# Patient Record
Sex: FEMALE | Race: WHITE | URBAN - METROPOLITAN AREA
[De-identification: names, ages, dates, MRNs, and addresses within clinical notes are randomized per-mention and may not be internally consistent; named-entity substitution may affect disease eponyms.]

---

## 2021-12-30 ENCOUNTER — TELEPHONE (OUTPATIENT)
Dept: FAMILY MEDICINE CLINIC | Age: 61
End: 2021-12-30

## 2022-02-08 ENCOUNTER — VIRTUAL VISIT (OUTPATIENT)
Dept: FAMILY MEDICINE CLINIC | Age: 62
End: 2022-02-08
Payer: COMMERCIAL

## 2022-02-08 DIAGNOSIS — M25.552 HIP PAIN, CHRONIC, LEFT: ICD-10-CM

## 2022-02-08 DIAGNOSIS — G89.29 CHRONIC PAIN OF LEFT KNEE: ICD-10-CM

## 2022-02-08 DIAGNOSIS — G89.29 CHRONIC PAIN OF RIGHT KNEE: ICD-10-CM

## 2022-02-08 DIAGNOSIS — M25.562 CHRONIC PAIN OF LEFT KNEE: ICD-10-CM

## 2022-02-08 DIAGNOSIS — G89.29 HIP PAIN, CHRONIC, LEFT: ICD-10-CM

## 2022-02-08 DIAGNOSIS — M25.561 CHRONIC PAIN OF RIGHT KNEE: ICD-10-CM

## 2022-02-08 DIAGNOSIS — Z76.89 ENCOUNTER TO ESTABLISH CARE: Primary | ICD-10-CM

## 2022-02-08 PROCEDURE — 99204 OFFICE O/P NEW MOD 45 MIN: CPT | Performed by: PHYSICIAN ASSISTANT

## 2022-02-08 RX ORDER — MELOXICAM 15 MG/1
15 TABLET ORAL DAILY
Qty: 90 TABLET | Refills: 1 | Status: SHIPPED | OUTPATIENT
Start: 2022-02-08 | End: 2022-05-06

## 2022-02-08 SDOH — ECONOMIC STABILITY: HOUSING INSECURITY
IN THE LAST 12 MONTHS, WAS THERE A TIME WHEN YOU DID NOT HAVE A STEADY PLACE TO SLEEP OR SLEPT IN A SHELTER (INCLUDING NOW)?: NO

## 2022-02-08 SDOH — ECONOMIC STABILITY: TRANSPORTATION INSECURITY
IN THE PAST 12 MONTHS, HAS THE LACK OF TRANSPORTATION KEPT YOU FROM MEDICAL APPOINTMENTS OR FROM GETTING MEDICATIONS?: NO

## 2022-02-08 SDOH — ECONOMIC STABILITY: FOOD INSECURITY: WITHIN THE PAST 12 MONTHS, THE FOOD YOU BOUGHT JUST DIDN'T LAST AND YOU DIDN'T HAVE MONEY TO GET MORE.: NEVER TRUE

## 2022-02-08 SDOH — ECONOMIC STABILITY: FOOD INSECURITY: WITHIN THE PAST 12 MONTHS, YOU WORRIED THAT YOUR FOOD WOULD RUN OUT BEFORE YOU GOT MONEY TO BUY MORE.: NEVER TRUE

## 2022-02-08 SDOH — ECONOMIC STABILITY: TRANSPORTATION INSECURITY
IN THE PAST 12 MONTHS, HAS LACK OF TRANSPORTATION KEPT YOU FROM MEETINGS, WORK, OR FROM GETTING THINGS NEEDED FOR DAILY LIVING?: NO

## 2022-02-08 SDOH — ECONOMIC STABILITY: HOUSING INSECURITY: IN THE LAST 12 MONTHS, HOW MANY PLACES HAVE YOU LIVED?: 1

## 2022-02-08 SDOH — ECONOMIC STABILITY: INCOME INSECURITY: IN THE LAST 12 MONTHS, WAS THERE A TIME WHEN YOU WERE NOT ABLE TO PAY THE MORTGAGE OR RENT ON TIME?: NO

## 2022-02-08 ASSESSMENT — ENCOUNTER SYMPTOMS
COLOR CHANGE: 0
BACK PAIN: 0
SHORTNESS OF BREATH: 0

## 2022-02-08 ASSESSMENT — PATIENT HEALTH QUESTIONNAIRE - PHQ9
SUM OF ALL RESPONSES TO PHQ QUESTIONS 1-9: 0
SUM OF ALL RESPONSES TO PHQ9 QUESTIONS 1 & 2: 0
SUM OF ALL RESPONSES TO PHQ QUESTIONS 1-9: 0
2. FEELING DOWN, DEPRESSED OR HOPELESS: 0
1. LITTLE INTEREST OR PLEASURE IN DOING THINGS: 0

## 2022-02-08 ASSESSMENT — SOCIAL DETERMINANTS OF HEALTH (SDOH): HOW HARD IS IT FOR YOU TO PAY FOR THE VERY BASICS LIKE FOOD, HOUSING, MEDICAL CARE, AND HEATING?: NOT HARD AT ALL

## 2022-02-08 NOTE — PROGRESS NOTES
2022    TELEHEALTH EVALUATION -- Audio/Visual (During FZFHV-00 public health emergency)    HPI:    Lauren Bender (:  1960) has requested an audio/video evaluation for the following concern(s):    The pt is here to establish care    Arthritis:  Symptoms started one year ago  Location: left hip, radiates down leg, right knee and left knee  Characteristics: sharp pain  Associated symptoms: bone on bone sensation, grinding sensation, left knee does not bend, right knee swells  Denies: heat and erythema  Treatments tried: aleve muscle and joint, advil, icy hot  Aggravated by going up and down stairs, has to   + family hx of rheumatoid arthritis in m. Grandmother        Review of Systems   Constitutional: Positive for activity change. Respiratory: Negative for shortness of breath. Cardiovascular: Negative for chest pain. Musculoskeletal: Positive for arthralgias, gait problem and joint swelling. Negative for back pain. Skin: Negative for color change. Neurological: Positive for weakness. Negative for numbness. Prior to Visit Medications    Medication Sig Taking? Authorizing Provider   Naproxen Sodium (ALEVE PO) Take by mouth Yes Historical Provider, MD   meloxicam (MOBIC) 15 MG tablet Take 1 tablet by mouth daily Yes SONAM Bailey   naproxen (NAPROSYN) 500 MG tablet Take 1 tablet by mouth 2 times daily (with meals)  Eron Umanzor MD   atorvastatin (LIPITOR) 10 MG tablet Take 1 tablet by mouth nightly. Eorn Umanzor MD   vitamin D (ERGOCALCIFEROL) 94225 UNITS CAPS capsule Take 1 capsule by mouth once a week. Imelda White MD   pravastatin (PRAVACHOL) 20 MG tablet Take 1 tablet by mouth every evening.   Imelda White MD       Social History     Tobacco Use    Smoking status: Former Smoker     Packs/day: 0.50     Years: 43.00     Pack years: 21.50     Types: Cigarettes     Quit date: 3/1/2018     Years since quitting: 3.9    Smokeless tobacco: Never Used   Vaping Use    Vaping Use: Some days    Devices: Refillable tank   Substance Use Topics    Alcohol use: No    Drug use: No        Allergies   Allergen Reactions    Asa [Aspirin] Rash    Pcn [Penicillins] Hives and Swelling       PHYSICAL EXAMINATION:  [ INSTRUCTIONS:  \"[x]\" Indicates a positive item  \"[]\" Indicates a negative item  -- DELETE ALL ITEMS NOT EXAMINED]  Vital Signs: (As obtained by patient/caregiver or practitioner observation)    Blood pressure-  Heart rate-    Respiratory rate- 14   Temperature- 98.6 Pulse oximetry-     Constitutional: [x] Appears well-developed and well-nourished [x] No apparent distress      [] Abnormal-   Mental status  [x] Alert and awake  [x] Oriented to person/place/time []Able to follow commands      Eyes:  EOM    [x]  Normal  [] Abnormal-  Sclera  [x]  Normal  [] Abnormal -         Discharge []  None visible  [] Abnormal -    HENT:   [x] Normocephalic, atraumatic. [] Abnormal   [x] Mouth/Throat: Mucous membranes are moist.     External Ears [x] Normal  [] Abnormal-     Neck: [x] No visualized mass     Pulmonary/Chest: [x] Respiratory effort normal.  [x] No visualized signs of difficulty breathing or respiratory distress        [] Abnormal-      Musculoskeletal:   [] Normal gait with no signs of ataxia         [x] Normal range of motion of neck        [x] Abnormal- using cane to ambulate    Neurological:        [] No Facial Asymmetry (Cranial nerve 7 motor function) (limited exam to video visit)          [] No gaze palsy        [] Abnormal-         Skin:        [] No significant exanthematous lesions or discoloration noted on facial skin         [] Abnormal-            Psychiatric:       [] Normal Affect [] No Hallucinations        [] Abnormal-     Other pertinent observable physical exam findings-     ASSESSMENT/PLAN:  1. Encounter to establish care  -  Pt in need of lab work, will have her return in office for a physical    2.  Chronic pain of right knee  - XR KNEE RIGHT (3 VIEWS); Future  - Amilcar Toledo MD, Orthopedic Surgery, [facility]  - meloxicam (MOBIC) 15 MG tablet; Take 1 tablet by mouth daily  Dispense: 90 tablet; Refill: 1    3. Hip pain, chronic, left  - [facility] Jocelynn Vega MD, Orthopedic Surgery, [facility]  - meloxicam (MOBIC) 15 MG tablet; Take 1 tablet by mouth daily  Dispense: 90 tablet; Refill: 1  - XR HIP LEFT (2-3 VIEWS); Future    4. Chronic pain of left knee  - XR KNEE LEFT (3 VIEWS); Future  - Amilcar Toledo MD, Orthopedic Surgery, [facility]  - meloxicam (MOBIC) 15 MG tablet; Take 1 tablet by mouth daily  Dispense: 90 tablet; Refill: 1      Return for Physical exam.    Emilie Sevilla, was evaluated through a synchronous (real-time) audio-video encounter. The patient (or guardian if applicable) is aware that this is a billable service, which includes applicable co-pays. This Virtual Visit was conducted with patient's (and/or legal guardian's) consent. The visit was conducted pursuant to the emergency declaration under the [facility], [address] [place] [address] authority and the [employer] and [employer] General Act. Patient identification was verified, and a caregiver was present when appropriate. The patient was located at home in a state where the provider was licensed to provide care. Total time spent on this encounter: Not billed by time    --SONAM Ellison on 2/8/2022 at 10:17 AM    An electronic signature was used to authenticate this note.

## 2022-02-21 ENCOUNTER — OFFICE VISIT (OUTPATIENT)
Dept: ORTHOPEDIC SURGERY | Age: 62
End: 2022-02-21
Payer: COMMERCIAL

## 2022-02-21 VITALS — BODY MASS INDEX: 32.96 KG/M2 | WEIGHT: 210 LBS | HEIGHT: 67 IN

## 2022-02-21 DIAGNOSIS — M25.561 PAIN IN BOTH KNEES, UNSPECIFIED CHRONICITY: Primary | ICD-10-CM

## 2022-02-21 DIAGNOSIS — M17.11 PRIMARY OSTEOARTHRITIS OF RIGHT KNEE: ICD-10-CM

## 2022-02-21 DIAGNOSIS — M17.12 PRIMARY OSTEOARTHRITIS OF LEFT KNEE: ICD-10-CM

## 2022-02-21 DIAGNOSIS — M25.562 PAIN IN BOTH KNEES, UNSPECIFIED CHRONICITY: Primary | ICD-10-CM

## 2022-02-21 PROCEDURE — 99204 OFFICE O/P NEW MOD 45 MIN: CPT | Performed by: ORTHOPAEDIC SURGERY

## 2022-02-21 PROCEDURE — 20610 DRAIN/INJ JOINT/BURSA W/O US: CPT | Performed by: ORTHOPAEDIC SURGERY

## 2022-02-21 RX ORDER — TRIAMCINOLONE ACETONIDE 40 MG/ML
40 INJECTION, SUSPENSION INTRA-ARTICULAR; INTRAMUSCULAR ONCE
Status: COMPLETED | OUTPATIENT
Start: 2022-02-21 | End: 2022-02-21

## 2022-02-21 RX ORDER — ROPIVACAINE HYDROCHLORIDE 5 MG/ML
20 INJECTION, SOLUTION EPIDURAL; INFILTRATION; PERINEURAL ONCE
Status: COMPLETED | OUTPATIENT
Start: 2022-02-21 | End: 2022-02-21

## 2022-02-21 RX ADMIN — ROPIVACAINE HYDROCHLORIDE 20 MG: 5 INJECTION, SOLUTION EPIDURAL; INFILTRATION; PERINEURAL at 11:47

## 2022-02-21 RX ADMIN — TRIAMCINOLONE ACETONIDE 40 MG: 40 INJECTION, SUSPENSION INTRA-ARTICULAR; INTRAMUSCULAR at 11:48

## 2022-02-21 NOTE — PROGRESS NOTES
ORTHOPAEDIC SURGERY H&P / CONSULTATION NOTE    Chief complaint:   Chief Complaint   Patient presents with    Knee Pain     BILATERL KNEE PAIN        History of present illness: The patient is a 59 y.o. female with subjective symptoms of bilateral knee pain left greater than right. The chief complaint is located at left knee greater than right knee. Duration of symptoms has been for over 1 year. The severity of symptoms is rated at 4/10 pain but can be as high as 10/10 pain on intake form. Patient states chronic pain. She has previous diagnoses of bilateral knee osteoarthritis with the right knee having more of a cosmetic deformity as not needed than the left knee. She has been taking meloxicam prescribed by her primary care doctor for the last week and a half and she does feel that there is been some improvement in her baseline symptoms. She is not done any physical therapy. She denies injections. She presents today for initial consultation. She denies gross instability of the knee. She does state occasional swelling. The patient has tried the below listed items prior to today's consultation for above listed chief complaint.     +   Over-the-counter anti-inflammatories/prescription medication anti-inflammatory. -   Physical therapy / guided home exercise program -     -   Previous corticosteroid injections    Past medical history:    Past Medical History:   Diagnosis Date    Foot pain, bilateral     Hypercholesterolemia 2014        Past surgical history:    Past Surgical History:   Procedure Laterality Date     SECTION          Allergies:     Allergies   Allergen Reactions    Asa [Aspirin] Rash    Pcn [Penicillins] Hives and Swelling         Medications:   Current Outpatient Medications:     Naproxen Sodium (ALEVE PO), Take by mouth, Disp: , Rfl:     meloxicam (MOBIC) 15 MG tablet, Take 1 tablet by mouth daily, Disp: 90 tablet, Rfl: 1    naproxen (NAPROSYN) 500 MG tablet, Take 1 tablet by mouth 2 times daily (with meals), Disp: 60 tablet, Rfl: 3    atorvastatin (LIPITOR) 10 MG tablet, Take 1 tablet by mouth nightly., Disp: 30 tablet, Rfl: 3    vitamin D (ERGOCALCIFEROL) 13472 UNITS CAPS capsule, Take 1 capsule by mouth once a week., Disp: 5 capsule, Rfl: 6    pravastatin (PRAVACHOL) 20 MG tablet, Take 1 tablet by mouth every evening., Disp: 30 tablet, Rfl: 3     Social history: Denies IV drug use. Social History     Socioeconomic History    Marital status:      Spouse name: Not on file    Number of children: Not on file    Years of education: Not on file    Highest education level: Not on file   Occupational History    Not on file   Tobacco Use    Smoking status: Former Smoker     Packs/day: 0.50     Years: 43.00     Pack years: 21.50     Types: Cigarettes     Quit date: 3/1/2018     Years since quitting: 3.9    Smokeless tobacco: Never Used   Vaping Use    Vaping Use: Some days    Devices: Refillable tank   Substance and Sexual Activity    Alcohol use: No    Drug use: No    Sexual activity: Not on file   Other Topics Concern    Not on file   Social History Narrative    Not on file     Social Determinants of Health     Financial Resource Strain: Low Risk     Difficulty of Paying Living Expenses: Not hard at all   Food Insecurity: No Food Insecurity    Worried About Running Out of Food in the Last Year: Never true    Dajuan of Food in the Last Year: Never true   Transportation Needs: No Transportation Needs    Lack of Transportation (Medical): No    Lack of Transportation (Non-Medical):  No   Physical Activity:     Days of Exercise per Week: Not on file    Minutes of Exercise per Session: Not on file   Stress:     Feeling of Stress : Not on file   Social Connections:     Frequency of Communication with Friends and Family: Not on file    Frequency of Social Gatherings with Friends and Family: Not on file    Attends Rastafarian Services: Not on file   [facility] Active Member of Clubs or Organizations: Not on file    Attends Club or Organization Meetings: Not on file    Marital Status: Not on file   Intimate Partner Violence:     Fear of Current or Ex-Partner: Not on file    Emotionally Abused: Not on file    Physically Abused: Not on file    Sexually Abused: Not on file   Housing Stability: [address] Unable to Pay for Housing in the Last Year: No    Number of [place] in the Last Year: 1    Unstable Housing in the Last Year: No     Tobacco use. 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 Patient denies any significant numbness or tingling at baseline as it relates to this presenting symptom/chief complaint. The patient denies any significant problems with skin or any significant allergies. Physical examination:  Body mass index is 32.89 kg/m².   AAOx3, NCAT  EOMI  MMM  RR  Unlabored breathing, no wheezing  Skin intact BUE and BLE, warm and moist  Bilateral lower extremity examination specific to subjective symptoms  Exam Right Lower Extremity  Trace effusion, 5/125/0 active ROM (E/F/Lag), same P assive ROM (E/F/Lag), negative anterior Drawer, negative posterior Drawer,   Stable varus/valgus at 0 and 30? with a valgus knee positioning of the right knee and unable to correct to neutral alignment, none TTP Joint Line, negative Shadi, positive Tripp's    Exam Left Lower Extremity  Trace effusion,      5/110/0 limited secondary to pain active ROM (E/F/Lag), same P assive ROM (E/F/Lag), negative anterior Drawer, negative posterior Drawer,  Stable varus/valgus at 0 and 30? relatively neutral alignment,    none TTP Joint Line, trace medial Shadi,   positive Tripp's     Bilateral lower extremity  Skin intact throughout  5/5 IP Q H TA G EHL  SILT DP SP LP MP S S  +2 DP pulse    Diagnostic imaging:  MY READ:  Four view bilateral knees 2/21/2022: Severe tricompartmental arthrosis right greater than left with a valgus knee positioning right knee. Relatively neutral alignment left    Pertinent lab work: None     Diagnosis Orders   1. Pain in both knees, unspecified chronicity  XR KNEE LEFT (MIN 4 VIEWS)    XR KNEE RIGHT (MIN 4 VIEWS)   2. Primary osteoarthritis of left knee  [facility] [place] (Ortho & Sports)-OSR    triamcinolone acetonide (KENALOG-40) injection 40 mg    KS ARTHROCENTESIS ASPIR&/INJ MAJOR JT/BURSA W/O US    ropivacaine (NAROPIN) 0.5% injection 20 mg   3. Primary osteoarthritis of right knee  [facility] (Ortho & [facility])-OSR       Assessment and plan: 59 y.o. female with current subjective symptoms and physical exam findings with diagnostic imaging correlating to bilateral knee osteoarthritis left greater than right.  -Time of 23 minutes was spent coordinating and discussing the clinical findings, reviewing diagnostic imaging as indicated, coordinating care with prior notes review and current clinical encounter documentation as it pertains to the patient's presenting subjective symptoms and diagnoses. -I reviewed with the patient the imaging findings as well as clinical exam and  how it correlates to subjective symptoms.  -I had a pleasant discussion with the patient. Additional time was taken given bilateral knee involvement and review with the patient somewhat windswept knee pattern. I reviewed with her that currently her clinical examination correlates to her radiographic findings by way of knee osteoarthritis.   Most pronounced radiographically as the right knee as well as cosmetically however she is doing quite well with this and is without significant pain. She states majority of her pain is primarily in the left knee. This is primarily anterior medial with occasional lateral base pain. I reviewed with her conservative treatment options which she is already started Mobic a week and a half ago and so I think this is reasonable for her to utilize for the next 4 to 6 weeks as needed as prescribed by her primary care doctor.  -I did offer formal physical therapy to include aqua therapy as she is not interested in trying to lose some weight as well as recondition her knees in the setting of [facility] for knee osteoarthritis treatment  -As her pain has been ongoing for over a year and truly is her left knee that is bothering her with range of motion, I did offer corticosteroid injection in the left knee intra-articular space. Understand the risks and benefits of this she wishes to proceed. --The patient was educated to the risks (infection and skin blanching to name a few) and benefits of the injection and understanding these risks and benefits, the patient wished to proceed and a verbal consent was obtained. If the patient verbalized the presence of diabetes or rheumatologic condition on chronic immunosuppresseants as a comorbidity upon direct questioning, an additional discussion was had detailing the potential increased risk of infection and potential increase in FSBG and to monitor FSBG and adjust medications as needed.  -After sterile prep of the left knee, a five cc corticosteroid injection (4cc ropivicaine and 1cc kenolog 40) was administered into the intra-articular space left knee. The patient tolerated the procedure well and started to have immediate improvement in pain/symptoms.   She was able to flex the knee to 120 degrees thereafter and better able to don doff clothing  -All questions answered to the patient's satisfaction and the patient expressed understanding and agreement with the above listed treatment plan  -Follow up in 12 weeks as needed  -Thank you for the clinical consultation and allowing me to participate in the patient's care. Electronically signed by Renee Sow MD on 2/21/22 at 11:33 AM JOHN Duran Res, MD       Orthopaedic Surgery-Sports Medicine        Disclaimer: This note was dictated with voice recognition software. Though review and correction are routinely performed, please contact the office/medical records for any errors requiring correction.

## 2022-03-08 ENCOUNTER — OFFICE VISIT (OUTPATIENT)
Dept: FAMILY MEDICINE CLINIC | Age: 62
End: 2022-03-08
Payer: COMMERCIAL

## 2022-03-08 VITALS
WEIGHT: 225 LBS | DIASTOLIC BLOOD PRESSURE: 92 MMHG | HEART RATE: 71 BPM | OXYGEN SATURATION: 97 % | TEMPERATURE: 98.4 F | BODY MASS INDEX: 36.16 KG/M2 | HEIGHT: 66 IN | SYSTOLIC BLOOD PRESSURE: 172 MMHG

## 2022-03-08 DIAGNOSIS — R60.0 BILATERAL LOWER EXTREMITY EDEMA: ICD-10-CM

## 2022-03-08 DIAGNOSIS — Z00.00 PHYSICAL EXAM, ANNUAL: Primary | ICD-10-CM

## 2022-03-08 DIAGNOSIS — Z12.11 COLON CANCER SCREENING: ICD-10-CM

## 2022-03-08 DIAGNOSIS — Z12.4 CERVICAL CANCER SCREENING: ICD-10-CM

## 2022-03-08 DIAGNOSIS — Z12.31 ENCOUNTER FOR SCREENING MAMMOGRAM FOR MALIGNANT NEOPLASM OF BREAST: ICD-10-CM

## 2022-03-08 DIAGNOSIS — Z00.00 PHYSICAL EXAM, ANNUAL: ICD-10-CM

## 2022-03-08 LAB
A/G RATIO: 1.7 (ref 1.1–2.2)
ALBUMIN SERPL-MCNC: 4.3 G/DL (ref 3.4–5)
ALP BLD-CCNC: 72 U/L (ref 40–129)
ALT SERPL-CCNC: 16 U/L (ref 10–40)
ANION GAP SERPL CALCULATED.3IONS-SCNC: 15 MMOL/L (ref 3–16)
AST SERPL-CCNC: 13 U/L (ref 15–37)
BILIRUB SERPL-MCNC: 0.4 MG/DL (ref 0–1)
BUN BLDV-MCNC: 13 MG/DL (ref 7–20)
CALCIUM SERPL-MCNC: 9.4 MG/DL (ref 8.3–10.6)
CHLORIDE BLD-SCNC: 103 MMOL/L (ref 99–110)
CHOLESTEROL, TOTAL: 249 MG/DL (ref 0–199)
CO2: 22 MMOL/L (ref 21–32)
CREAT SERPL-MCNC: 0.7 MG/DL (ref 0.6–1.2)
GFR AFRICAN AMERICAN: >60
GFR NON-AFRICAN AMERICAN: >60
GLUCOSE BLD-MCNC: 101 MG/DL (ref 70–99)
HCT VFR BLD CALC: 40.4 % (ref 36–48)
HDLC SERPL-MCNC: 51 MG/DL (ref 40–60)
HEMOGLOBIN: 13.2 G/DL (ref 12–16)
LDL CHOLESTEROL CALCULATED: 167 MG/DL
MCH RBC QN AUTO: 29 PG (ref 26–34)
MCHC RBC AUTO-ENTMCNC: 32.7 G/DL (ref 31–36)
MCV RBC AUTO: 88.5 FL (ref 80–100)
PDW BLD-RTO: 15.2 % (ref 12.4–15.4)
PLATELET # BLD: 277 K/UL (ref 135–450)
PMV BLD AUTO: 7.9 FL (ref 5–10.5)
POTASSIUM SERPL-SCNC: 4.6 MMOL/L (ref 3.5–5.1)
PRO-BNP: 195 PG/ML (ref 0–124)
RBC # BLD: 4.56 M/UL (ref 4–5.2)
SODIUM BLD-SCNC: 140 MMOL/L (ref 136–145)
TOTAL PROTEIN: 6.9 G/DL (ref 6.4–8.2)
TRIGL SERPL-MCNC: 153 MG/DL (ref 0–150)
TSH REFLEX: 0.93 UIU/ML (ref 0.27–4.2)
VLDLC SERPL CALC-MCNC: 31 MG/DL
WBC # BLD: 7.3 K/UL (ref 4–11)

## 2022-03-08 PROCEDURE — 99396 PREV VISIT EST AGE 40-64: CPT | Performed by: PHYSICIAN ASSISTANT

## 2022-03-08 ASSESSMENT — ENCOUNTER SYMPTOMS
BLOOD IN STOOL: 0
SHORTNESS OF BREATH: 0
WHEEZING: 0

## 2022-03-09 RX ORDER — ATORVASTATIN CALCIUM 20 MG/1
20 TABLET, FILM COATED ORAL DAILY
Qty: 90 TABLET | Refills: 1 | Status: SHIPPED | OUTPATIENT
Start: 2022-03-09

## 2022-03-09 RX ORDER — POTASSIUM CHLORIDE 750 MG/1
10 TABLET, EXTENDED RELEASE ORAL DAILY
Qty: 90 TABLET | Refills: 1 | Status: SHIPPED | OUTPATIENT
Start: 2022-03-09

## 2022-03-09 RX ORDER — FUROSEMIDE 20 MG/1
20 TABLET ORAL DAILY
Qty: 90 TABLET | Refills: 1 | Status: SHIPPED | OUTPATIENT
Start: 2022-03-09

## 2022-05-06 ENCOUNTER — PATIENT MESSAGE (OUTPATIENT)
Dept: FAMILY MEDICINE CLINIC | Age: 62
End: 2022-05-06

## 2022-05-06 DIAGNOSIS — M25.561 CHRONIC PAIN OF RIGHT KNEE: Primary | ICD-10-CM

## 2022-05-06 DIAGNOSIS — G89.29 HIP PAIN, CHRONIC, LEFT: ICD-10-CM

## 2022-05-06 DIAGNOSIS — M25.562 CHRONIC PAIN OF LEFT KNEE: ICD-10-CM

## 2022-05-06 DIAGNOSIS — M25.552 HIP PAIN, CHRONIC, LEFT: ICD-10-CM

## 2022-05-06 DIAGNOSIS — G89.29 CHRONIC PAIN OF LEFT KNEE: ICD-10-CM

## 2022-05-06 DIAGNOSIS — G89.29 CHRONIC PAIN OF RIGHT KNEE: Primary | ICD-10-CM

## 2022-05-06 RX ORDER — CELECOXIB 200 MG/1
200 CAPSULE ORAL DAILY
Qty: 90 CAPSULE | Refills: 1 | Status: SHIPPED | OUTPATIENT
Start: 2022-05-06

## 2022-05-06 NOTE — TELEPHONE ENCOUNTER
From: Jam Rodriguez  To: Ainsley Flores  Sent: 5/6/2022 8:29 AM EDT  Subject: Anti inflamatory    Good Morning Promise  I wanted to check in with you it has been almost 90 days on the meds and i am still swollen and in pain actually it is worse now than ehat it was. Dr. Ky De Los Santos gave me a shot said it would ladt about 2 or 3 months actually it lasted about 2 weeks i cant afford physical therapy i have to pay it out of pocket I am just asking is there another medication that i can take or maybe a second opion i can get I am tolerating the pain but it is getting so unbearable.  Thank you WERO

## 2022-06-01 ENCOUNTER — TELEPHONE (OUTPATIENT)
Dept: FAMILY MEDICINE CLINIC | Age: 62
End: 2022-06-01

## 2022-06-01 NOTE — TELEPHONE ENCOUNTER
Pt given an order for mammogram and cologuard at her appointment three months ago.  Can we check that she received her cologuard box and remind her to schedule her mammogram? Thank you

## 2023-01-27 ENCOUNTER — TELEPHONE (OUTPATIENT)
Dept: FAMILY MEDICINE CLINIC | Age: 63
End: 2023-01-27

## 2023-01-27 NOTE — TELEPHONE ENCOUNTER
Contacted the Pt to confirm she received her Cologuard Kit. The PT stated yes and she will process it this week.

## 2023-08-15 ENCOUNTER — TELEPHONE (OUTPATIENT)
Dept: FAMILY MEDICINE CLINIC | Age: 63
End: 2023-08-15

## 2024-02-20 ENCOUNTER — TELEPHONE (OUTPATIENT)
Dept: FAMILY MEDICINE CLINIC | Age: 64
End: 2024-02-20

## 2024-03-18 ASSESSMENT — PATIENT HEALTH QUESTIONNAIRE - PHQ9
4. FEELING TIRED OR HAVING LITTLE ENERGY: MORE THAN HALF THE DAYS
SUM OF ALL RESPONSES TO PHQ QUESTIONS 1-9: 8
SUM OF ALL RESPONSES TO PHQ QUESTIONS 1-9: 8
5. POOR APPETITE OR OVEREATING: NEARLY EVERY DAY
1. LITTLE INTEREST OR PLEASURE IN DOING THINGS: NOT AT ALL
2. FEELING DOWN, DEPRESSED OR HOPELESS: NOT AT ALL
3. TROUBLE FALLING OR STAYING ASLEEP: NOT AT ALL
SUM OF ALL RESPONSES TO PHQ9 QUESTIONS 1 & 2: 0
2. FEELING DOWN, DEPRESSED OR HOPELESS: NOT AT ALL
6. FEELING BAD ABOUT YOURSELF - OR THAT YOU ARE A FAILURE OR HAVE LET YOURSELF OR YOUR FAMILY DOWN: SEVERAL DAYS
7. TROUBLE CONCENTRATING ON THINGS, SUCH AS READING THE NEWSPAPER OR WATCHING TELEVISION: NOT AT ALL
10. IF YOU CHECKED OFF ANY PROBLEMS, HOW DIFFICULT HAVE THESE PROBLEMS MADE IT FOR YOU TO DO YOUR WORK, TAKE CARE OF THINGS AT HOME, OR GET ALONG WITH OTHER PEOPLE: NOT DIFFICULT AT ALL
SUM OF ALL RESPONSES TO PHQ9 QUESTIONS 1 & 2: 0
SUM OF ALL RESPONSES TO PHQ QUESTIONS 1-9: 8
9. THOUGHTS THAT YOU WOULD BE BETTER OFF DEAD, OR OF HURTING YOURSELF: MORE THAN HALF THE DAYS
8. MOVING OR SPEAKING SO SLOWLY THAT OTHER PEOPLE COULD HAVE NOTICED. OR THE OPPOSITE, BEING SO FIGETY OR RESTLESS THAT YOU HAVE BEEN MOVING AROUND A LOT MORE THAN USUAL: NOT AT ALL
SUM OF ALL RESPONSES TO PHQ QUESTIONS 1-9: 6
1. LITTLE INTEREST OR PLEASURE IN DOING THINGS: NOT AT ALL

## 2024-03-19 ENCOUNTER — OFFICE VISIT (OUTPATIENT)
Dept: FAMILY MEDICINE CLINIC | Age: 64
End: 2024-03-19
Payer: COMMERCIAL

## 2024-03-19 VITALS
TEMPERATURE: 97.3 F | HEART RATE: 73 BPM | BODY MASS INDEX: 35.48 KG/M2 | SYSTOLIC BLOOD PRESSURE: 180 MMHG | WEIGHT: 220.8 LBS | DIASTOLIC BLOOD PRESSURE: 90 MMHG | HEIGHT: 66 IN | OXYGEN SATURATION: 99 %

## 2024-03-19 DIAGNOSIS — E78.00 HYPERCHOLESTEROLEMIA: ICD-10-CM

## 2024-03-19 DIAGNOSIS — Z23 NEED FOR PNEUMOCOCCAL 20-VALENT CONJUGATE VACCINATION: ICD-10-CM

## 2024-03-19 DIAGNOSIS — Z12.11 COLON CANCER SCREENING: ICD-10-CM

## 2024-03-19 DIAGNOSIS — R06.00 DYSPNEA, UNSPECIFIED TYPE: ICD-10-CM

## 2024-03-19 DIAGNOSIS — E66.01 SEVERE OBESITY (BMI 35.0-39.9) WITH COMORBIDITY (HCC): ICD-10-CM

## 2024-03-19 DIAGNOSIS — Z12.31 ENCOUNTER FOR SCREENING MAMMOGRAM FOR MALIGNANT NEOPLASM OF BREAST: ICD-10-CM

## 2024-03-19 DIAGNOSIS — I10 ESSENTIAL HYPERTENSION: Primary | ICD-10-CM

## 2024-03-19 DIAGNOSIS — Z78.0 POST-MENOPAUSAL: ICD-10-CM

## 2024-03-19 DIAGNOSIS — R03.0 ELEVATED BLOOD PRESSURE READING: ICD-10-CM

## 2024-03-19 LAB
ALBUMIN SERPL-MCNC: 4.3 G/DL (ref 3.4–5)
ALBUMIN/GLOB SERPL: 1.6 {RATIO} (ref 1.1–2.2)
ALP SERPL-CCNC: 73 U/L (ref 40–129)
ALT SERPL-CCNC: 9 U/L (ref 10–40)
ANION GAP SERPL CALCULATED.3IONS-SCNC: 13 MMOL/L (ref 3–16)
AST SERPL-CCNC: 13 U/L (ref 15–37)
BILIRUB SERPL-MCNC: <0.2 MG/DL (ref 0–1)
BUN SERPL-MCNC: 17 MG/DL (ref 7–20)
CALCIUM SERPL-MCNC: 9.4 MG/DL (ref 8.3–10.6)
CHLORIDE SERPL-SCNC: 103 MMOL/L (ref 99–110)
CHOLEST SERPL-MCNC: 216 MG/DL (ref 0–199)
CO2 SERPL-SCNC: 23 MMOL/L (ref 21–32)
CREAT SERPL-MCNC: 0.7 MG/DL (ref 0.6–1.2)
DEPRECATED RDW RBC AUTO: 14.3 % (ref 12.4–15.4)
GFR SERPLBLD CREATININE-BSD FMLA CKD-EPI: >60 ML/MIN/{1.73_M2}
GLUCOSE SERPL-MCNC: 104 MG/DL (ref 70–99)
HCT VFR BLD AUTO: 39.6 % (ref 36–48)
HDLC SERPL-MCNC: 49 MG/DL (ref 40–60)
HGB BLD-MCNC: 13.1 G/DL (ref 12–16)
LDLC SERPL CALC-MCNC: 148 MG/DL
MCH RBC QN AUTO: 30 PG (ref 26–34)
MCHC RBC AUTO-ENTMCNC: 33.2 G/DL (ref 31–36)
MCV RBC AUTO: 90.4 FL (ref 80–100)
NT-PROBNP SERPL-MCNC: 171 PG/ML (ref 0–124)
PLATELET # BLD AUTO: 330 K/UL (ref 135–450)
PMV BLD AUTO: 8.8 FL (ref 5–10.5)
POTASSIUM SERPL-SCNC: 4.9 MMOL/L (ref 3.5–5.1)
PROT SERPL-MCNC: 7 G/DL (ref 6.4–8.2)
RBC # BLD AUTO: 4.38 M/UL (ref 4–5.2)
SODIUM SERPL-SCNC: 139 MMOL/L (ref 136–145)
TRIGL SERPL-MCNC: 95 MG/DL (ref 0–150)
VLDLC SERPL CALC-MCNC: 19 MG/DL
WBC # BLD AUTO: 8.7 K/UL (ref 4–11)

## 2024-03-19 PROCEDURE — 1123F ACP DISCUSS/DSCN MKR DOCD: CPT | Performed by: PHYSICIAN ASSISTANT

## 2024-03-19 PROCEDURE — 3077F SYST BP >= 140 MM HG: CPT | Performed by: PHYSICIAN ASSISTANT

## 2024-03-19 PROCEDURE — G0009 ADMIN PNEUMOCOCCAL VACCINE: HCPCS | Performed by: PHYSICIAN ASSISTANT

## 2024-03-19 PROCEDURE — 3079F DIAST BP 80-89 MM HG: CPT | Performed by: PHYSICIAN ASSISTANT

## 2024-03-19 PROCEDURE — 99214 OFFICE O/P EST MOD 30 MIN: CPT | Performed by: PHYSICIAN ASSISTANT

## 2024-03-19 PROCEDURE — 90677 PCV20 VACCINE IM: CPT | Performed by: PHYSICIAN ASSISTANT

## 2024-03-19 RX ORDER — FUROSEMIDE 20 MG/1
20 TABLET ORAL DAILY
Qty: 90 TABLET | Refills: 1 | Status: SHIPPED | OUTPATIENT
Start: 2024-03-19

## 2024-03-19 RX ORDER — ACETAMINOPHEN 500 MG
500 TABLET ORAL EVERY 6 HOURS PRN
COMMUNITY

## 2024-03-19 RX ORDER — POTASSIUM CHLORIDE 750 MG/1
10 TABLET, EXTENDED RELEASE ORAL DAILY
Qty: 90 TABLET | Refills: 1 | Status: SHIPPED | OUTPATIENT
Start: 2024-03-19

## 2024-03-19 RX ORDER — LISINOPRIL 10 MG/1
10 TABLET ORAL DAILY
Qty: 30 TABLET | Refills: 0 | Status: SHIPPED | OUTPATIENT
Start: 2024-03-19

## 2024-03-19 SDOH — ECONOMIC STABILITY: FOOD INSECURITY: WITHIN THE PAST 12 MONTHS, THE FOOD YOU BOUGHT JUST DIDN'T LAST AND YOU DIDN'T HAVE MONEY TO GET MORE.: NEVER TRUE

## 2024-03-19 SDOH — ECONOMIC STABILITY: INCOME INSECURITY: HOW HARD IS IT FOR YOU TO PAY FOR THE VERY BASICS LIKE FOOD, HOUSING, MEDICAL CARE, AND HEATING?: NOT HARD AT ALL

## 2024-03-19 SDOH — ECONOMIC STABILITY: FOOD INSECURITY: WITHIN THE PAST 12 MONTHS, YOU WORRIED THAT YOUR FOOD WOULD RUN OUT BEFORE YOU GOT MONEY TO BUY MORE.: NEVER TRUE

## 2024-03-19 ASSESSMENT — ENCOUNTER SYMPTOMS
SHORTNESS OF BREATH: 1
VOMITING: 0
CHEST TIGHTNESS: 0
BLOOD IN STOOL: 0
CONSTIPATION: 1
DIARRHEA: 0
WHEEZING: 0
ABDOMINAL PAIN: 0
NAUSEA: 0
COLOR CHANGE: 0

## 2024-03-19 NOTE — PROGRESS NOTES
normal.      Palpations: Abdomen is soft.   Musculoskeletal:      Right lower leg: No edema.      Left lower leg: No edema.   Neurological:      General: No focal deficit present.      Mental Status: She is alert and oriented to person, place, and time.      Cranial Nerves: No cranial nerve deficit.   Psychiatric:         Mood and Affect: Mood normal.         Behavior: Behavior normal.         Thought Content: Thought content normal.         Judgment: Judgment normal.                  An electronic signature was used to authenticate this note.    --SONAM Farr

## 2024-03-20 DIAGNOSIS — R60.0 BILATERAL LOWER EXTREMITY EDEMA: ICD-10-CM

## 2024-03-20 DIAGNOSIS — R73.01 IMPAIRED FASTING GLUCOSE: Primary | ICD-10-CM

## 2024-03-20 DIAGNOSIS — R73.01 IMPAIRED FASTING GLUCOSE: ICD-10-CM

## 2024-03-20 LAB
EST. AVERAGE GLUCOSE BLD GHB EST-MCNC: 108.3 MG/DL
HBA1C MFR BLD: 5.4 %

## 2024-03-20 RX ORDER — ATORVASTATIN CALCIUM 40 MG/1
40 TABLET, FILM COATED ORAL DAILY
Qty: 90 TABLET | Refills: 1 | Status: SHIPPED | OUTPATIENT
Start: 2024-03-20

## 2024-03-26 ENCOUNTER — PATIENT MESSAGE (OUTPATIENT)
Dept: FAMILY MEDICINE CLINIC | Age: 64
End: 2024-03-26

## 2024-03-26 NOTE — TELEPHONE ENCOUNTER
From: Rosario Riley  To: Promise Taylor  Sent: 3/26/2024 11:25 AM EDT  Subject: Inflammation pain    Good Morning Promise    I have tried for the last week to deal with the pain we talked about,but I have been sticking with the medicine you gave me and just taking the arthritis Tylenol but it is very painful to walk or get up and down. Do you have any suggestions until my appointment with you on the fourth of April to get by.  Thank you  Rosario Riley

## 2024-03-27 RX ORDER — MELOXICAM 15 MG/1
15 TABLET ORAL DAILY
Qty: 30 TABLET | Refills: 0 | Status: SHIPPED | OUTPATIENT
Start: 2024-03-27

## 2024-04-01 SDOH — HEALTH STABILITY: PHYSICAL HEALTH: ON AVERAGE, HOW MANY MINUTES DO YOU ENGAGE IN EXERCISE AT THIS LEVEL?: 0 MIN

## 2024-04-01 SDOH — HEALTH STABILITY: PHYSICAL HEALTH: ON AVERAGE, HOW MANY DAYS PER WEEK DO YOU ENGAGE IN MODERATE TO STRENUOUS EXERCISE (LIKE A BRISK WALK)?: 0 DAYS

## 2024-04-01 ASSESSMENT — PATIENT HEALTH QUESTIONNAIRE - PHQ9
10. IF YOU CHECKED OFF ANY PROBLEMS, HOW DIFFICULT HAVE THESE PROBLEMS MADE IT FOR YOU TO DO YOUR WORK, TAKE CARE OF THINGS AT HOME, OR GET ALONG WITH OTHER PEOPLE: NOT DIFFICULT AT ALL
9. THOUGHTS THAT YOU WOULD BE BETTER OFF DEAD, OR OF HURTING YOURSELF: NOT AT ALL
5. POOR APPETITE OR OVEREATING: NOT AT ALL
DEPRESSION UNABLE TO ASSESS: FUNCTIONAL CAPACITY MOTIVATION LIMITS ACCURACY
SUM OF ALL RESPONSES TO PHQ QUESTIONS 1-9: 0
6. FEELING BAD ABOUT YOURSELF - OR THAT YOU ARE A FAILURE OR HAVE LET YOURSELF OR YOUR FAMILY DOWN: NOT AT ALL
SUM OF ALL RESPONSES TO PHQ QUESTIONS 1-9: 0
3. TROUBLE FALLING OR STAYING ASLEEP: NOT AT ALL
4. FEELING TIRED OR HAVING LITTLE ENERGY: NOT AT ALL
SUM OF ALL RESPONSES TO PHQ QUESTIONS 1-9: 0
SUM OF ALL RESPONSES TO PHQ9 QUESTIONS 1 & 2: 0
2. FEELING DOWN, DEPRESSED OR HOPELESS: NOT AT ALL
1. LITTLE INTEREST OR PLEASURE IN DOING THINGS: NOT AT ALL
7. TROUBLE CONCENTRATING ON THINGS, SUCH AS READING THE NEWSPAPER OR WATCHING TELEVISION: NOT AT ALL
SUM OF ALL RESPONSES TO PHQ QUESTIONS 1-9: 0
8. MOVING OR SPEAKING SO SLOWLY THAT OTHER PEOPLE COULD HAVE NOTICED. OR THE OPPOSITE, BEING SO FIGETY OR RESTLESS THAT YOU HAVE BEEN MOVING AROUND A LOT MORE THAN USUAL: NOT AT ALL

## 2024-04-01 ASSESSMENT — LIFESTYLE VARIABLES
HOW OFTEN DO YOU HAVE A DRINK CONTAINING ALCOHOL: 1
HOW MANY STANDARD DRINKS CONTAINING ALCOHOL DO YOU HAVE ON A TYPICAL DAY: 0
HOW OFTEN DO YOU HAVE A DRINK CONTAINING ALCOHOL: NEVER
HOW MANY STANDARD DRINKS CONTAINING ALCOHOL DO YOU HAVE ON A TYPICAL DAY: PATIENT DOES NOT DRINK
HOW OFTEN DO YOU HAVE SIX OR MORE DRINKS ON ONE OCCASION: 1

## 2024-04-04 ENCOUNTER — OFFICE VISIT (OUTPATIENT)
Dept: FAMILY MEDICINE CLINIC | Age: 64
End: 2024-04-04
Payer: COMMERCIAL

## 2024-04-04 ENCOUNTER — HOSPITAL ENCOUNTER (OUTPATIENT)
Dept: WOMENS IMAGING | Age: 64
Discharge: HOME | End: 2024-04-04
Payer: COMMERCIAL

## 2024-04-04 VITALS
HEART RATE: 100 BPM | HEIGHT: 67 IN | WEIGHT: 217 LBS | BODY MASS INDEX: 34.06 KG/M2 | DIASTOLIC BLOOD PRESSURE: 82 MMHG | OXYGEN SATURATION: 98 % | SYSTOLIC BLOOD PRESSURE: 152 MMHG

## 2024-04-04 VITALS — WEIGHT: 217 LBS | BODY MASS INDEX: 34.06 KG/M2 | HEIGHT: 67 IN

## 2024-04-04 DIAGNOSIS — I10 ESSENTIAL HYPERTENSION: ICD-10-CM

## 2024-04-04 DIAGNOSIS — Z12.31 ENCOUNTER FOR SCREENING MAMMOGRAM FOR MALIGNANT NEOPLASM OF BREAST: ICD-10-CM

## 2024-04-04 DIAGNOSIS — Z78.0 POST-MENOPAUSAL: ICD-10-CM

## 2024-04-04 DIAGNOSIS — E66.01 SEVERE OBESITY (BMI 35.0-39.9) WITH COMORBIDITY (HCC): ICD-10-CM

## 2024-04-04 DIAGNOSIS — M15.9 PRIMARY OSTEOARTHRITIS INVOLVING MULTIPLE JOINTS: ICD-10-CM

## 2024-04-04 DIAGNOSIS — Z00.00 WELCOME TO MEDICARE PREVENTIVE VISIT: Primary | ICD-10-CM

## 2024-04-04 PROBLEM — M15.0 PRIMARY OSTEOARTHRITIS INVOLVING MULTIPLE JOINTS: Status: ACTIVE | Noted: 2024-04-04

## 2024-04-04 LAB
ALBUMIN SERPL-MCNC: 4.5 G/DL (ref 3.4–5)
ANION GAP SERPL CALCULATED.3IONS-SCNC: 11 MMOL/L (ref 3–16)
BUN SERPL-MCNC: 17 MG/DL (ref 7–20)
CALCIUM SERPL-MCNC: 9.5 MG/DL (ref 8.3–10.6)
CHLORIDE SERPL-SCNC: 103 MMOL/L (ref 99–110)
CO2 SERPL-SCNC: 26 MMOL/L (ref 21–32)
CREAT SERPL-MCNC: 0.8 MG/DL (ref 0.6–1.2)
GFR SERPLBLD CREATININE-BSD FMLA CKD-EPI: 81 ML/MIN/{1.73_M2}
GLUCOSE SERPL-MCNC: 107 MG/DL (ref 70–99)
PHOSPHATE SERPL-MCNC: 3.5 MG/DL (ref 2.5–4.9)
POTASSIUM SERPL-SCNC: 4.7 MMOL/L (ref 3.5–5.1)
SODIUM SERPL-SCNC: 140 MMOL/L (ref 136–145)

## 2024-04-04 PROCEDURE — 3077F SYST BP >= 140 MM HG: CPT | Performed by: PHYSICIAN ASSISTANT

## 2024-04-04 PROCEDURE — G0402 INITIAL PREVENTIVE EXAM: HCPCS | Performed by: PHYSICIAN ASSISTANT

## 2024-04-04 PROCEDURE — 1123F ACP DISCUSS/DSCN MKR DOCD: CPT | Performed by: PHYSICIAN ASSISTANT

## 2024-04-04 PROCEDURE — 3079F DIAST BP 80-89 MM HG: CPT | Performed by: PHYSICIAN ASSISTANT

## 2024-04-04 PROCEDURE — 99214 OFFICE O/P EST MOD 30 MIN: CPT | Performed by: PHYSICIAN ASSISTANT

## 2024-04-04 PROCEDURE — 77080 DXA BONE DENSITY AXIAL: CPT

## 2024-04-04 PROCEDURE — 77063 BREAST TOMOSYNTHESIS BI: CPT

## 2024-04-04 RX ORDER — DICLOFENAC SODIUM 75 MG/1
75 TABLET, DELAYED RELEASE ORAL 2 TIMES DAILY
Qty: 60 TABLET | Refills: 3 | Status: SHIPPED | OUTPATIENT
Start: 2024-04-04

## 2024-04-04 RX ORDER — LISINOPRIL 20 MG/1
20 TABLET ORAL DAILY
Qty: 30 TABLET | Refills: 0 | Status: SHIPPED | OUTPATIENT
Start: 2024-04-04 | End: 2024-05-04

## 2024-04-04 ASSESSMENT — ENCOUNTER SYMPTOMS
WHEEZING: 0
SHORTNESS OF BREATH: 0

## 2024-04-04 NOTE — PROGRESS NOTES
Rosario Riley (:  1960) is a 63 y.o. female,Established patient, here for evaluation of the following chief complaint(s):  Medicare AWV and Hypertension         ASSESSMENT/PLAN:  1. Essential hypertension  -     lisinopril (PRINIVIL;ZESTRIL) 20 MG tablet; Take 1 tablet by mouth daily, Disp-30 tablet, R-0Normal  -     Renal Function Panel; Future  -    uncontrolled, will increase to 20 mg, follow up with me in 2 weeks, renal function today, continue with lasix  2. Severe obesity (BMI 35.0-39.9) with comorbidity (HCC)        -   increase protein in diet as well as fiber  3. Primary osteoarthritis involving multiple joints  -     diclofenac (VOLTAREN) 75 MG EC tablet; Take 1 tablet by mouth 2 times daily, Disp-60 tablet, R-3Normal  -    meloxicam is not working, stop this medication, add on diclofenac    Return in about 2 weeks (around 2024) for HTN.         Subjective   SUBJECTIVE/OBJECTIVE:  HPI  HTN:  Current medication: lisinopril and lasix  Does not have a way to check blood pressure at home  Reports: none  Denies: chest pain, shortness of breath, headache    Osteoarthritis  Current medication: meloxicam  Previous medication: celebrex, naproxen (works better for pain but not swelling)  No improvement in symptoms  Has less swelling but no less pain  Affected joints: bilateral knees and hip  No interest in surgery    Review of Systems   Constitutional:  Negative for diaphoresis, fatigue and unexpected weight change.   Respiratory:  Negative for shortness of breath and wheezing.    Cardiovascular:  Negative for chest pain, palpitations and leg swelling.   Musculoskeletal:  Positive for arthralgias and joint swelling.   Neurological:  Negative for dizziness and headaches.          Objective   Physical Exam  Vitals reviewed.   Constitutional:       Appearance: Normal appearance. She is obese.   HENT:      Head: Normocephalic and atraumatic.   Cardiovascular:      Rate and Rhythm: Normal rate and 
Sig Taking? Authorizing Provider   diclofenac (VOLTAREN) 75 MG EC tablet Take 1 tablet by mouth 2 times daily Yes Promise Taylor PA   lisinopril (PRINIVIL;ZESTRIL) 20 MG tablet Take 1 tablet by mouth daily Yes Promise Taylor PA   atorvastatin (LIPITOR) 40 MG tablet Take 1 tablet by mouth daily Yes Promise Taylor PA   acetaminophen (TYLENOL) 500 MG tablet Take 1 tablet by mouth every 6 hours as needed for Pain Yes Provider, MD Brendan   furosemide (LASIX) 20 MG tablet Take 1 tablet by mouth daily Yes Promise Taylor PA   potassium chloride (KLOR-CON M) 10 MEQ extended release tablet Take 1 tablet by mouth daily Yes Promise Taylor PA       CareTeam (Including outside providers/suppliers regularly involved in providing care):   Patient Care Team:  Promise Taylor PA as PCP - General (Physician Assistant)  Promise Taylor PA as PCP - Empaneled Provider     Reviewed and updated this visit:  Tobacco  Allergies  Meds  Med Hx  Surg Hx  Soc Hx  Fam Hx

## 2024-04-04 NOTE — PATIENT INSTRUCTIONS

## 2024-04-05 ENCOUNTER — HOSPITAL ENCOUNTER (OUTPATIENT)
Dept: CARDIOLOGY | Age: 64
Discharge: HOME | End: 2024-04-05
Payer: COMMERCIAL

## 2024-04-05 DIAGNOSIS — R60.0 BILATERAL LOWER EXTREMITY EDEMA: ICD-10-CM

## 2024-04-05 PROCEDURE — 93306 TTE W/DOPPLER COMPLETE: CPT

## 2024-04-09 RX ORDER — ALENDRONATE SODIUM 70 MG/1
70 TABLET ORAL
Qty: 13 TABLET | Refills: 0 | Status: SHIPPED | OUTPATIENT
Start: 2024-04-09

## 2024-04-15 DIAGNOSIS — I10 ESSENTIAL HYPERTENSION: ICD-10-CM

## 2024-04-15 RX ORDER — LISINOPRIL 10 MG/1
10 TABLET ORAL DAILY
Qty: 30 TABLET | Refills: 0 | OUTPATIENT
Start: 2024-04-15

## 2024-04-15 NOTE — TELEPHONE ENCOUNTER
Refill Request     CONFIRM preferred pharmacy with the patient.    If Mail Order Rx - Pend for 90 day refill.      Last Seen: Last Seen Department: 4/4/2024  Last Seen by PCP: 4/4/2024    Last Written: discontinue 4/4/24 for dose increase     If no future appointment scheduled:  Review the last OV with PCP and review information for follow-up visit,  Route STAFF MESSAGE with patient name to the  Pool for scheduling with the following information:            -  Timing of next visit           -  Visit type ie Physical, OV, etc           -  Diagnoses/Reason ie. COPD, HTN - Do not use MEDICATION, Follow-up or CHECK UP - Give reason for visit      Next Appointment:   Future Appointments   Date Time Provider Department Center   4/18/2024 11:00 AM Promise Taylor PA [facility]  [place] [facility]       Message sent to  to schedule appt with patient?  NO      Requested Prescriptions     Pending Prescriptions Disp Refills    lisinopril (PRINIVIL;ZESTRIL) 10 MG tablet [Pharmacy Med Name: LISINOPRIL 10 MG TABLET] 30 tablet 0     Sig: TAKE 1 TABLET BY MOUTH EVERY DAY

## 2024-04-18 ENCOUNTER — OFFICE VISIT (OUTPATIENT)
Dept: FAMILY MEDICINE CLINIC | Age: 64
End: 2024-04-18
Payer: COMMERCIAL

## 2024-04-18 VITALS
WEIGHT: 218 LBS | BODY MASS INDEX: 34.14 KG/M2 | OXYGEN SATURATION: 97 % | HEART RATE: 63 BPM | SYSTOLIC BLOOD PRESSURE: 130 MMHG | DIASTOLIC BLOOD PRESSURE: 72 MMHG

## 2024-04-18 DIAGNOSIS — M15.9 PRIMARY OSTEOARTHRITIS INVOLVING MULTIPLE JOINTS: ICD-10-CM

## 2024-04-18 DIAGNOSIS — M81.0 AGE-RELATED OSTEOPOROSIS WITHOUT CURRENT PATHOLOGICAL FRACTURE: Primary | ICD-10-CM

## 2024-04-18 DIAGNOSIS — I10 ESSENTIAL HYPERTENSION: ICD-10-CM

## 2024-04-18 PROCEDURE — 3075F SYST BP GE 130 - 139MM HG: CPT | Performed by: PHYSICIAN ASSISTANT

## 2024-04-18 PROCEDURE — 99214 OFFICE O/P EST MOD 30 MIN: CPT | Performed by: PHYSICIAN ASSISTANT

## 2024-04-18 PROCEDURE — 3078F DIAST BP <80 MM HG: CPT | Performed by: PHYSICIAN ASSISTANT

## 2024-04-18 PROCEDURE — 1123F ACP DISCUSS/DSCN MKR DOCD: CPT | Performed by: PHYSICIAN ASSISTANT

## 2024-04-18 RX ORDER — DICLOFENAC SODIUM 75 MG/1
75 TABLET, DELAYED RELEASE ORAL 2 TIMES DAILY
Qty: 180 TABLET | Refills: 1 | Status: SHIPPED | OUTPATIENT
Start: 2024-04-18 | End: 2024-10-15

## 2024-04-18 RX ORDER — LISINOPRIL 20 MG/1
20 TABLET ORAL DAILY
Qty: 90 TABLET | Refills: 1 | Status: SHIPPED | OUTPATIENT
Start: 2024-04-18 | End: 2024-10-15

## 2024-04-18 ASSESSMENT — ENCOUNTER SYMPTOMS
WHEEZING: 0
SHORTNESS OF BREATH: 0

## 2024-04-18 NOTE — PROGRESS NOTES
Rosario Riley (:  1960) is a 63 y.o. female,Established patient, here for evaluation of the following chief complaint(s):  Hypertension and Other (Discuss osteoporosis medication )      Assessment & Plan   1. Age-related osteoporosis without current pathological fracture       -   reviewed proper use of taking fosamax with the patient. DEXA in two years.  2. Essential hypertension  -     lisinopril (PRINIVIL;ZESTRIL) 20 MG tablet; Take 1 tablet by mouth daily, Disp-90 tablet, R-1Normal  -    well controlled, continue with lisinopril and lasix. Follow up in 6 months  3. Primary osteoarthritis involving multiple joints  -     diclofenac (VOLTAREN) 75 MG EC tablet; Take 1 tablet by mouth 2 times daily, Disp-180 tablet, R-1Normal        -   working well, better than meloxicam, continue with diclofenac. Follow up in 6 months, watch for elevated BP    Return in about 6 months (around 10/18/2024) for HTN.       Subjective   HPI  HTN:  Current medication: lisinopril (increased to 20 mg)and lasix  Does not have a way to check blood pressure at home  Reports: none  Denies: chest pain, shortness of breath, headache  Pt now has a cuff at home but hasn't checked it yet    Osteoporosis  New diagnosis  Will start an otc vitamin D3  She gets calcium in her diet  First dose of fosmax caused nausea but she took it at night  Plans on switching to  morning      Diclofenac working well for joint pain, using tylenol infrequently as needed    Review of Systems   Constitutional:  Negative for diaphoresis and unexpected weight change.   Eyes:  Negative for visual disturbance.   Respiratory:  Negative for shortness of breath and wheezing.    Cardiovascular:  Negative for chest pain, palpitations and leg swelling.   Musculoskeletal:  Positive for arthralgias.   Neurological:  Negative for dizziness, light-headedness and headaches.          Objective   Physical Exam  Vitals reviewed.   Constitutional:       Appearance:

## 2024-05-09 ENCOUNTER — OFFICE VISIT (OUTPATIENT)
Dept: ORTHOPEDIC SURGERY | Age: 64
End: 2024-05-09
Payer: COMMERCIAL

## 2024-05-09 VITALS — BODY MASS INDEX: 34.21 KG/M2 | HEIGHT: 67 IN | WEIGHT: 218 LBS

## 2024-05-09 DIAGNOSIS — R52 PAIN: ICD-10-CM

## 2024-05-09 DIAGNOSIS — M25.561 RIGHT KNEE PAIN, UNSPECIFIED CHRONICITY: Primary | ICD-10-CM

## 2024-05-09 PROCEDURE — 99213 OFFICE O/P EST LOW 20 MIN: CPT | Performed by: PHYSICIAN ASSISTANT

## 2024-05-09 PROCEDURE — 1123F ACP DISCUSS/DSCN MKR DOCD: CPT | Performed by: PHYSICIAN ASSISTANT

## 2024-05-10 ENCOUNTER — TELEPHONE (OUTPATIENT)
Dept: ORTHOPEDIC SURGERY | Age: 64
End: 2024-05-10

## 2024-05-10 DIAGNOSIS — M15.9 PRIMARY OSTEOARTHRITIS INVOLVING MULTIPLE JOINTS: Primary | ICD-10-CM

## 2024-05-10 NOTE — TELEPHONE ENCOUNTER
General Question     Subject: MED  Patient and /or Facility Request: Rosario Riley \"Bri\"   Contact Number: [phone]     PT WENT TO AFTER HOURS OFFICE ON 5/9 WAS ADV WOULD GET A STEROID PAC SENT TO [facility] IN [place]     PLEASE CLL TO ADV

## 2024-05-13 RX ORDER — METHYLPREDNISOLONE 4 MG/1
TABLET ORAL
Qty: 21 KIT | Refills: 0 | Status: SHIPPED | OUTPATIENT
Start: 2024-05-13

## 2024-05-13 SDOH — HEALTH STABILITY: PHYSICAL HEALTH: ON AVERAGE, HOW MANY MINUTES DO YOU ENGAGE IN EXERCISE AT THIS LEVEL?: 0 MIN

## 2024-05-13 SDOH — HEALTH STABILITY: PHYSICAL HEALTH
ON AVERAGE, HOW MANY DAYS PER WEEK DO YOU ENGAGE IN MODERATE TO STRENUOUS EXERCISE (LIKE A BRISK WALK)?: PATIENT DECLINED

## 2024-05-13 NOTE — TELEPHONE ENCOUNTER
Spoke with patient and let her know we would send it over to the [facility] in [place] for her to  at her convenience. Apologized for the error.

## 2024-05-16 ENCOUNTER — OFFICE VISIT (OUTPATIENT)
Dept: ORTHOPEDIC SURGERY | Age: 64
End: 2024-05-16
Payer: COMMERCIAL

## 2024-05-16 VITALS — HEIGHT: 67 IN | BODY MASS INDEX: 34.21 KG/M2 | WEIGHT: 218 LBS | RESPIRATION RATE: 18 BRPM

## 2024-05-16 DIAGNOSIS — M16.12 PRIMARY OSTEOARTHRITIS OF LEFT HIP: ICD-10-CM

## 2024-05-16 DIAGNOSIS — M17.11 PRIMARY OSTEOARTHRITIS OF RIGHT KNEE: Primary | ICD-10-CM

## 2024-05-16 PROCEDURE — 99214 OFFICE O/P EST MOD 30 MIN: CPT | Performed by: PHYSICIAN ASSISTANT

## 2024-05-16 PROCEDURE — 1123F ACP DISCUSS/DSCN MKR DOCD: CPT | Performed by: PHYSICIAN ASSISTANT

## 2024-05-16 RX ORDER — TRIAMCINOLONE ACETONIDE 40 MG/ML
40 INJECTION, SUSPENSION INTRA-ARTICULAR; INTRAMUSCULAR ONCE
Status: COMPLETED | OUTPATIENT
Start: 2024-05-16 | End: 2024-05-16

## 2024-05-16 RX ORDER — BUPIVACAINE HYDROCHLORIDE 2.5 MG/ML
2 INJECTION, SOLUTION INFILTRATION; PERINEURAL ONCE
Status: COMPLETED | OUTPATIENT
Start: 2024-05-16 | End: 2024-05-16

## 2024-05-16 RX ADMIN — BUPIVACAINE HYDROCHLORIDE 5 MG: 2.5 INJECTION, SOLUTION INFILTRATION; PERINEURAL at 09:04

## 2024-05-16 RX ADMIN — TRIAMCINOLONE ACETONIDE 40 MG: 40 INJECTION, SUSPENSION INTRA-ARTICULAR; INTRAMUSCULAR at 09:04

## 2024-05-17 ENCOUNTER — TELEPHONE (OUTPATIENT)
Dept: ORTHOPEDIC SURGERY | Age: 64
End: 2024-05-17

## 2024-05-17 ENCOUNTER — PREP FOR PROCEDURE (OUTPATIENT)
Dept: ORTHOPEDIC SURGERY | Age: 64
End: 2024-05-17

## 2024-05-17 PROBLEM — M16.12 OSTEOARTHRITIS OF LEFT HIP: Status: ACTIVE | Noted: 2024-05-17

## 2024-05-22 NOTE — PROGRESS NOTES
This dictation was done with [employer] dictation and may contain mechanical errors related to translation.    I have today reviewed with Rosario Riley the clinically relevant, past medical history, medications, allergies, family history, social history, and Review Of Systems form the patient’s most recent history form & I have documented any details relevant to today's presenting complaints in my history below. Ms. Rosario Riley's self-reported past medical history, medications, allergies, family history, social history, and Review Of Systems form has been scanned into the chart under the \"Media\" tab.    Subjective:  Rosario Riley is a 63 y.o. who is here as an established patient that I saw in the [facility] after-hours [place] clinic.  She was complaining mainly of her right knee pain but her examination did not demonstrate how bad her left hip was then turns out she has advanced osteoarthritis in both her left hip and her right knee.  Had her and her daughters come to see Dr. Greenberg he went through the x-rays with them talked about short and long-term expectations and together we have decided to proceed towards a left total hip replacement.  There was a discussion of the risk and benefits and the rationale treatment full disclosure of the risks involving the surgeries.  Patient has had some improvement with taking some medicine and resting.  She says she has 8 out of 10 pain today.      Patient Active Problem List   Diagnosis    Hypercholesterolemia    Vitamin D deficiency    Severe obesity (BMI 35.0-39.9) with comorbidity (HCC)    Essential hypertension    Primary osteoarthritis involving multiple joints    Age-related osteoporosis without current pathological fracture    Osteoarthritis of left hip           Current Outpatient Medications on File Prior to Visit   Medication Sig Dispense Refill    methylPREDNISolone (MEDROL DOSEPACK) 4 MG tablet Take by mouth as directed. 21 kit 0    lisinopril (PRINIVIL;ZESTRIL)

## 2024-06-29 NOTE — TELEPHONE ENCOUNTER
Refill Request     CONFIRM preferred pharmacy with the patient.    If Mail Order Rx - Pend for 90 day refill.      Last Seen: Last Seen Department: 4/18/2024  Last Seen by PCP: 4/18/2024    Last Written: 4/9/2024    If no future appointment scheduled:  Review the last OV with PCP and review information for follow-up visit,  Route STAFF MESSAGE with patient name to the  Pool for scheduling with the following information:            -  Timing of next visit           -  Visit type ie Physical, OV, etc           -  Diagnoses/Reason ie. COPD, HTN - Do not use MEDICATION, Follow-up or CHECK UP - Give reason for visit      Next Appointment:   Future Appointments   Date Time Provider Department Center   7/18/2024  1:30 PM Promise Taylor PA [facility]  [place] [facility]   8/1/2024 11:00 AM Nick Greenberg MD [facility]   8/6/2024  7:50 AM Nick Greenberg MD [facility]   8/19/2024 11:00 AM Nick Greenberg MD [facility]   10/18/2024 11:00 AM Promise Taylor PA [facility]  [place] [facility]       Message sent to  to schedule appt with patient?  NO      Requested Prescriptions     Pending Prescriptions Disp Refills    alendronate (FOSAMAX) 70 MG tablet [Pharmacy Med Name: ALENDRONATE SODIUM 70 MG TAB] 12 tablet 0     Sig: TAKE 1 TABLET BY MOUTH ONE TIME PER WEEK

## 2024-07-01 RX ORDER — ALENDRONATE SODIUM 70 MG/1
TABLET ORAL
Qty: 12 TABLET | Refills: 1 | Status: SHIPPED | OUTPATIENT
Start: 2024-07-01

## 2024-07-18 ENCOUNTER — HOSPITAL ENCOUNTER (OUTPATIENT)
Dept: GENERAL RADIOLOGY | Age: 64
Discharge: HOME | End: 2024-07-18
Payer: COMMERCIAL

## 2024-07-18 ENCOUNTER — OFFICE VISIT (OUTPATIENT)
Dept: FAMILY MEDICINE CLINIC | Age: 64
End: 2024-07-18
Payer: COMMERCIAL

## 2024-07-18 DIAGNOSIS — R06.02 SHORTNESS OF BREATH: ICD-10-CM

## 2024-07-18 DIAGNOSIS — M81.0 AGE-RELATED OSTEOPOROSIS WITHOUT CURRENT PATHOLOGICAL FRACTURE: ICD-10-CM

## 2024-07-18 DIAGNOSIS — R60.9 SWELLING: ICD-10-CM

## 2024-07-18 DIAGNOSIS — R05.2 SUBACUTE COUGH: ICD-10-CM

## 2024-07-18 DIAGNOSIS — M16.12 PRIMARY OSTEOARTHRITIS OF LEFT HIP: ICD-10-CM

## 2024-07-18 DIAGNOSIS — I10 ESSENTIAL HYPERTENSION: ICD-10-CM

## 2024-07-18 DIAGNOSIS — Z01.818 PRE-OP EVALUATION: Primary | ICD-10-CM

## 2024-07-18 PROCEDURE — 3075F SYST BP GE 130 - 139MM HG: CPT | Performed by: PHYSICIAN ASSISTANT

## 2024-07-18 PROCEDURE — 99214 OFFICE O/P EST MOD 30 MIN: CPT | Performed by: PHYSICIAN ASSISTANT

## 2024-07-18 PROCEDURE — 1123F ACP DISCUSS/DSCN MKR DOCD: CPT | Performed by: PHYSICIAN ASSISTANT

## 2024-07-18 PROCEDURE — 71046 X-RAY EXAM CHEST 2 VIEWS: CPT

## 2024-07-18 PROCEDURE — 3078F DIAST BP <80 MM HG: CPT | Performed by: PHYSICIAN ASSISTANT

## 2024-07-18 RX ORDER — CETIRIZINE HYDROCHLORIDE 10 MG/1
10 TABLET ORAL DAILY
Qty: 90 TABLET | Refills: 0 | Status: SHIPPED | OUTPATIENT
Start: 2024-07-18

## 2024-07-18 RX ORDER — ALBUTEROL SULFATE 90 UG/1
2 AEROSOL, METERED RESPIRATORY (INHALATION) 4 TIMES DAILY PRN
Qty: 18 G | Refills: 5 | Status: SHIPPED | OUTPATIENT
Start: 2024-07-18

## 2024-07-18 NOTE — PROGRESS NOTES
Preoperative Consultation      Rosario Riley  YOB: 1960    Date of Service:  7/18/2024    Vitals:    07/18/24 1319 07/25/24 1056   BP: (!) 160/70 138/70   Pulse: 88    SpO2: 98%    Weight: 98.9 kg (218 lb)    Height: 1.702 m (5' 7\")       Wt Readings from Last 2 Encounters:   07/25/24 98.9 kg (218 lb)   07/25/24 98.9 kg (218 lb)     BP Readings from Last 3 Encounters:   07/25/24 130/60   07/25/24 (!) 160/70   07/25/24 138/70        Chief Complaint   Patient presents with    Pre-op Exam     Surgery 8/6/24, Dr. Greenberg, LEFT ANTERIOR TOTAL HIP REPLACEMENT, [facility]       Allergies   Allergen Reactions    Asa [Aspirin] Rash    Pcn [Penicillins] Hives and Swelling     Outpatient Medications Marked as Taking for the 7/18/24 encounter (Office Visit) with Promise Taylor PA   Medication Sig Dispense Refill    albuterol sulfate HFA (VENTOLIN HFA) 108 (90 Base) MCG/ACT inhaler Inhale 2 puffs into the lungs 4 times daily as needed for Wheezing 18 g 5    cetirizine (ZYRTEC) 10 MG tablet Take 1 tablet by mouth daily 90 tablet 0    alendronate (FOSAMAX) 70 MG tablet TAKE 1 TABLET BY MOUTH ONE TIME PER WEEK 12 tablet 1    lisinopril (PRINIVIL;ZESTRIL) 20 MG tablet Take 1 tablet by mouth daily 90 tablet 1    diclofenac (VOLTAREN) 75 MG EC tablet Take 1 tablet by mouth 2 times daily 180 tablet 1    atorvastatin (LIPITOR) 40 MG tablet Take 1 tablet by mouth daily 90 tablet 1    acetaminophen (TYLENOL) 500 MG tablet Take 1 tablet by mouth every 6 hours as needed for Pain      furosemide (LASIX) 20 MG tablet Take 1 tablet by mouth daily (Patient taking differently: Take 2 tablets by mouth daily) 90 tablet 1    potassium chloride (KLOR-CON M) 10 MEQ extended release tablet Take 1 tablet by mouth daily 90 tablet 1       This patient presents to the office today for a preoperative consultation at the request of surgeon, Dr. Greenberg, who plans on performing Left Anterior Total Hip Replacement on August 6 at [facility] [place]

## 2024-07-19 DIAGNOSIS — E87.5 HYPERKALEMIA: Primary | ICD-10-CM

## 2024-07-19 LAB
ALBUMIN SERPL-MCNC: 4.3 G/DL (ref 3.4–5)
ANION GAP SERPL CALCULATED.3IONS-SCNC: 14 MMOL/L (ref 3–16)
BUN SERPL-MCNC: 18 MG/DL (ref 7–20)
CALCIUM SERPL-MCNC: 9.9 MG/DL (ref 8.3–10.6)
CHLORIDE SERPL-SCNC: 106 MMOL/L (ref 99–110)
CO2 SERPL-SCNC: 23 MMOL/L (ref 21–32)
CREAT SERPL-MCNC: 0.9 MG/DL (ref 0.6–1.2)
GFR SERPLBLD CREATININE-BSD FMLA CKD-EPI: 70 ML/MIN/{1.73_M2}
GLUCOSE SERPL-MCNC: 106 MG/DL (ref 70–99)
NT-PROBNP SERPL-MCNC: 243 PG/ML (ref 0–124)
PHOSPHATE SERPL-MCNC: 4.2 MG/DL (ref 2.5–4.9)
POTASSIUM SERPL-SCNC: 5.7 MMOL/L (ref 3.5–5.1)
SODIUM SERPL-SCNC: 143 MMOL/L (ref 136–145)

## 2024-07-22 DIAGNOSIS — R06.02 SHORTNESS OF BREATH: ICD-10-CM

## 2024-07-22 DIAGNOSIS — I10 ESSENTIAL HYPERTENSION: ICD-10-CM

## 2024-07-22 DIAGNOSIS — R60.9 SWELLING: Primary | ICD-10-CM

## 2024-07-23 ENCOUNTER — TELEPHONE (OUTPATIENT)
Dept: FAMILY MEDICINE CLINIC | Age: 64
End: 2024-07-23

## 2024-07-23 DIAGNOSIS — I10 ESSENTIAL HYPERTENSION: ICD-10-CM

## 2024-07-23 DIAGNOSIS — R06.02 SHORTNESS OF BREATH: ICD-10-CM

## 2024-07-23 DIAGNOSIS — R60.9 SWELLING: Primary | ICD-10-CM

## 2024-07-23 NOTE — TELEPHONE ENCOUNTER
Patient called to schedule Echo, the soonest appointment is 9/13/24, Patient has hip surgery scheduled 8/6/24, Central scheduling informed patient this needed to be to be changed to STAT order to get sooner.    Please advise.  Thanks

## 2024-07-24 ENCOUNTER — TELEPHONE (OUTPATIENT)
Dept: FAMILY MEDICINE CLINIC | Age: 64
End: 2024-07-24

## 2024-07-24 NOTE — TELEPHONE ENCOUNTER
Received a call from patient's daughter Kayla. Stated patient has an ECHO scheduled for tomorrow morning 7/25 but she is not feeling well. She is having SOB, kayla thinks it's from anxiety due to upcoming ECHO and blood work results from 7/18, said she doesn't understand the results. Daughter thinks Rosario needs an appt. Please advise. Thanks!

## 2024-07-24 NOTE — TELEPHONE ENCOUNTER
Please approve nursing actions.    Spoke with patient per providers request to get more information.    Patient stated she thinks she is having anxiety related to tomorrow's testing and the recent elevated labs.  Patient reports she did a google search which she thinks made her feel worse.  Advised patient that Dr. Russo is not recommended ever, encouraged patient to use mychart to ask questions or call the office with questions instead.  Patient states she is a little winded at rest but not a lot, states she is more SOB with ambulation.  Patient reports the inhaler did not help.  Patient reports she is having a sharp pain between shoulder blades and the pain worsens with movement.  Patient reports she did increase the furosemide to 40 mg as recommended, but does not feel that it is making her urinate more.  Patient reports she will get repeat potassium level tomorrow after ECHO.  Patient denies chest pain, arm or jaw pain.  Patient reports swelling in feet, ankles and thighs.  I asked patient if anything else had changed.  Patient reports she basically completely cut caffeine out of her diet.  Asked patient how much she was taking in, per patient: 1 cup of coffee and 1-2 20 ounce diet Dr. Pepper daily, plus water.  Asked patient if it was a regular coffee cup or large, patient reports it was a large coffee daily.  Patient reports she has only had coffee twice since her appointment last week and switched to sprite zero not knowing it didn't have caffeine.   Advised patient caffeine withdrawal can happen and can attribute to her symptoms as well.  Note: patient was at work during conversation and was able to communicate with nurse, did not sound like she was SOB during conversation, and did not have to take breaks to talk.    Spoke with Promise, patient is ok to wait to be evaluated tomorrow, ok to offer appointment after ECHO and advise patient to not get labs drawn if she is going to come to the office incase

## 2024-07-25 ENCOUNTER — OFFICE VISIT (OUTPATIENT)
Dept: FAMILY MEDICINE CLINIC | Age: 64
End: 2024-07-25
Payer: COMMERCIAL

## 2024-07-25 ENCOUNTER — HOSPITAL ENCOUNTER (OUTPATIENT)
Dept: CARDIOLOGY | Age: 64
Discharge: HOME | End: 2024-07-27
Payer: COMMERCIAL

## 2024-07-25 VITALS
WEIGHT: 218 LBS | SYSTOLIC BLOOD PRESSURE: 130 MMHG | DIASTOLIC BLOOD PRESSURE: 60 MMHG | HEART RATE: 74 BPM | BODY MASS INDEX: 34.14 KG/M2 | OXYGEN SATURATION: 99 %

## 2024-07-25 VITALS
BODY MASS INDEX: 34.21 KG/M2 | SYSTOLIC BLOOD PRESSURE: 160 MMHG | HEIGHT: 67 IN | WEIGHT: 218 LBS | DIASTOLIC BLOOD PRESSURE: 70 MMHG

## 2024-07-25 VITALS
SYSTOLIC BLOOD PRESSURE: 138 MMHG | DIASTOLIC BLOOD PRESSURE: 70 MMHG | HEART RATE: 88 BPM | BODY MASS INDEX: 34.21 KG/M2 | OXYGEN SATURATION: 98 % | WEIGHT: 218 LBS | HEIGHT: 67 IN

## 2024-07-25 DIAGNOSIS — E03.9 HYPOTHYROIDISM, UNSPECIFIED TYPE: ICD-10-CM

## 2024-07-25 DIAGNOSIS — E87.5 HYPERKALEMIA: ICD-10-CM

## 2024-07-25 DIAGNOSIS — R60.0 BILATERAL LOWER EXTREMITY EDEMA: ICD-10-CM

## 2024-07-25 DIAGNOSIS — R06.02 SHORTNESS OF BREATH: ICD-10-CM

## 2024-07-25 DIAGNOSIS — E03.9 HYPOTHYROIDISM, UNSPECIFIED TYPE: Primary | ICD-10-CM

## 2024-07-25 DIAGNOSIS — I10 ESSENTIAL HYPERTENSION: ICD-10-CM

## 2024-07-25 DIAGNOSIS — R05.2 SUBACUTE COUGH: ICD-10-CM

## 2024-07-25 DIAGNOSIS — R60.9 SWELLING: ICD-10-CM

## 2024-07-25 LAB
ECHO AO ASC DIAM: 2.1 CM
ECHO AO ASCENDING AORTA INDEX: 1 CM/M2
ECHO AO ROOT DIAM: 2.6 CM
ECHO AO ROOT INDEX: 1.24 CM/M2
ECHO AV AREA PEAK VELOCITY: 1.8 CM2
ECHO AV AREA VTI: 2.1 CM2
ECHO AV AREA/BSA PEAK VELOCITY: 0.9 CM2/M2
ECHO AV AREA/BSA VTI: 1 CM2/M2
ECHO AV CUSP MM: 1.6 CM
ECHO AV MEAN GRADIENT: 5 MMHG
ECHO AV MEAN VELOCITY: 1 M/S
ECHO AV PEAK GRADIENT: 10 MMHG
ECHO AV PEAK VELOCITY: 1.6 M/S
ECHO AV VELOCITY RATIO: 0.63
ECHO AV VTI: 32.7 CM
ECHO BSA: 2.16 M2
ECHO EST RA PRESSURE: 3 MMHG
ECHO LA AREA 2C: 20.8 CM2
ECHO LA AREA 4C: 21.3 CM2
ECHO LA DIAMETER INDEX: 1.67 CM/M2
ECHO LA DIAMETER: 3.5 CM
ECHO LA MAJOR AXIS: 5.8 CM
ECHO LA MINOR AXIS: 5.5 CM
ECHO LA TO AORTIC ROOT RATIO: 1.35
ECHO LA VOL BP: 64 ML (ref 22–52)
ECHO LA VOL MOD A2C: 64 ML (ref 22–52)
ECHO LA VOL MOD A4C: 63 ML (ref 22–52)
ECHO LA VOL/BSA BIPLANE: 30 ML/M2 (ref 16–34)
ECHO LA VOLUME INDEX MOD A2C: 30 ML/M2 (ref 16–34)
ECHO LA VOLUME INDEX MOD A4C: 30 ML/M2 (ref 16–34)
ECHO LV E' LATERAL VELOCITY: 8 CM/S
ECHO LV E' SEPTAL VELOCITY: 8 CM/S
ECHO LV FRACTIONAL SHORTENING: 36 % (ref 28–44)
ECHO LV INTERNAL DIMENSION DIASTOLE INDEX: 2.14 CM/M2
ECHO LV INTERNAL DIMENSION DIASTOLIC: 4.5 CM (ref 3.9–5.3)
ECHO LV INTERNAL DIMENSION SYSTOLIC INDEX: 1.38 CM/M2
ECHO LV INTERNAL DIMENSION SYSTOLIC: 2.9 CM
ECHO LV ISOVOLUMETRIC RELAXATION TIME (IVRT): 92 MS
ECHO LV IVSD: 0.8 CM (ref 0.6–0.9)
ECHO LV MASS 2D: 123.1 G (ref 67–162)
ECHO LV MASS INDEX 2D: 58.6 G/M2 (ref 43–95)
ECHO LV POSTERIOR WALL DIASTOLIC: 0.9 CM (ref 0.6–0.9)
ECHO LV RELATIVE WALL THICKNESS RATIO: 0.4
ECHO LVOT AREA: 2.8 CM2
ECHO LVOT AV VTI INDEX: 0.74
ECHO LVOT DIAM: 1.9 CM
ECHO LVOT MEAN GRADIENT: 2 MMHG
ECHO LVOT PEAK GRADIENT: 4 MMHG
ECHO LVOT PEAK VELOCITY: 1 M/S
ECHO LVOT STROKE VOLUME INDEX: 32.7 ML/M2
ECHO LVOT SV: 68.6 ML
ECHO LVOT VTI: 24.2 CM
ECHO MV A VELOCITY: 0.93 M/S
ECHO MV E DECELERATION TIME (DT): 246 MS
ECHO MV E VELOCITY: 0.98 M/S
ECHO MV E/A RATIO: 1.05
ECHO MV E/E' LATERAL: 12.25
ECHO MV E/E' RATIO (AVERAGED): 12.25
ECHO MV E/E' SEPTAL: 12.25
ECHO RV TAPSE: 2.8 CM (ref 1.7–?)
POTASSIUM SERPL-SCNC: 5.2 MMOL/L (ref 3.5–5.1)
T4 FREE SERPL-MCNC: 1.5 NG/DL (ref 0.9–1.8)
TSH SERPL DL<=0.005 MIU/L-ACNC: 1.33 UIU/ML (ref 0.27–4.2)

## 2024-07-25 PROCEDURE — 93306 TTE W/DOPPLER COMPLETE: CPT | Performed by: INTERNAL MEDICINE

## 2024-07-25 PROCEDURE — 99213 OFFICE O/P EST LOW 20 MIN: CPT | Performed by: PHYSICIAN ASSISTANT

## 2024-07-25 PROCEDURE — 93306 TTE W/DOPPLER COMPLETE: CPT

## 2024-07-25 PROCEDURE — 3078F DIAST BP <80 MM HG: CPT | Performed by: PHYSICIAN ASSISTANT

## 2024-07-25 PROCEDURE — 3075F SYST BP GE 130 - 139MM HG: CPT | Performed by: PHYSICIAN ASSISTANT

## 2024-07-25 PROCEDURE — 1123F ACP DISCUSS/DSCN MKR DOCD: CPT | Performed by: PHYSICIAN ASSISTANT

## 2024-07-25 RX ORDER — BENZONATATE 200 MG/1
200 CAPSULE ORAL 3 TIMES DAILY PRN
Qty: 30 CAPSULE | Refills: 0 | Status: SHIPPED | OUTPATIENT
Start: 2024-07-25 | End: 2024-08-04

## 2024-07-25 ASSESSMENT — ENCOUNTER SYMPTOMS
SORE THROAT: 0
SINUS PAIN: 0
SINUS PRESSURE: 0
SHORTNESS OF BREATH: 0
WHEEZING: 0
COUGH: 1
CHEST TIGHTNESS: 0
RHINORRHEA: 0

## 2024-07-25 NOTE — PROGRESS NOTES
Constitutional:       Appearance: Normal appearance.   HENT:      Head: Normocephalic and atraumatic.   Cardiovascular:      Rate and Rhythm: Normal rate and regular rhythm.      Heart sounds: Normal heart sounds.   Pulmonary:      Effort: Pulmonary effort is normal.      Breath sounds: Normal breath sounds.   Musculoskeletal:      Right lower leg: Edema present.      Left lower leg: Edema present.      Comments: Improved, non-pitting   Neurological:      General: No focal deficit present.      Mental Status: She is alert and oriented to person, place, and time.      Cranial Nerves: No cranial nerve deficit.                  An electronic signature was used to authenticate this note.    --SONAM Farr

## 2024-07-30 ENCOUNTER — TELEPHONE (OUTPATIENT)
Dept: ORTHOPEDICS UNIT | Age: 64
End: 2024-07-30

## 2024-07-30 RX ORDER — ASPIRIN 81 MG/1
81 TABLET ORAL DAILY
Status: ON HOLD | COMMUNITY
End: 2024-08-06

## 2024-07-30 NOTE — TELEPHONE ENCOUNTER
I emailed the patient the JET education handouts on 7/29/2024 at 0938am per [facility] office request..

## 2024-07-30 NOTE — TELEPHONE ENCOUNTER
Attempted to contact patient.  Left hippa compliant voicemail for patient stating purpose (discharge planning and JET education) and call back number.   Flower RIDERN, RN, ONC  Orthopedic Nurse Navigator  Phone number: [phone]  Future Appointments   Date Time Provider Department Center   8/1/2024 11:00 AM Nick Greenberg MD [facility]   8/6/2024  7:50 AM Nick Greenberg MD [facility]   8/19/2024 11:00 AM Nick Greenberg MD [facility]   10/18/2024 11:00 AM Promise Taylor PA [facility] [place] - [facility]       Electronically signed by Flower Victoria RN on 7/30/2024 at 12:47 PM

## 2024-07-31 NOTE — TELEPHONE ENCOUNTER
Patient emailed JET education. Patient states she received the jet education and questions addressed at this time.  Labs and Tests to be completed/completed as ordered.  I discussed with patient and where to complete labs at.  Anti-septic bottle to be given to patient at preop appointment.     Date Of Surgery: 8/6/2024  Surgeon: Dr Greenberg  Saw Primary care provider on 7/18/2024.     DC Plan: Home    HOME ASSISTANCE - WHO WILL BE STAYING WITH YOU AT HOME FOR FIRST SEVERAL DAYS?  Daughter Cathleen and daughter Reva    DC TRANSPORTATION: Cathleen or Reva    STEPS INTO HOME: 2    STEPS TO BATHROOM/BEDROOM:  14 steps to bathroom and bedroom    DME NEEDS:  borrowing a rolling walker.     LENGTH OF STAY HAS BEEN DISCUSSED WITH THE PATIENT, APPROPRIATE TO HIS/ HER PROCEDURE.  PATIENT HAS BEEN INFORMED THAT THEY WILL BE DISCHARGED WHEN THE PHYSICIAN DEEMS THEM MEDICALLY READY. MOST PATIENTS CAN EXPECT TO BE IN THE HOSPITAL ONE NIGHT OR 1-2 DAYS AS AN ADMISSION FOR THOSE WITH MEDICAL HEALTH ISSUES/COMPLICATIONS.    HOME CARE CHOICE(S): open to any agency, home health care list was provided to patient.    SNF/REHAB CHOICES (S):     Declined a need for skilled nursing facility.    MEDS TO BEDS FROM [facility]: Patient is agreeable to have medications filled via meds to beds program with [facility].    Patient states no further questions or concerns.   Facesheet with discharge needs provided to [facility] / .    Future Appointments   Date Time Provider Department Center   8/1/2024 11:00 AM Nick Greenberg MD [facility]   8/6/2024  7:50 AM Nick Greenberg MD [facility]   8/19/2024 11:00 AM Nick Greenberg MD [facility]   10/18/2024 11:00 AM Promise Taylor PA [facility]  [place] [facility]     Electronically signed by Flower Victoria RN on 7/31/2024 at 1:14 PM

## 2024-07-31 NOTE — DISCHARGE INSTR - COC
[phone]  Fax:    / signature: Electronically signed by Lexis Morales on 8/7/24 at 9:03 AM EDT    Followup Dr Greenberg in office 2 weeks post hip surgery  [facility], [address] [place] [address], [address]   [id number],   [phone]         DISCHARGE INSTRUCTIONS FOR TOTAL JOINT REPLACEMENT  Activity:  Elevate your leg if swelling occurs in your ankle.    Continue the exercise program as prescribed by physical therapists.  Take frequent walks.  Use walker, crutches, or cane with weight bearing instructions as indicated by the physical therapists.  Take rest periods often.   Wear knee hi RENATO hose daily and remove at night for 2 weeks post surgery  Incentive spirometer 4 times a day for 10 breaths for 1 weeks post surgery  Wear BIPAP/CPAP at all times when sleeping or napping if you have sleep apnea AND have a BIPAP/CPAP at home  Wound Care:  Do not scrub wound.  Pat it dry with a soft towel.   Don’t apply any lotions, ointments,  or creams to your wound.  Check the incision every day for drainage or wound opening. Call for concerns.   Ice the operative hip over clothing or a cloth . Use as needed for swelling and pain control   Diet:  You can resume your normal diet.  There are no limits on your diet due to your surgery.  Pain pills and activity changes may lead to constipation.  To prevent this, use prune juice or bran cereals liberally.  You may need to use a laxative such as Dulcolax, Senokot, or Milk of Magnesia.  Drink plenty of fluids.  Medications:  Take pain pills as ordered to maintain comfort.  Never drive while taking pain medicine. Do not drive until cleared by your surgeon.  Avoid over the counter medications until checking with your doctor.  Resume previous medications as instructed by your doctor.  Take blood thinners as directed and until completed.  Take Tylenol 650mg four times a day as needed for added pain relief.  DO not exceed 3000mg in 24 hours.

## 2024-08-01 ENCOUNTER — OFFICE VISIT (OUTPATIENT)
Dept: ORTHOPEDIC SURGERY | Age: 64
End: 2024-08-01
Payer: COMMERCIAL

## 2024-08-01 ENCOUNTER — HOSPITAL ENCOUNTER (OUTPATIENT)
Age: 64
Discharge: HOME | End: 2024-08-01
Payer: COMMERCIAL

## 2024-08-01 VITALS — HEIGHT: 67 IN | BODY MASS INDEX: 34.21 KG/M2 | WEIGHT: 218 LBS

## 2024-08-01 DIAGNOSIS — M16.12 PRIMARY OSTEOARTHRITIS OF LEFT HIP: Primary | ICD-10-CM

## 2024-08-01 DIAGNOSIS — M16.12 PRIMARY OSTEOARTHRITIS OF LEFT HIP: ICD-10-CM

## 2024-08-01 LAB
25(OH)D3 SERPL-MCNC: 6.2 NG/ML
ABO + RH BLD: NORMAL
ALBUMIN SERPL-MCNC: 4.3 G/DL (ref 3.4–5)
ANION GAP SERPL CALCULATED.3IONS-SCNC: 12 MMOL/L (ref 3–16)
APTT BLD: 23.6 SEC (ref 22.1–36.4)
BASOPHILS # BLD: 0.1 K/UL (ref 0–0.2)
BASOPHILS NFR BLD: 1.2 %
BLD GP AB SCN SERPL QL: NORMAL
BUN SERPL-MCNC: 18 MG/DL (ref 7–20)
CALCIUM SERPL-MCNC: 9.6 MG/DL (ref 8.3–10.6)
CHLORIDE SERPL-SCNC: 104 MMOL/L (ref 99–110)
CO2 SERPL-SCNC: 23 MMOL/L (ref 21–32)
CREAT SERPL-MCNC: 1.1 MG/DL (ref 0.6–1.2)
DEPRECATED RDW RBC AUTO: 13.5 % (ref 12.4–15.4)
EKG ATRIAL RATE: 62 BPM
EKG DIAGNOSIS: NORMAL
EKG P AXIS: 62 DEGREES
EKG P-R INTERVAL: 192 MS
EKG Q-T INTERVAL: 426 MS
EKG QRS DURATION: 84 MS
EKG QTC CALCULATION (BAZETT): 432 MS
EKG R AXIS: 27 DEGREES
EKG T AXIS: 66 DEGREES
EKG VENTRICULAR RATE: 62 BPM
EOSINOPHIL # BLD: 0.2 K/UL (ref 0–0.6)
EOSINOPHIL NFR BLD: 2.2 %
EST. AVERAGE GLUCOSE BLD GHB EST-MCNC: 108.3 MG/DL
GFR SERPLBLD CREATININE-BSD FMLA CKD-EPI: 55 ML/MIN/{1.73_M2}
GLUCOSE SERPL-MCNC: 108 MG/DL (ref 70–99)
HBA1C MFR BLD: 5.4 %
HCT VFR BLD AUTO: 36 % (ref 36–48)
HGB BLD-MCNC: 12.1 G/DL (ref 12–16)
INR PPP: 0.95 (ref 0.85–1.15)
LYMPHOCYTES # BLD: 1.9 K/UL (ref 1–5.1)
LYMPHOCYTES NFR BLD: 26 %
MCH RBC QN AUTO: 31.4 PG (ref 26–34)
MCHC RBC AUTO-ENTMCNC: 33.7 G/DL (ref 31–36)
MCV RBC AUTO: 93.1 FL (ref 80–100)
MONOCYTES # BLD: 0.6 K/UL (ref 0–1.3)
MONOCYTES NFR BLD: 7.6 %
NEUTROPHILS # BLD: 4.6 K/UL (ref 1.7–7.7)
NEUTROPHILS NFR BLD: 63 %
PLATELET # BLD AUTO: 296 K/UL (ref 135–450)
PMV BLD AUTO: 8.3 FL (ref 5–10.5)
POTASSIUM SERPL-SCNC: 5.1 MMOL/L (ref 3.5–5.1)
PREALB SERPL-MCNC: 31.7 MG/DL (ref 20–40)
PROTHROMBIN TIME: 12.9 SEC (ref 11.9–14.9)
RBC # BLD AUTO: 3.86 M/UL (ref 4–5.2)
SODIUM SERPL-SCNC: 139 MMOL/L (ref 136–145)
WBC # BLD AUTO: 7.3 K/UL (ref 4–11)

## 2024-08-01 PROCEDURE — 86850 RBC ANTIBODY SCREEN: CPT

## 2024-08-01 PROCEDURE — 84134 ASSAY OF PREALBUMIN: CPT

## 2024-08-01 PROCEDURE — 83036 HEMOGLOBIN GLYCOSYLATED A1C: CPT

## 2024-08-01 PROCEDURE — 85025 COMPLETE CBC W/AUTO DIFF WBC: CPT

## 2024-08-01 PROCEDURE — 1123F ACP DISCUSS/DSCN MKR DOCD: CPT | Performed by: ORTHOPAEDIC SURGERY

## 2024-08-01 PROCEDURE — 85610 PROTHROMBIN TIME: CPT

## 2024-08-01 PROCEDURE — 87641 MR-STAPH DNA AMP PROBE: CPT

## 2024-08-01 PROCEDURE — 93005 ELECTROCARDIOGRAM TRACING: CPT

## 2024-08-01 PROCEDURE — 36415 COLL VENOUS BLD VENIPUNCTURE: CPT

## 2024-08-01 PROCEDURE — 85730 THROMBOPLASTIN TIME PARTIAL: CPT

## 2024-08-01 PROCEDURE — 80048 BASIC METABOLIC PNL TOTAL CA: CPT

## 2024-08-01 PROCEDURE — 86901 BLOOD TYPING SEROLOGIC RH(D): CPT

## 2024-08-01 PROCEDURE — 99214 OFFICE O/P EST MOD 30 MIN: CPT | Performed by: ORTHOPAEDIC SURGERY

## 2024-08-01 PROCEDURE — 82040 ASSAY OF SERUM ALBUMIN: CPT

## 2024-08-01 PROCEDURE — 86900 BLOOD TYPING SEROLOGIC ABO: CPT

## 2024-08-01 PROCEDURE — 82306 VITAMIN D 25 HYDROXY: CPT

## 2024-08-01 NOTE — PROGRESS NOTES
[facility]  Office Visit    Chief Complaint: Follow-up for left hip pain    HPI:  Rosario Riley is a 64 y.o. who is here in follow-up of left hip pain.  She is scheduled for left total hip arthroplasty next week.  For review, she reports a multiple year history of pain and stiffness in the left hip.  She is also having pain in the right knee.  She walks with use of a cane. The patient has difficulty putting on socks and shoes, difficulty getting out of a car, difficulty with ambulation and doing activities of daily living including pain at night.  Pain at rest is 7 and with activities 9-10/10.  She has been taking diclofenac and Tylenol to help relieve the pain.  There is no history of injury or surgery to the left hip.    She denies diabetes, history of blood clots, blood thinners, heart or lung issues.  She has help at home.      Past Medical History:   Diagnosis Date    Essential hypertension 4/4/2024    Foot pain, bilateral     Hypercholesterolemia 02/02/2014    Osteoarthritis NA    Had for at least 2 or more years    Severe obesity (BMI 35.0-39.9) with comorbidity (HCC) 4/4/2024        ROS:  Constitutional: denies fever, chills, weight loss  MSK: denies pain in other joints, muscle aches  Neurological: denies numbness, tingling, weakness    Exam:  Appearance: sitting in exam room chair, appears to be in no acute distress, awake and alert  Resp: unlabored breathing on room air  Skin: warm, dry and intact with out erythema or significant increased temperature  Neuro: grossly intact both lower extremities. Intact sensation to light touch. Motor exam 4+ to 5/5 in all major motor groups.  LLE: Examination demonstrates pain with logroll and Stinchfield.  There is brisk capillary refill.  She demonstrates weak active hip flexion.    Imaging:  Prior left hip radiographs reviewed and demonstrate severe left hip osteoarthritis with no remaining joint space.  There is flattening of the

## 2024-08-02 LAB — MRSA DNA SPEC QL NAA+PROBE: NORMAL

## 2024-08-05 ENCOUNTER — ANESTHESIA EVENT (OUTPATIENT)
Dept: OPERATING ROOM | Age: 64
End: 2024-08-05
Payer: COMMERCIAL

## 2024-08-06 ENCOUNTER — HOSPITAL ENCOUNTER (OUTPATIENT)
Age: 64
Setting detail: OBSERVATION
Discharge: NURSING, REHAB OR LONG-TERM CARE | End: 2024-08-07
Attending: ORTHOPAEDIC SURGERY | Admitting: ORTHOPAEDIC SURGERY
Payer: COMMERCIAL

## 2024-08-06 ENCOUNTER — APPOINTMENT (OUTPATIENT)
Dept: GENERAL RADIOLOGY | Age: 64
End: 2024-08-06
Attending: ORTHOPAEDIC SURGERY
Payer: COMMERCIAL

## 2024-08-06 ENCOUNTER — ANESTHESIA (OUTPATIENT)
Dept: OPERATING ROOM | Age: 64
End: 2024-08-06
Payer: COMMERCIAL

## 2024-08-06 DIAGNOSIS — M15.9 PRIMARY OSTEOARTHRITIS INVOLVING MULTIPLE JOINTS: ICD-10-CM

## 2024-08-06 DIAGNOSIS — M16.12 PRIMARY OSTEOARTHRITIS OF LEFT HIP: Primary | ICD-10-CM

## 2024-08-06 LAB
ABO + RH BLD: NORMAL
BLD GP AB SCN SERPL QL: NORMAL
GLUCOSE BLD-MCNC: 155 MG/DL (ref 70–99)
GLUCOSE BLD-MCNC: 160 MG/DL (ref 70–99)
GLUCOSE BLD-MCNC: 179 MG/DL (ref 70–99)
GLUCOSE BLD-MCNC: 222 MG/DL (ref 70–99)
PERFORMED ON: ABNORMAL

## 2024-08-06 PROCEDURE — 97166 OT EVAL MOD COMPLEX 45 MIN: CPT

## 2024-08-06 PROCEDURE — 27130 TOTAL HIP ARTHROPLASTY: CPT | Performed by: ORTHOPAEDIC SURGERY

## 2024-08-06 PROCEDURE — 2580000003 HC RX 258: Performed by: ORTHOPAEDIC SURGERY

## 2024-08-06 PROCEDURE — 73501 X-RAY EXAM HIP UNI 1 VIEW: CPT

## 2024-08-06 PROCEDURE — 6370000000 HC RX 637 (ALT 250 FOR IP): Performed by: NURSE PRACTITIONER

## 2024-08-06 PROCEDURE — G0378 HOSPITAL OBSERVATION PER HR: HCPCS

## 2024-08-06 PROCEDURE — 86850 RBC ANTIBODY SCREEN: CPT

## 2024-08-06 PROCEDURE — 86900 BLOOD TYPING SEROLOGIC ABO: CPT

## 2024-08-06 PROCEDURE — 27130 TOTAL HIP ARTHROPLASTY: CPT | Performed by: PHYSICIAN ASSISTANT

## 2024-08-06 PROCEDURE — 3600000005 HC SURGERY LEVEL 5 BASE: Performed by: ORTHOPAEDIC SURGERY

## 2024-08-06 PROCEDURE — 97530 THERAPEUTIC ACTIVITIES: CPT

## 2024-08-06 PROCEDURE — 6360000002 HC RX W HCPCS

## 2024-08-06 PROCEDURE — 3600000015 HC SURGERY LEVEL 5 ADDTL 15MIN: Performed by: ORTHOPAEDIC SURGERY

## 2024-08-06 PROCEDURE — 2500000003 HC RX 250 WO HCPCS

## 2024-08-06 PROCEDURE — 97116 GAIT TRAINING THERAPY: CPT

## 2024-08-06 PROCEDURE — 86901 BLOOD TYPING SEROLOGIC RH(D): CPT

## 2024-08-06 PROCEDURE — 2720000010 HC SURG SUPPLY STERILE: Performed by: ORTHOPAEDIC SURGERY

## 2024-08-06 PROCEDURE — 97161 PT EVAL LOW COMPLEX 20 MIN: CPT

## 2024-08-06 PROCEDURE — 6370000000 HC RX 637 (ALT 250 FOR IP): Performed by: ORTHOPAEDIC SURGERY

## 2024-08-06 PROCEDURE — 3700000001 HC ADD 15 MINUTES (ANESTHESIA): Performed by: ORTHOPAEDIC SURGERY

## 2024-08-06 PROCEDURE — 6360000002 HC RX W HCPCS: Performed by: ORTHOPAEDIC SURGERY

## 2024-08-06 PROCEDURE — A4217 STERILE WATER/SALINE, 500 ML: HCPCS | Performed by: ORTHOPAEDIC SURGERY

## 2024-08-06 PROCEDURE — 97535 SELF CARE MNGMENT TRAINING: CPT

## 2024-08-06 PROCEDURE — 3700000000 HC ANESTHESIA ATTENDED CARE: Performed by: ORTHOPAEDIC SURGERY

## 2024-08-06 PROCEDURE — 2580000003 HC RX 258: Performed by: ANESTHESIOLOGY

## 2024-08-06 PROCEDURE — C1776 JOINT DEVICE (IMPLANTABLE): HCPCS | Performed by: ORTHOPAEDIC SURGERY

## 2024-08-06 PROCEDURE — 7100000001 HC PACU RECOVERY - ADDTL 15 MIN: Performed by: ORTHOPAEDIC SURGERY

## 2024-08-06 PROCEDURE — 6360000002 HC RX W HCPCS: Performed by: ANESTHESIOLOGY

## 2024-08-06 PROCEDURE — 2709999900 HC NON-CHARGEABLE SUPPLY: Performed by: ORTHOPAEDIC SURGERY

## 2024-08-06 PROCEDURE — 94760 N-INVAS EAR/PLS OXIMETRY 1: CPT

## 2024-08-06 PROCEDURE — 7100000000 HC PACU RECOVERY - FIRST 15 MIN: Performed by: ORTHOPAEDIC SURGERY

## 2024-08-06 DEVICE — LINER ACET OD54MM ID36MM +4MM OFFSET HIP POLYETH MTL ON: Type: IMPLANTABLE DEVICE | Site: HIP | Status: FUNCTIONAL

## 2024-08-06 DEVICE — CUP ACET DIA54MM HIP GRIPTION PRI CEMENTLESS FIX SECT SER: Type: IMPLANTABLE DEVICE | Site: HIP | Status: FUNCTIONAL

## 2024-08-06 DEVICE — STEM FEM SZ 8 L111MM 12/14 TAPR STD OFFSET HIP DUOFIX CLLRD: Type: IMPLANTABLE DEVICE | Site: HIP | Status: FUNCTIONAL

## 2024-08-06 DEVICE — HEAD FEM DIA36MM +1.5MM OFFSET 12/14 TAPR HIP CERAMIC: Type: IMPLANTABLE DEVICE | Site: HIP | Status: FUNCTIONAL

## 2024-08-06 RX ORDER — FENTANYL CITRATE 0.05 MG/ML
25 INJECTION, SOLUTION INTRAMUSCULAR; INTRAVENOUS EVERY 5 MIN PRN
Status: DISCONTINUED | OUTPATIENT
Start: 2024-08-06 | End: 2024-08-06

## 2024-08-06 RX ORDER — CALCIUM CARBONATE 500 MG/1
500 TABLET, CHEWABLE ORAL 3 TIMES DAILY PRN
Status: DISCONTINUED | OUTPATIENT
Start: 2024-08-06 | End: 2024-08-07 | Stop reason: HOSPADM

## 2024-08-06 RX ORDER — SODIUM CHLORIDE 9 MG/ML
INJECTION, SOLUTION INTRAVENOUS PRN
Status: DISCONTINUED | OUTPATIENT
Start: 2024-08-06 | End: 2024-08-07 | Stop reason: HOSPADM

## 2024-08-06 RX ORDER — ONDANSETRON 2 MG/ML
4 INJECTION INTRAMUSCULAR; INTRAVENOUS
Status: COMPLETED | OUTPATIENT
Start: 2024-08-06 | End: 2024-08-06

## 2024-08-06 RX ORDER — DEXTROSE MONOHYDRATE 100 MG/ML
INJECTION, SOLUTION INTRAVENOUS CONTINUOUS PRN
Status: DISCONTINUED | OUTPATIENT
Start: 2024-08-06 | End: 2024-08-07 | Stop reason: HOSPADM

## 2024-08-06 RX ORDER — SODIUM CHLORIDE 0.9 % (FLUSH) 0.9 %
5-40 SYRINGE (ML) INJECTION PRN
Status: DISCONTINUED | OUTPATIENT
Start: 2024-08-06 | End: 2024-08-06

## 2024-08-06 RX ORDER — ACETAMINOPHEN 500 MG
1000 TABLET ORAL ONCE
Status: COMPLETED | OUTPATIENT
Start: 2024-08-06 | End: 2024-08-06

## 2024-08-06 RX ORDER — DULOXETIN HYDROCHLORIDE 60 MG/1
60 CAPSULE, DELAYED RELEASE ORAL ONCE
Status: COMPLETED | OUTPATIENT
Start: 2024-08-06 | End: 2024-08-06

## 2024-08-06 RX ORDER — INSULIN LISPRO 100 [IU]/ML
0-4 INJECTION, SOLUTION INTRAVENOUS; SUBCUTANEOUS
Status: DISCONTINUED | OUTPATIENT
Start: 2024-08-06 | End: 2024-08-07 | Stop reason: HOSPADM

## 2024-08-06 RX ORDER — TRANEXAMIC ACID 650 MG/1
1950 TABLET ORAL ONCE
Status: COMPLETED | OUTPATIENT
Start: 2024-08-06 | End: 2024-08-06

## 2024-08-06 RX ORDER — SODIUM CHLORIDE 0.9 % (FLUSH) 0.9 %
5-40 SYRINGE (ML) INJECTION EVERY 12 HOURS SCHEDULED
Status: DISCONTINUED | OUTPATIENT
Start: 2024-08-06 | End: 2024-08-06

## 2024-08-06 RX ORDER — SODIUM CHLORIDE 0.9 % (FLUSH) 0.9 %
5-40 SYRINGE (ML) INJECTION EVERY 12 HOURS SCHEDULED
Status: DISCONTINUED | OUTPATIENT
Start: 2024-08-06 | End: 2024-08-06 | Stop reason: HOSPADM

## 2024-08-06 RX ORDER — FENTANYL CITRATE 50 UG/ML
INJECTION, SOLUTION INTRAMUSCULAR; INTRAVENOUS PRN
Status: DISCONTINUED | OUTPATIENT
Start: 2024-08-06 | End: 2024-08-06 | Stop reason: SDUPTHER

## 2024-08-06 RX ORDER — MORPHINE SULFATE 2 MG/ML
2 INJECTION, SOLUTION INTRAMUSCULAR; INTRAVENOUS
Status: DISCONTINUED | OUTPATIENT
Start: 2024-08-06 | End: 2024-08-07

## 2024-08-06 RX ORDER — INSULIN LISPRO 100 [IU]/ML
0.08 INJECTION, SOLUTION INTRAVENOUS; SUBCUTANEOUS
Status: DISCONTINUED | OUTPATIENT
Start: 2024-08-06 | End: 2024-08-07 | Stop reason: HOSPADM

## 2024-08-06 RX ORDER — SODIUM CHLORIDE 0.9 % (FLUSH) 0.9 %
5-40 SYRINGE (ML) INJECTION EVERY 12 HOURS SCHEDULED
Status: DISCONTINUED | OUTPATIENT
Start: 2024-08-06 | End: 2024-08-07 | Stop reason: HOSPADM

## 2024-08-06 RX ORDER — OXYCODONE HYDROCHLORIDE 5 MG/1
5-10 TABLET ORAL EVERY 6 HOURS PRN
Qty: 40 TABLET | Refills: 0 | Status: SHIPPED | OUTPATIENT
Start: 2024-08-06 | End: 2024-08-06

## 2024-08-06 RX ORDER — GLUCAGON 1 MG/ML
1 KIT INJECTION PRN
Status: DISCONTINUED | OUTPATIENT
Start: 2024-08-06 | End: 2024-08-07 | Stop reason: HOSPADM

## 2024-08-06 RX ORDER — FUROSEMIDE 40 MG/1
40 TABLET ORAL DAILY
Status: DISCONTINUED | OUTPATIENT
Start: 2024-08-07 | End: 2024-08-07 | Stop reason: HOSPADM

## 2024-08-06 RX ORDER — DULOXETIN HYDROCHLORIDE 60 MG/1
60 CAPSULE, DELAYED RELEASE ORAL DAILY
Status: DISCONTINUED | OUTPATIENT
Start: 2024-08-07 | End: 2024-08-07 | Stop reason: HOSPADM

## 2024-08-06 RX ORDER — MAGNESIUM HYDROXIDE 1200 MG/15ML
LIQUID ORAL CONTINUOUS PRN
Status: COMPLETED | OUTPATIENT
Start: 2024-08-06 | End: 2024-08-06

## 2024-08-06 RX ORDER — OXYCODONE HYDROCHLORIDE 5 MG/1
5-10 TABLET ORAL EVERY 6 HOURS PRN
Qty: 40 TABLET | Refills: 0 | Status: SHIPPED | OUTPATIENT
Start: 2024-08-06 | End: 2024-08-11

## 2024-08-06 RX ORDER — NALOXONE HYDROCHLORIDE 0.4 MG/ML
INJECTION, SOLUTION INTRAMUSCULAR; INTRAVENOUS; SUBCUTANEOUS PRN
Status: DISCONTINUED | OUTPATIENT
Start: 2024-08-06 | End: 2024-08-06

## 2024-08-06 RX ORDER — DICLOFENAC SODIUM 75 MG/1
75 TABLET, DELAYED RELEASE ORAL 2 TIMES DAILY
Qty: 180 TABLET | Refills: 1
Start: 2024-08-06 | End: 2025-02-02

## 2024-08-06 RX ORDER — INSULIN GLARGINE 100 [IU]/ML
0.25 INJECTION, SOLUTION SUBCUTANEOUS NIGHTLY
Status: DISCONTINUED | OUTPATIENT
Start: 2024-08-06 | End: 2024-08-07 | Stop reason: HOSPADM

## 2024-08-06 RX ORDER — DULOXETIN HYDROCHLORIDE 60 MG/1
60 CAPSULE, DELAYED RELEASE ORAL DAILY
Qty: 14 CAPSULE | Refills: 0 | Status: SHIPPED | OUTPATIENT
Start: 2024-08-07 | End: 2024-08-06

## 2024-08-06 RX ORDER — SODIUM CHLORIDE 450 MG/100ML
INJECTION, SOLUTION INTRAVENOUS CONTINUOUS
Status: DISCONTINUED | OUTPATIENT
Start: 2024-08-06 | End: 2024-08-07 | Stop reason: HOSPADM

## 2024-08-06 RX ORDER — POLYETHYLENE GLYCOL 3350 17 G/17G
17 POWDER, FOR SOLUTION ORAL DAILY PRN
Status: DISCONTINUED | OUTPATIENT
Start: 2024-08-06 | End: 2024-08-07 | Stop reason: HOSPADM

## 2024-08-06 RX ORDER — SODIUM CHLORIDE 9 MG/ML
INJECTION, SOLUTION INTRAVENOUS PRN
Status: DISCONTINUED | OUTPATIENT
Start: 2024-08-06 | End: 2024-08-06

## 2024-08-06 RX ORDER — DEXAMETHASONE SODIUM PHOSPHATE 4 MG/ML
INJECTION, SOLUTION INTRA-ARTICULAR; INTRALESIONAL; INTRAMUSCULAR; INTRAVENOUS; SOFT TISSUE PRN
Status: DISCONTINUED | OUTPATIENT
Start: 2024-08-06 | End: 2024-08-06 | Stop reason: SDUPTHER

## 2024-08-06 RX ORDER — KETOROLAC TROMETHAMINE 30 MG/ML
15 INJECTION, SOLUTION INTRAMUSCULAR; INTRAVENOUS
Status: COMPLETED | OUTPATIENT
Start: 2024-08-06 | End: 2024-08-07

## 2024-08-06 RX ORDER — INSULIN LISPRO 100 [IU]/ML
0-4 INJECTION, SOLUTION INTRAVENOUS; SUBCUTANEOUS NIGHTLY
Status: DISCONTINUED | OUTPATIENT
Start: 2024-08-06 | End: 2024-08-07 | Stop reason: HOSPADM

## 2024-08-06 RX ORDER — METAXALONE 800 MG/1
800 TABLET ORAL EVERY 8 HOURS PRN
Status: DISCONTINUED | OUTPATIENT
Start: 2024-08-06 | End: 2024-08-07 | Stop reason: HOSPADM

## 2024-08-06 RX ORDER — ONDANSETRON 2 MG/ML
INJECTION INTRAMUSCULAR; INTRAVENOUS PRN
Status: DISCONTINUED | OUTPATIENT
Start: 2024-08-06 | End: 2024-08-06 | Stop reason: SDUPTHER

## 2024-08-06 RX ORDER — OXYCODONE HYDROCHLORIDE 5 MG/1
5 TABLET ORAL EVERY 4 HOURS PRN
Status: DISCONTINUED | OUTPATIENT
Start: 2024-08-06 | End: 2024-08-07 | Stop reason: HOSPADM

## 2024-08-06 RX ORDER — LIDOCAINE HYDROCHLORIDE 20 MG/ML
INJECTION, SOLUTION EPIDURAL; INFILTRATION; INTRACAUDAL; PERINEURAL PRN
Status: DISCONTINUED | OUTPATIENT
Start: 2024-08-06 | End: 2024-08-06 | Stop reason: SDUPTHER

## 2024-08-06 RX ORDER — OXYCODONE HYDROCHLORIDE 10 MG/1
10 TABLET ORAL EVERY 4 HOURS PRN
Status: DISCONTINUED | OUTPATIENT
Start: 2024-08-06 | End: 2024-08-07 | Stop reason: HOSPADM

## 2024-08-06 RX ORDER — LISINOPRIL 10 MG/1
20 TABLET ORAL DAILY
Status: DISCONTINUED | OUTPATIENT
Start: 2024-08-07 | End: 2024-08-07 | Stop reason: HOSPADM

## 2024-08-06 RX ORDER — SODIUM CHLORIDE 0.9 % (FLUSH) 0.9 %
5-40 SYRINGE (ML) INJECTION PRN
Status: DISCONTINUED | OUTPATIENT
Start: 2024-08-06 | End: 2024-08-06 | Stop reason: HOSPADM

## 2024-08-06 RX ORDER — DULOXETIN HYDROCHLORIDE 60 MG/1
60 CAPSULE, DELAYED RELEASE ORAL DAILY
Qty: 14 CAPSULE | Refills: 0 | Status: SHIPPED | OUTPATIENT
Start: 2024-08-07 | End: 2024-08-21

## 2024-08-06 RX ORDER — SODIUM CHLORIDE 0.9 % (FLUSH) 0.9 %
5-40 SYRINGE (ML) INJECTION PRN
Status: DISCONTINUED | OUTPATIENT
Start: 2024-08-06 | End: 2024-08-07 | Stop reason: HOSPADM

## 2024-08-06 RX ORDER — ONDANSETRON 4 MG/1
4 TABLET, ORALLY DISINTEGRATING ORAL EVERY 8 HOURS PRN
Status: DISCONTINUED | OUTPATIENT
Start: 2024-08-06 | End: 2024-08-07 | Stop reason: HOSPADM

## 2024-08-06 RX ORDER — ATORVASTATIN CALCIUM 40 MG/1
40 TABLET, FILM COATED ORAL DAILY
Status: DISCONTINUED | OUTPATIENT
Start: 2024-08-07 | End: 2024-08-07 | Stop reason: HOSPADM

## 2024-08-06 RX ORDER — SODIUM CHLORIDE 9 MG/ML
INJECTION, SOLUTION INTRAVENOUS PRN
Status: DISCONTINUED | OUTPATIENT
Start: 2024-08-06 | End: 2024-08-06 | Stop reason: HOSPADM

## 2024-08-06 RX ORDER — ALBUTEROL SULFATE 90 UG/1
2 AEROSOL, METERED RESPIRATORY (INHALATION) 4 TIMES DAILY PRN
Status: DISCONTINUED | OUTPATIENT
Start: 2024-08-06 | End: 2024-08-07 | Stop reason: HOSPADM

## 2024-08-06 RX ORDER — ACETAMINOPHEN 325 MG/1
650 TABLET ORAL EVERY 6 HOURS
Status: DISCONTINUED | OUTPATIENT
Start: 2024-08-06 | End: 2024-08-07 | Stop reason: HOSPADM

## 2024-08-06 RX ORDER — PROPOFOL 10 MG/ML
INJECTION, EMULSION INTRAVENOUS PRN
Status: DISCONTINUED | OUTPATIENT
Start: 2024-08-06 | End: 2024-08-06 | Stop reason: SDUPTHER

## 2024-08-06 RX ORDER — ONDANSETRON 2 MG/ML
4 INJECTION INTRAMUSCULAR; INTRAVENOUS EVERY 6 HOURS PRN
Status: DISCONTINUED | OUTPATIENT
Start: 2024-08-06 | End: 2024-08-07 | Stop reason: HOSPADM

## 2024-08-06 RX ORDER — MORPHINE SULFATE 4 MG/ML
4 INJECTION, SOLUTION INTRAMUSCULAR; INTRAVENOUS
Status: DISCONTINUED | OUTPATIENT
Start: 2024-08-06 | End: 2024-08-07

## 2024-08-06 RX ORDER — ASPIRIN 81 MG/1
81 TABLET ORAL DAILY
Qty: 30 TABLET | Refills: 3
Start: 2024-08-06

## 2024-08-06 RX ORDER — CETIRIZINE HYDROCHLORIDE 10 MG/1
10 TABLET ORAL DAILY
Status: DISCONTINUED | OUTPATIENT
Start: 2024-08-07 | End: 2024-08-07 | Stop reason: HOSPADM

## 2024-08-06 RX ORDER — ROCURONIUM BROMIDE 10 MG/ML
INJECTION, SOLUTION INTRAVENOUS PRN
Status: DISCONTINUED | OUTPATIENT
Start: 2024-08-06 | End: 2024-08-06 | Stop reason: SDUPTHER

## 2024-08-06 RX ADMIN — HYDROMORPHONE HYDROCHLORIDE 0.25 MG: 1 INJECTION, SOLUTION INTRAMUSCULAR; INTRAVENOUS; SUBCUTANEOUS at 08:33

## 2024-08-06 RX ADMIN — ACETAMINOPHEN 325MG 650 MG: 325 TABLET ORAL at 10:37

## 2024-08-06 RX ADMIN — LIDOCAINE HYDROCHLORIDE 100 MG: 20 INJECTION, SOLUTION EPIDURAL; INFILTRATION; INTRACAUDAL; PERINEURAL at 07:43

## 2024-08-06 RX ADMIN — CEFAZOLIN 2000 MG: 2 INJECTION, POWDER, FOR SOLUTION INTRAVENOUS at 23:42

## 2024-08-06 RX ADMIN — INSULIN GLARGINE 25 UNITS: 100 INJECTION, SOLUTION SUBCUTANEOUS at 21:25

## 2024-08-06 RX ADMIN — APIXABAN 2.5 MG: 2.5 TABLET, FILM COATED ORAL at 14:20

## 2024-08-06 RX ADMIN — SODIUM CHLORIDE: 4.5 INJECTION, SOLUTION INTRAVENOUS at 10:37

## 2024-08-06 RX ADMIN — FENTANYL CITRATE 50 MCG: 50 INJECTION INTRAMUSCULAR; INTRAVENOUS at 07:42

## 2024-08-06 RX ADMIN — FENTANYL CITRATE 25 MCG: 50 INJECTION INTRAMUSCULAR; INTRAVENOUS at 07:45

## 2024-08-06 RX ADMIN — ACETAMINOPHEN 325MG 650 MG: 325 TABLET ORAL at 21:24

## 2024-08-06 RX ADMIN — CEFAZOLIN 2000 MG: 2 INJECTION, POWDER, FOR SOLUTION INTRAVENOUS at 16:22

## 2024-08-06 RX ADMIN — ONDANSETRON 4 MG: 2 INJECTION INTRAMUSCULAR; INTRAVENOUS at 08:42

## 2024-08-06 RX ADMIN — HYDROMORPHONE HYDROCHLORIDE 0.5 MG: 1 INJECTION, SOLUTION INTRAMUSCULAR; INTRAVENOUS; SUBCUTANEOUS at 09:02

## 2024-08-06 RX ADMIN — PROPOFOL 180 MG: 10 INJECTION, EMULSION INTRAVENOUS at 07:43

## 2024-08-06 RX ADMIN — ROCURONIUM BROMIDE 50 MG: 10 INJECTION, SOLUTION INTRAVENOUS at 07:43

## 2024-08-06 RX ADMIN — SODIUM CHLORIDE 2 MG: 900 INJECTION INTRAVENOUS at 07:51

## 2024-08-06 RX ADMIN — DULOXETINE HYDROCHLORIDE 60 MG: 60 CAPSULE, DELAYED RELEASE ORAL at 06:31

## 2024-08-06 RX ADMIN — FENTANYL CITRATE 25 MCG: 0.05 INJECTION, SOLUTION INTRAMUSCULAR; INTRAVENOUS at 09:38

## 2024-08-06 RX ADMIN — SODIUM CHLORIDE, PRESERVATIVE FREE 10 ML: 5 INJECTION INTRAVENOUS at 21:24

## 2024-08-06 RX ADMIN — ONDANSETRON 4 MG: 2 INJECTION INTRAMUSCULAR; INTRAVENOUS at 09:26

## 2024-08-06 RX ADMIN — SUGAMMADEX 200 MG: 100 INJECTION, SOLUTION INTRAVENOUS at 08:50

## 2024-08-06 RX ADMIN — ACETAMINOPHEN 1000 MG: 500 TABLET ORAL at 06:31

## 2024-08-06 RX ADMIN — FENTANYL CITRATE 25 MCG: 50 INJECTION INTRAMUSCULAR; INTRAVENOUS at 07:39

## 2024-08-06 RX ADMIN — SODIUM CHLORIDE: 9 INJECTION, SOLUTION INTRAVENOUS at 07:39

## 2024-08-06 RX ADMIN — HYDROMORPHONE HYDROCHLORIDE 0.25 MG: 1 INJECTION, SOLUTION INTRAMUSCULAR; INTRAVENOUS; SUBCUTANEOUS at 08:50

## 2024-08-06 RX ADMIN — APIXABAN 2.5 MG: 2.5 TABLET, FILM COATED ORAL at 21:24

## 2024-08-06 RX ADMIN — PROPOFOL 50 MG: 10 INJECTION, EMULSION INTRAVENOUS at 08:04

## 2024-08-06 RX ADMIN — DEXAMETHASONE SODIUM PHOSPHATE 8 MG: 4 INJECTION, SOLUTION INTRAMUSCULAR; INTRAVENOUS at 07:54

## 2024-08-06 RX ADMIN — TRANEXAMIC ACID 1950 MG: 650 TABLET ORAL at 06:31

## 2024-08-06 RX ADMIN — ACETAMINOPHEN 325MG 650 MG: 325 TABLET ORAL at 16:23

## 2024-08-06 RX ADMIN — FENTANYL CITRATE 25 MCG: 0.05 INJECTION, SOLUTION INTRAMUSCULAR; INTRAVENOUS at 09:26

## 2024-08-06 ASSESSMENT — PAIN - FUNCTIONAL ASSESSMENT
PAIN_FUNCTIONAL_ASSESSMENT: 0-10
PAIN_FUNCTIONAL_ASSESSMENT: ADULT NONVERBAL PAIN SCALE (NPVS)
PAIN_FUNCTIONAL_ASSESSMENT: PREVENTS OR INTERFERES SOME ACTIVE ACTIVITIES AND ADLS

## 2024-08-06 ASSESSMENT — PAIN SCALES - GENERAL
PAINLEVEL_OUTOF10: 3
PAINLEVEL_OUTOF10: 4
PAINLEVEL_OUTOF10: 8
PAINLEVEL_OUTOF10: 3
PAINLEVEL_OUTOF10: 5
PAINLEVEL_OUTOF10: 1

## 2024-08-06 ASSESSMENT — PAIN DESCRIPTION - LOCATION
LOCATION: HIP

## 2024-08-06 ASSESSMENT — PAIN DESCRIPTION - ORIENTATION
ORIENTATION: LEFT

## 2024-08-06 ASSESSMENT — PAIN DESCRIPTION - FREQUENCY
FREQUENCY: CONTINUOUS

## 2024-08-06 ASSESSMENT — PAIN DESCRIPTION - ONSET
ONSET: ON-GOING

## 2024-08-06 ASSESSMENT — PAIN DESCRIPTION - DESCRIPTORS
DESCRIPTORS: DISCOMFORT
DESCRIPTORS: DISCOMFORT
DESCRIPTORS: DISCOMFORT;ACHING
DESCRIPTORS: DISCOMFORT

## 2024-08-06 ASSESSMENT — PAIN DESCRIPTION - PAIN TYPE
TYPE: SURGICAL PAIN

## 2024-08-06 NOTE — OP NOTE
Patient: Rosario Riley  YOB: 1960  MRN: [id number]    Date of Procedure: 8/6/2024      Pre-Op Diagnosis: Osteoarthritis, left hip     Post-Op Diagnosis: Same       Procedure Performed: Left anterior total hip arthroplasty     Surgeon: Nick Greenberg MD     Physician Assistant: SONAM Black     Anesthesia: General     Estimated Blood Loss: 200 mL      Complications: None    Implants:  Depuy [place] Gription cup, 54 mm  36 mm polyethylene liner, +4 mm offset  Depuy Actis stem, size 8 standard offset  36 mm diameter ceramic femoral head, +1.5 mm offset    Indications: This is a 64 y.o. female with osteoarthritis of the hip that continues to be painful despite conservative management. We discussed the diagnosis and treatment options and I recommended total hip arthroplasty. The operative procedure, alternatives, and risks were discussed in detail with the patient.  The risks include but are not limited to: Infection, vessel injury, nerve injury, DVT, pulmonary embolism, implant loosening, need for revision surgery, leg length discrepancy, dislocation, lateral femoral cutaneous nerve palsy, intraoperative fracture. Informed consent for surgery was signed by the patient.     Details:  The patient was seen in the preoperative holding area where the site of surgery was marked and informed consent was confirmed. The patient was brought back to the operating room by OR personnel. Anesthesia was administered. The patient was positioned supine on the [place] table. The left lower extremity was then prepped and draped in a standard and sterile fashion. A final and formal timeout was then performed which confirmed the correct patient, correct position, and correct site of surgery. IV antibiotics were administered within 1 hour of the skin incision.     A direct anterior supine approach was utilized. The skin and subcutaneous tissue were dissected down to the body of the tensor fascia yi. Incision into the

## 2024-08-06 NOTE — H&P
Update History & Physical    The patient's History and Physical of July 18, 2024 was reviewed with the patient and I examined the patient. There was no change. The surgical site was confirmed by the patient and me.       Plan: The risks, benefits, expected outcome, and alternative to the recommended procedure have been discussed with the patient. Patient understands and wants to proceed with the procedure.     Electronically signed by MARIA FERNANDA ALANIZ MD on 8/6/2024 at 7:56 AM

## 2024-08-06 NOTE — ANESTHESIA POSTPROCEDURE EVALUATION
[facility] Department of Anesthesiology  Post-Anesthesia Note       Name:  Rosario Riley                                  Age:  64 y.o.  MRN:  [id number]     Last Vitals & Oxygen Saturation: /68   Pulse 74   Temp 97.5 °F (36.4 °C) (Oral)   Resp 16   Ht 1.702 m (5' 7\")   Wt 99.1 kg (218 lb 8 oz)   LMP 01/30/2009   SpO2 94%   BMI 34.22 kg/m²   Patient Vitals for the past 4 hrs:   BP Temp Temp src Pulse Resp SpO2 Height Weight   08/06/24 1005 117/68 97.5 °F (36.4 °C) Oral 74 16 94 % -- --   08/06/24 0949 -- -- -- 87 12 97 % -- --   08/06/24 0938 -- -- -- 88 14 99 % -- --   08/06/24 0931 -- -- -- 72 12 99 % -- --   08/06/24 0930 (!) 121/52 -- -- 75 12 99 % -- --   08/06/24 0926 -- -- -- 67 10 99 % -- --   08/06/24 0923 (!) 131/54 -- -- 75 14 99 % -- --   08/06/24 0915 138/62 -- -- 91 19 (!) 88 % -- --   08/06/24 0914 -- -- -- 75 17 93 % -- --   08/06/24 0910 136/77 -- -- 92 17 98 % -- --   08/06/24 0905 (!) 151/61 -- -- 85 16 98 % -- --   08/06/24 0858 (!) 185/63 97 °F (36.1 °C) Temporal 90 16 97 % -- --   08/06/24 0628 (!) 140/65 97.2 °F (36.2 °C) Temporal 69 17 96 % 1.702 m (5' 7\") 99.1 kg (218 lb 8 oz)       Level of consciousness:  Awake, alert to baseline    Respiratory: Respirations easy, no distress. Stable.    Cardiovascular: Hemodynamically stable.    Hydration: Adequate.    PONV: Adequately managed.    Post-op pain: Adequately controlled.    Post-op assessment: Tolerated anesthetic well without complication.    Complications:  None.    Stanislav Hayes MD  August 6, 2024   10:06 AM

## 2024-08-06 NOTE — ANESTHESIA PRE PROCEDURE
Yes (!) 140/65   07/25/24 (!) 160/70   07/25/24 130/60     BMI  Body mass index is 34.22 kg/m².  Estimated body mass index is 34.22 kg/m² as calculated from the following:    Height as of this encounter: 1.702 m (5' 7\").    Weight as of this encounter: 99.1 kg (218 lb 8 oz).    CBC   Lab Results   Component Value Date/Time    WBC 7.3 08/01/2024 12:15 PM    RBC 3.86 08/01/2024 12:15 PM    HGB 12.1 08/01/2024 12:15 PM    HCT 36.0 08/01/2024 12:15 PM    MCV 93.1 08/01/2024 12:15 PM    RDW 13.5 08/01/2024 12:15 PM     08/01/2024 12:15 PM     CMP    Lab Results   Component Value Date/Time     08/01/2024 12:15 PM    K 5.1 08/01/2024 12:15 PM     08/01/2024 12:15 PM    CO2 23 08/01/2024 12:15 PM    BUN 18 08/01/2024 12:15 PM    CREATININE 1.1 08/01/2024 12:15 PM    GFRAA >60 03/08/2022 12:08 PM    AGRATIO 1.6 03/19/2024 11:28 AM    LABGLOM 55 08/01/2024 12:15 PM    LABGLOM 81 04/04/2024 11:46 AM    GLUCOSE 108 08/01/2024 12:15 PM    CALCIUM 9.6 08/01/2024 12:15 PM    BILITOT <0.2 03/19/2024 11:28 AM    ALKPHOS 73 03/19/2024 11:28 AM    AST 13 03/19/2024 11:28 AM    ALT 9 03/19/2024 11:28 AM     BMP    Lab Results   Component Value Date/Time     08/01/2024 12:15 PM    K 5.1 08/01/2024 12:15 PM     08/01/2024 12:15 PM    CO2 23 08/01/2024 12:15 PM    BUN 18 08/01/2024 12:15 PM    CREATININE 1.1 08/01/2024 12:15 PM    CALCIUM 9.6 08/01/2024 12:15 PM    GFRAA >60 03/08/2022 12:08 PM    LABGLOM 55 08/01/2024 12:15 PM    LABGLOM 81 04/04/2024 11:46 AM    GLUCOSE 108 08/01/2024 12:15 PM     POCGlucose  No results for input(s): \"GLUCOSE\" in the last 72 hours.   [facility]    Lab Results   Component Value Date/Time    PROTIME 12.9 08/01/2024 12:15 PM    INR 0.95 08/01/2024 12:15 PM    APTT 23.6 08/01/2024 12:15 PM     HCG (If Applicable) No results found for: \"PREGTESTUR\", \"PREGSERUM\", \"HCG\", \"HCGQUANT\"   ABGs No results found for: \"PHART\", \"PO2ART\", \"CYU5IOO\", \"NXL2CCG\", \"BEART\", \"Q0ZAOHST\"   Type & Screen

## 2024-08-07 VITALS
RESPIRATION RATE: 18 BRPM | HEIGHT: 67 IN | WEIGHT: 218.92 LBS | TEMPERATURE: 97.8 F | HEART RATE: 94 BPM | SYSTOLIC BLOOD PRESSURE: 122 MMHG | OXYGEN SATURATION: 95 % | BODY MASS INDEX: 34.36 KG/M2 | DIASTOLIC BLOOD PRESSURE: 87 MMHG

## 2024-08-07 LAB
ANION GAP SERPL CALCULATED.3IONS-SCNC: 11 MMOL/L (ref 3–16)
BUN SERPL-MCNC: 17 MG/DL (ref 7–20)
CALCIUM SERPL-MCNC: 8.7 MG/DL (ref 8.3–10.6)
CHLORIDE SERPL-SCNC: 104 MMOL/L (ref 99–110)
CO2 SERPL-SCNC: 21 MMOL/L (ref 21–32)
CREAT SERPL-MCNC: 1 MG/DL (ref 0.6–1.2)
GFR SERPLBLD CREATININE-BSD FMLA CKD-EPI: 62 ML/MIN/{1.73_M2}
GLUCOSE BLD-MCNC: 159 MG/DL (ref 70–99)
GLUCOSE BLD-MCNC: 217 MG/DL (ref 70–99)
GLUCOSE SERPL-MCNC: 138 MG/DL (ref 70–99)
PERFORMED ON: ABNORMAL
PERFORMED ON: ABNORMAL
POTASSIUM SERPL-SCNC: 4.8 MMOL/L (ref 3.5–5.1)
SODIUM SERPL-SCNC: 136 MMOL/L (ref 136–145)

## 2024-08-07 PROCEDURE — G0378 HOSPITAL OBSERVATION PER HR: HCPCS

## 2024-08-07 PROCEDURE — 80048 BASIC METABOLIC PNL TOTAL CA: CPT

## 2024-08-07 PROCEDURE — 6360000002 HC RX W HCPCS: Performed by: ORTHOPAEDIC SURGERY

## 2024-08-07 PROCEDURE — 2580000003 HC RX 258: Performed by: ORTHOPAEDIC SURGERY

## 2024-08-07 PROCEDURE — 6370000000 HC RX 637 (ALT 250 FOR IP): Performed by: NURSE PRACTITIONER

## 2024-08-07 PROCEDURE — 96374 THER/PROPH/DIAG INJ IV PUSH: CPT

## 2024-08-07 PROCEDURE — 36415 COLL VENOUS BLD VENIPUNCTURE: CPT

## 2024-08-07 PROCEDURE — 6370000000 HC RX 637 (ALT 250 FOR IP): Performed by: ORTHOPAEDIC SURGERY

## 2024-08-07 PROCEDURE — 97535 SELF CARE MNGMENT TRAINING: CPT

## 2024-08-07 PROCEDURE — 97530 THERAPEUTIC ACTIVITIES: CPT

## 2024-08-07 RX ADMIN — APIXABAN 2.5 MG: 2.5 TABLET, FILM COATED ORAL at 08:25

## 2024-08-07 RX ADMIN — OXYCODONE HYDROCHLORIDE 10 MG: 10 TABLET ORAL at 15:40

## 2024-08-07 RX ADMIN — OXYCODONE HYDROCHLORIDE 5 MG: 5 TABLET ORAL at 11:00

## 2024-08-07 RX ADMIN — INSULIN LISPRO 1 UNITS: 100 INJECTION, SOLUTION INTRAVENOUS; SUBCUTANEOUS at 11:33

## 2024-08-07 RX ADMIN — DULOXETINE HYDROCHLORIDE 60 MG: 60 CAPSULE, DELAYED RELEASE ORAL at 08:25

## 2024-08-07 RX ADMIN — LISINOPRIL 20 MG: 10 TABLET ORAL at 08:25

## 2024-08-07 RX ADMIN — ACETAMINOPHEN 325MG 650 MG: 325 TABLET ORAL at 09:16

## 2024-08-07 RX ADMIN — FUROSEMIDE 40 MG: 40 TABLET ORAL at 08:25

## 2024-08-07 RX ADMIN — ACETAMINOPHEN 325MG 650 MG: 325 TABLET ORAL at 05:31

## 2024-08-07 RX ADMIN — INSULIN LISPRO 8 UNITS: 100 INJECTION, SOLUTION INTRAVENOUS; SUBCUTANEOUS at 11:33

## 2024-08-07 RX ADMIN — KETOROLAC TROMETHAMINE 15 MG: 30 INJECTION, SOLUTION INTRAMUSCULAR at 01:28

## 2024-08-07 RX ADMIN — ATORVASTATIN CALCIUM 40 MG: 40 TABLET, FILM COATED ORAL at 08:25

## 2024-08-07 RX ADMIN — SODIUM CHLORIDE, PRESERVATIVE FREE 10 ML: 5 INJECTION INTRAVENOUS at 08:26

## 2024-08-07 RX ADMIN — CETIRIZINE HYDROCHLORIDE 10 MG: 10 TABLET, FILM COATED ORAL at 08:25

## 2024-08-07 ASSESSMENT — PAIN DESCRIPTION - DESCRIPTORS
DESCRIPTORS: ACHING
DESCRIPTORS: OTHER (COMMENT)
DESCRIPTORS: DISCOMFORT

## 2024-08-07 ASSESSMENT — PAIN DESCRIPTION - ORIENTATION
ORIENTATION: LEFT

## 2024-08-07 ASSESSMENT — PAIN DESCRIPTION - LOCATION
LOCATION: HIP

## 2024-08-07 ASSESSMENT — PAIN DESCRIPTION - ONSET: ONSET: ON-GOING

## 2024-08-07 ASSESSMENT — PAIN SCALES - GENERAL
PAINLEVEL_OUTOF10: 7
PAINLEVEL_OUTOF10: 8
PAINLEVEL_OUTOF10: 5

## 2024-08-07 ASSESSMENT — PAIN - FUNCTIONAL ASSESSMENT
PAIN_FUNCTIONAL_ASSESSMENT: PREVENTS OR INTERFERES SOME ACTIVE ACTIVITIES AND ADLS
PAIN_FUNCTIONAL_ASSESSMENT: PREVENTS OR INTERFERES SOME ACTIVE ACTIVITIES AND ADLS

## 2024-08-07 ASSESSMENT — PAIN SCALES - WONG BAKER
WONGBAKER_NUMERICALRESPONSE: NO HURT

## 2024-08-07 ASSESSMENT — PAIN DESCRIPTION - FREQUENCY: FREQUENCY: CONTINUOUS

## 2024-08-07 ASSESSMENT — PAIN DESCRIPTION - PAIN TYPE: TYPE: SURGICAL PAIN

## 2024-08-07 NOTE — PLAN OF CARE
Problem: Discharge Planning  Goal: Discharge to home or other facility with appropriate resources  8/7/2024 1301 by Martha Em RN  Outcome: Completed  8/7/2024 1000 by Martha Em RN  Outcome: Progressing     Problem: Neurosensory - Adult  Goal: Achieves stable or improved neurological status  8/7/2024 1301 by Martha Em RN  Outcome: Completed  8/7/2024 1000 by Martha Em RN  Outcome: Progressing     Problem: Skin/Tissue Integrity - Adult  Goal: Incisions, wounds, or drain sites healing without S/S of infection  8/7/2024 1301 by Martha Em RN  Outcome: Completed  8/7/2024 1000 by Martha Em RN  Outcome: Progressing     Problem: Musculoskeletal - Adult  Goal: Return mobility to safest level of function  8/7/2024 1301 by Martha Em RN  Outcome: Completed  8/7/2024 1000 by Martha Em RN  Outcome: Progressing  Goal: Maintain proper alignment of affected body part  8/7/2024 1301 by Martha Em RN  Outcome: Completed  8/7/2024 1000 by Martha Em RN  Outcome: Progressing  Goal: Return ADL status to a safe level of function  8/7/2024 1301 by Martha Em RN  Outcome: Completed  8/7/2024 1000 by Martha Em RN  Outcome: Progressing     Problem: Infection - Adult  Goal: Absence of infection at discharge  8/7/2024 1301 by Martha Em RN  Outcome: Completed  8/7/2024 1000 by Martha Em RN  Outcome: Progressing  Goal: Absence of infection during hospitalization  8/7/2024 1301 by Martha Em RN  Outcome: Completed  8/7/2024 1000 by Martha Em RN  Outcome: Progressing     Problem: Metabolic/Fluid and Electrolytes - Adult  Goal: Glucose maintained within prescribed range  8/7/2024 1301 by Martha Em RN  Outcome: Completed  8/7/2024 1000 by Martha Em RN  Outcome: Progressing     Problem: Chronic Conditions and Co-morbidities  Goal: Patient's chronic conditions and co-morbidity symptoms are monitored and maintained or improved  8/7/2024 1301 by

## 2024-08-07 NOTE — PLAN OF CARE
Problem: Discharge Planning  Goal: Discharge to home or other facility with appropriate resources  8/7/2024 1000 by Martha Em RN  Outcome: Progressing  8/6/2024 2048 by Tawnya Mays RN  Outcome: Progressing  Flowsheets (Taken 8/6/2024 2048)  Discharge to home or other facility with appropriate resources: Identify barriers to discharge with patient and caregiver     Problem: Neurosensory - Adult  Goal: Achieves stable or improved neurological status  8/7/2024 1000 by Martha Em RN  Outcome: Progressing  8/6/2024 2048 by Tawnya Mays RN  Outcome: Progressing  Flowsheets (Taken 8/6/2024 2048)  Achieves stable or improved neurological status: Assess for and report changes in neurological status     Problem: Skin/Tissue Integrity - Adult  Goal: Incisions, wounds, or drain sites healing without S/S of infection  8/7/2024 1000 by Martha Em RN  Outcome: Progressing  8/6/2024 2048 by Tawnya Mays RN  Outcome: Progressing  Flowsheets (Taken 8/6/2024 2048)  Incisions, Wounds, or Drain Sites Healing Without Sign and Symptoms of Infection: ADMISSION and DAILY: Assess and document risk factors for pressure ulcer development     Problem: Musculoskeletal - Adult  Goal: Return mobility to safest level of function  8/7/2024 1000 by Martha Em RN  Outcome: Progressing  8/6/2024 2048 by Tawnya Mays RN  Outcome: Progressing  Flowsheets (Taken 8/6/2024 2048)  Return Mobility to Safest Level of Function: Assess patient stability and activity tolerance for standing, transferring and ambulating with or without assistive devices  Goal: Maintain proper alignment of affected body part  8/7/2024 1000 by Martha Em RN  Outcome: Progressing  8/6/2024 2048 by Tawnya Mays RN  Outcome: Progressing  Flowsheets (Taken 8/6/2024 2048)  Maintain proper alignment of affected body part: Support and protect limb and body alignment per provider's orders  Goal: Return ADL status to a safe level of function  8/7/2024 1000 by Manav

## 2024-08-07 NOTE — DISCHARGE INSTR - DIET

## 2024-08-07 NOTE — PLAN OF CARE
Problem: Discharge Planning  Goal: Discharge to home or other facility with appropriate resources  Outcome: Progressing  Flowsheets (Taken 8/6/2024 2048)  Discharge to home or other facility with appropriate resources: Identify barriers to discharge with patient and caregiver     Problem: Neurosensory - Adult  Goal: Achieves stable or improved neurological status  Outcome: Progressing  Flowsheets (Taken 8/6/2024 2048)  Achieves stable or improved neurological status: Assess for and report changes in neurological status     Problem: Skin/Tissue Integrity - Adult  Goal: Incisions, wounds, or drain sites healing without S/S of infection  Outcome: Progressing  Flowsheets (Taken 8/6/2024 2048)  Incisions, Wounds, or Drain Sites Healing Without Sign and Symptoms of Infection: ADMISSION and DAILY: Assess and document risk factors for pressure ulcer development     Problem: Musculoskeletal - Adult  Goal: Return mobility to safest level of function  Outcome: Progressing  Flowsheets (Taken 8/6/2024 2048)  Return Mobility to Safest Level of Function: Assess patient stability and activity tolerance for standing, transferring and ambulating with or without assistive devices  Goal: Maintain proper alignment of affected body part  Outcome: Progressing  Flowsheets (Taken 8/6/2024 2048)  Maintain proper alignment of affected body part: Support and protect limb and body alignment per provider's orders  Goal: Return ADL status to a safe level of function  Outcome: Progressing  Flowsheets (Taken 8/6/2024 2048)  Return ADL Status to a Safe Level of Function: Administer medication as ordered     Problem: Infection - Adult  Goal: Absence of infection at discharge  Outcome: Progressing  Flowsheets (Taken 8/6/2024 2048)  Absence of infection at discharge: Assess and monitor for signs and symptoms of infection  Goal: Absence of infection during hospitalization  Outcome: Progressing  Flowsheets (Taken 8/6/2024 2048)  Absence of infection

## 2024-08-07 NOTE — CARE COORDINATION
Received call from [facility]-- precert obtained - 7 days auth'd    DISCHARGE SUMMARY     DATE OF DISCHARGE: 8/7/24    DISCHARGE DESTINATION: [facility]    Level of Care: Skilled  [place] [id number]    Report Number: [phone]    Fax Number:  [phone]    Precert Obtained: yes     Hens Completed: N/A    PASARR: yes    Notified: RN, Family, and Facility/Agency  Notified Kandi in admissions #[phone]    Prescriptions Faxed:yes      TRANSPORTATION: Private Car  NEW DME ORDERED: N/A    Electronically signed by Lexis Morales on 8/7/2024 at 11:59 AM  #[phone]  
Received call from Kandi calderon/ [facility]- they are unable to accept at [facility] due to lack of beds but are able to accept at [facility]  Pt and family agreeable   Will initiate precert once PT/OT completed     Electronically signed by Lexis Morales on 8/6/2024 at 1:12 PM  #[phone]   
Susan calderon/ Kandi at [facility] has been initiated   Will follow.  Electronically signed by Lexis Morales on 8/6/2024 at 3:46 PM  #[phone]  
of choice list with basic dialogue that supports the patient's individualized plan of care/goals and shares the quality data associated with the providers was provided to: (P) Patient   The Patient and/or Patient Representative Agree with the Discharge Plan? (P) Yes    Lexis Morales  Case Management Department  Ph: [phone] Fax: [phone]

## 2024-08-08 ENCOUNTER — TELEPHONE (OUTPATIENT)
Dept: ORTHOPEDIC SURGERY | Age: 64
End: 2024-08-08

## 2024-08-08 ENCOUNTER — TELEPHONE (OUTPATIENT)
Dept: FAMILY MEDICINE CLINIC | Age: 64
End: 2024-08-08

## 2024-08-08 NOTE — TELEPHONE ENCOUNTER
General Question     Subject: DISCHARGE  Patient and /or Facility Request: PATIENT DAUGHTER CALLED STATES THAT SHE WOULD LIKE TO SPEAK TO SOMEONE REGARDING PATIENT'S DISCHARGE. STATES THAT PATIENT WAS SUPPOSED TO TRANSPORTED TO REHAB FACILITY VIA STRETCHER FROM HOSPITAL. THEY ENDED UP HAVING TO COMMUTE PATIENT WITHOUT ONE AND ONCE THEY ARRIVED AT FACILITY THEY DID NOT GET ASSISTANCE FROM STAFF BECAUSE SHE WAS NOT ON A STRETCHER. PATIENT WAS NOT GIVEN MEDICATIONS EITHER, PLS CALL TO ADVISE  Contact Number: [phone]

## 2024-08-08 NOTE — TELEPHONE ENCOUNTER
.ENTRY FOR [facility] MAMMOGRAM RAFFLE    Completion of mammogram before Oct 31st equals 1 entry. Completion same day as order/visit with EFM will equal 2 entries.    Mammogram Completion date: 4-4-24    Ordering provider: Promise RAE #: [id number]      [facility] Raffle will be held on Friday Nov 1st.  4 winners will be selected. (One from each office POD)  If chosen office staff will contact patient to coordinate raffle basket collection.

## 2024-08-08 NOTE — TELEPHONE ENCOUNTER
Care Transitions Initial Follow Up Call    Outreach made within 2 business days of discharge: Yes    Patient: Rosario Riley Patient : 1960   MRN: [id number]  Reason for Admission: Hip replacement  Discharge Date: 24       Spoke with: patient admitted for scheduled procedure.  DC to SNF and is following up with surgeon. TCM with PCP not needed at this time.     Discharge department/facility: W    TCM Interactive Patient Contact:  Was patient able to fill all prescriptions: Yes  Was patient instructed to bring all medications to the follow-up visit: Yes  Is patient taking all medications as directed in the discharge summary? Yes  Does patient understand their discharge instructions: Yes  Does patient have questions or concerns that need addressed prior to 7-14 day follow up office visit: no    Additional needs identified to be addressed with provider  No needs identified             Scheduled appointment with PCP within 7-14 days    Follow Up  Future Appointments   Date Time Provider Department Center   2024 11:00 AM Nick Greenberg MD [facility]   10/18/2024 11:00 AM Promise Taylor PA [facility]       Sanjuana Boogie RN

## 2024-08-08 NOTE — DISCHARGE SUMMARY
Physician Discharge Summary     Patient ID:  Rosario Riley  [id number]  64 y.o.  1960    Admit date: 8/6/2024    Discharge date and time: 8/7/2024  4:31 PM     Admitting Physician: Nick Greenberg MD     Discharge Physician: SASHA Greenberg    Admission Diagnoses: Osteoarthritis of left hip [M16.12]  Primary osteoarthritis of left hip [M16.12]    Discharge Diagnoses: left hip OA    Admission Condition: good    Discharged Condition: good    Indication for Admission: Failed conservative treatment as outpatient for joint pain including PT and pain meds. This patient was then electively scheduled for total joint replacement surgery    Surgical procedure: left EDWARD    Consults: PT OT SS    This patient had no postoperative complications. They has PT and OT for ADL's . IV and PO pain med for pain control and was eventually DC in stable condition    Treatments: analgesia,  therapies: PT OT,  and surgery      Disposition: home    Patient Instructions:   [unfilled]  Activity: activity as tolerated  Diet: regular diet  Wound Care: keep wound clean and dry    Follow-up with SASHA Greenberg in 2 weeks.    Signed:  PHILOMENA Musa CNP  8/8/2024  11:54 AM

## 2024-08-13 NOTE — PROGRESS NOTES
[facility] Pre-Admission Testing Electronic Communication Worksheet for OR/ENDO Procedures        Patient: Rosario Riley    DOS: 8/6    Transportation Confirmed: [x] YES    []  NO    History and Physical:  [x] YES    []  NO  [] N/A  If yes, please list doctor or Urgent Care and date of H&P:     Additional Clearance(Cardiac, Pulmonary, etc):  [] YES    [x]  NO    Pre-Admission Testing Visit: per      [] YES    []  NO If no, do labs/testing need to be done DOS?  [] YES    []  NO    Medication Reconciliation Complete:  [x] YES    []  NO        Additional Notes:                Interview Complete: [x] YES    []  NO          Kelsi Pantoja RN  10:29 AM  
4 Eyes Skin Assessment     NAME:  Rosario Riley  YOB: 1960  MEDICAL RECORD NUMBER:  [id number]    The patient is being assessed for  Admission    I agree that at least one RN has performed a thorough Head to Toe Skin Assessment on the patient. ALL assessment sites listed below have been assessed.      Areas assessed by both nurses:    Head, Face, Ears, Shoulders, Back, Chest, Arms, Elbows, Hands, Sacrum. Buttock, Coccyx, Ischium, and Legs. Feet and Heels        Does the Patient have a Wound? No noted wound(s)       Jamar Prevention initiated by RN: Yes  Wound Care Orders initiated by RN: No    Pressure Injury (Stage 3,4, Unstageable, DTI, NWPT, and Complex wounds) if present, place Wound referral order by RN under : No    New Ostomies, if present place, Ostomy referral order under : No     Nurse 1 eSignature: Electronically signed by Milagro Thompson RN on 8/6/24 at 10:35 AM EDT    **SHARE this note so that the co-signing nurse can place an eSignature**    Nurse 2 eSignature: Electronically signed by Flower Victoria RN on 8/6/24 at 1:32 PM EDT    
Ambulated patient from chair to bathroom using contact guard assist, use of gait belt and front rolling walker. Patient tolerated activity fairly well. Patient had complaints of nausea after voiding, Stedy x1 was used to assist patient back to chery for safety. Patient reports nausea has begun to subside but still requested pain medication to be given in IV for fear that she will vomit. See eMAR. Patient instructed to call in 1 hour if pain is not controlled at that time, patient verbalized understanding.   
Checking on patient Q2H for nutrition needs, hygiene needs, comfort measures, mobility, fall risk interventions, and safe environment. All precautions and interventions in place. Educated patient on use of call light and telephone. Patient verbalizes understanding. Call light/telephone in reach.Electronically signed by HARMAN ARENAS RN on 8/7/2024 at 10:00 AM   
[facility] Orthopedic Surgery   Progress Note      S/P :  SUBJECTIVE  in recliner., Alert and oriented. . Pain is   described in left hip and with the intensity of moderate. Pain is described as aching.       OBJECTIVE              Physical                      VITALS:  /75   Pulse 85   Temp 98.4 °F (36.9 °C) (Oral)   Resp 17   Ht 1.702 m (5' 7\")   Wt 99.3 kg (218 lb 14.7 oz)   LMP 01/30/2009   SpO2 93%   BMI 34.29 kg/m²                     MUSCULOSKELETAL:  left foot NVI. Wiggles toes to command. Able to plantarflex and dorsiflex ankle Pedal pulses are palpable.                    NEUROLOGIC:                                  Sensory:  Touch:  Left Lower Extremity:  normal                                                 Surgical wound appears clean and dry left hip with Prineo dressing Ice pack on. RENATO hose on    Data       CBC:   Lab Results   Component Value Date/Time    WBC 7.3 08/01/2024 12:15 PM    RBC 3.86 08/01/2024 12:15 PM    HGB 12.1 08/01/2024 12:15 PM    HCT 36.0 08/01/2024 12:15 PM    MCV 93.1 08/01/2024 12:15 PM    MCH 31.4 08/01/2024 12:15 PM    MCHC 33.7 08/01/2024 12:15 PM    RDW 13.5 08/01/2024 12:15 PM     08/01/2024 12:15 PM    MPV 8.3 08/01/2024 12:15 PM        WBC:    Lab Results   Component Value Date/Time    WBC 7.3 08/01/2024 12:15 PM        Hemoglobin/Hematocrit:    Lab Results   Component Value Date/Time    HGB 12.1 08/01/2024 12:15 PM    HCT 36.0 08/01/2024 12:15 PM        PT/INR:    Lab Results   Component Value Date/Time    PROTIME 12.9 08/01/2024 12:15 PM    INR 0.95 08/01/2024 12:15 PM              Current Inpatient Medications             Current Facility-Administered Medications: albuterol sulfate HFA (PROVENTIL;VENTOLIN;PROAIR) 108 (90 Base) MCG/ACT inhaler 2 puff, 2 puff, Inhalation, 4x Daily PRN  atorvastatin (LIPITOR) tablet 40 mg, 40 mg, Oral, Daily  cetirizine (ZYRTEC) tablet 10 mg, 10 mg, Oral, Daily  furosemide (LASIX) tablet 40 mg, 40 mg, Oral, 
Huddle performed including Nurse navigator Flower, Physical therapist Gracia, and Occupational therapist Vani. Discussed plan of care, discharge plan, and dme needs if applicable for orthopedic total joint patient.    
Left hip xrays obtained per MD order.   
[facility] Orthopedic Surgery   Progress Note      S/P :  SUBJECTIVE  in bed. Alert and oriented. . Pain is   described in left hip and with the intensity of moderate. Pain is described as aching.       OBJECTIVE              Physical                      VITALS:  /74   Pulse 67   Temp 97.9 °F (36.6 °C) (Oral)   Resp 16   Ht 1.702 m (5' 7\")   Wt 99.1 kg (218 lb 8 oz)   LMP 01/30/2009   SpO2 93%   BMI 34.22 kg/m²                     MUSCULOSKELETAL:  left foot NVI. Wiggles toes to command. Able to plantarflex and dorsiflex ankle Pedal pulses are palpable.                    NEUROLOGIC:                                  Sensory:  Touch:  Left Lower Extremity:  normal                                                 Surgical wound appears clean and dry left hip with gauze and tape dressing Ice pack on. RENATO hose on.     Data       CBC:   Lab Results   Component Value Date/Time    WBC 7.3 08/01/2024 12:15 PM    RBC 3.86 08/01/2024 12:15 PM    HGB 12.1 08/01/2024 12:15 PM    HCT 36.0 08/01/2024 12:15 PM    MCV 93.1 08/01/2024 12:15 PM    MCH 31.4 08/01/2024 12:15 PM    MCHC 33.7 08/01/2024 12:15 PM    RDW 13.5 08/01/2024 12:15 PM     08/01/2024 12:15 PM    MPV 8.3 08/01/2024 12:15 PM        WBC:    Lab Results   Component Value Date/Time    WBC 7.3 08/01/2024 12:15 PM        Hemoglobin/Hematocrit:    Lab Results   Component Value Date/Time    HGB 12.1 08/01/2024 12:15 PM    HCT 36.0 08/01/2024 12:15 PM        PT/INR:    Lab Results   Component Value Date/Time    PROTIME 12.9 08/01/2024 12:15 PM    INR 0.95 08/01/2024 12:15 PM              Current Inpatient Medications             Current Facility-Administered Medications: albuterol sulfate HFA (PROVENTIL;VENTOLIN;PROAIR) 108 (90 Base) MCG/ACT inhaler 2 puff, 2 puff, Inhalation, 4x Daily PRN  [START ON 8/7/2024] atorvastatin (LIPITOR) tablet 40 mg, 40 mg, Oral, Daily  [START ON 8/7/2024] cetirizine (ZYRTEC) tablet 10 mg, 10 mg, Oral, Daily  [START ON 
Met with patient at bedside, patient is alert and oriented x4. Discussed role of nurse navigator.  Reviewed reasons to call with questions or concerns, importance of TEDS, Incentive spirometer and incentive spirometer at bedside, pain medication, and physical and occupational therapy. 2/4 bed rails up, bed locked and in lowest position, fall precautions in place, call light within reach.     Pulses present bilaterally +2 Moderate pedal, Left Hip Dry dressing assessed and is intact with scant amount of serosanguineous  drainage noted. Ice in place. Juanjo and scds on bilateral lower extremities. weak dorsi and plantar flexions noted bilaterally, toes appear appropriate to ethnicity in color , Warm to touch bilaterally. patient denies numbness or tingling in extremities.    Recent Labs     08/06/24  0900 08/06/24  1114   POCGLU 160* 155*     Per Insulin Protocol, patient triggered the insulin protocol, check POC glucose ACHS    Vitals:    08/06/24 0938 08/06/24 0949 08/06/24 1005 08/06/24 1117   BP:   117/68 128/74   Pulse: 88 87 74 67   Resp: 14 12 16 16   Temp:   97.5 °F (36.4 °C) 97.9 °F (36.6 °C)   TempSrc:   Oral Oral   SpO2: 99% 97% 94% 93%   Weight:       Height:           DC Plan: [facility] pending precert. family to transport patient.  DME needs:needs a rolling walker if discharges home.    Flower Victoria  Orthopedic Nurse Navigator  Phone number: [phone]    Future Appointments   Date Time Provider Department Center   8/19/2024 11:00 AM Nick Greenberg MD [facility]   10/18/2024 11:00 AM Promise Taylor PA [facility] DEP         
Notification sent to Dr Greenberg and medical assistant Kelsi BAEZ regarding abnormal preoperative labs and pertinent medical history.    
Orders completed this shift:      [x] Surgical site and Neurovascular checks assessed with head to toe assessment and Q4Hr this shift per orders. See flowsheets for documentation.   [x] Insulin protocol implemented per orders, see eMAR for documentation.  [x] Patient ambulated 20 feet from bed to toilet and back to bed . See flowsheet for documentation on how patient tolerated ambulation.  [x] Ice pack/pad in place to surgical site. Barrier in place between patient skin and ice pack/pad.   [x]Pain medication administered per orders for management of pain. See eMAR for documentation.   [x] Incentive spirometer performed by patient. Volume achieved 1800. Encouraged patient to perform Q2hr while awake per order.  [x] IV fluids stopped per orders as patient is tolerating PO and has adequate urine output. See eMAR for documentation.   [x] Shift intake and output documented per shift, see flowsheet.  [x] Customized care plan and education charted on Qshift.     
Patient arrived to unit from PACU and was placed into room 3126. Patient A&O x 4. Patient oriented to room, unit routine, call light/telephone use, bed/chair alarm implementation, and meal ordering process. Patient reports understanding, denies questions. Patient denies physical/emotional needs at this time. Call light, telephone, and bed side table are within reach. Fall precautions in place. Electronically signed by Milagro Thompson RN on 8/6/2024 at 10:03 AM    
Patient is in bed, quiet, with eyes closed. Respirations are even and unlabored. Bed is locked in lowest position with bed alarm on. Bedside table and call light are within reach. Patient is able to communicate needs.   
Physical Therapy  Facility/Department: [address] ORTHOPEDICS  Physical Therapy Initial Assessment  This note serves as patient discharge summary if pt discharges prior to next PT visit      Name: Rosario Riley  : 1960  MRN: [id number]  Date of Service: 2024    Discharge Recommendations:  Patient would benefit from continued therapy after discharge (3-5)   Rosario Riley scored a 14/24 on the AM-PAC short mobility form. Current research shows that an AM-PAC score of 17 or less is typically not associated with a discharge to the patient's home setting. Based on the patient's AM-PAC score and their current functional mobility deficits, it is recommended that the patient have 3-5 sessions per week of Physical Therapy at d/c to increase the patient's independence.  Please see assessment section for further patient specific details.    PT Equipment Recommendations  Other: monitor. Has RW      Patient Diagnosis(es): The primary encounter diagnosis was Primary osteoarthritis of left hip. A diagnosis of Primary osteoarthritis involving multiple joints was also pertinent to this visit.  Past Medical History:  has a past medical history of Essential hypertension, Foot pain, bilateral, Hypercholesterolemia, Osteoarthritis, and Severe obesity (BMI 35.0-39.9) with comorbidity (HCC).  Past Surgical History:  has a past surgical history that includes  section; Tonsillectomy (); Knee arthroscopy (Left); and knee surgery (2022).    Assessment   Body Structures, Functions, Activity Limitations Requiring Skilled Therapeutic Intervention: Decreased functional mobility ;Decreased strength;Increased pain  Assessment: 65 yo female to hospital 24 for elective L THR via Dr. Greenberg. WBAT. Anterior precautions. Other PMHx as noted, including OA R knee with valgus, \"Also needs surgery.\"  Prior status: Lives in 2 floor home with multiple family members, independent in ADLs, transfers, and gait with cane.  Status 
Pt arrived to PACU from OR. Pt asleep on room air. Left hip dressing C/D/I. Ice pack applied. +pulses noted.   
Report called to [facility] to nurse Elliott, Educated nurse Elliott on discharge instructions, follow up appointment, and anticoagulant Eliquis 2.5mg to be given twice a day for 30 days total, nurse Elliott verbalized understanding. educated Earl nurse that patient should wear teds hose for 2 weeks total, on in AM and off at bedtime. no further questions at this time. Updated bedside SUMEET Thomas on report being given.    Future Appointments   Date Time Provider Department Spring   8/19/2024 11:00 AM Nick Greenberg MD [facility]   10/18/2024 11:00 AM Promise Taylor PA [facility] DEP     Electronically signed by Flower Victoria RN on 8/7/2024 at 3:44 PM    
The patients OARRS report has been reviewed online and any prescribing of pain related medications is based on our findings.The patient has been issued narcotics to safely reduce postoperative pain and promote tolerance with physical therapies and ADL's. Reduction in dosing will be addressed with the next narcotic refill request. Dosing is adjusted for patients with history of chronic pain disorders.    
[facility] ECC DEP       
functioning below baseline limited by decreased balance and L hip and R knee pain. Pt required Min A bed mobility, Min A fxl tx various surfaces, Min/CGA fxl mobility with RW household distances with unsteadiness, CGA sink side grooming, Max A LB dressing and Min A toileting. BUE WFL for self care. Cont acute OT to address above deficits. Pt with low AMPAC score and fall risk, demonstrating need for further skilled OT, rec low-mod pace  Treatment Diagnosis: impaired ADL/fxl mobility  Prognosis: Good  Decision Making: Medium Complexity  REQUIRES OT FOLLOW-UP: Yes  Activity Tolerance  Activity Tolerance: Patient Tolerated treatment well        Plan   Occupational Therapy Plan  Times Per Week: 3-5  Current Treatment Recommendations: Balance training, Functional mobility training, Endurance training, Pain management, Safety education & training, Patient/Caregiver education & training, Self-Care / ADL, Home management training, Modalities, Positioning     Restrictions  Restrictions/Precautions  Restrictions/Precautions: Weight Bearing, Fall Risk, ROM Restrictions  Lower Extremity Weight Bearing Restrictions  Left Lower Extremity Weight Bearing: Weight Bearing As Tolerated  Position Activity Restriction  Hip Precautions: No hip external rotation, No hip extension    Subjective   General  Chart Reviewed: Yes  Patient assessed for rehabilitation services?: Yes  Additional Pertinent Hx: 65 yo female admitted after elective 8/6/24 L EDWARD now WBAT with anterior hip precautions. PMH: Obesity, HTN  Family / Caregiver Present: No  Referring Practitioner: Nick Greenberg MD  Diagnosis: L EDWARD  Subjective  Subjective: Pt resting in bed upon arrival and agreeable to OT/PT eval. Pt reporting L hip and R knee pain, 5/10 both. Min dizziness  General Comment  Comments: RN ok to see       Social/Functional History  Social/Functional History  Lives With: Family (dtrs, grandchildren  Simultaneous filing. User may not have seen previous 
or toes      For your safety, please do not wear any jewelry or body piercing's on the day of surgery.   All jewelry must be removed.      If you have dentures, they will be removed before going to operating room.    For your convenience, we will provide you with a container.    If you wear contact lenses or glasses, they will be removed, please bring a case for them.     If you have a living will and a durable power of  for healthcare, please bring in a copy.     As part of our patient safety program to minimize surgical site infections, we ask you to do the following:    Please notify your surgeon if you develop any illness between         now and the  day of your surgery.    This includes a cough, cold, fever, sore throat, nausea,         or vomiting, and diarrhea, etc.   Please notify your surgeon if you experience dizziness, shortness         of breath or blurred vision between now and the time of your surgery.      Do not shave your operative site 96 hours prior to surgery.   For face and neck surgery, men may use an electric razor 48 hours   prior to surgery.    You must shower the night before surgery and the morning of   your surgery with an antibacterial soap.    You will need to bring a photo ID and insurance card    [facility] has an onsite pharmacy, would you like to utilize our pharmacy     If you will be staying overnight and use a C-pap machine, please bring   your C-pap to hospital     Our goal is to provide you with excellent care, therefore, visitors will be limited to two(2) in the room at a time so that we may focus on providing this care for you.          Please contact pre-admission testing if you have any further questions.                 Bessy Carter phone number:  [phone]     [facility] fax number:  [phone]  Please note these are generalized instructions for all surgical cases, you may be provided with more specific instructions according to your surgery.    C-Difficile 
therapy       Therapy Time   Individual Concurrent Group Co-treatment   Time In 1410         Time Out 1503         Minutes 53                 Electronically signed by Vani Mi, [id number] on 8/7/2024 at 3:03 PM    OTR was consulted with this patients treatment/intervention plan.        
oral

## 2024-08-19 ENCOUNTER — OFFICE VISIT (OUTPATIENT)
Dept: ORTHOPEDIC SURGERY | Age: 64
End: 2024-08-19

## 2024-08-19 VITALS — BODY MASS INDEX: 34.29 KG/M2 | HEIGHT: 67 IN

## 2024-08-19 DIAGNOSIS — Z96.642 STATUS POST TOTAL REPLACEMENT OF LEFT HIP: Primary | ICD-10-CM

## 2024-08-19 PROCEDURE — 99024 POSTOP FOLLOW-UP VISIT: CPT | Performed by: ORTHOPAEDIC SURGERY

## 2024-08-19 NOTE — PROGRESS NOTES
[facility] Orthopaedics and Spine  Office Visit    Chief Complaint: Follow-up s/p left total hip arthroplasty    HPI:  Rosario Riley is a 64 y.o. who is here in follow-up of left total hip arthroplasty performed on August 5, 2024.  She is staying in a skilled nursing facility and returns home tomorrow.  She is walking with a walker.  She takes 1 to 2 tablets of oxycodone per day for pain.  She rates pain as 4/10.    Exam:  Appearance: sitting in exam room chair, appears to be in no acute distress, awake and alert  Resp: unlabored breathing on room air  Skin: warm, dry and intact with out erythema or significant increased temperature  Neuro: grossly intact both lower extremities. Intact sensation to light touch. Motor exam 4+ to 5/5 in all major motor groups.  Left hip: Incision is healing as expected.  Examination demonstrates negative logroll and negative Stinchfield.  There is brisk capillary refill.  She demonstrates weak active hip flexion.    Imaging:  3 views of the left hip were performed and reviewed today. Significant for total hip arthroplasty prosthesis in place with no signs of osteolysis, loosening, fracture, or dislocation.    Assessment:  S/p left total hip arthroplasty    Plan:  She is recovering well postoperatively.  She will transition to home physical therapy.  She will continue walking with a walker and eventually start using a cane.  She will wean off the pain medication.  Follow-up for repeat assessment in 4 weeks.    This dictation was done with [employer] dictation and may contain mechanical errors related to translation.

## 2024-08-20 ENCOUNTER — TELEPHONE (OUTPATIENT)
Dept: FAMILY MEDICINE CLINIC | Age: 64
End: 2024-08-20

## 2024-08-20 NOTE — TELEPHONE ENCOUNTER
Care Transitions Initial Follow Up Call    Outreach made within 2 business days of discharge: Yes    Patient: Rosario Riley Patient : 1960   MRN: [id number]  Reason for Admission: Osteoarthritis left hip  Discharge Date: 24       Spoke with: Bri, patient discharged from [facility]    Discharge department/facility: [facility] Interactive Patient Contact:  Was patient able to fill all prescriptions: Yes  Was patient instructed to bring all medications to the follow-up visit: Yes  Is patient taking all medications as directed in the discharge summary? Yes  Does patient understand their discharge instructions: Yes  Does patient have questions or concerns that need addressed prior to 7-14 day follow up office visit: no        Scheduled appointment with PCP within 7-14 days    Follow Up  Future Appointments   Date Time Provider Department Center   2024 11:00 AM Promise Taylor PA [facility] DEP   2024 10:45 AM Nick Greenberg MD [facility]   10/18/2024 11:00 AM Promise Taylor PA [facility] DEP       Kelsi Elizabeth LPN

## 2024-08-21 ENCOUNTER — TELEPHONE (OUTPATIENT)
Dept: FAMILY MEDICINE CLINIC | Age: 64
End: 2024-08-21

## 2024-08-21 ENCOUNTER — TELEPHONE (OUTPATIENT)
Dept: ORTHOPEDIC SURGERY | Age: 64
End: 2024-08-21

## 2024-08-21 DIAGNOSIS — M16.12 PRIMARY OSTEOARTHRITIS OF LEFT HIP: ICD-10-CM

## 2024-08-21 RX ORDER — DULOXETIN HYDROCHLORIDE 30 MG/1
30 CAPSULE, DELAYED RELEASE ORAL DAILY
Qty: 30 CAPSULE | Refills: 0 | Status: SHIPPED | OUTPATIENT
Start: 2024-08-21

## 2024-08-21 RX ORDER — OXYCODONE HYDROCHLORIDE 5 MG/1
5-10 TABLET ORAL EVERY 6 HOURS PRN
Qty: 40 TABLET | Refills: 0 | Status: SHIPPED | OUTPATIENT
Start: 2024-08-21 | End: 2024-08-26

## 2024-08-21 NOTE — TELEPHONE ENCOUNTER
General Question     Subject: patient daughter Cathleen is calling she has a question it regarding her mother taken Depression Medication. She just would like a call back regarding this matter. Please Advise.  Patient Cathleen Latif (Daughter)  Contact Number: [phone]

## 2024-08-21 NOTE — TELEPHONE ENCOUNTER
It looks like this was started in the hospital. This medication can be used for chronic pain patients to help reduce pain of musculoskeletal origin. That is most likely why they started it. Knowing this, if she would like to wean off of this medication let me know and I will send in the 30 mg tablet for her to take for a few weeks. This is not a medication that she can stop suddenly.     I am happy to place the referral for her to get alternative home care services. Was this for nursing, physical therapy, occupational therapy or all of the above? Did they have a specific company in mind?

## 2024-08-21 NOTE — TELEPHONE ENCOUNTER
Prescription Refill     Medication Name:  Oxycodone   Pharmacy: [facility] [id number]  Patient Contact Number:  [phone]

## 2024-08-21 NOTE — TELEPHONE ENCOUNTER
Daughter is not on HIPAA but spoke to the patient who gave me the verbal to speak with kayla   They would like the 30 mg cymbalta sent in since she only has 1 pill left of the 60 mg, please send to [facility] in [place]  Daughter also states they will just use [facility] for the home health at this time since its only 7 visits in a 2 week time period.

## 2024-08-21 NOTE — TELEPHONE ENCOUNTER
Incoming call from daughter & patient. Just discharged from [facility] and patient is prescribed medication that she wasn't aware she was taking.    Duloxetine 60mg daily for depression, patient denies depression and was not aware she was taking this medication. Daughter asks if this needs to be weaned off in order to stop prior to appt on 8/26.    Also they will bring in discharge papers from [facility] to visit.  [facility] did not assist in DME order and they had to purchase bedside commode out of pocket.  [facility] is supposed to schedule in home therapy, daughter would prefer to use different home health agency, okay to discuss at upcoming appointment.

## 2024-08-22 ENCOUNTER — TELEPHONE (OUTPATIENT)
Dept: FAMILY MEDICINE CLINIC | Age: 64
End: 2024-08-22

## 2024-08-22 NOTE — TELEPHONE ENCOUNTER
Received phone call from Deborah with [facility] she stated that the patient has requested to start services on Tuesday 08/27/2024     SANDRA Cabrera- [phone]

## 2024-08-29 ENCOUNTER — OFFICE VISIT (OUTPATIENT)
Dept: FAMILY MEDICINE CLINIC | Age: 64
End: 2024-08-29

## 2024-08-29 VITALS
HEART RATE: 110 BPM | BODY MASS INDEX: 33.05 KG/M2 | SYSTOLIC BLOOD PRESSURE: 110 MMHG | WEIGHT: 211 LBS | OXYGEN SATURATION: 97 % | DIASTOLIC BLOOD PRESSURE: 60 MMHG

## 2024-08-29 DIAGNOSIS — I10 ESSENTIAL HYPERTENSION: ICD-10-CM

## 2024-08-29 DIAGNOSIS — Z09 HOSPITAL DISCHARGE FOLLOW-UP: Primary | ICD-10-CM

## 2024-08-29 DIAGNOSIS — L89.302 PRESSURE INJURY OF BUTTOCK, STAGE 2, UNSPECIFIED LATERALITY (HCC): ICD-10-CM

## 2024-08-29 DIAGNOSIS — R19.7 DIARRHEA OF PRESUMED INFECTIOUS ORIGIN: ICD-10-CM

## 2024-08-29 PROBLEM — M16.12 OSTEOARTHRITIS OF LEFT HIP: Status: RESOLVED | Noted: 2024-05-17 | Resolved: 2024-08-29

## 2024-08-29 ASSESSMENT — ENCOUNTER SYMPTOMS
VOMITING: 0
NAUSEA: 1
DIARRHEA: 1

## 2024-08-29 NOTE — PROGRESS NOTES
Post-Discharge Transitional Care Follow Up      Rosario Riley   YOB: 1960    Date of Office Visit:  8/29/2024  Date of Hospital Admission: 8/6/24  Discharge from [facility]: 08/20/24  Date of Hospital Discharge: 8/7/24  Readmission Risk Score(high >=14%. Medium >=10%):No data recorded    Care management risk score Rising risk (score 2-5) and Complex Care (Scores >=6): No Risk Score On File     Non face to face  following discharge, date last encounter closed (first attempt may have been earlier): 08/20/2024     Call initiated 2 business days of discharge: Yes     Below is the assessment and plan developed based on review of pertinent history, physical exam, labs, studies, and medications.  Hospital discharge follow-up  -     [place] DISCHARGE MEDS RECONCILED W/ CURRENT OUTPATIENT MED LIST. I reviewed lab work and notes from hospitalization. Pt would like to stop home care at this time and follow up with PT at their office. She has a referral and will call gustavo  Diarrhea of presumed infectious origin  -     C DIFF TOXIN/ANTIGEN; Future, suspect C diff, family to bring back a stool sample  Essential hypertension       -  well controlled, will continue with lisinopril and lasix. Follow up in 6 months  Pressure injury of buttock, stage 2, unspecified laterality (HCC)       -   no signs of infection. We discussed the cause of these sores and ways to prevent and treat. Keep area clean, avoid pressure. Follow up for signs of non-healing pressure sore    Medical Decision Making: moderate complexity  Return in about 6 months (around 2/28/2025) for HTN.       Subjective:   HPI    Inpatient course: Discharge summary reviewed- see chart.    Interval history/Current status: the patient reports that she is feeling well since being discharged. She is active at home and now ambulating with a cane. Her daughter and grand-daughter are assisting her with ADLs as needed. She has followed up with her surgeon who  is recommending PT.     The patient does report that she developed diarrhea one week ago while still in [place]. Bowel movements are now occurring 2-4 times per day.  Stool is green and foul smelling. There is associated nausea and cramping. She has been staying on a mostly liquid diet at home since most things do not sound good to her. As a result, she has a shallow pressure ulcer that has developed on her buttocks.    Patient Active Problem List   Diagnosis    Hypercholesterolemia    Vitamin D deficiency    Severe obesity (BMI 35.0-39.9) with comorbidity (HCC)    Essential hypertension    Primary osteoarthritis involving multiple joints    Age-related osteoporosis without current pathological fracture    Primary osteoarthritis of left hip       Medications listed as started at the time of discharge from hospital  Cannot display discharge medications since this is not an admission.          Medications marked \"taking\" at this time  Outpatient Medications Marked as Taking for the 8/29/24 encounter (Office Visit) with Promise Taylor PA   Medication Sig Dispense Refill    aspirin 81 MG EC tablet Take 1 tablet by mouth daily HOLD while on Eliquis 30 tablet 3    diclofenac (VOLTAREN) 75 MG EC tablet Take 1 tablet by mouth 2 times daily HOLD while on Eliquis 180 tablet 1    apixaban (ELIQUIS) 2.5 MG TABS tablet Take 1 tablet by mouth 2 times daily 60 tablet 0    albuterol sulfate HFA (VENTOLIN HFA) 108 (90 Base) MCG/ACT inhaler Inhale 2 puffs into the lungs 4 times daily as needed for Wheezing 18 g 5    cetirizine (ZYRTEC) 10 MG tablet Take 1 tablet by mouth daily 90 tablet 0    lisinopril (PRINIVIL;ZESTRIL) 20 MG tablet Take 1 tablet by mouth daily 90 tablet 1    atorvastatin (LIPITOR) 40 MG tablet Take 1 tablet by mouth daily 90 tablet 1    acetaminophen (TYLENOL) 500 MG tablet Take 1 tablet by mouth every 6 hours as needed for Pain      furosemide (LASIX) 20 MG tablet Take 1 tablet by mouth daily 90 tablet 1

## 2024-09-11 DIAGNOSIS — I10 ESSENTIAL HYPERTENSION: ICD-10-CM

## 2024-09-11 RX ORDER — ATORVASTATIN CALCIUM 40 MG/1
40 TABLET, FILM COATED ORAL DAILY
Qty: 90 TABLET | Refills: 1 | Status: SHIPPED | OUTPATIENT
Start: 2024-09-11

## 2024-09-11 RX ORDER — FUROSEMIDE 20 MG
20 TABLET ORAL DAILY
Qty: 90 TABLET | Refills: 1 | Status: SHIPPED | OUTPATIENT
Start: 2024-09-11

## 2024-09-11 RX ORDER — POTASSIUM CHLORIDE 750 MG/1
10 TABLET, EXTENDED RELEASE ORAL DAILY
Qty: 90 TABLET | Refills: 1 | Status: SHIPPED | OUTPATIENT
Start: 2024-09-11

## 2024-09-26 ENCOUNTER — OFFICE VISIT (OUTPATIENT)
Dept: ORTHOPEDIC SURGERY | Age: 64
End: 2024-09-26

## 2024-09-26 VITALS — HEIGHT: 67 IN | BODY MASS INDEX: 33.74 KG/M2 | WEIGHT: 215 LBS

## 2024-09-26 DIAGNOSIS — Z96.642 STATUS POST TOTAL REPLACEMENT OF LEFT HIP: Primary | ICD-10-CM

## 2024-09-26 PROCEDURE — 99024 POSTOP FOLLOW-UP VISIT: CPT | Performed by: PHYSICIAN ASSISTANT

## 2024-10-18 PROBLEM — Z47.1 AFTERCARE FOLLOWING JOINT REPLACEMENT SURGERY: Status: ACTIVE | Noted: 2024-08-07

## 2024-10-18 PROBLEM — S83.272D COMPLEX TEAR OF LAT MENSC, CURRENT INJURY, LEFT KNEE, SUBS: Status: ACTIVE | Noted: 2022-09-19

## 2024-10-24 DIAGNOSIS — I10 ESSENTIAL HYPERTENSION: ICD-10-CM

## 2024-10-24 RX ORDER — LISINOPRIL 20 MG/1
20 TABLET ORAL DAILY
Qty: 90 TABLET | Refills: 1 | Status: SHIPPED | OUTPATIENT
Start: 2024-10-24

## 2024-10-24 NOTE — TELEPHONE ENCOUNTER
Refill Request     CONFIRM preferred pharmacy with the patient.    If Mail Order Rx - Pend for 90 day refill.      Last Seen: Last Seen Department: 8/29/2024  Last Seen by PCP: 8/29/2024    Last Written: 4/18/24 90 with 1 refill     If no future appointment scheduled:  Review the last OV with PCP and review information for follow-up visit,  Route STAFF MESSAGE with patient name to the  Pool for scheduling with the following information:            -  Timing of next visit           -  Visit type ie Physical, OV, etc           -  Diagnoses/Reason ie. COPD, HTN - Do not use MEDICATION, Follow-up or CHECK UP - Give reason for visit      Next Appointment:   Future Appointments   Date Time Provider Department Center   11/7/2024 11:00 AM Bhanu Crowder, PA [facility]       Message sent to  to schedule appt with patient?  YES Return in about 6 months (around 10/18/2024) for HTN.         Requested Prescriptions     Pending Prescriptions Disp Refills    lisinopril (PRINIVIL;ZESTRIL) 20 MG tablet [Pharmacy Med Name: LISINOPRIL 20 MG TABLET] 90 tablet 1     Sig: TAKE 1 TABLET BY MOUTH EVERY DAY

## 2024-11-07 ENCOUNTER — OFFICE VISIT (OUTPATIENT)
Dept: ORTHOPEDIC SURGERY | Age: 64
End: 2024-11-07

## 2024-11-07 VITALS — BODY MASS INDEX: 33.74 KG/M2 | HEIGHT: 67 IN | WEIGHT: 215 LBS

## 2024-11-07 DIAGNOSIS — M17.11 PRIMARY OSTEOARTHRITIS OF RIGHT KNEE: ICD-10-CM

## 2024-11-07 DIAGNOSIS — Z96.642 STATUS POST TOTAL REPLACEMENT OF LEFT HIP: Primary | ICD-10-CM

## 2024-11-07 RX ORDER — TRIAMCINOLONE ACETONIDE 40 MG/ML
40 INJECTION, SUSPENSION INTRA-ARTICULAR; INTRAMUSCULAR ONCE
Status: COMPLETED | OUTPATIENT
Start: 2024-11-07 | End: 2024-11-07

## 2024-11-07 RX ORDER — BUPIVACAINE HYDROCHLORIDE 2.5 MG/ML
2 INJECTION, SOLUTION INFILTRATION; PERINEURAL ONCE
Status: COMPLETED | OUTPATIENT
Start: 2024-11-07 | End: 2024-11-07

## 2024-11-07 RX ADMIN — TRIAMCINOLONE ACETONIDE 40 MG: 40 INJECTION, SUSPENSION INTRA-ARTICULAR; INTRAMUSCULAR at 11:01

## 2024-11-07 RX ADMIN — BUPIVACAINE HYDROCHLORIDE 5 MG: 2.5 INJECTION, SOLUTION INFILTRATION; PERINEURAL at 11:01

## 2024-11-07 NOTE — PROGRESS NOTES
pain or discomfort for a few days, temporary bruising or collection of blood under the skin, flushing of the face, changes to your vision such as blurred vision, and infection causing redness, swelling and pain, loss of fat where the injection was given, paler skin around the site of injection.  If you are diabetic, your blood sugar level may go up for 3 days.  If you have high blood pressure, your blood pressure may go up for a few days.  Cortisone Injection                                                       PROCEDURE NOTE:  Diagnosis:  Right knee pain due to arthritis.  With Rosario Riley permission, her right knee was prepped  in standard sterile fashion with  Alcohol.   2 cc of 0.25% Marcaine and 1 cc of Kenalog 40 mg was injected into the right lateral compartment  without difficulty. She tolerated this well without difficulty.  A band-aid was applied.She was advised to ice the right knee for 15-20 minutes to relieve any injection site related pain.        Follow up- 3 months with Dr Greenberg.   Call or return to clinic if these symptoms worsen or fail to improve as anticipated.

## 2024-12-05 DIAGNOSIS — M15.0 PRIMARY OSTEOARTHRITIS INVOLVING MULTIPLE JOINTS: ICD-10-CM

## 2024-12-05 RX ORDER — DICLOFENAC SODIUM 75 MG/1
75 TABLET, DELAYED RELEASE ORAL 2 TIMES DAILY
Qty: 180 TABLET | Refills: 1 | Status: SHIPPED | OUTPATIENT
Start: 2024-12-05

## 2024-12-05 NOTE — TELEPHONE ENCOUNTER
.Refill Request     CONFIRM preferred pharmacy with the patient.    If Mail Order Rx - Pend for 90 day refill.      Last Seen: Last Seen Department: 8/29/2024  Last Seen by PCP: 8/29/2024    Last Written: 8-6-24 180 with 1     If no future appointment scheduled:  Review the last OV with PCP and review information for follow-up visit,  Route STAFF MESSAGE with patient name to the  Pool for scheduling with the following information:            -  Timing of next visit           -  Visit type ie Physical, OV, etc           -  Diagnoses/Reason ie. COPD, HTN - Do not use MEDICATION, Follow-up or CHECK UP - Give reason for visit      Next Appointment:   Future Appointments   Date Time Provider Department Center   2/7/2025 11:00 AM Nick Greenberg MD [facility]       Message sent to  to schedule appt with patient?  NO      Requested Prescriptions     Pending Prescriptions Disp Refills    diclofenac (VOLTAREN) 75 MG EC tablet [Pharmacy Med Name: DICLOFENAC SOD EC 75 MG TAB] 180 tablet 1     Sig: TAKE 1 TABLET BY MOUTH TWICE A DAY

## 2025-02-07 ENCOUNTER — OFFICE VISIT (OUTPATIENT)
Dept: ORTHOPEDIC SURGERY | Age: 65
End: 2025-02-07

## 2025-02-07 VITALS — BODY MASS INDEX: 33.74 KG/M2 | HEIGHT: 67 IN | WEIGHT: 215 LBS

## 2025-02-07 DIAGNOSIS — M17.11 PRIMARY OSTEOARTHRITIS OF RIGHT KNEE: Primary | ICD-10-CM

## 2025-02-07 RX ORDER — TRIAMCINOLONE ACETONIDE 40 MG/ML
40 INJECTION, SUSPENSION INTRA-ARTICULAR; INTRAMUSCULAR ONCE
Status: COMPLETED | OUTPATIENT
Start: 2025-02-07 | End: 2025-02-07

## 2025-02-07 RX ORDER — BUPIVACAINE HYDROCHLORIDE 2.5 MG/ML
2 INJECTION, SOLUTION INFILTRATION; PERINEURAL ONCE
Status: COMPLETED | OUTPATIENT
Start: 2025-02-07 | End: 2025-02-07

## 2025-02-07 RX ADMIN — BUPIVACAINE HYDROCHLORIDE 5 MG: 2.5 INJECTION, SOLUTION INFILTRATION; PERINEURAL at 11:06

## 2025-02-07 RX ADMIN — TRIAMCINOLONE ACETONIDE 40 MG: 40 INJECTION, SUSPENSION INTRA-ARTICULAR; INTRAMUSCULAR at 11:07

## 2025-02-07 SDOH — ECONOMIC STABILITY: FOOD INSECURITY: WITHIN THE PAST 12 MONTHS, YOU WORRIED THAT YOUR FOOD WOULD RUN OUT BEFORE YOU GOT MONEY TO BUY MORE.: NEVER TRUE

## 2025-02-07 SDOH — ECONOMIC STABILITY: INCOME INSECURITY: IN THE LAST 12 MONTHS, WAS THERE A TIME WHEN YOU WERE NOT ABLE TO PAY THE MORTGAGE OR RENT ON TIME?: NO

## 2025-02-07 SDOH — ECONOMIC STABILITY: FOOD INSECURITY: WITHIN THE PAST 12 MONTHS, THE FOOD YOU BOUGHT JUST DIDN'T LAST AND YOU DIDN'T HAVE MONEY TO GET MORE.: NEVER TRUE

## 2025-02-07 SDOH — HEALTH STABILITY: PHYSICAL HEALTH: ON AVERAGE, HOW MANY MINUTES DO YOU ENGAGE IN EXERCISE AT THIS LEVEL?: 0 MIN

## 2025-02-07 SDOH — HEALTH STABILITY: PHYSICAL HEALTH: ON AVERAGE, HOW MANY DAYS PER WEEK DO YOU ENGAGE IN MODERATE TO STRENUOUS EXERCISE (LIKE A BRISK WALK)?: 0 DAYS

## 2025-02-07 ASSESSMENT — PATIENT HEALTH QUESTIONNAIRE - PHQ9
SUM OF ALL RESPONSES TO PHQ QUESTIONS 1-9: 0
1. LITTLE INTEREST OR PLEASURE IN DOING THINGS: NOT AT ALL
2. FEELING DOWN, DEPRESSED OR HOPELESS: NOT AT ALL
SUM OF ALL RESPONSES TO PHQ QUESTIONS 1-9: 0

## 2025-02-07 ASSESSMENT — LIFESTYLE VARIABLES
HOW OFTEN DO YOU HAVE A DRINK CONTAINING ALCOHOL: NEVER
HOW MANY STANDARD DRINKS CONTAINING ALCOHOL DO YOU HAVE ON A TYPICAL DAY: PATIENT DOES NOT DRINK

## 2025-02-07 NOTE — PROGRESS NOTES
[facility]  Office Visit    Chief Complaint: Follow-up for left hip replacement and right knee    HPI:  Rosario Riley is a 64 y.o. who is here for follow-up of left total hip arthroplasty performed on August 6, 2024.  She also comes in follow-up of right knee osteoarthritis.  She last had a steroid injection in the right knee 3 months ago Bhanu Crowder and this helped relieve her symptoms until recently.  She walks with the use of a cane due to her right knee pain.  She wears a knee brace on the right side.  Her left hip is doing well.  She has some tingling around the incision, but otherwise feels that she is doing very well with the hip.  She would like to hold off on knee replacement for now.    Past Medical History:   Diagnosis Date    Essential hypertension 04/04/2024    Foot pain, bilateral     Hypercholesterolemia 02/02/2014    Hypercholesterolemia 02/02/2014    Osteoarthritis NA    Had for at least 2 or more years    Severe obesity (BMI 35.0-39.9) with comorbidity 04/04/2024    Vitamin D deficiency 02/02/2014        ROS:  Constitutional: denies fever, chills, weight loss  MSK: denies pain in other joints, muscle aches  Neurological: denies numbness, tingling, weakness    Exam:  Ht 1.702 m (5' 7\")   Wt 97.5 kg (215 lb)   BMI 33.67 kg/m²      Appearance: sitting in exam room chair, appears to be in no acute distress, awake and alert  Resp: unlabored breathing on room air  Skin: warm, dry and intact with out erythema or significant increased temperature  Neuro: grossly intact both lower extremities. Intact sensation to light touch. Motor exam 4+ to 5/5 in all major motor groups.  LLE: Examination demonstrates negative logroll and negative Stinchfield.  There is brisk capillary refill.  Strength is 5/5 in hamstrings, quads, hip flexors.  RLE: Examination reveals that active knee range of motion is 0 to 115 degrees.  There is significant valgus deformity, positive crepitus,

## 2025-03-22 DIAGNOSIS — I10 ESSENTIAL HYPERTENSION: ICD-10-CM

## 2025-03-22 NOTE — TELEPHONE ENCOUNTER
Refill Request     CONFIRM preferred pharmacy with the patient.    If Mail Order Rx - Pend for 90 day refill.      Last Seen: Last Seen Department: 8/29/2024  Last Seen by PCP: 8/29/2024    Last Written: 10/24/2024    If no future appointment scheduled:  Review the last OV with PCP and review information for follow-up visit,  Route STAFF MESSAGE with patient name to the  Pool for scheduling with the following information:            -  Timing of next visit           -  Visit type ie Physical, OV, etc           -  Diagnoses/Reason ie. COPD, HTN - Do not use MEDICATION, Follow-up or CHECK UP - Give reason for visit      Next Appointment:   Future Appointments   Date Time Provider Department Center   3/27/2025 11:00 AM Promise Taylor PA [facility]   5/8/2025 11:00 AM Nick Greenberg MD [facility]       Message sent to  to schedule appt with patient?  NO      Requested Prescriptions     Pending Prescriptions Disp Refills    lisinopril (PRINIVIL;ZESTRIL) 20 MG tablet [Pharmacy Med Name: LISINOPRIL 20 MG TABLET] 90 tablet 1     Sig: TAKE 1 TABLET BY MOUTH EVERY DAY

## 2025-03-23 DIAGNOSIS — I10 ESSENTIAL HYPERTENSION: ICD-10-CM

## 2025-03-23 NOTE — TELEPHONE ENCOUNTER
Refill Request     CONFIRM preferred pharmacy with the patient.    If Mail Order Rx - Pend for 90 day refill.      Last Seen: Last Seen Department: 8/29/2024  Last Seen by PCP: 8/29/2024    Last Written:   Lipitor-9/11/2024 90 tab 1 refills  Lasix-9/11/2024 90 tab 1 refills    If no future appointment scheduled:  Review the last OV with PCP and review information for follow-up visit,  Route STAFF MESSAGE with patient name to the  Pool for scheduling with the following information:            -  Timing of next visit           -  Visit type ie Physical, OV, etc           -  Diagnoses/Reason ie. COPD, HTN - Do not use MEDICATION, Follow-up or CHECK UP - Give reason for visit      Next Appointment:   Future Appointments   Date Time Provider Department Center   3/27/2025 11:00 AM Promise [facility] PA [facility]   5/8/2025 11:00 AM Nick Greenberg MD [facility]       Message sent to  to schedule appt with patient?  NO      Requested Prescriptions     Pending Prescriptions Disp Refills    atorvastatin (LIPITOR) 40 MG tablet [Pharmacy Med Name: ATORVASTATIN 40 MG TABLET] 90 tablet 1     Sig: TAKE 1 TABLET BY MOUTH EVERY DAY    furosemide (LASIX) 20 MG tablet [Pharmacy Med Name: FUROSEMIDE 20 MG TABLET] 90 tablet 1     Sig: TAKE 1 TABLET BY MOUTH EVERY DAY

## 2025-03-24 RX ORDER — LISINOPRIL 20 MG/1
20 TABLET ORAL DAILY
Qty: 90 TABLET | Refills: 0 | Status: SHIPPED | OUTPATIENT
Start: 2025-03-24

## 2025-03-24 RX ORDER — FUROSEMIDE 20 MG/1
20 TABLET ORAL DAILY
Qty: 90 TABLET | Refills: 0 | Status: SHIPPED | OUTPATIENT
Start: 2025-03-24

## 2025-03-24 RX ORDER — ATORVASTATIN CALCIUM 40 MG/1
40 TABLET, FILM COATED ORAL DAILY
Qty: 90 TABLET | Refills: 0 | Status: SHIPPED | OUTPATIENT
Start: 2025-03-24 | End: 2025-03-28

## 2025-03-24 SDOH — HEALTH STABILITY: PHYSICAL HEALTH: ON AVERAGE, HOW MANY MINUTES DO YOU ENGAGE IN EXERCISE AT THIS LEVEL?: 0 MIN

## 2025-03-24 SDOH — HEALTH STABILITY: PHYSICAL HEALTH: ON AVERAGE, HOW MANY DAYS PER WEEK DO YOU ENGAGE IN MODERATE TO STRENUOUS EXERCISE (LIKE A BRISK WALK)?: 0 DAYS

## 2025-03-24 ASSESSMENT — PATIENT HEALTH QUESTIONNAIRE - PHQ9
2. FEELING DOWN, DEPRESSED OR HOPELESS: NOT AT ALL
1. LITTLE INTEREST OR PLEASURE IN DOING THINGS: NOT AT ALL
SUM OF ALL RESPONSES TO PHQ QUESTIONS 1-9: 0

## 2025-03-27 ENCOUNTER — OFFICE VISIT (OUTPATIENT)
Dept: FAMILY MEDICINE CLINIC | Age: 65
End: 2025-03-27
Payer: COMMERCIAL

## 2025-03-27 VITALS
HEIGHT: 67 IN | OXYGEN SATURATION: 98 % | HEART RATE: 81 BPM | SYSTOLIC BLOOD PRESSURE: 128 MMHG | WEIGHT: 224 LBS | BODY MASS INDEX: 35.16 KG/M2 | RESPIRATION RATE: 18 BRPM | TEMPERATURE: 98.2 F | DIASTOLIC BLOOD PRESSURE: 68 MMHG

## 2025-03-27 DIAGNOSIS — Z00.00 INITIAL MEDICARE ANNUAL WELLNESS VISIT: Primary | ICD-10-CM

## 2025-03-27 DIAGNOSIS — E78.00 HYPERCHOLESTEROLEMIA: ICD-10-CM

## 2025-03-27 DIAGNOSIS — M81.0 AGE-RELATED OSTEOPOROSIS WITHOUT CURRENT PATHOLOGICAL FRACTURE: ICD-10-CM

## 2025-03-27 DIAGNOSIS — Z12.11 COLON CANCER SCREENING: ICD-10-CM

## 2025-03-27 DIAGNOSIS — I10 ESSENTIAL HYPERTENSION: ICD-10-CM

## 2025-03-27 DIAGNOSIS — Z87.891 HISTORY OF TOBACCO USE: ICD-10-CM

## 2025-03-27 LAB
25(OH)D3 SERPL-MCNC: 6 NG/ML
ALBUMIN SERPL-MCNC: 4.4 G/DL (ref 3.4–5)
ALBUMIN/GLOB SERPL: 1.6 {RATIO} (ref 1.1–2.2)
ALP SERPL-CCNC: 102 U/L (ref 40–129)
ALT SERPL-CCNC: 27 U/L (ref 10–40)
ANION GAP SERPL CALCULATED.3IONS-SCNC: 9 MMOL/L (ref 3–16)
AST SERPL-CCNC: 15 U/L (ref 15–37)
BILIRUB SERPL-MCNC: <0.2 MG/DL (ref 0–1)
BUN SERPL-MCNC: 25 MG/DL (ref 7–20)
CALCIUM SERPL-MCNC: 9.3 MG/DL (ref 8.3–10.6)
CHLORIDE SERPL-SCNC: 102 MMOL/L (ref 99–110)
CHOLEST SERPL-MCNC: 188 MG/DL (ref 0–199)
CO2 SERPL-SCNC: 22 MMOL/L (ref 21–32)
CREAT SERPL-MCNC: 1.2 MG/DL (ref 0.6–1.2)
DEPRECATED RDW RBC AUTO: 13.6 % (ref 12.4–15.4)
GFR SERPLBLD CREATININE-BSD FMLA CKD-EPI: 50 ML/MIN/{1.73_M2}
GLUCOSE SERPL-MCNC: 104 MG/DL (ref 70–99)
HCT VFR BLD AUTO: 35.5 % (ref 36–48)
HDLC SERPL-MCNC: 38 MG/DL (ref 40–60)
HGB BLD-MCNC: 11.7 G/DL (ref 12–16)
LDLC SERPL CALC-MCNC: 117 MG/DL
MCH RBC QN AUTO: 31 PG (ref 26–34)
MCHC RBC AUTO-ENTMCNC: 33.1 G/DL (ref 31–36)
MCV RBC AUTO: 93.9 FL (ref 80–100)
PLATELET # BLD AUTO: 314 K/UL (ref 135–450)
PMV BLD AUTO: 8.5 FL (ref 5–10.5)
POTASSIUM SERPL-SCNC: 5.5 MMOL/L (ref 3.5–5.1)
PROT SERPL-MCNC: 7.1 G/DL (ref 6.4–8.2)
RBC # BLD AUTO: 3.78 M/UL (ref 4–5.2)
SODIUM SERPL-SCNC: 133 MMOL/L (ref 136–145)
TRIGL SERPL-MCNC: 165 MG/DL (ref 0–150)
VLDLC SERPL CALC-MCNC: 33 MG/DL
WBC # BLD AUTO: 7.8 K/UL (ref 4–11)

## 2025-03-27 PROCEDURE — 3078F DIAST BP <80 MM HG: CPT | Performed by: PHYSICIAN ASSISTANT

## 2025-03-27 PROCEDURE — G0438 PPPS, INITIAL VISIT: HCPCS | Performed by: PHYSICIAN ASSISTANT

## 2025-03-27 PROCEDURE — 1159F MED LIST DOCD IN RCRD: CPT | Performed by: PHYSICIAN ASSISTANT

## 2025-03-27 PROCEDURE — 1123F ACP DISCUSS/DSCN MKR DOCD: CPT | Performed by: PHYSICIAN ASSISTANT

## 2025-03-27 PROCEDURE — 3074F SYST BP LT 130 MM HG: CPT | Performed by: PHYSICIAN ASSISTANT

## 2025-03-27 SDOH — ECONOMIC STABILITY: FOOD INSECURITY: WITHIN THE PAST 12 MONTHS, THE FOOD YOU BOUGHT JUST DIDN'T LAST AND YOU DIDN'T HAVE MONEY TO GET MORE.: NEVER TRUE

## 2025-03-27 SDOH — ECONOMIC STABILITY: FOOD INSECURITY: WITHIN THE PAST 12 MONTHS, YOU WORRIED THAT YOUR FOOD WOULD RUN OUT BEFORE YOU GOT MONEY TO BUY MORE.: NEVER TRUE

## 2025-03-27 NOTE — PATIENT INSTRUCTIONS
and put on gloves. Start by flossing:  Gently work a piece of floss between each of the teeth toward the gums. A plastic flossing tool may make this easier, and they are available at most [facility].  Curve the floss around each tooth into a U-shape and gently slide it under the gum line.  Move the floss firmly up and down several times to scrape off the plaque.  After you've finished flossing, throw away the used floss and begin brushing:  Wet the brush and apply toothpaste.  Place the brush at a 45-degree angle where the teeth meet the gums. Press firmly, and move the brush in small circles over the surface of the teeth.  Be careful not to brush too hard. Vigorous brushing can make the gums pull away from the teeth and can scratch the tooth enamel.  Brush all surfaces of the teeth, on the tongue side and on the cheek side. Pay special attention to the front teeth and all surfaces of the back teeth.  Brush chewing surfaces with short back-and-forth strokes.  After you've finished, help the person rinse the remaining toothpaste from their mouth.  Where can you learn more?  Go to https://www.[employer].net/patientEd and enter F944 to learn more about \"Learning About Dental Care for Older Adults.\"  Current as of: July 31, 2024  Content Version: 14.4  © 6190-2029 [employer].   Care instructions adapted under license by [employer]. If you have questions about a medical condition or this instruction, always ask your healthcare professional. [employer] disclaims any warranty or liability for your use of this information.         Learning About Vision Tests  What are vision tests?     The four most common vision tests are visual acuity tests, refraction, visual field tests, and color vision tests.  Visual acuity (sharpness) tests  These tests are used:  To see if you need glasses or contact lenses.  To monitor an eye problem.  To check an eye injury.  Visual acuity tests are done as part of

## 2025-03-27 NOTE — PROGRESS NOTES
Medicare Annual Wellness Visit    Rosario Riley is here for Medicare AWV (Patient here for yearly AWV. No acute concerns today. )    Assessment & Plan   Initial Medicare annual wellness visit      -  reviewed age appropriate cancer screenings and immunizations  Essential hypertension  -     Comprehensive Metabolic Panel; Future  -     CBC; Future  -     chronic, well controlled, due for labs, continue with lasix and lisinopril, follow up in 6 months  Age-related osteoporosis without current pathological fracture  -     Comprehensive Metabolic Panel; Future  -     Vitamin D 25 Hydroxy; Future  -     [facility]  -     Pt did not tolerate fosamax, will refer her to endocrinology for further assessment  Hypercholesterolemia  Assessment & Plan:  -  continue with lipitor, stable, lab work today  Orders:  -     LIPID PANEL; Future  Colon cancer screening  -     [facility] - David Barnes MD, Gastroenterology, [facility]  History of tobacco use  -     CT LUNG CANCER SCREENING (INITIAL/ANNUAL); Future       Return in about 6 months (around 9/27/2025) for HTN.     Subjective   The following acute and/or chronic problems were also addressed today:  HTN:  Current medication: lisinopril and lasix  Does not have a way to check blood pressure at home  Reports: none  Denies: chest pain, shortness of breath, headache  Pt now has a cuff at home but hasn't checked it yet    HLD:  Diet: has cut down on fatty foods, has started getting diarrhea after eating chicken  Exercise: walks with a cane, limited exercise     Osteoporosis  New diagnosis  Tx: fosamax- made her ill  Will start an otc vitamin D3  She gets calcium in her diet    Diclofenac working well for joint pain, using tylenol infrequently as needed    Patient's complete Health Risk Assessment and screening values have been reviewed and are found in Flowsheets. The following problems were reviewed today and where indicated follow up

## 2025-03-27 NOTE — PROGRESS NOTES
Rosario Riley (:  1960) is a 64 y.o. female,{New vs Established:[id number]::\"Established patient\"}, here for evaluation of the following chief complaint(s):  Medicare AWV (Patient here for yearly AWV. )         Assessment & Plan  Essential hypertension   {A/P Summary:5412147085}         Age-related osteoporosis without current pathological fracture   {A/P Summary:2129487510}         Hypercholesterolemia   {A/P Summary:6482858707}         Colon cancer screening   {A/P Summary:0816800350}           No follow-ups on file.       Subjective   HPI  HTN:  Current medication: lisinopril and lasix  Does not have a way to check blood pressure at home  Reports: none  Denies: chest pain, shortness of breath, headache  Pt now has a cuff at home but hasn't checked it yet    HLD:  Diet: has cut down on fatty foods, has started getting diarrhea after eating chicken  Exercise: walks with a cane, limited exercise     Osteoporosis  New diagnosis  Tx: fosamax- made her ill  Will start an otc vitamin D3  She gets calcium in her diet    Diclofenac working well for joint pain, using tylenol infrequently as needed    Review of Systems       Objective   Physical Exam       {Time Documentation Optional:560870154}      An electronic signature was used to authenticate this note.    --SONAM Farr

## 2025-03-28 ENCOUNTER — RESULTS FOLLOW-UP (OUTPATIENT)
Dept: FAMILY MEDICINE CLINIC | Age: 65
End: 2025-03-28

## 2025-03-28 DIAGNOSIS — R73.01 IMPAIRED FASTING GLUCOSE: ICD-10-CM

## 2025-03-28 DIAGNOSIS — D64.9 NORMOCYTIC ANEMIA: ICD-10-CM

## 2025-03-28 DIAGNOSIS — N28.9 KIDNEY FUNCTION ABNORMAL: ICD-10-CM

## 2025-03-28 DIAGNOSIS — E55.9 VITAMIN D DEFICIENCY: Primary | ICD-10-CM

## 2025-03-28 RX ORDER — ROSUVASTATIN CALCIUM 20 MG/1
20 TABLET, COATED ORAL NIGHTLY
Qty: 90 TABLET | Refills: 1 | Status: SHIPPED | OUTPATIENT
Start: 2025-03-28

## 2025-03-28 RX ORDER — ERGOCALCIFEROL 1.25 MG/1
50000 CAPSULE, LIQUID FILLED ORAL WEEKLY
Qty: 12 CAPSULE | Refills: 0 | Status: SHIPPED | OUTPATIENT
Start: 2025-03-28

## 2025-03-28 NOTE — TELEPHONE ENCOUNTER
Patient informed and will start new medication today. She will also return to get labs drawn as well.

## 2025-04-08 ENCOUNTER — TELEPHONE (OUTPATIENT)
Dept: FAMILY MEDICINE CLINIC | Age: 65
End: 2025-04-08

## 2025-04-08 DIAGNOSIS — N28.9 KIDNEY FUNCTION ABNORMAL: Primary | ICD-10-CM

## 2025-04-08 NOTE — TELEPHONE ENCOUNTER
Patients daughter Cathleen calling asking if Promise would be able to change patients cholesterol from Crestor back to Atorvastatin. Cathleen stated that patient isnt herself she is very irritable and is gaining water weight. Cathleen also stated that patient changed taking her potassium chloride 10mg once every other day due to her potassium levels still being high. Cathleen also stated that she will come to patients next appointment so she is able to talk to Promise about these concerns as well. Please advise

## 2025-04-09 RX ORDER — ATORVASTATIN CALCIUM 40 MG/1
40 TABLET, FILM COATED ORAL DAILY
Qty: 90 TABLET | Refills: 1 | Status: SHIPPED | OUTPATIENT
Start: 2025-04-09

## 2025-04-09 NOTE — TELEPHONE ENCOUNTER
Cathleen, patient's daughter, notified. She will bring her mother tomorrow to have labs done.   Requesting any medication to be sent to [facility].     Pharmacy- [facility]      No need for a call back.

## 2025-04-09 NOTE — TELEPHONE ENCOUNTER
Bri has not returned to do her repeat labs, is she able to come today to check? She will be giving urine and blood. Also, her daughter called and feels that she is having side effects to her crestor. We can switch her back to lipitor if she would like and try adding on a non-statin medication to help reduce her risk instead. We also don't have to start her on any additional medication if she is not interested as long as she understands her risk of heart attack or stroke is above goal for her age at 9.2%.

## 2025-04-10 DIAGNOSIS — E55.9 VITAMIN D DEFICIENCY: ICD-10-CM

## 2025-04-10 DIAGNOSIS — N28.9 KIDNEY FUNCTION ABNORMAL: ICD-10-CM

## 2025-04-10 DIAGNOSIS — D64.9 NORMOCYTIC ANEMIA: ICD-10-CM

## 2025-04-10 DIAGNOSIS — R73.01 IMPAIRED FASTING GLUCOSE: ICD-10-CM

## 2025-04-10 LAB
25(OH)D3 SERPL-MCNC: 15 NG/ML
ALBUMIN SERPL-MCNC: 4.1 G/DL (ref 3.4–5)
ANION GAP SERPL CALCULATED.3IONS-SCNC: 13 MMOL/L (ref 3–16)
BUN SERPL-MCNC: 29 MG/DL (ref 7–20)
CALCIUM SERPL-MCNC: 9.2 MG/DL (ref 8.3–10.6)
CHLORIDE SERPL-SCNC: 108 MMOL/L (ref 99–110)
CO2 SERPL-SCNC: 19 MMOL/L (ref 21–32)
CREAT SERPL-MCNC: 1.2 MG/DL (ref 0.6–1.2)
DEPRECATED RDW RBC AUTO: 13.3 % (ref 12.4–15.4)
EST. AVERAGE GLUCOSE BLD GHB EST-MCNC: 125.5 MG/DL
FERRITIN SERPL IA-MCNC: 96.1 NG/ML (ref 15–150)
GFR SERPLBLD CREATININE-BSD FMLA CKD-EPI: 50 ML/MIN/{1.73_M2}
GLUCOSE SERPL-MCNC: 102 MG/DL (ref 70–99)
HBA1C MFR BLD: 6 %
HCT VFR BLD AUTO: 33.1 % (ref 36–48)
HGB BLD-MCNC: 10.9 G/DL (ref 12–16)
IRON SATN MFR SERPL: 23 % (ref 15–50)
IRON SERPL-MCNC: 68 UG/DL (ref 37–145)
MCH RBC QN AUTO: 30.5 PG (ref 26–34)
MCHC RBC AUTO-ENTMCNC: 32.9 G/DL (ref 31–36)
MCV RBC AUTO: 92.8 FL (ref 80–100)
PHOSPHATE SERPL-MCNC: 4.4 MG/DL (ref 2.5–4.9)
PLATELET # BLD AUTO: 280 K/UL (ref 135–450)
PMV BLD AUTO: 8.2 FL (ref 5–10.5)
POTASSIUM SERPL-SCNC: 5.7 MMOL/L (ref 3.5–5.1)
RBC # BLD AUTO: 3.56 M/UL (ref 4–5.2)
SODIUM SERPL-SCNC: 140 MMOL/L (ref 136–145)
TIBC SERPL-MCNC: 292 UG/DL (ref 260–445)
TRANSFERRIN SERPL-MCNC: 227 MG/DL (ref 200–360)
WBC # BLD AUTO: 7.5 K/UL (ref 4–11)

## 2025-04-11 ENCOUNTER — RESULTS FOLLOW-UP (OUTPATIENT)
Dept: FAMILY MEDICINE CLINIC | Age: 65
End: 2025-04-11

## 2025-04-11 DIAGNOSIS — N28.9 KIDNEY FUNCTION ABNORMAL: ICD-10-CM

## 2025-04-11 DIAGNOSIS — D64.9 ANEMIA, UNSPECIFIED TYPE: ICD-10-CM

## 2025-04-11 DIAGNOSIS — E55.9 VITAMIN D DEFICIENCY: Primary | ICD-10-CM

## 2025-04-14 ENCOUNTER — PATIENT MESSAGE (OUTPATIENT)
Dept: FAMILY MEDICINE CLINIC | Age: 65
End: 2025-04-14

## 2025-04-30 DIAGNOSIS — I10 ESSENTIAL HYPERTENSION: ICD-10-CM

## 2025-04-30 RX ORDER — POTASSIUM CHLORIDE 750 MG/1
10 TABLET, EXTENDED RELEASE ORAL DAILY
Qty: 90 TABLET | Refills: 1 | Status: SHIPPED | OUTPATIENT
Start: 2025-04-30

## 2025-04-30 NOTE — TELEPHONE ENCOUNTER
Refill Request     CONFIRM preferred pharmacy with the patient.    If Mail Order Rx - Pend for 90 day refill.      Last Seen: Last Seen Department: 3/27/2025  Last Seen by PCP: 3/27/2025    Last Written: 9/11/24 90 with 1 refill     If no future appointment scheduled:  Review the last OV with PCP and review information for follow-up visit,  Route STAFF MESSAGE with patient name to the  Pool for scheduling with the following information:            -  Timing of next visit           -  Visit type ie Physical, OV, etc           -  Diagnoses/Reason ie. COPD, HTN - Do not use MEDICATION, Follow-up or CHECK UP - Give reason for visit      Next Appointment:   Future Appointments   Date Time Provider Department Center   5/5/2025  3:00 PM Promise Taylor PA [facility]   5/8/2025 11:00 AM Nick Greenberg MD [facility]       Message sent to  to schedule appt with patient?  NO      Requested Prescriptions     Pending Prescriptions Disp Refills    potassium chloride (KLOR-CON M) 10 MEQ extended release tablet [Pharmacy Med Name: POTASSIUM CL ER 10 MEQ TAB MCR] 90 tablet 1     Sig: TAKE 1 TABLET BY MOUTH EVERY DAY

## 2025-05-05 ENCOUNTER — OFFICE VISIT (OUTPATIENT)
Dept: FAMILY MEDICINE CLINIC | Age: 65
End: 2025-05-05
Payer: COMMERCIAL

## 2025-05-05 DIAGNOSIS — D64.9 ANEMIA, UNSPECIFIED TYPE: ICD-10-CM

## 2025-05-05 DIAGNOSIS — N28.9 KIDNEY FUNCTION ABNORMAL: ICD-10-CM

## 2025-05-05 DIAGNOSIS — I10 ESSENTIAL HYPERTENSION: ICD-10-CM

## 2025-05-05 DIAGNOSIS — R06.02 SHORTNESS OF BREATH: ICD-10-CM

## 2025-05-05 DIAGNOSIS — N28.9 KIDNEY FUNCTION ABNORMAL: Primary | ICD-10-CM

## 2025-05-05 DIAGNOSIS — R82.998 LEUKOCYTES IN URINE: ICD-10-CM

## 2025-05-05 DIAGNOSIS — R39.89 SUSPECTED UTI: ICD-10-CM

## 2025-05-05 DIAGNOSIS — R60.9 FLUID RETENTION: ICD-10-CM

## 2025-05-05 DIAGNOSIS — E78.00 HYPERCHOLESTEROLEMIA: ICD-10-CM

## 2025-05-05 LAB
BILIRUBIN, POC: ABNORMAL
BLOOD URINE, POC: ABNORMAL
CLARITY, POC: CLEAR
COLOR, POC: YELLOW
GLUCOSE URINE, POC: ABNORMAL MG/DL
KETONES, POC: ABNORMAL MG/DL
LEUKOCYTE EST, POC: ABNORMAL
NITRITE, POC: ABNORMAL
PH, POC: 5.5
PROTEIN, POC: ABNORMAL MG/DL
SPECIFIC GRAVITY, POC: 1.01
UROBILINOGEN, POC: 0.2 MG/DL

## 2025-05-05 PROCEDURE — 99214 OFFICE O/P EST MOD 30 MIN: CPT | Performed by: PHYSICIAN ASSISTANT

## 2025-05-05 PROCEDURE — 3077F SYST BP >= 140 MM HG: CPT | Performed by: PHYSICIAN ASSISTANT

## 2025-05-05 PROCEDURE — 3078F DIAST BP <80 MM HG: CPT | Performed by: PHYSICIAN ASSISTANT

## 2025-05-05 PROCEDURE — G2211 COMPLEX E/M VISIT ADD ON: HCPCS | Performed by: PHYSICIAN ASSISTANT

## 2025-05-05 PROCEDURE — 81002 URINALYSIS NONAUTO W/O SCOPE: CPT | Performed by: PHYSICIAN ASSISTANT

## 2025-05-05 PROCEDURE — 1123F ACP DISCUSS/DSCN MKR DOCD: CPT | Performed by: PHYSICIAN ASSISTANT

## 2025-05-05 PROCEDURE — 1159F MED LIST DOCD IN RCRD: CPT | Performed by: PHYSICIAN ASSISTANT

## 2025-05-05 RX ORDER — SULFAMETHOXAZOLE AND TRIMETHOPRIM 800; 160 MG/1; MG/1
1 TABLET ORAL 2 TIMES DAILY
Qty: 6 TABLET | Refills: 0 | Status: SHIPPED | OUTPATIENT
Start: 2025-05-05 | End: 2025-05-08

## 2025-05-05 RX ORDER — ATORVASTATIN CALCIUM 80 MG/1
80 TABLET, FILM COATED ORAL DAILY
Qty: 90 TABLET | Refills: 1 | Status: SHIPPED | OUTPATIENT
Start: 2025-05-05

## 2025-05-05 ASSESSMENT — ENCOUNTER SYMPTOMS
WHEEZING: 0
SHORTNESS OF BREATH: 1

## 2025-05-05 NOTE — ASSESSMENT & PLAN NOTE
Chronic, not at goal (unstable), pt is willing to try a higher dose of lipitor, will double this to 80 mg. Crestor added to drug intolerance as there was concern for agitation and confusion on this medication    Orders:    atorvastatin (LIPITOR) 80 MG tablet; Take 1 tablet by mouth daily

## 2025-05-05 NOTE — ASSESSMENT & PLAN NOTE
Chronic, not at goal (unstable), will start her on torsemide, renal function in two weeks    Orders:    John Aquino MD, Cardiology, [facility]    torsemide (DEMADEX) 20 MG tablet; Take 1 tablet by mouth daily

## 2025-05-05 NOTE — PROGRESS NOTES
Rosario Riley (:  1960) is a 64 y.o. female,Established patient, here for evaluation of the following chief complaint(s):  Results (Discuss lab results from 25) and Weight Gain (States she has gained 5 pounds in just a few days )         Assessment & Plan  Kidney function abnormal    Suspect uti, start on bactrim, culture has been sent    Orders:    POCT Urinalysis no Micro    MICROSCOPIC URINALYSIS    Culture, Urine    Renal Function Panel; Future    Leukocytes in urine    Micro is positive for WBC, presumed UTI- start on bactrim    Orders:    MICROSCOPIC URINALYSIS    Culture, Urine    Essential hypertension   Chronic, not at goal (unstable), will start her on torsemide, renal function in two weeks    Orders:    John Aquino MD, Cardiology, [facility]    torsemide (DEMADEX) 20 MG tablet; Take 1 tablet by mouth daily    Fluid retention   New, not at goal (unstable), started after stopping lasix, reviewed last echo. BNP ordered, elevated, will order echo for her to complete. Will trial torsemide for fluid retention. Recheck renal function in 1-2 weeks. Recommended she start wearing her compression stockings as well    Orders:    Brain Natriuretic Peptide; Future    Echo (TTE) complete (PRN contrast/bubble/strain/3D); Future    torsemide (DEMADEX) 20 MG tablet; Take 1 tablet by mouth daily    Anemia, unspecified type    Unchanged, would like for her to follow up with GI for further evaluation of cause    Orders:    CBC; Future    Hypercholesterolemia   Chronic, not at goal (unstable), pt is willing to try a higher dose of lipitor, will double this to 80 mg. Crestor added to drug intolerance as there was concern for agitation and confusion on this medication    Orders:    atorvastatin (LIPITOR) 80 MG tablet; Take 1 tablet by mouth daily    Shortness of breath    Elevated BNP, start on torsemide    Orders:    Brain Natriuretic Peptide; Future    Echo (TTE) complete (PRN

## 2025-05-06 ENCOUNTER — RESULTS FOLLOW-UP (OUTPATIENT)
Dept: FAMILY MEDICINE CLINIC | Age: 65
End: 2025-05-06

## 2025-05-06 ENCOUNTER — TELEPHONE (OUTPATIENT)
Dept: CARDIOLOGY CLINIC | Age: 65
End: 2025-05-06

## 2025-05-06 VITALS
WEIGHT: 229 LBS | SYSTOLIC BLOOD PRESSURE: 150 MMHG | HEART RATE: 66 BPM | OXYGEN SATURATION: 99 % | BODY MASS INDEX: 35.87 KG/M2 | DIASTOLIC BLOOD PRESSURE: 84 MMHG

## 2025-05-06 LAB
ALBUMIN SERPL-MCNC: 4.3 G/DL (ref 3.4–5)
ANION GAP SERPL CALCULATED.3IONS-SCNC: 11 MMOL/L (ref 3–16)
BACTERIA URNS QL MICRO: ABNORMAL /HPF
BUN SERPL-MCNC: 16 MG/DL (ref 7–20)
CALCIUM SERPL-MCNC: 9.5 MG/DL (ref 8.3–10.6)
CHLORIDE SERPL-SCNC: 103 MMOL/L (ref 99–110)
CO2 SERPL-SCNC: 24 MMOL/L (ref 21–32)
CREAT SERPL-MCNC: 0.9 MG/DL (ref 0.6–1.2)
DEPRECATED RDW RBC AUTO: 13.3 % (ref 12.4–15.4)
EPI CELLS #/AREA URNS AUTO: 2 /HPF (ref 0–5)
GFR SERPLBLD CREATININE-BSD FMLA CKD-EPI: 70 ML/MIN/{1.73_M2}
GLUCOSE SERPL-MCNC: 98 MG/DL (ref 70–99)
HCT VFR BLD AUTO: 32.2 % (ref 36–48)
HGB BLD-MCNC: 10.9 G/DL (ref 12–16)
HYALINE CASTS #/AREA URNS AUTO: 2 /LPF (ref 0–8)
MCH RBC QN AUTO: 30.4 PG (ref 26–34)
MCHC RBC AUTO-ENTMCNC: 33.8 G/DL (ref 31–36)
MCV RBC AUTO: 90.1 FL (ref 80–100)
NT-PROBNP SERPL-MCNC: 484 PG/ML (ref 0–124)
PHOSPHATE SERPL-MCNC: 3.9 MG/DL (ref 2.5–4.9)
PLATELET # BLD AUTO: 338 K/UL (ref 135–450)
PMV BLD AUTO: 8.8 FL (ref 5–10.5)
POTASSIUM SERPL-SCNC: 4.7 MMOL/L (ref 3.5–5.1)
RBC # BLD AUTO: 3.57 M/UL (ref 4–5.2)
RBC CLUMPS #/AREA URNS AUTO: 1 /HPF (ref 0–4)
SODIUM SERPL-SCNC: 138 MMOL/L (ref 136–145)
URN SPEC COLLECT METH UR: ABNORMAL
WBC # BLD AUTO: 9.6 K/UL (ref 4–11)
WBC #/AREA URNS AUTO: 54 /HPF (ref 0–5)

## 2025-05-06 RX ORDER — TORSEMIDE 20 MG/1
20 TABLET ORAL DAILY
Qty: 30 TABLET | Refills: 3 | Status: SHIPPED | OUTPATIENT
Start: 2025-05-06

## 2025-05-06 ASSESSMENT — ENCOUNTER SYMPTOMS: BLOOD IN STOOL: 0

## 2025-05-06 NOTE — TELEPHONE ENCOUNTER
Pt's daughter called to schedule pt with [facility]. She would like her mother to be seen ASAP if at all possible. All of the pt's lab work came back abnormal. Her PCP is getting a STAT ECHO done. Is there an overbook we can offer?   Pt is new to [facility]. Daughter sees [facility] and would like her mother to as well.

## 2025-05-07 LAB
BACTERIA UR CULT: ABNORMAL
ORGANISM: ABNORMAL

## 2025-05-09 ENCOUNTER — RESULTS FOLLOW-UP (OUTPATIENT)
Dept: FAMILY MEDICINE CLINIC | Age: 65
End: 2025-05-09

## 2025-05-09 NOTE — PROGRESS NOTES
[facility]   Cardiac Consultation    Referring Provider:  Promise Taylor PA     Chief Complaint   Patient presents with    New Patient    Hypertension    Hyperlipidemia    Shortness of Breath      Rosario Riley   1960    History of Present Illness:    Rosario Riley is a 64 y.o. female who is here today as a new patient at the request of Promise Taylor PA for SOB, edema, elevated BNP, and hypertension.    Previous testing 2024- EF 60-65%. Repeat echocardiogram 2025- EF 60-65%.    Today she states PCP had her on Lasix for edema and 2 weeks ago changed her to torsemide. She states her leg swelling is no better since changing to torsemide. She states she has moderate daily SOB that started 2 months ago that comes on with exertion like walking and stairs. No associated chest pains or orthopnea. Resolves w/ rest. She has had increased swelling in the last month and has gained 5 lbs over a week period. Her SOB decreases after she stops and rest. Blood pressure at home is running- 135-140 for SBP. Patient currently denies any palpitations, chest pain, dizziness, and syncope. She has been told she is borderline diabetic.       Past Medical History:   has a past medical history of Essential hypertension, Foot pain, bilateral, Hypercholesterolemia, Hypercholesterolemia, Osteoarthritis, Severe obesity (BMI 35.0-39.9) with comorbidity (HCC), and Vitamin D deficiency.    Surgical History:   has a past surgical history that includes  section; Tonsillectomy (); Knee arthroscopy (Left); knee surgery (2022); and Total hip arthroplasty (Left, 2024).     Social History:   reports that she quit smoking about 6 years ago. Her smoking use included cigarettes. She started smoking about 44 years ago. She has a 37.8 pack-year smoking history. She has never used smokeless tobacco. She reports that she does not drink alcohol and does not use drugs.     Family History:  family history includes

## 2025-05-12 ENCOUNTER — OFFICE VISIT (OUTPATIENT)
Dept: CARDIOLOGY CLINIC | Age: 65
End: 2025-05-12
Payer: COMMERCIAL

## 2025-05-12 VITALS
WEIGHT: 226 LBS | DIASTOLIC BLOOD PRESSURE: 84 MMHG | HEIGHT: 67 IN | HEART RATE: 75 BPM | BODY MASS INDEX: 35.47 KG/M2 | SYSTOLIC BLOOD PRESSURE: 144 MMHG | OXYGEN SATURATION: 95 %

## 2025-05-12 DIAGNOSIS — R06.02 SHORTNESS OF BREATH: ICD-10-CM

## 2025-05-12 DIAGNOSIS — R60.9 FLUID RETENTION: ICD-10-CM

## 2025-05-12 DIAGNOSIS — I10 ESSENTIAL HYPERTENSION: ICD-10-CM

## 2025-05-12 DIAGNOSIS — R94.31 ABNORMAL ELECTROCARDIOGRAPHY: ICD-10-CM

## 2025-05-12 DIAGNOSIS — S83.272D COMPLEX TEAR OF LAT MENSC, CURRENT INJURY, LEFT KNEE, SUBS: ICD-10-CM

## 2025-05-12 DIAGNOSIS — I10 ESSENTIAL HYPERTENSION: Primary | ICD-10-CM

## 2025-05-12 PROCEDURE — 1159F MED LIST DOCD IN RCRD: CPT | Performed by: INTERNAL MEDICINE

## 2025-05-12 PROCEDURE — 99204 OFFICE O/P NEW MOD 45 MIN: CPT | Performed by: INTERNAL MEDICINE

## 2025-05-12 PROCEDURE — 1123F ACP DISCUSS/DSCN MKR DOCD: CPT | Performed by: INTERNAL MEDICINE

## 2025-05-12 PROCEDURE — 3079F DIAST BP 80-89 MM HG: CPT | Performed by: INTERNAL MEDICINE

## 2025-05-12 PROCEDURE — 93000 ELECTROCARDIOGRAM COMPLETE: CPT | Performed by: INTERNAL MEDICINE

## 2025-05-12 PROCEDURE — 3077F SYST BP >= 140 MM HG: CPT | Performed by: INTERNAL MEDICINE

## 2025-05-12 RX ORDER — FUROSEMIDE 20 MG/1
TABLET ORAL
Refills: 0 | OUTPATIENT
Start: 2025-05-12

## 2025-05-12 NOTE — PATIENT INSTRUCTIONS
~Take torsemide 40 mg twice a day for 3 days and then go to 40 mg daily   ~Labs Thursday BMP and BNP   ~Stop lisinopril   ~Start olmesartan 40 mg daily     ~Recommend a stress test- Lexiscan Myoview to evaluate shortness of breath. Patient is not able to walk on a treadmill due to arthritis    ~[facility] at [phone] to schedule testing          Cardiac medications reviewed including indications and pertinent side effects. Medication list updated at this visit.   Patient verbalizes understanding of the need for treatment and education has been provided at today's visit. Additional education material will be provided in after visit summary.    Check blood pressure and heart rate at home a few times per week- keep a log with dates and times and bring to office visit   Regular exercise and following a healthy diet encouraged   Follow up with me in 3 months

## 2025-05-13 RX ORDER — FUROSEMIDE 20 MG/1
TABLET ORAL
Refills: 0 | OUTPATIENT
Start: 2025-05-13

## 2025-05-14 ENCOUNTER — TELEPHONE (OUTPATIENT)
Dept: CARDIOLOGY CLINIC | Age: 65
End: 2025-05-14

## 2025-05-14 ENCOUNTER — APPOINTMENT (OUTPATIENT)
Dept: GENERAL RADIOLOGY | Age: 65
DRG: 641 | End: 2025-05-14
Payer: COMMERCIAL

## 2025-05-14 ENCOUNTER — HOSPITAL ENCOUNTER (INPATIENT)
Age: 65
LOS: 5 days | Discharge: HOME | DRG: 641 | End: 2025-05-22
Attending: EMERGENCY MEDICINE | Admitting: INTERNAL MEDICINE
Payer: COMMERCIAL

## 2025-05-14 DIAGNOSIS — I25.10 MULTIPLE VESSEL CORONARY ARTERY DISEASE: Primary | ICD-10-CM

## 2025-05-14 DIAGNOSIS — N17.9 AKI (ACUTE KIDNEY INJURY): ICD-10-CM

## 2025-05-14 DIAGNOSIS — R06.02 SHORTNESS OF BREATH: ICD-10-CM

## 2025-05-14 DIAGNOSIS — R94.39 ABNORMAL STRESS TEST: ICD-10-CM

## 2025-05-14 DIAGNOSIS — R55 NEAR SYNCOPE: ICD-10-CM

## 2025-05-14 DIAGNOSIS — R60.9 EDEMA, UNSPECIFIED TYPE: ICD-10-CM

## 2025-05-14 LAB
ALBUMIN SERPL-MCNC: 3.9 G/DL (ref 3.4–5)
ALBUMIN/GLOB SERPL: 1.2 {RATIO} (ref 1.1–2.2)
ALP SERPL-CCNC: 112 U/L (ref 40–129)
ALT SERPL-CCNC: 15 U/L (ref 10–40)
ANION GAP SERPL CALCULATED.3IONS-SCNC: 14 MMOL/L (ref 3–16)
AST SERPL-CCNC: 20 U/L (ref 15–37)
BACTERIA URNS QL MICRO: ABNORMAL /HPF
BASOPHILS # BLD: 0.1 K/UL (ref 0–0.2)
BASOPHILS NFR BLD: 1.2 %
BILIRUB SERPL-MCNC: <0.2 MG/DL (ref 0–1)
BILIRUB UR QL STRIP.AUTO: NEGATIVE
BUN SERPL-MCNC: 26 MG/DL (ref 7–20)
CALCIUM SERPL-MCNC: 8.8 MG/DL (ref 8.3–10.6)
CHLORIDE SERPL-SCNC: 99 MMOL/L (ref 99–110)
CLARITY UR: ABNORMAL
CO2 SERPL-SCNC: 24 MMOL/L (ref 21–32)
COLOR UR: YELLOW
CREAT SERPL-MCNC: 1.6 MG/DL (ref 0.6–1.2)
DEPRECATED RDW RBC AUTO: 13.6 % (ref 12.4–15.4)
EKG ATRIAL RATE: 71 BPM
EKG DIAGNOSIS: NORMAL
EKG P AXIS: 44 DEGREES
EKG P-R INTERVAL: 182 MS
EKG Q-T INTERVAL: 418 MS
EKG QRS DURATION: 80 MS
EKG QTC CALCULATION (BAZETT): 454 MS
EKG R AXIS: 25 DEGREES
EKG T AXIS: 57 DEGREES
EKG VENTRICULAR RATE: 71 BPM
EOSINOPHIL # BLD: 0.2 K/UL (ref 0–0.6)
EOSINOPHIL NFR BLD: 2 %
EPI CELLS #/AREA URNS HPF: ABNORMAL /HPF (ref 0–5)
FINE GRAN CASTS #/AREA URNS HPF: ABNORMAL /LPF (ref 0–2)
GFR SERPLBLD CREATININE-BSD FMLA CKD-EPI: 35 ML/MIN/{1.73_M2}
GLUCOSE SERPL-MCNC: 239 MG/DL (ref 70–99)
GLUCOSE UR STRIP.AUTO-MCNC: NEGATIVE MG/DL
HCT VFR BLD AUTO: 33.5 % (ref 36–48)
HGB BLD-MCNC: 11 G/DL (ref 12–16)
HGB UR QL STRIP.AUTO: ABNORMAL
HYALINE CASTS #/AREA URNS LPF: ABNORMAL /LPF (ref 0–2)
KETONES UR STRIP.AUTO-MCNC: NEGATIVE MG/DL
LEUKOCYTE ESTERASE UR QL STRIP.AUTO: ABNORMAL
LIPASE SERPL-CCNC: 22 U/L (ref 13–60)
LYMPHOCYTES # BLD: 2.2 K/UL (ref 1–5.1)
LYMPHOCYTES NFR BLD: 19.3 %
MCH RBC QN AUTO: 30.4 PG (ref 26–34)
MCHC RBC AUTO-ENTMCNC: 32.9 G/DL (ref 31–36)
MCV RBC AUTO: 92.4 FL (ref 80–100)
MONOCYTES # BLD: 0.6 K/UL (ref 0–1.3)
MONOCYTES NFR BLD: 5.5 %
NEUTROPHILS # BLD: 8.2 K/UL (ref 1.7–7.7)
NEUTROPHILS NFR BLD: 72 %
NITRITE UR QL STRIP.AUTO: NEGATIVE
NT-PROBNP SERPL-MCNC: 108 PG/ML (ref 0–124)
PH UR STRIP.AUTO: 5.5 [PH] (ref 5–8)
PLATELET # BLD AUTO: 369 K/UL (ref 135–450)
PMV BLD AUTO: 7.9 FL (ref 5–10.5)
POTASSIUM SERPL-SCNC: 4.3 MMOL/L (ref 3.5–5.1)
POTASSIUM SERPL-SCNC: 4.6 MMOL/L (ref 3.5–5.1)
PROT SERPL-MCNC: 7.1 G/DL (ref 6.4–8.2)
PROT UR STRIP.AUTO-MCNC: NEGATIVE MG/DL
RBC # BLD AUTO: 3.63 M/UL (ref 4–5.2)
RBC #/AREA URNS HPF: ABNORMAL /HPF (ref 0–4)
RENAL EPI CELLS #/AREA UR COMP ASSIST: ABNORMAL /HPF (ref 0–1)
SODIUM SERPL-SCNC: 137 MMOL/L (ref 136–145)
SP GR UR STRIP.AUTO: 1.01 (ref 1–1.03)
TROPONIN, HIGH SENSITIVITY: 11 NG/L (ref 0–14)
TROPONIN, HIGH SENSITIVITY: 12 NG/L (ref 0–14)
UA COMPLETE W REFLEX CULTURE PNL UR: YES
UA DIPSTICK W REFLEX MICRO PNL UR: YES
URN SPEC COLLECT METH UR: ABNORMAL
UROBILINOGEN UR STRIP-ACNC: 0.2 E.U./DL
WBC # BLD AUTO: 11.4 K/UL (ref 4–11)
WBC #/AREA URNS HPF: ABNORMAL /HPF (ref 0–5)
YEAST URNS QL MICRO: PRESENT /HPF

## 2025-05-14 PROCEDURE — 93005 ELECTROCARDIOGRAM TRACING: CPT | Performed by: EMERGENCY MEDICINE

## 2025-05-14 PROCEDURE — 87086 URINE CULTURE/COLONY COUNT: CPT

## 2025-05-14 PROCEDURE — 2580000003 HC RX 258: Performed by: PHYSICIAN ASSISTANT

## 2025-05-14 PROCEDURE — 99285 EMERGENCY DEPT VISIT HI MDM: CPT

## 2025-05-14 PROCEDURE — 85025 COMPLETE CBC W/AUTO DIFF WBC: CPT

## 2025-05-14 PROCEDURE — 2500000003 HC RX 250 WO HCPCS: Performed by: STUDENT IN AN ORGANIZED HEALTH CARE EDUCATION/TRAINING PROGRAM

## 2025-05-14 PROCEDURE — 83690 ASSAY OF LIPASE: CPT

## 2025-05-14 PROCEDURE — 36415 COLL VENOUS BLD VENIPUNCTURE: CPT

## 2025-05-14 PROCEDURE — 84443 ASSAY THYROID STIM HORMONE: CPT

## 2025-05-14 PROCEDURE — 6370000000 HC RX 637 (ALT 250 FOR IP): Performed by: STUDENT IN AN ORGANIZED HEALTH CARE EDUCATION/TRAINING PROGRAM

## 2025-05-14 PROCEDURE — 83880 ASSAY OF NATRIURETIC PEPTIDE: CPT

## 2025-05-14 PROCEDURE — 93010 ELECTROCARDIOGRAM REPORT: CPT | Performed by: INTERNAL MEDICINE

## 2025-05-14 PROCEDURE — 6370000000 HC RX 637 (ALT 250 FOR IP): Performed by: NURSE PRACTITIONER

## 2025-05-14 PROCEDURE — 2580000003 HC RX 258: Performed by: STUDENT IN AN ORGANIZED HEALTH CARE EDUCATION/TRAINING PROGRAM

## 2025-05-14 PROCEDURE — G0378 HOSPITAL OBSERVATION PER HR: HCPCS

## 2025-05-14 PROCEDURE — 84484 ASSAY OF TROPONIN QUANT: CPT

## 2025-05-14 PROCEDURE — 71045 X-RAY EXAM CHEST 1 VIEW: CPT

## 2025-05-14 PROCEDURE — 80053 COMPREHEN METABOLIC PANEL: CPT

## 2025-05-14 PROCEDURE — 84132 ASSAY OF SERUM POTASSIUM: CPT

## 2025-05-14 PROCEDURE — 81001 URINALYSIS AUTO W/SCOPE: CPT

## 2025-05-14 PROCEDURE — 6360000002 HC RX W HCPCS: Performed by: STUDENT IN AN ORGANIZED HEALTH CARE EDUCATION/TRAINING PROGRAM

## 2025-05-14 PROCEDURE — 1200000000 HC SEMI PRIVATE

## 2025-05-14 RX ORDER — ACETAMINOPHEN 650 MG/1
650 SUPPOSITORY RECTAL EVERY 6 HOURS PRN
Status: DISCONTINUED | OUTPATIENT
Start: 2025-05-14 | End: 2025-05-22 | Stop reason: HOSPADM

## 2025-05-14 RX ORDER — SODIUM CHLORIDE 0.9 % (FLUSH) 0.9 %
5-40 SYRINGE (ML) INJECTION PRN
Status: DISCONTINUED | OUTPATIENT
Start: 2025-05-14 | End: 2025-05-22 | Stop reason: HOSPADM

## 2025-05-14 RX ORDER — ONDANSETRON 4 MG/1
4 TABLET, ORALLY DISINTEGRATING ORAL EVERY 8 HOURS PRN
Status: DISCONTINUED | OUTPATIENT
Start: 2025-05-14 | End: 2025-05-22 | Stop reason: HOSPADM

## 2025-05-14 RX ORDER — ACETAMINOPHEN 325 MG/1
650 TABLET ORAL EVERY 6 HOURS PRN
Status: DISCONTINUED | OUTPATIENT
Start: 2025-05-14 | End: 2025-05-22 | Stop reason: HOSPADM

## 2025-05-14 RX ORDER — SODIUM CHLORIDE 9 MG/ML
INJECTION, SOLUTION INTRAVENOUS PRN
Status: DISCONTINUED | OUTPATIENT
Start: 2025-05-14 | End: 2025-05-20 | Stop reason: SDUPTHER

## 2025-05-14 RX ORDER — 0.9 % SODIUM CHLORIDE 0.9 %
500 INTRAVENOUS SOLUTION INTRAVENOUS ONCE
Status: COMPLETED | OUTPATIENT
Start: 2025-05-14 | End: 2025-05-14

## 2025-05-14 RX ORDER — SODIUM CHLORIDE 0.9 % (FLUSH) 0.9 %
5-40 SYRINGE (ML) INJECTION EVERY 12 HOURS SCHEDULED
Status: DISCONTINUED | OUTPATIENT
Start: 2025-05-14 | End: 2025-05-22 | Stop reason: HOSPADM

## 2025-05-14 RX ORDER — LIDOCAINE 4 G/G
1 PATCH TOPICAL DAILY
Status: DISCONTINUED | OUTPATIENT
Start: 2025-05-14 | End: 2025-05-22 | Stop reason: HOSPADM

## 2025-05-14 RX ORDER — ONDANSETRON 2 MG/ML
4 INJECTION INTRAMUSCULAR; INTRAVENOUS EVERY 6 HOURS PRN
Status: DISCONTINUED | OUTPATIENT
Start: 2025-05-14 | End: 2025-05-22 | Stop reason: HOSPADM

## 2025-05-14 RX ORDER — POLYETHYLENE GLYCOL 3350 17 G/17G
17 POWDER, FOR SOLUTION ORAL DAILY PRN
Status: DISCONTINUED | OUTPATIENT
Start: 2025-05-14 | End: 2025-05-22 | Stop reason: HOSPADM

## 2025-05-14 RX ORDER — SODIUM CHLORIDE, SODIUM LACTATE, POTASSIUM CHLORIDE, AND CALCIUM CHLORIDE .6; .31; .03; .02 G/100ML; G/100ML; G/100ML; G/100ML
1000 INJECTION, SOLUTION INTRAVENOUS ONCE
Status: COMPLETED | OUTPATIENT
Start: 2025-05-14 | End: 2025-05-14

## 2025-05-14 RX ORDER — OLMESARTAN MEDOXOMIL 40 MG/1
40 TABLET ORAL DAILY
Status: ON HOLD | COMMUNITY

## 2025-05-14 RX ORDER — ATORVASTATIN CALCIUM 80 MG/1
80 TABLET, FILM COATED ORAL DAILY
Status: DISCONTINUED | OUTPATIENT
Start: 2025-05-14 | End: 2025-05-22 | Stop reason: HOSPADM

## 2025-05-14 RX ORDER — ENOXAPARIN SODIUM 100 MG/ML
30 INJECTION SUBCUTANEOUS 2 TIMES DAILY
Status: DISCONTINUED | OUTPATIENT
Start: 2025-05-14 | End: 2025-05-22 | Stop reason: HOSPADM

## 2025-05-14 RX ADMIN — SODIUM CHLORIDE 500 ML: 0.9 INJECTION, SOLUTION INTRAVENOUS at 12:57

## 2025-05-14 RX ADMIN — ACETAMINOPHEN 650 MG: 325 TABLET ORAL at 15:29

## 2025-05-14 RX ADMIN — ONDANSETRON 4 MG: 2 INJECTION, SOLUTION INTRAMUSCULAR; INTRAVENOUS at 15:29

## 2025-05-14 RX ADMIN — SODIUM CHLORIDE, SODIUM LACTATE, POTASSIUM CHLORIDE, AND CALCIUM CHLORIDE 1000 ML: .6; .31; .03; .02 INJECTION, SOLUTION INTRAVENOUS at 15:29

## 2025-05-14 RX ADMIN — SODIUM CHLORIDE, PRESERVATIVE FREE 10 ML: 5 INJECTION INTRAVENOUS at 20:46

## 2025-05-14 RX ADMIN — ENOXAPARIN SODIUM 30 MG: 100 INJECTION SUBCUTANEOUS at 20:46

## 2025-05-14 ASSESSMENT — PAIN DESCRIPTION - LOCATION: LOCATION: LEG

## 2025-05-14 ASSESSMENT — PAIN - FUNCTIONAL ASSESSMENT: PAIN_FUNCTIONAL_ASSESSMENT: NONE - DENIES PAIN

## 2025-05-14 ASSESSMENT — PAIN DESCRIPTION - DESCRIPTORS: DESCRIPTORS: DISCOMFORT

## 2025-05-14 ASSESSMENT — PAIN SCALES - GENERAL
PAINLEVEL_OUTOF10: 7
PAINLEVEL_OUTOF10: 0

## 2025-05-14 ASSESSMENT — PAIN DESCRIPTION - ORIENTATION: ORIENTATION: RIGHT

## 2025-05-14 NOTE — H&P
Normal wall thickness. Normal wall motion. Indeterminate diastolic function.   Right Ventricle: Right ventricle size is normal. Normal systolic function.   Aortic Valve: Sclerosis of the aortic valve cusps.   Mitral Valve: There is moderate annular calcification noted.   Left Atrium: Left atrium is moderately dilated.       PCP: Promise Taylor PA    Past Medical History:        Diagnosis Date    Essential hypertension 2024    Foot pain, bilateral     Hypercholesterolemia 2014    Hypercholesterolemia 2014    Osteoarthritis NA    Had for at least 2 or more years    Severe obesity (BMI 35.0-39.9) with comorbidity (HCC) 2024    Vitamin D deficiency 2014       Past Surgical History:        Procedure Laterality Date     SECTION      KNEE ARTHROSCOPY Left     KNEE SURGERY  2022    Miniscus    TONSILLECTOMY  1963    TOTAL HIP ARTHROPLASTY Left 2024    LEFT ANTERIOR TOTAL HIP REPLACEMENT performed by Nick Greenberg MD at [facility] OR       Medications Prior to Admission:   Prior to Admission medications    Medication Sig Start Date End Date Taking? Authorizing Provider   olmesartan (BENICAR) 40 MG tablet Take 1 tablet by mouth daily    Provider, MD Brendan   torsemide 40 MG TABS Take 40 mg by mouth daily 25   John Carvajal MD   atorvastatin (LIPITOR) 80 MG tablet Take 1 tablet by mouth daily 25   Promise Taylor PA   vitamin D (ERGOCALCIFEROL) 1.25 MG (76673 UT) CAPS capsule Take 1 capsule by mouth once a week 3/28/25   Promise Taylor PA       Labs: Personally reviewed and interpreted for clinical significance.   Recent Labs     25  1130   WBC 11.4*   HGB 11.0*   HCT 33.5*        Recent Labs     25  1130      K 4.6   CL 99   CO2 24   BUN 26*   CREATININE 1.6*   CALCIUM 8.8     Recent Labs     25  1130   PROBNP 108   TROPHS 12     No results for input(s): \"LABA1C\" in the last 72 hours.  Recent Labs     25  1130   AST 20

## 2025-05-14 NOTE — ED NOTES
Patient updated on plan of care. Patient resting in bed with personal belongings and call light. Rails up x2.

## 2025-05-14 NOTE — TELEPHONE ENCOUNTER
Pts daughter Cathleen stated that they had to call 911 today due to pt feeling clamy and dizzy. Cathleen tried to encourage Gatorade and water bc she felt pt is dehydrated. After getting pt to take fluids she was nauseated and was experiencing severe right shoulder pain. Abdonmagda stated that pt is currently in route to [facility] and wanted to know if we would admit the pt until we can figure out pts medications since she is still holding fluid as well. Cathleen would like a call back at [phone]. Pleases advise.

## 2025-05-14 NOTE — ED NOTES
Patient updated on plan of care. Personal belongings to stretcher with patient. IV infusing. Call light to stretcher and rails up x2

## 2025-05-14 NOTE — ED NOTES
Rosario Riley is a 64 y.o. female admitted for  Principal Problem:    Pre-syncope  Resolved Problems:    * No resolved hospital problems. *  .   Patient Home via EMS transportation with   Chief Complaint   Patient presents with    near syncope     Pt to [employer] near syncope at work. Pt felt faint but never passed out. Pt has had recent change to her torsemide rx, taking 40 mg twice a day for last 2 days   .  Patient is alert and Person, Place, Time, and Situation  Patient's baseline mobility: Baseline Mobility: Independent  patient has a cane she  also uses r/t knee brace to right leg  Code Status: Full Code   Cardiac Rhythm:       Is patient on baseline Oxygen: no   Abnormal Assessment Findings: patient reports dizziness that has resolved since visit. Patient medication increased recently. Patient has had several side effects related to medication change and increase in diuretic.      Isolation: None      NIH Score:    C-SSRS: Risk of Suicide: No Risk  Bedside swallow:        Active LDA's:   Peripheral IV 05/14/25 Left Antecubital (Active)           Family/Caregiver Present no Any Concerns: no   Restraints no  Sitter no         Vitals: MEWS Score: 1    Vitals:    05/14/25 1448 05/14/25 1503 05/14/25 1518 05/14/25 1530   BP: (!) 147/56 (!) 139/49 (!) 154/49    Pulse: 72 73 80 72   Resp: 23 24 30 15   Temp:       SpO2: 100% 99% 99% 100%   Weight:       Height:           Last documented pain score (0-10 scale) Pain Level: 7  Pain medication administered Yes- see MAR.    Pertinent or High Risk Medications/Drips: No.    Pending Blood Product Administration: no    Abnormal labs:   Abnormal Labs Reviewed   CBC WITH AUTO DIFFERENTIAL - Abnormal; Notable for the following components:       Result Value    WBC 11.4 (*)     RBC 3.63 (*)     Hemoglobin 11.0 (*)     Hematocrit 33.5 (*)     Neutrophils Absolute 8.2 (*)     All other components within normal limits   COMPREHENSIVE METABOLIC PANEL W/ REFLEX TO MG FOR LOW K -

## 2025-05-14 NOTE — PLAN OF CARE
Problem: Discharge Planning  Goal: Discharge to home or other facility with appropriate resources  Outcome: Progressing  Flowsheets (Taken 5/14/2025 8794)  Discharge to home or other facility with appropriate resources:   Identify barriers to discharge with patient and caregiver   Arrange for needed discharge resources and transportation as appropriate     Problem: Safety - Adult  Goal: Free from fall injury  Outcome: Progressing     Problem: Skin/Tissue Integrity - Adult  Goal: Skin integrity remains intact  Outcome: Progressing     Problem: Chronic Conditions and Co-morbidities  Goal: Patient's chronic conditions and co-morbidity symptoms are monitored and maintained or improved  Outcome: Progressing

## 2025-05-14 NOTE — TELEPHONE ENCOUNTER
Pt called office and stated that her pharmacy has been trying to get ahold of [facility]. They stated they need action from prescriber on the Torsemide and Olmesarten before they will fill them. Pt stated she has not had to BP medication in two day due to the pharmacy needing to contact [facility] and her BP is starting to become elevated. Please advise with [facility] in [place] on what information they need. Thank you!     [facility]/pharmacy #[id number] - [place], [place] - [address] [phone] - F [phone]

## 2025-05-14 NOTE — TELEPHONE ENCOUNTER
Reviewed course.  EKG shows no ischemic changes.  Troponins are negative.  Inpatient team is working up for alternative etiologies apart from cardiac.

## 2025-05-14 NOTE — TELEPHONE ENCOUNTER
Spoke with pt daughter and let her know we ant really call over and have pt admitted like that, she v/u.   They are at ED currently .

## 2025-05-14 NOTE — TELEPHONE ENCOUNTER
5/12/25  ~Take torsemide 40 mg twice a day for 3 days and then go to 40 mg daily   ~Labs Thursday BMP and BNP   ~Stop lisinopril   ~Start olmesartan 40 mg daily     Spoke with pharmacy, olmesartan wasn't sent over, they are filling it now along with the torsemide. Left vm for pt to call back. Remind pt to get labs completed tomorrow(non fasting)

## 2025-05-14 NOTE — ED PROVIDER NOTES
[facility] EMERGENCY DEPARTMENT  EMERGENCY DEPARTMENT ENCOUNTER        Pt Name: Rosario Riley  MRN: [id number]  Birthdate 1960  Date of evaluation: 2025  Provider: SONAM Estrada  PCP: Promise Taylor PA  Note Started: 2:30 PM EDT 25       I have seen and evaluated this patient with my supervising physician Mt Mcgill MD.      CHIEF COMPLAINT       Chief Complaint   Patient presents with    near syncope     Pt to er [facility] near syncope at work. Pt felt faint but never passed out. Pt has had recent change to her torsemide rx, taking 40 mg twice a day for last 2 days       HISTORY OF PRESENT ILLNESS: 1 or more Elements     History From: patient      Rosario Riley is a 64 y.o. female who presents to the emergency department via EMS complaining of near syncopal episode.  The patient states over the past 2 days she has increased her torsemide from 40 mg once daily to 40 mg twice daily.  This was increased due to her legs being swollen recently.  She follows with Dr. Carvajal.  Today she was at work and after walking back from the bathroom she felt very lightheaded.  She states she felt like she might pass out therefore EMS was called.  She denies any chest pain, she did feel little bit short of breath during this episode.  Denies any abdominal pain.  There was no syncopal episode.  She states she has been eating and drinking normally although with this episode she had an episode of nausea and vomiting.    Nursing Notes were all reviewed and agreed with or any disagreements were addressed in the HPI.    REVIEW OF SYSTEMS :      Review of Systems    Positives and Pertinent negatives as per HPI.     SURGICAL HISTORY     Past Surgical History:   Procedure Laterality Date     SECTION      KNEE ARTHROSCOPY Left     KNEE SURGERY  2022    Miniscus    TONSILLECTOMY  1963    TOTAL HIP ARTHROPLASTY Left 2024    LEFT ANTERIOR TOTAL HIP REPLACEMENT performed by [facility] 
Emergency Department Attending Provider Note  Location: [facility] EMERGENCY DEPARTMENT  5/14/2025     Patient Identification  Rosario Riley is a 64 y.o. female      Rosario Riley was evaluated in the Emergency Department for lightheadedness presyncopal symptoms with positional changes.  Also had 1 episode of vomiting.  Has had changes increases in diuretics over the past week.  Denies any active chest pain..     HPI: as noted above    Physical Exam: Overall well-appearing no apparent distress vitals notable for mild hypertension systolic 140s widened pulse pressure 140s over 50s other vitals within normal limits.  No murmur minimal pedal edema      EKG Interpretation  Rhythm is normal sinus rhythm  Rate is 70  Axis is normal  No STEMI criteria  Qtc is 454    Bedside Ultrasound  No results found.     Patient seen and evaluated.  Relevant records reviewed.  Patient presents with symptoms noted above.  Patient presents with positional presyncopal symptoms in the setting of increased diuresis recently.  Exam is overall reassuring she is hemodynamically stable.  Workup is notable for an JANIS which is likely due to dehydration.  Patient still symptomatic may benefit from further treatment.    Although initial history and physical exam information was obtained by SANDY/NPP/MD/DO (who also dictated a record of this visit), I personally saw the patient and made/approved the management plan and take responsibility for patient management. I, Dr. Mcgill, am the primary clinician of record.     I personally saw the patient and independently provided 0 minutes of non-concurrent critical care out of the total shared critical care time excluding separately billed procedures.    This chart was generated in part by using Dragon Dictation system and may contain errors related to that system including errors in grammar, punctuation, and spelling, as well as words and phrases that may be inappropriate. If there are any 
Patent

## 2025-05-15 LAB
ALBUMIN SERPL-MCNC: 3.9 G/DL (ref 3.4–5)
ALBUMIN/GLOB SERPL: 1.6 {RATIO} (ref 1.1–2.2)
ALP SERPL-CCNC: 100 U/L (ref 40–129)
ALT SERPL-CCNC: 11 U/L (ref 10–40)
ANION GAP SERPL CALCULATED.3IONS-SCNC: 10 MMOL/L (ref 3–16)
AST SERPL-CCNC: 10 U/L (ref 15–37)
BACTERIA UR CULT: NORMAL
BASOPHILS # BLD: 0.1 K/UL (ref 0–0.2)
BASOPHILS NFR BLD: 0.8 %
BILIRUB SERPL-MCNC: <0.2 MG/DL (ref 0–1)
BUN SERPL-MCNC: 26 MG/DL (ref 7–20)
CALCIUM SERPL-MCNC: 8.7 MG/DL (ref 8.3–10.6)
CHLORIDE SERPL-SCNC: 103 MMOL/L (ref 99–110)
CO2 SERPL-SCNC: 25 MMOL/L (ref 21–32)
CREAT SERPL-MCNC: 1 MG/DL (ref 0.6–1.2)
DEPRECATED RDW RBC AUTO: 13.3 % (ref 12.4–15.4)
EOSINOPHIL # BLD: 0.3 K/UL (ref 0–0.6)
EOSINOPHIL NFR BLD: 2.8 %
GFR SERPLBLD CREATININE-BSD FMLA CKD-EPI: 62 ML/MIN/{1.73_M2}
GLUCOSE SERPL-MCNC: 102 MG/DL (ref 70–99)
HCT VFR BLD AUTO: 30.2 % (ref 36–48)
HGB BLD-MCNC: 10.3 G/DL (ref 12–16)
LYMPHOCYTES # BLD: 2.4 K/UL (ref 1–5.1)
LYMPHOCYTES NFR BLD: 24.6 %
MCH RBC QN AUTO: 31.1 PG (ref 26–34)
MCHC RBC AUTO-ENTMCNC: 33.9 G/DL (ref 31–36)
MCV RBC AUTO: 91.6 FL (ref 80–100)
MONOCYTES # BLD: 0.9 K/UL (ref 0–1.3)
MONOCYTES NFR BLD: 9.3 %
NEUTROPHILS # BLD: 6.1 K/UL (ref 1.7–7.7)
NEUTROPHILS NFR BLD: 62.5 %
PLATELET # BLD AUTO: 303 K/UL (ref 135–450)
PMV BLD AUTO: 8.2 FL (ref 5–10.5)
POTASSIUM SERPL-SCNC: 4.3 MMOL/L (ref 3.5–5.1)
PROT SERPL-MCNC: 6.3 G/DL (ref 6.4–8.2)
RBC # BLD AUTO: 3.3 M/UL (ref 4–5.2)
SODIUM SERPL-SCNC: 138 MMOL/L (ref 136–145)
TSH SERPL DL<=0.005 MIU/L-ACNC: 0.99 UIU/ML (ref 0.27–4.2)
WBC # BLD AUTO: 9.8 K/UL (ref 4–11)

## 2025-05-15 PROCEDURE — 6370000000 HC RX 637 (ALT 250 FOR IP): Performed by: NURSE PRACTITIONER

## 2025-05-15 PROCEDURE — 36415 COLL VENOUS BLD VENIPUNCTURE: CPT

## 2025-05-15 PROCEDURE — 80053 COMPREHEN METABOLIC PANEL: CPT

## 2025-05-15 PROCEDURE — G0378 HOSPITAL OBSERVATION PER HR: HCPCS

## 2025-05-15 PROCEDURE — 6370000000 HC RX 637 (ALT 250 FOR IP): Performed by: STUDENT IN AN ORGANIZED HEALTH CARE EDUCATION/TRAINING PROGRAM

## 2025-05-15 PROCEDURE — 2500000003 HC RX 250 WO HCPCS: Performed by: STUDENT IN AN ORGANIZED HEALTH CARE EDUCATION/TRAINING PROGRAM

## 2025-05-15 PROCEDURE — 6370000000 HC RX 637 (ALT 250 FOR IP): Performed by: INTERNAL MEDICINE

## 2025-05-15 PROCEDURE — 85025 COMPLETE CBC W/AUTO DIFF WBC: CPT

## 2025-05-15 PROCEDURE — 6360000002 HC RX W HCPCS: Performed by: STUDENT IN AN ORGANIZED HEALTH CARE EDUCATION/TRAINING PROGRAM

## 2025-05-15 RX ORDER — LOSARTAN POTASSIUM 25 MG/1
50 TABLET ORAL DAILY
Status: DISCONTINUED | OUTPATIENT
Start: 2025-05-15 | End: 2025-05-20

## 2025-05-15 RX ORDER — REGADENOSON 0.08 MG/ML
0.4 INJECTION, SOLUTION INTRAVENOUS
Status: COMPLETED | OUTPATIENT
Start: 2025-05-15 | End: 2025-05-16

## 2025-05-15 RX ORDER — FUROSEMIDE 40 MG/1
40 TABLET ORAL DAILY
Status: DISCONTINUED | OUTPATIENT
Start: 2025-05-15 | End: 2025-05-20

## 2025-05-15 RX ADMIN — POLYETHYLENE GLYCOL 3350 17 G: 17 POWDER, FOR SOLUTION ORAL at 20:45

## 2025-05-15 RX ADMIN — ENOXAPARIN SODIUM 30 MG: 100 INJECTION SUBCUTANEOUS at 20:45

## 2025-05-15 RX ADMIN — SODIUM CHLORIDE, PRESERVATIVE FREE 10 ML: 5 INJECTION INTRAVENOUS at 20:45

## 2025-05-15 RX ADMIN — ENOXAPARIN SODIUM 30 MG: 100 INJECTION SUBCUTANEOUS at 10:09

## 2025-05-15 RX ADMIN — LOSARTAN POTASSIUM 50 MG: 25 TABLET, FILM COATED ORAL at 16:19

## 2025-05-15 RX ADMIN — FUROSEMIDE 40 MG: 40 TABLET ORAL at 16:19

## 2025-05-15 RX ADMIN — SODIUM CHLORIDE, PRESERVATIVE FREE 10 ML: 5 INJECTION INTRAVENOUS at 10:09

## 2025-05-15 RX ADMIN — ATORVASTATIN CALCIUM 80 MG: 80 TABLET, FILM COATED ORAL at 10:08

## 2025-05-15 ASSESSMENT — PAIN SCALES - GENERAL: PAINLEVEL_OUTOF10: 0

## 2025-05-15 NOTE — PLAN OF CARE
Problem: Discharge Planning  Goal: Discharge to home or other facility with appropriate resources  Outcome: Progressing     Problem: Safety - Adult  Goal: Free from fall injury  Outcome: Progressing     Problem: Skin/Tissue Integrity - Adult  Goal: Skin integrity remains intact  Outcome: Progressing     Problem: Chronic Conditions and Co-morbidities  Goal: Patient's chronic conditions and co-morbidity symptoms are monitored and maintained or improved  Outcome: Progressing     Problem: Cardiovascular - Adult  Goal: Maintains optimal cardiac output and hemodynamic stability  Outcome: Progressing  Goal: Absence of cardiac dysrhythmias or at baseline  Outcome: Progressing

## 2025-05-15 NOTE — ACP (ADVANCE CARE PLANNING)
Advance Care Planning   General Advance Care Planning (ACP) Conversation    Date of Conversation: 5/14/2025  Conducted with: Patient with Decision Making Capacity  Other persons present: None    Healthcare Decision Maker:    Primary Decision Maker: Cathleen Latif - Child - [phone]    Today we documented Decision Maker(s) consistent with Legal Next of Kin hierarchy.  Content/Action Overview:  Has NO ACP documents-Information provided  Reviewed DNR/DNI and patient elects Full Code (Attempt Resuscitation)      Length of Voluntary ACP Conversation in minutes:  <16 minutes (Non-Billable)    Noelle Dixon RN

## 2025-05-15 NOTE — PLAN OF CARE
Problem: Discharge Planning  Goal: Discharge to home or other facility with appropriate resources  5/15/2025 1435 by Rosario Urbina RN  Outcome: Progressing  5/15/2025 0556 by Osei Parada RN  Outcome: Progressing     Problem: Safety - Adult  Goal: Free from fall injury  5/15/2025 1435 by Rosario Urbina RN  Outcome: Progressing  5/15/2025 0556 by Osei Parada RN  Outcome: Progressing     Problem: Skin/Tissue Integrity - Adult  Goal: Skin integrity remains intact  5/15/2025 1435 by Rosario Urbina RN  Outcome: Progressing  5/15/2025 0556 by Osei Parada RN  Outcome: Progressing     Problem: Chronic Conditions and Co-morbidities  Goal: Patient's chronic conditions and co-morbidity symptoms are monitored and maintained or improved  5/15/2025 1435 by Rosario Urbina RN  Outcome: Progressing  5/15/2025 0556 by Osei Parada RN  Outcome: Progressing     Problem: Cardiovascular - Adult  Goal: Maintains optimal cardiac output and hemodynamic stability  5/15/2025 1435 by Rosario Urbina RN  Outcome: Progressing  5/15/2025 0556 by Osei Parada RN  Outcome: Progressing  Goal: Absence of cardiac dysrhythmias or at baseline  5/15/2025 1435 by Rosario Urbina RN  Outcome: Progressing  5/15/2025 0556 by Osei Parada RN  Outcome: Progressing

## 2025-05-15 NOTE — CARE COORDINATION
Case Management Assessment  Initial Evaluation    Date/Time of Evaluation: 5/15/2025 12:15 PM  Assessment Completed by: Noelle Dixon RN    If patient is discharged prior to next notation, then this note serves as note for discharge by case management.    Patient Name: Rosario Riley                   YOB: 1960  Diagnosis: Pre-syncope [R55]  JANIS (acute kidney injury) [N17.9]  Near syncope [R55]  Edema, unspecified type [R60.9]                   Date / Time: 5/14/2025 11:18 AM    Patient Admission Status: Inpatient   Readmission Risk (Low < 19, Mod (19-27), High > 27): Readmission Risk Score: 9.5    Current PCP: Promise Taylor PA  PCP verified by CM? Yes    Chart Reviewed: Yes      History Provided by: Patient  Patient Orientation: Alert and Oriented    Patient Cognition: Alert    Hospitalization in the last 30 days (Readmission):  No    If yes, Readmission Assessment in CM Navigator will be completed.    Advance Directives:      Code Status: Full Code   Patient's Primary Decision Maker is: Legal Next of Kin    Primary Decision Maker: Cathleen Latif - Child - [phone]    Discharge Planning:    Patient lives with: Family Members Type of Home: Other (Comment) (condo)  Primary Care Giver: Self  Patient Support Systems include: Children, Family Members   Current Financial resources: Medicare, Medicaid  Current community resources: Other (Comment)  Current services prior to admission: Durable Medical Equipment            Current DME: [facility]            Type of Home Care services:  None    ADLS  Prior functional level: Independent in ADLs/IADLs  Current functional level: Independent in ADLs/IADLs    PT AM-PAC:   /24  OT AM-PAC:   /24    Family can provide assistance at DC: Yes  Would you like Case Management to discuss the discharge plan with any other family members/significant others, and if so, who? Yes (daughter)  Plans to Return to Present Housing: Yes  Other Identified Issues/Barriers to

## 2025-05-16 ENCOUNTER — HOSPITAL ENCOUNTER (OUTPATIENT)
Age: 65
Setting detail: OBSERVATION
Discharge: HOME | DRG: 641 | End: 2025-05-18
Attending: INTERNAL MEDICINE
Payer: COMMERCIAL

## 2025-05-16 ENCOUNTER — APPOINTMENT (OUTPATIENT)
Dept: NUCLEAR MEDICINE | Age: 65
DRG: 641 | End: 2025-05-16
Attending: INTERNAL MEDICINE
Payer: COMMERCIAL

## 2025-05-16 LAB
ANION GAP SERPL CALCULATED.3IONS-SCNC: 12 MMOL/L (ref 3–16)
BUN SERPL-MCNC: 21 MG/DL (ref 7–20)
CALCIUM SERPL-MCNC: 8.8 MG/DL (ref 8.3–10.6)
CHLORIDE SERPL-SCNC: 104 MMOL/L (ref 99–110)
CO2 SERPL-SCNC: 24 MMOL/L (ref 21–32)
CREAT SERPL-MCNC: 0.9 MG/DL (ref 0.6–1.2)
ECHO BSA: 2.19 M2
GFR SERPLBLD CREATININE-BSD FMLA CKD-EPI: 70 ML/MIN/{1.73_M2}
GLUCOSE SERPL-MCNC: 108 MG/DL (ref 70–99)
MAGNESIUM SERPL-MCNC: 2.26 MG/DL (ref 1.8–2.4)
NUC STRESS EJECTION FRACTION: 64 %
NUC STRESS LV EDV: 76 ML (ref 56–104)
NUC STRESS LV ESV: 27 ML (ref 19–49)
NUC STRESS LV MASS: 117 G
POTASSIUM SERPL-SCNC: 4.4 MMOL/L (ref 3.5–5.1)
SODIUM SERPL-SCNC: 140 MMOL/L (ref 136–145)
STRESS BASELINE HR: 65 BPM
STRESS ESTIMATED WORKLOAD: 1 METS
STRESS PEAK DIAS BP: 83 MMHG
STRESS PEAK SYS BP: 164 MMHG
STRESS PERCENT HR ACHIEVED: 62 %
STRESS POST PEAK HR: 96 BPM
STRESS RATE PRESSURE PRODUCT: NORMAL BPM*MMHG
STRESS TARGET HR: 154 BPM

## 2025-05-16 PROCEDURE — 93017 CV STRESS TEST TRACING ONLY: CPT

## 2025-05-16 PROCEDURE — A9502 TC99M TETROFOSMIN: HCPCS | Performed by: PHYSICIAN ASSISTANT

## 2025-05-16 PROCEDURE — 36415 COLL VENOUS BLD VENIPUNCTURE: CPT

## 2025-05-16 PROCEDURE — 2500000003 HC RX 250 WO HCPCS: Performed by: STUDENT IN AN ORGANIZED HEALTH CARE EDUCATION/TRAINING PROGRAM

## 2025-05-16 PROCEDURE — 93018 CV STRESS TEST I&R ONLY: CPT | Performed by: INTERNAL MEDICINE

## 2025-05-16 PROCEDURE — G0378 HOSPITAL OBSERVATION PER HR: HCPCS

## 2025-05-16 PROCEDURE — 83735 ASSAY OF MAGNESIUM: CPT

## 2025-05-16 PROCEDURE — 6370000000 HC RX 637 (ALT 250 FOR IP): Performed by: INTERNAL MEDICINE

## 2025-05-16 PROCEDURE — 6370000000 HC RX 637 (ALT 250 FOR IP): Performed by: NURSE PRACTITIONER

## 2025-05-16 PROCEDURE — A9502 TC99M TETROFOSMIN: HCPCS | Performed by: INTERNAL MEDICINE

## 2025-05-16 PROCEDURE — 3430000000 HC RX DIAGNOSTIC RADIOPHARMACEUTICAL: Performed by: PHYSICIAN ASSISTANT

## 2025-05-16 PROCEDURE — 6360000002 HC RX W HCPCS: Performed by: INTERNAL MEDICINE

## 2025-05-16 PROCEDURE — 93016 CV STRESS TEST SUPVJ ONLY: CPT | Performed by: INTERNAL MEDICINE

## 2025-05-16 PROCEDURE — 6370000000 HC RX 637 (ALT 250 FOR IP): Performed by: STUDENT IN AN ORGANIZED HEALTH CARE EDUCATION/TRAINING PROGRAM

## 2025-05-16 PROCEDURE — 78452 HT MUSCLE IMAGE SPECT MULT: CPT | Performed by: INTERNAL MEDICINE

## 2025-05-16 PROCEDURE — 78452 HT MUSCLE IMAGE SPECT MULT: CPT

## 2025-05-16 PROCEDURE — 6360000002 HC RX W HCPCS: Performed by: STUDENT IN AN ORGANIZED HEALTH CARE EDUCATION/TRAINING PROGRAM

## 2025-05-16 PROCEDURE — 80048 BASIC METABOLIC PNL TOTAL CA: CPT

## 2025-05-16 PROCEDURE — 3430000000 HC RX DIAGNOSTIC RADIOPHARMACEUTICAL: Performed by: INTERNAL MEDICINE

## 2025-05-16 RX ORDER — LEVOFLOXACIN 500 MG/1
500 TABLET, FILM COATED ORAL EVERY 24 HOURS
Status: DISCONTINUED | OUTPATIENT
Start: 2025-05-16 | End: 2025-05-16 | Stop reason: SDUPTHER

## 2025-05-16 RX ORDER — LEVOFLOXACIN 500 MG/1
500 TABLET, FILM COATED ORAL EVERY 24 HOURS
Status: COMPLETED | OUTPATIENT
Start: 2025-05-16 | End: 2025-05-18

## 2025-05-16 RX ORDER — FUROSEMIDE 40 MG/1
20 TABLET ORAL DAILY
Qty: 30 TABLET | Refills: 3 | Status: SHIPPED | OUTPATIENT
Start: 2025-05-17 | End: 2025-05-22

## 2025-05-16 RX ORDER — ASPIRIN 81 MG/1
81 TABLET, CHEWABLE ORAL DAILY
Status: DISCONTINUED | OUTPATIENT
Start: 2025-05-16 | End: 2025-05-22 | Stop reason: HOSPADM

## 2025-05-16 RX ADMIN — FUROSEMIDE 40 MG: 40 TABLET ORAL at 14:32

## 2025-05-16 RX ADMIN — LEVOFLOXACIN 500 MG: 500 TABLET, FILM COATED ORAL at 14:32

## 2025-05-16 RX ADMIN — ENOXAPARIN SODIUM 30 MG: 100 INJECTION SUBCUTANEOUS at 19:29

## 2025-05-16 RX ADMIN — TETROFOSMIN 33.1 MILLICURIE: 1.38 INJECTION, POWDER, LYOPHILIZED, FOR SOLUTION INTRAVENOUS at 12:46

## 2025-05-16 RX ADMIN — REGADENOSON 0.4 MG: 0.08 INJECTION, SOLUTION INTRAVENOUS at 12:46

## 2025-05-16 RX ADMIN — ASPIRIN 81 MG 81 MG: 81 TABLET ORAL at 18:08

## 2025-05-16 RX ADMIN — SODIUM CHLORIDE, PRESERVATIVE FREE 10 ML: 5 INJECTION INTRAVENOUS at 19:29

## 2025-05-16 RX ADMIN — ATORVASTATIN CALCIUM 80 MG: 80 TABLET, FILM COATED ORAL at 14:32

## 2025-05-16 RX ADMIN — TETROFOSMIN 11.4 MILLICURIE: 1.38 INJECTION, POWDER, LYOPHILIZED, FOR SOLUTION INTRAVENOUS at 11:30

## 2025-05-16 RX ADMIN — SODIUM CHLORIDE, PRESERVATIVE FREE 5 ML: 5 INJECTION INTRAVENOUS at 10:25

## 2025-05-16 RX ADMIN — LOSARTAN POTASSIUM 50 MG: 25 TABLET, FILM COATED ORAL at 14:32

## 2025-05-16 ASSESSMENT — PAIN DESCRIPTION - LOCATION: LOCATION: LEG

## 2025-05-16 ASSESSMENT — PAIN DESCRIPTION - ORIENTATION: ORIENTATION: RIGHT

## 2025-05-16 ASSESSMENT — PAIN SCALES - GENERAL: PAINLEVEL_OUTOF10: 7

## 2025-05-16 NOTE — PLAN OF CARE
Problem: Discharge Planning  Goal: Discharge to home or other facility with appropriate resources  Outcome: Progressing     Problem: Safety - Adult  Goal: Free from fall injury  Outcome: Progressing     Problem: Skin/Tissue Integrity - Adult  Goal: Skin integrity remains intact  Outcome: Progressing     Problem: Chronic Conditions and Co-morbidities  Goal: Patient's chronic conditions and co-morbidity symptoms are monitored and maintained or improved  Outcome: Progressing     Problem: Cardiovascular - Adult  Goal: Maintains optimal cardiac output and hemodynamic stability  Outcome: Progressing  Goal: Absence of cardiac dysrhythmias or at baseline  Outcome: Progressing     Problem: Pain  Goal: Verbalizes/displays adequate comfort level or baseline comfort level  Outcome: Progressing

## 2025-05-17 PROBLEM — R94.39 ABNORMAL STRESS TEST: Status: ACTIVE | Noted: 2025-05-17

## 2025-05-17 LAB
ANION GAP SERPL CALCULATED.3IONS-SCNC: 11 MMOL/L (ref 3–16)
BUN SERPL-MCNC: 18 MG/DL (ref 7–20)
CALCIUM SERPL-MCNC: 9.2 MG/DL (ref 8.3–10.6)
CHLORIDE SERPL-SCNC: 102 MMOL/L (ref 99–110)
CO2 SERPL-SCNC: 23 MMOL/L (ref 21–32)
CREAT SERPL-MCNC: 0.9 MG/DL (ref 0.6–1.2)
DEPRECATED RDW RBC AUTO: 13.2 % (ref 12.4–15.4)
GFR SERPLBLD CREATININE-BSD FMLA CKD-EPI: 70 ML/MIN/{1.73_M2}
GLUCOSE SERPL-MCNC: 115 MG/DL (ref 70–99)
HCT VFR BLD AUTO: 31.7 % (ref 36–48)
HGB BLD-MCNC: 10.7 G/DL (ref 12–16)
MCH RBC QN AUTO: 31.1 PG (ref 26–34)
MCHC RBC AUTO-ENTMCNC: 33.8 G/DL (ref 31–36)
MCV RBC AUTO: 92 FL (ref 80–100)
PLATELET # BLD AUTO: 285 K/UL (ref 135–450)
PMV BLD AUTO: 7.8 FL (ref 5–10.5)
POTASSIUM SERPL-SCNC: 4.3 MMOL/L (ref 3.5–5.1)
RBC # BLD AUTO: 3.45 M/UL (ref 4–5.2)
SODIUM SERPL-SCNC: 136 MMOL/L (ref 136–145)
WBC # BLD AUTO: 9.1 K/UL (ref 4–11)

## 2025-05-17 PROCEDURE — 99222 1ST HOSP IP/OBS MODERATE 55: CPT | Performed by: INTERNAL MEDICINE

## 2025-05-17 PROCEDURE — 85027 COMPLETE CBC AUTOMATED: CPT

## 2025-05-17 PROCEDURE — 36415 COLL VENOUS BLD VENIPUNCTURE: CPT

## 2025-05-17 PROCEDURE — G0378 HOSPITAL OBSERVATION PER HR: HCPCS

## 2025-05-17 PROCEDURE — 80048 BASIC METABOLIC PNL TOTAL CA: CPT

## 2025-05-17 PROCEDURE — 6360000002 HC RX W HCPCS: Performed by: STUDENT IN AN ORGANIZED HEALTH CARE EDUCATION/TRAINING PROGRAM

## 2025-05-17 PROCEDURE — 1200000000 HC SEMI PRIVATE

## 2025-05-17 PROCEDURE — 6370000000 HC RX 637 (ALT 250 FOR IP): Performed by: INTERNAL MEDICINE

## 2025-05-17 PROCEDURE — 6370000000 HC RX 637 (ALT 250 FOR IP): Performed by: STUDENT IN AN ORGANIZED HEALTH CARE EDUCATION/TRAINING PROGRAM

## 2025-05-17 PROCEDURE — 2500000003 HC RX 250 WO HCPCS: Performed by: STUDENT IN AN ORGANIZED HEALTH CARE EDUCATION/TRAINING PROGRAM

## 2025-05-17 PROCEDURE — 6370000000 HC RX 637 (ALT 250 FOR IP): Performed by: NURSE PRACTITIONER

## 2025-05-17 RX ADMIN — ATORVASTATIN CALCIUM 80 MG: 80 TABLET, FILM COATED ORAL at 07:31

## 2025-05-17 RX ADMIN — FUROSEMIDE 40 MG: 40 TABLET ORAL at 07:31

## 2025-05-17 RX ADMIN — SODIUM CHLORIDE, PRESERVATIVE FREE 10 ML: 5 INJECTION INTRAVENOUS at 07:31

## 2025-05-17 RX ADMIN — POLYETHYLENE GLYCOL 3350 17 G: 17 POWDER, FOR SOLUTION ORAL at 16:10

## 2025-05-17 RX ADMIN — LOSARTAN POTASSIUM 50 MG: 25 TABLET, FILM COATED ORAL at 07:31

## 2025-05-17 RX ADMIN — ENOXAPARIN SODIUM 30 MG: 100 INJECTION SUBCUTANEOUS at 07:31

## 2025-05-17 RX ADMIN — ENOXAPARIN SODIUM 30 MG: 100 INJECTION SUBCUTANEOUS at 20:26

## 2025-05-17 RX ADMIN — LEVOFLOXACIN 500 MG: 500 TABLET, FILM COATED ORAL at 13:31

## 2025-05-17 RX ADMIN — ASPIRIN 81 MG 81 MG: 81 TABLET ORAL at 07:31

## 2025-05-17 RX ADMIN — SODIUM CHLORIDE, PRESERVATIVE FREE 5 ML: 5 INJECTION INTRAVENOUS at 20:27

## 2025-05-17 ASSESSMENT — PAIN SCALES - GENERAL: PAINLEVEL_OUTOF10: 9

## 2025-05-17 ASSESSMENT — PAIN DESCRIPTION - LOCATION: LOCATION: HIP;KNEE

## 2025-05-17 NOTE — PLAN OF CARE
Problem: Discharge Planning  Goal: Discharge to home or other facility with appropriate resources  5/16/2025 2017 by Latrice Hawk RN  Outcome: Progressing     Problem: Safety - Adult  Goal: Free from fall injury  5/16/2025 2017 by Latrice Hawk RN  Outcome: Progressing  Bed in lowest position, wheels locked, 2/4 side rails up, nonskid footwear on. Bed/ chair check alarm in place, call light within reach. Pt instructed to call out when needing assistance. Pt stated understanding.      Problem: Skin/Tissue Integrity - Adult  Goal: Skin integrity remains intact  5/16/2025 2017 by Latrice Hawk RN  Outcome: Progressing     Problem: Cardiovascular - Adult  Goal: Maintains optimal cardiac output and hemodynamic stability  5/16/2025 2017 by Latrice Hawk RN  Outcome: Progressing     Problem: Cardiovascular - Adult  Goal: Absence of cardiac dysrhythmias or at baseline  5/16/2025 2017 by Latrice Hawk RN  Outcome: Progressing     Problem: Chronic Conditions and Co-morbidities  Goal: Patient's chronic conditions and co-morbidity symptoms are monitored and maintained or improved  5/16/2025 2017 by Lartice Hawk RN  Outcome: Progressing     Problem: Pain  Goal: Verbalizes/displays adequate comfort level or baseline comfort level  5/16/2025 2017 by Latrice Hawk RN  Outcome: Progressing  Pt scoring pain on 0-10 scale. Pain medications given per MAR. Pt instructed to call out when pain level increasing. Call light within reach.

## 2025-05-18 PROCEDURE — 99232 SBSQ HOSP IP/OBS MODERATE 35: CPT | Performed by: NURSE PRACTITIONER

## 2025-05-18 PROCEDURE — 6370000000 HC RX 637 (ALT 250 FOR IP): Performed by: STUDENT IN AN ORGANIZED HEALTH CARE EDUCATION/TRAINING PROGRAM

## 2025-05-18 PROCEDURE — 1200000000 HC SEMI PRIVATE

## 2025-05-18 PROCEDURE — 6370000000 HC RX 637 (ALT 250 FOR IP): Performed by: NURSE PRACTITIONER

## 2025-05-18 PROCEDURE — 6370000000 HC RX 637 (ALT 250 FOR IP): Performed by: INTERNAL MEDICINE

## 2025-05-18 PROCEDURE — 2500000003 HC RX 250 WO HCPCS: Performed by: STUDENT IN AN ORGANIZED HEALTH CARE EDUCATION/TRAINING PROGRAM

## 2025-05-18 PROCEDURE — 6360000002 HC RX W HCPCS: Performed by: STUDENT IN AN ORGANIZED HEALTH CARE EDUCATION/TRAINING PROGRAM

## 2025-05-18 RX ADMIN — SODIUM CHLORIDE, PRESERVATIVE FREE 5 ML: 5 INJECTION INTRAVENOUS at 19:44

## 2025-05-18 RX ADMIN — SODIUM CHLORIDE, PRESERVATIVE FREE 10 ML: 5 INJECTION INTRAVENOUS at 07:40

## 2025-05-18 RX ADMIN — LOSARTAN POTASSIUM 50 MG: 25 TABLET, FILM COATED ORAL at 07:40

## 2025-05-18 RX ADMIN — ENOXAPARIN SODIUM 30 MG: 100 INJECTION SUBCUTANEOUS at 07:39

## 2025-05-18 RX ADMIN — ASPIRIN 81 MG 81 MG: 81 TABLET ORAL at 07:40

## 2025-05-18 RX ADMIN — ENOXAPARIN SODIUM 30 MG: 100 INJECTION SUBCUTANEOUS at 19:43

## 2025-05-18 RX ADMIN — ATORVASTATIN CALCIUM 80 MG: 80 TABLET, FILM COATED ORAL at 07:40

## 2025-05-18 RX ADMIN — LEVOFLOXACIN 500 MG: 500 TABLET, FILM COATED ORAL at 13:16

## 2025-05-18 ASSESSMENT — PAIN DESCRIPTION - LOCATION: LOCATION: KNEE

## 2025-05-18 ASSESSMENT — PAIN SCALES - GENERAL: PAINLEVEL_OUTOF10: 5

## 2025-05-18 NOTE — PLAN OF CARE
Problem: Discharge Planning  Goal: Discharge to home or other facility with appropriate resources  Outcome: Progressing  Flowsheets (Taken 5/17/2025 2015)  Discharge to home or other facility with appropriate resources: Identify barriers to discharge with patient and caregiver     Problem: Safety - Adult  Goal: Free from fall injury  Outcome: Progressing     Problem: Skin/Tissue Integrity - Adult  Goal: Skin integrity remains intact  Outcome: Progressing  Flowsheets  Taken 5/17/2025 2209  Skin Integrity Remains Intact: Monitor for areas of redness and/or skin breakdown  Taken 5/17/2025 2015  Skin Integrity Remains Intact: Monitor for areas of redness and/or skin breakdown     Problem: Cardiovascular - Adult  Goal: Maintains optimal cardiac output and hemodynamic stability  Outcome: Progressing  Flowsheets (Taken 5/17/2025 2015)  Maintains optimal cardiac output and hemodynamic stability: Monitor blood pressure and heart rate  Goal: Absence of cardiac dysrhythmias or at baseline  Outcome: Progressing  Flowsheets (Taken 5/17/2025 2015)  Absence of cardiac dysrhythmias or at baseline: Monitor cardiac rate and rhythm     Problem: Chronic Conditions and Co-morbidities  Goal: Patient's chronic conditions and co-morbidity symptoms are monitored and maintained or improved  Outcome: Progressing  Flowsheets (Taken 5/17/2025 2015)  Care Plan - Patient's Chronic Conditions and Co-Morbidity Symptoms are Monitored and Maintained or Improved: Monitor and assess patient's chronic conditions and comorbid symptoms for stability, deterioration, or improvement     Problem: Pain  Goal: Verbalizes/displays adequate comfort level or baseline comfort level  Outcome: Progressing

## 2025-05-19 ENCOUNTER — APPOINTMENT (OUTPATIENT)
Dept: CT IMAGING | Age: 65
DRG: 641 | End: 2025-05-19
Payer: COMMERCIAL

## 2025-05-19 PROBLEM — I25.10 MULTIPLE VESSEL CORONARY ARTERY DISEASE: Status: ACTIVE | Noted: 2025-05-19

## 2025-05-19 LAB
ANION GAP SERPL CALCULATED.3IONS-SCNC: 8 MMOL/L (ref 3–16)
BASE EXCESS BLDA CALC-SCNC: -0.6 MMOL/L (ref -3–3)
BUN SERPL-MCNC: 18 MG/DL (ref 7–20)
CALCIUM SERPL-MCNC: 9.2 MG/DL (ref 8.3–10.6)
CHLORIDE SERPL-SCNC: 101 MMOL/L (ref 99–110)
CO2 BLDA-SCNC: 24.5 MMOL/L
CO2 SERPL-SCNC: 24 MMOL/L (ref 21–32)
COHGB MFR BLDA: 1.4 % (ref 0–1.5)
CREAT SERPL-MCNC: 0.9 MG/DL (ref 0.6–1.2)
DEPRECATED RDW RBC AUTO: 13.4 % (ref 12.4–15.4)
ECHO BSA: 2.19 M2
GFR SERPLBLD CREATININE-BSD FMLA CKD-EPI: 70 ML/MIN/{1.73_M2}
GLUCOSE SERPL-MCNC: 117 MG/DL (ref 70–99)
HCO3 BLDA-SCNC: 23.4 MMOL/L (ref 21–29)
HCT VFR BLD AUTO: 30.7 % (ref 36–48)
HGB BLD-MCNC: 10.3 G/DL (ref 12–16)
HGB BLDA-MCNC: 11.6 G/DL (ref 12–16)
MAGNESIUM SERPL-MCNC: 2.33 MG/DL (ref 1.8–2.4)
MCH RBC QN AUTO: 30.7 PG (ref 26–34)
MCHC RBC AUTO-ENTMCNC: 33.7 G/DL (ref 31–36)
MCV RBC AUTO: 91.3 FL (ref 80–100)
METHGB MFR BLDA: 0.3 %
O2 THERAPY: ABNORMAL
PCO2 BLDA: 36.4 MMHG (ref 35–45)
PH BLDA: 7.43 [PH] (ref 7.35–7.45)
PLATELET # BLD AUTO: 308 K/UL (ref 135–450)
PMV BLD AUTO: 7.8 FL (ref 5–10.5)
PO2 BLDA: 92.5 MMHG (ref 75–108)
POC ACT LR: 226 SEC
POTASSIUM SERPL-SCNC: 4.7 MMOL/L (ref 3.5–5.1)
RBC # BLD AUTO: 3.37 M/UL (ref 4–5.2)
SAO2 % BLDA: 97.8 %
SODIUM SERPL-SCNC: 133 MMOL/L (ref 136–145)
WBC # BLD AUTO: 9.4 K/UL (ref 4–11)

## 2025-05-19 PROCEDURE — 94010 BREATHING CAPACITY TEST: CPT

## 2025-05-19 PROCEDURE — 2709999900 HC NON-CHARGEABLE SUPPLY: Performed by: INTERNAL MEDICINE

## 2025-05-19 PROCEDURE — 71250 CT THORAX DX C-: CPT

## 2025-05-19 PROCEDURE — 6360000002 HC RX W HCPCS: Performed by: STUDENT IN AN ORGANIZED HEALTH CARE EDUCATION/TRAINING PROGRAM

## 2025-05-19 PROCEDURE — 1200000000 HC SEMI PRIVATE

## 2025-05-19 PROCEDURE — 6370000000 HC RX 637 (ALT 250 FOR IP): Performed by: INTERNAL MEDICINE

## 2025-05-19 PROCEDURE — 2500000003 HC RX 250 WO HCPCS: Performed by: STUDENT IN AN ORGANIZED HEALTH CARE EDUCATION/TRAINING PROGRAM

## 2025-05-19 PROCEDURE — 6370000000 HC RX 637 (ALT 250 FOR IP): Performed by: STUDENT IN AN ORGANIZED HEALTH CARE EDUCATION/TRAINING PROGRAM

## 2025-05-19 PROCEDURE — 93460 R&L HRT ART/VENTRICLE ANGIO: CPT | Performed by: INTERNAL MEDICINE

## 2025-05-19 PROCEDURE — 99222 1ST HOSP IP/OBS MODERATE 55: CPT

## 2025-05-19 PROCEDURE — 83735 ASSAY OF MAGNESIUM: CPT

## 2025-05-19 PROCEDURE — 2500000003 HC RX 250 WO HCPCS: Performed by: NURSE PRACTITIONER

## 2025-05-19 PROCEDURE — C1894 INTRO/SHEATH, NON-LASER: HCPCS | Performed by: INTERNAL MEDICINE

## 2025-05-19 PROCEDURE — 2580000003 HC RX 258: Performed by: INTERNAL MEDICINE

## 2025-05-19 PROCEDURE — 99152 MOD SED SAME PHYS/QHP 5/>YRS: CPT | Performed by: INTERNAL MEDICINE

## 2025-05-19 PROCEDURE — 2700000000 HC OXYGEN THERAPY PER DAY

## 2025-05-19 PROCEDURE — 36600 WITHDRAWAL OF ARTERIAL BLOOD: CPT

## 2025-05-19 PROCEDURE — 85347 COAGULATION TIME ACTIVATED: CPT

## 2025-05-19 PROCEDURE — 80048 BASIC METABOLIC PNL TOTAL CA: CPT

## 2025-05-19 PROCEDURE — 6360000004 HC RX CONTRAST MEDICATION: Performed by: INTERNAL MEDICINE

## 2025-05-19 PROCEDURE — 82803 BLOOD GASES ANY COMBINATION: CPT

## 2025-05-19 PROCEDURE — 6360000002 HC RX W HCPCS: Performed by: INTERNAL MEDICINE

## 2025-05-19 PROCEDURE — 4A023N8 MEASUREMENT OF CARDIAC SAMPLING AND PRESSURE, BILATERAL, PERCUTANEOUS APPROACH: ICD-10-PCS | Performed by: INTERNAL MEDICINE

## 2025-05-19 PROCEDURE — C1887 CATHETER, GUIDING: HCPCS | Performed by: INTERNAL MEDICINE

## 2025-05-19 PROCEDURE — B2151ZZ FLUOROSCOPY OF LEFT HEART USING LOW OSMOLAR CONTRAST: ICD-10-PCS | Performed by: INTERNAL MEDICINE

## 2025-05-19 PROCEDURE — 7100000010 HC PHASE II RECOVERY - FIRST 15 MIN: Performed by: INTERNAL MEDICINE

## 2025-05-19 PROCEDURE — 94761 N-INVAS EAR/PLS OXIMETRY MLT: CPT

## 2025-05-19 PROCEDURE — 7100000011 HC PHASE II RECOVERY - ADDTL 15 MIN: Performed by: INTERNAL MEDICINE

## 2025-05-19 PROCEDURE — 85027 COMPLETE CBC AUTOMATED: CPT

## 2025-05-19 PROCEDURE — 2500000003 HC RX 250 WO HCPCS: Performed by: INTERNAL MEDICINE

## 2025-05-19 PROCEDURE — C1769 GUIDE WIRE: HCPCS | Performed by: INTERNAL MEDICINE

## 2025-05-19 PROCEDURE — B2111ZZ FLUOROSCOPY OF MULTIPLE CORONARY ARTERIES USING LOW OSMOLAR CONTRAST: ICD-10-PCS | Performed by: INTERNAL MEDICINE

## 2025-05-19 RX ORDER — SODIUM CHLORIDE 9 MG/ML
INJECTION, SOLUTION INTRAVENOUS CONTINUOUS PRN
Status: COMPLETED | OUTPATIENT
Start: 2025-05-19 | End: 2025-05-19

## 2025-05-19 RX ORDER — SODIUM CHLORIDE 9 MG/ML
INJECTION, SOLUTION INTRAVENOUS PRN
Status: DISCONTINUED | OUTPATIENT
Start: 2025-05-19 | End: 2025-05-22 | Stop reason: HOSPADM

## 2025-05-19 RX ORDER — SODIUM CHLORIDE 0.9 % (FLUSH) 0.9 %
5-40 SYRINGE (ML) INJECTION EVERY 12 HOURS SCHEDULED
Status: DISCONTINUED | OUTPATIENT
Start: 2025-05-19 | End: 2025-05-19

## 2025-05-19 RX ORDER — HYDRALAZINE HYDROCHLORIDE 20 MG/ML
10 INJECTION INTRAMUSCULAR; INTRAVENOUS EVERY 10 MIN PRN
Status: DISCONTINUED | OUTPATIENT
Start: 2025-05-19 | End: 2025-05-22 | Stop reason: HOSPADM

## 2025-05-19 RX ORDER — IOPAMIDOL 755 MG/ML
INJECTION, SOLUTION INTRAVASCULAR PRN
Status: DISCONTINUED | OUTPATIENT
Start: 2025-05-19 | End: 2025-05-19 | Stop reason: HOSPADM

## 2025-05-19 RX ORDER — LORAZEPAM 0.5 MG/1
0.5 TABLET ORAL
Status: DISCONTINUED | OUTPATIENT
Start: 2025-05-19 | End: 2025-05-21

## 2025-05-19 RX ORDER — FENTANYL CITRATE 50 UG/ML
INJECTION, SOLUTION INTRAMUSCULAR; INTRAVENOUS PRN
Status: DISCONTINUED | OUTPATIENT
Start: 2025-05-19 | End: 2025-05-19 | Stop reason: HOSPADM

## 2025-05-19 RX ORDER — SODIUM CHLORIDE 0.9 % (FLUSH) 0.9 %
5-40 SYRINGE (ML) INJECTION PRN
Status: DISCONTINUED | OUTPATIENT
Start: 2025-05-19 | End: 2025-05-20 | Stop reason: SDUPTHER

## 2025-05-19 RX ORDER — LIDOCAINE HYDROCHLORIDE 20 MG/ML
INJECTION, SOLUTION EPIDURAL; INFILTRATION; INTRACAUDAL; PERINEURAL PRN
Status: DISCONTINUED | OUTPATIENT
Start: 2025-05-19 | End: 2025-05-19 | Stop reason: HOSPADM

## 2025-05-19 RX ORDER — ONDANSETRON 2 MG/ML
4 INJECTION INTRAMUSCULAR; INTRAVENOUS EVERY 6 HOURS PRN
Status: DISCONTINUED | OUTPATIENT
Start: 2025-05-19 | End: 2025-05-20 | Stop reason: SDUPTHER

## 2025-05-19 RX ORDER — SODIUM CHLORIDE 9 MG/ML
INJECTION, SOLUTION INTRAVENOUS CONTINUOUS
Status: CANCELLED | OUTPATIENT
Start: 2025-05-19 | End: 2025-05-19

## 2025-05-19 RX ORDER — SODIUM CHLORIDE 9 MG/ML
INJECTION, SOLUTION INTRAVENOUS PRN
Status: DISCONTINUED | OUTPATIENT
Start: 2025-05-19 | End: 2025-05-20 | Stop reason: SDUPTHER

## 2025-05-19 RX ORDER — MIDAZOLAM HYDROCHLORIDE 1 MG/ML
INJECTION, SOLUTION INTRAMUSCULAR; INTRAVENOUS PRN
Status: DISCONTINUED | OUTPATIENT
Start: 2025-05-19 | End: 2025-05-19 | Stop reason: HOSPADM

## 2025-05-19 RX ADMIN — ACETAMINOPHEN 650 MG: 325 TABLET ORAL at 20:43

## 2025-05-19 RX ADMIN — ATORVASTATIN CALCIUM 80 MG: 80 TABLET, FILM COATED ORAL at 09:13

## 2025-05-19 RX ADMIN — ASPIRIN 81 MG 81 MG: 81 TABLET ORAL at 09:13

## 2025-05-19 RX ADMIN — LOSARTAN POTASSIUM 50 MG: 25 TABLET, FILM COATED ORAL at 09:13

## 2025-05-19 RX ADMIN — ENOXAPARIN SODIUM 30 MG: 100 INJECTION SUBCUTANEOUS at 20:43

## 2025-05-19 RX ADMIN — Medication 10 ML: at 09:13

## 2025-05-19 RX ADMIN — SODIUM CHLORIDE, PRESERVATIVE FREE 10 ML: 5 INJECTION INTRAVENOUS at 20:43

## 2025-05-19 RX ADMIN — DICLOFENAC SODIUM 2 G: 10 GEL TOPICAL at 20:43

## 2025-05-19 RX ADMIN — HYDRALAZINE HYDROCHLORIDE 10 MG: 20 INJECTION INTRAMUSCULAR; INTRAVENOUS at 16:57

## 2025-05-19 ASSESSMENT — PAIN SCALES - GENERAL: PAINLEVEL_OUTOF10: 10

## 2025-05-19 ASSESSMENT — PAIN DESCRIPTION - LOCATION: LOCATION: KNEE

## 2025-05-19 NOTE — CARE COORDINATION
Chart reviewed day 5. Care provided by cards and IM. Pt is from home with her family. She is IPTA. She had a cath today and CT surg has been consulted. D/C plan is ongoing as it will depend on treatment. Will continue to follow course for needs. Noelle Dixon RN

## 2025-05-19 NOTE — PLAN OF CARE
Problem: Discharge Planning  Goal: Discharge to home or other facility with appropriate resources  Outcome: Progressing  Flowsheets (Taken 5/18/2025 1930)  Discharge to home or other facility with appropriate resources: Identify barriers to discharge with patient and caregiver     Problem: Safety - Adult  Goal: Free from fall injury  Outcome: Progressing     Problem: Skin/Tissue Integrity - Adult  Goal: Skin integrity remains intact  Outcome: Progressing  Flowsheets  Taken 5/19/2025 0227  Skin Integrity Remains Intact: Monitor for areas of redness and/or skin breakdown  Taken 5/18/2025 1930  Skin Integrity Remains Intact: Monitor for areas of redness and/or skin breakdown     Problem: Cardiovascular - Adult  Goal: Maintains optimal cardiac output and hemodynamic stability  Outcome: Progressing  Flowsheets (Taken 5/18/2025 1930)  Maintains optimal cardiac output and hemodynamic stability: Monitor blood pressure and heart rate  Goal: Absence of cardiac dysrhythmias or at baseline  Outcome: Progressing  Flowsheets (Taken 5/18/2025 1930)  Absence of cardiac dysrhythmias or at baseline: Monitor cardiac rate and rhythm     Problem: Chronic Conditions and Co-morbidities  Goal: Patient's chronic conditions and co-morbidity symptoms are monitored and maintained or improved  Outcome: Progressing  Flowsheets (Taken 5/18/2025 1930)  Care Plan - Patient's Chronic Conditions and Co-Morbidity Symptoms are Monitored and Maintained or Improved: Monitor and assess patient's chronic conditions and comorbid symptoms for stability, deterioration, or improvement     Problem: Pain  Goal: Verbalizes/displays adequate comfort level or baseline comfort level  Outcome: Progressing     Problem: Pain  Goal: Verbalizes/displays adequate comfort level or baseline comfort level  Outcome: Progressing

## 2025-05-19 NOTE — ADT AUTH CERT
Utilization Reviews       5/18/25    Last updated by Monse Allen on 5/19/2025 0906     Review Status Created By   In Primary Monse Allen       Review Type Associated Date   -- 5/19/2025      Criteria Review   DATE: 5/18/25  TYPE OF BED: Medical with Telemetry               PERTINENT UPDATES:  Cardiology did recommend a Lexiscan Myoview to evaluate for shortness of breath this was to be done as a outpatient.  Requested to be done while she was inpatient.  Has completed on 5/16/25.  Results have been reviewed and there was concern for ischemia.  She is to go for cardiac catheterization in the A     VITALS:  T 97.6  P 124  R 16  /79  SPO2 98 RA  Weight 99.3 kg 34.30 BMI  Pain 5/10 knee     PHYSICAL EXAM:  General:  Awake, alert, NAD  Skin:  Warm and dry  Neck:  JVD<8  Chest:  Clear to auscultation, no wheezes/rhonchi/rales  Cardiovascular:  RRR S1S2, no m/r/g   Abdomen:  Soft, nontender, +bowel sounds  Extremities:  trace  bilateral lower extremity edema     MD CONSULTS/ASSESSMENT AND PLAN:  IM:   Assessment/Plan:       Presyncope : Suspect secondary to overdiuresis and hypovolemia.  Her torsemide dose was changed to 40 mg twice daily symptoms started after that.  Will continue to monitor on telemetry.  Echocardiogram was completed 5/8/2025 and reviewed.  Have been following on telemetry and has been unremarkable.     Acute renal failure : Creatinine has improved over the past 24 hours and will follow.  Encourage oral intake.  Will avoid nephrotoxins and check renal functions in a.m.  Have been holding torsemide and olmesartan     CHFpEF : History is noted.  Echocardiogram reviewed and she has LV ejection of 60 %.  Will resume on oral furosemide at 40 mg a day and follow her response.  Her blood pressure also elevated and have resumed back on olmesartan.     UTI : Urinalysis from 5/14/25 reviewed and suspicious for urinary tract infection.  Urine culture currently pending will initiate on empiric oral

## 2025-05-19 NOTE — POST SEDATION
Patient:  Rosario Riley   :   1960    A pre-sedation re-evaluation was performed immediately at the end of the procedure.  Procedure:  Cardiac cath  Medications: Procedural sedation with minimal conscious sedation  Complications: None  Estimated Blood Loss: none  Specimens: Were not obtained        Eileen Medication and Procedural Reconciliation:  I agree that the documented medications and procedures performed are true.  The medications were given under my order.  The procedures were performed under my direct supervision.    An immediate re-assessment was completed prior to sedation, and it is determined to be safe to proceed.

## 2025-05-19 NOTE — DISCHARGE INSTRUCTIONS
Cath Labs at  [facility]   Discharge Instructions        5/19/2025  Rosario Riley   Date of Birth 1960       Activity:  No driving for 24 hours.  In 24 hours you may remove dressing and shower, wash site gently with soap and water and leave open to air  Avoid submerging your arm in sitting water for 5 days.  Do not use your right hand for 24 hours, then  No lifting more than 5 pounds for 5 days.   No lotions, powders, or ointments near site for 5 days.   No work/school for 5 days unless instructed otherwise by your cardiologist.    Diet:   Resume previous diet, if a cardiac diet is specified you will receive a handout with  general guidelines.   Drink extra non-alcoholic/decaffienated fluids for first 24 hours after your procedure.    Arm Management:  If bleeding occurs from the site or a hematoma (lump) begins to increase in size, apply pressure directly over the site, call 911 to return to the hospital.    Special Instructions:  Report any coolness or numbness in the arm  Report any chills, fever, itching, red bumps or rash   Report any of the following to the MD: drainage from the site, redness and/or swelling at the site, increased tenderness at the site   If you are currently taking Metformin or Metformin combination medications for Diabetes, hold your dose for 48 hours after your procedure.  Consult your Cardiologist before taking any NSAIDS, vitamin supplements, estrogen, or estrogen plus progestin.  Do not stop taking Plavix, Brilinta or Effient, without first consulting your cardiologist.    Sedation Discharge Instructions:  For the next 24 hours do not drive a car, operate machinery, power tools or kitchen appliances.    Do not drink alcohol; including beer or wine.    Do not make any important decisions or sign any important papers.  For the next 24 hours you can expect drowsiness, light-headed or dizziness, nausea/ vomiting, inability to concentrate, fatigue and desire to sleep.  We strongly

## 2025-05-19 NOTE — PROCEDURES
Post Cardiac Catheterization Note.  Full details available in procedure log    DATE: 5/19/2025  PATIENT: Rosario Riley  MRN: [id number]    Procedure Performed:  CPT Code: 72056 US guidance for vascular access  ~NOTE: Ultrasound access was used to access the vessel using the modified seldinger technique after local infiltration of 1-2% lidocaine.   Ultrasound documented/visualized patency, pulsatility, and proper location for puncture. An anterior wall puncture was made. It was determined safety to proceed with access.   Left ventriculogram  Left heart catheterization  Selective coronary angiography  Right heart cath    Procedure Findings:  Three vessel calcified disease  ~90% proximal LAD  ~90% OM2  ~90% mid/distal RCA  Normal left ventricular function with ejection fraction of 65%  Normal left heart catheterization  Normal right heart cath    Conclusion/Recommendations:  CABG referral    Nakita Wolfe  Interventional Cardiology  [facility]  Office [phone]

## 2025-05-20 ENCOUNTER — APPOINTMENT (OUTPATIENT)
Dept: VASCULAR LAB | Age: 65
DRG: 641 | End: 2025-05-20
Payer: COMMERCIAL

## 2025-05-20 LAB
ANION GAP SERPL CALCULATED.3IONS-SCNC: 11 MMOL/L (ref 3–16)
BUN SERPL-MCNC: 17 MG/DL (ref 7–20)
CALCIUM SERPL-MCNC: 9 MG/DL (ref 8.3–10.6)
CHLORIDE SERPL-SCNC: 105 MMOL/L (ref 99–110)
CO2 SERPL-SCNC: 23 MMOL/L (ref 21–32)
CREAT SERPL-MCNC: 0.9 MG/DL (ref 0.6–1.2)
DEPRECATED RDW RBC AUTO: 13.5 % (ref 12.4–15.4)
ECHO BSA: 2.19 M2
ECHO BSA: 2.19 M2
GFR SERPLBLD CREATININE-BSD FMLA CKD-EPI: 70 ML/MIN/{1.73_M2}
GLUCOSE SERPL-MCNC: 117 MG/DL (ref 70–99)
HCT VFR BLD AUTO: 31.9 % (ref 36–48)
HGB BLD-MCNC: 10.9 G/DL (ref 12–16)
MAGNESIUM SERPL-MCNC: 2.38 MG/DL (ref 1.8–2.4)
MCH RBC QN AUTO: 31.1 PG (ref 26–34)
MCHC RBC AUTO-ENTMCNC: 34 G/DL (ref 31–36)
MCV RBC AUTO: 91.5 FL (ref 80–100)
PLATELET # BLD AUTO: 342 K/UL (ref 135–450)
PMV BLD AUTO: 8 FL (ref 5–10.5)
POTASSIUM SERPL-SCNC: 4.5 MMOL/L (ref 3.5–5.1)
RBC # BLD AUTO: 3.49 M/UL (ref 4–5.2)
SODIUM SERPL-SCNC: 139 MMOL/L (ref 136–145)
VAS LEFT CCA DIST EDV: 33.6 CM/S
VAS LEFT CCA DIST PSV: 126 CM/S
VAS LEFT CCA MID EDV: 23.1 CM/S
VAS LEFT CCA MID PSV: 105 CM/S
VAS LEFT CCA PROX EDV: 16.5 CM/S
VAS LEFT CCA PROX PSV: 80.7 CM/S
VAS LEFT ECA EDV: 8.66 CM/S
VAS LEFT ECA PSV: 153 CM/S
VAS LEFT GSV ANKLE DIAM: 2.29 MM
VAS LEFT GSV AT KNEE DIAM: 3.46 MM
VAS LEFT GSV BK DIST DIAM: 2.56 MM
VAS LEFT GSV BK MID DIAM: 2.01 MM
VAS LEFT GSV BK PROX DIAM: 1.41 MM
VAS LEFT GSV JUNC DIAM: 6.56 MM
VAS LEFT GSV THIGH DIST DIAM: 4.19 MM
VAS LEFT GSV THIGH MID DIAM: 3.61 MM
VAS LEFT GSV THIGH PROX DIAM: 3.24 MM
VAS LEFT ICA DIST EDV: 43.3 CM/S
VAS LEFT ICA DIST PSV: 125 CM/S
VAS LEFT ICA MID EDV: 33.8 CM/S
VAS LEFT ICA MID PSV: 163 CM/S
VAS LEFT ICA PROX EDV: 32.9 CM/S
VAS LEFT ICA PROX PSV: 131 CM/S
VAS LEFT ICA/CCA PSV: 1.55
VAS LEFT SUBCLAVIAN PROX EDV: 0 CM/S
VAS LEFT SUBCLAVIAN PROX PSV: 114 CM/S
VAS LEFT VERTEBRAL EDV: 21.7 CM/S
VAS LEFT VERTEBRAL PSV: 108 CM/S
VAS RIGHT CCA DIST EDV: 19.3 CM/S
VAS RIGHT CCA DIST PSV: 75.2 CM/S
VAS RIGHT CCA MID EDV: 18 CM/S
VAS RIGHT CCA MID PSV: 65.2 CM/S
VAS RIGHT CCA PROX EDV: 18.6 CM/S
VAS RIGHT CCA PROX PSV: 75.2 CM/S
VAS RIGHT ECA EDV: 23.3 CM/S
VAS RIGHT ECA PSV: 207 CM/S
VAS RIGHT GSV ANKLE DIAM: 0.96 MM
VAS RIGHT GSV AT KNEE DIAM: 3.36 MM
VAS RIGHT GSV BK DIST DIAM: 1.64 MM
VAS RIGHT GSV BK MID DIAM: 1.32 MM
VAS RIGHT GSV BK PROX DIAM: 1.83 MM
VAS RIGHT GSV JUNC DIAM: 7.11 MM
VAS RIGHT GSV THIGH DIST DIAM: 3.57 MM
VAS RIGHT GSV THIGH MID DIAM: 4.58 MM
VAS RIGHT GSV THIGH PROX DIAM: 3.84 MM
VAS RIGHT ICA DIST EDV: 20.7 CM/S
VAS RIGHT ICA DIST PSV: 92.1 CM/S
VAS RIGHT ICA MID EDV: 26.6 CM/S
VAS RIGHT ICA MID PSV: 110 CM/S
VAS RIGHT ICA PROX EDV: 63.1 CM/S
VAS RIGHT ICA PROX PSV: 209 CM/S
VAS RIGHT ICA/CCA PSV: 3.21
VAS RIGHT SUBCLAVIAN PROX EDV: 0 CM/S
VAS RIGHT SUBCLAVIAN PROX PSV: 112 CM/S
VAS RIGHT VERTEBRAL EDV: 22.1 CM/S
VAS RIGHT VERTEBRAL PSV: 60 CM/S
WBC # BLD AUTO: 9.8 K/UL (ref 4–11)

## 2025-05-20 PROCEDURE — 36415 COLL VENOUS BLD VENIPUNCTURE: CPT

## 2025-05-20 PROCEDURE — 1200000000 HC SEMI PRIVATE

## 2025-05-20 PROCEDURE — 6360000002 HC RX W HCPCS: Performed by: STUDENT IN AN ORGANIZED HEALTH CARE EDUCATION/TRAINING PROGRAM

## 2025-05-20 PROCEDURE — 6370000000 HC RX 637 (ALT 250 FOR IP): Performed by: NURSE PRACTITIONER

## 2025-05-20 PROCEDURE — 83735 ASSAY OF MAGNESIUM: CPT

## 2025-05-20 PROCEDURE — 6370000000 HC RX 637 (ALT 250 FOR IP): Performed by: STUDENT IN AN ORGANIZED HEALTH CARE EDUCATION/TRAINING PROGRAM

## 2025-05-20 PROCEDURE — 85027 COMPLETE CBC AUTOMATED: CPT

## 2025-05-20 PROCEDURE — 6370000000 HC RX 637 (ALT 250 FOR IP): Performed by: INTERNAL MEDICINE

## 2025-05-20 PROCEDURE — 2500000003 HC RX 250 WO HCPCS: Performed by: STUDENT IN AN ORGANIZED HEALTH CARE EDUCATION/TRAINING PROGRAM

## 2025-05-20 PROCEDURE — 99232 SBSQ HOSP IP/OBS MODERATE 35: CPT | Performed by: NURSE PRACTITIONER

## 2025-05-20 PROCEDURE — 93880 EXTRACRANIAL BILAT STUDY: CPT | Performed by: SURGERY

## 2025-05-20 PROCEDURE — 93880 EXTRACRANIAL BILAT STUDY: CPT

## 2025-05-20 PROCEDURE — 93970 EXTREMITY STUDY: CPT | Performed by: SURGERY

## 2025-05-20 PROCEDURE — 80048 BASIC METABOLIC PNL TOTAL CA: CPT

## 2025-05-20 PROCEDURE — 93970 EXTREMITY STUDY: CPT

## 2025-05-20 RX ORDER — METOPROLOL TARTRATE 25 MG/1
25 TABLET, FILM COATED ORAL 2 TIMES DAILY
Status: DISCONTINUED | OUTPATIENT
Start: 2025-05-20 | End: 2025-05-22 | Stop reason: HOSPADM

## 2025-05-20 RX ADMIN — ATORVASTATIN CALCIUM 80 MG: 80 TABLET, FILM COATED ORAL at 09:00

## 2025-05-20 RX ADMIN — METOPROLOL TARTRATE 25 MG: 25 TABLET, FILM COATED ORAL at 20:36

## 2025-05-20 RX ADMIN — SODIUM CHLORIDE, PRESERVATIVE FREE 10 ML: 5 INJECTION INTRAVENOUS at 09:00

## 2025-05-20 RX ADMIN — ENOXAPARIN SODIUM 30 MG: 100 INJECTION SUBCUTANEOUS at 18:45

## 2025-05-20 RX ADMIN — DICLOFENAC SODIUM 2 G: 10 GEL TOPICAL at 20:38

## 2025-05-20 RX ADMIN — ASPIRIN 81 MG 81 MG: 81 TABLET ORAL at 09:00

## 2025-05-20 RX ADMIN — ACETAMINOPHEN 650 MG: 325 TABLET ORAL at 22:33

## 2025-05-20 RX ADMIN — METOPROLOL TARTRATE 25 MG: 25 TABLET, FILM COATED ORAL at 09:54

## 2025-05-20 RX ADMIN — LOSARTAN POTASSIUM 50 MG: 25 TABLET, FILM COATED ORAL at 09:00

## 2025-05-20 RX ADMIN — ACETAMINOPHEN 650 MG: 325 TABLET ORAL at 16:17

## 2025-05-20 RX ADMIN — ENOXAPARIN SODIUM 30 MG: 100 INJECTION SUBCUTANEOUS at 20:37

## 2025-05-20 RX ADMIN — SODIUM CHLORIDE, PRESERVATIVE FREE 5 ML: 5 INJECTION INTRAVENOUS at 20:40

## 2025-05-20 ASSESSMENT — PAIN DESCRIPTION - DESCRIPTORS
DESCRIPTORS: ACHING
DESCRIPTORS: ACHING;DISCOMFORT
DESCRIPTORS: ACHING

## 2025-05-20 ASSESSMENT — PAIN SCALES - GENERAL
PAINLEVEL_OUTOF10: 0
PAINLEVEL_OUTOF10: 4
PAINLEVEL_OUTOF10: 3
PAINLEVEL_OUTOF10: 7
PAINLEVEL_OUTOF10: 4
PAINLEVEL_OUTOF10: 7
PAINLEVEL_OUTOF10: 6

## 2025-05-20 ASSESSMENT — PAIN DESCRIPTION - ORIENTATION
ORIENTATION: LEFT
ORIENTATION: RIGHT
ORIENTATION: RIGHT

## 2025-05-20 ASSESSMENT — PAIN DESCRIPTION - LOCATION
LOCATION: KNEE

## 2025-05-20 ASSESSMENT — PAIN - FUNCTIONAL ASSESSMENT
PAIN_FUNCTIONAL_ASSESSMENT: ACTIVITIES ARE NOT PREVENTED
PAIN_FUNCTIONAL_ASSESSMENT: ACTIVITIES ARE NOT PREVENTED

## 2025-05-20 NOTE — PLAN OF CARE
Problem: Discharge Planning  Goal: Discharge to home or other facility with appropriate resources  5/20/2025 1105 by Rosario Urbina RN  Outcome: Progressing  5/20/2025 0637 by Osei Parada RN  Outcome: Progressing     Problem: Safety - Adult  Goal: Free from fall injury  5/20/2025 1105 by Rosario Urbina RN  Outcome: Progressing  5/20/2025 0637 by Osei Parada RN  Outcome: Progressing     Problem: Skin/Tissue Integrity - Adult  Goal: Skin integrity remains intact  5/20/2025 1105 by Rosario Urbina RN  Outcome: Progressing  5/20/2025 0637 by Osei Parada RN  Outcome: Progressing     Problem: Chronic Conditions and Co-morbidities  Goal: Patient's chronic conditions and co-morbidity symptoms are monitored and maintained or improved  5/20/2025 1105 by Rosario Urbina RN  Outcome: Progressing  5/20/2025 0637 by Osei Parada RN  Outcome: Progressing     Problem: Cardiovascular - Adult  Goal: Maintains optimal cardiac output and hemodynamic stability  5/20/2025 1105 by Rosario Urbina RN  Outcome: Progressing  5/20/2025 0637 by Osei Parada RN  Outcome: Progressing  Goal: Absence of cardiac dysrhythmias or at baseline  5/20/2025 1105 by Rosario Urbina RN  Outcome: Progressing  5/20/2025 0637 by Osei Parada RN  Outcome: Progressing     Problem: Pain  Goal: Verbalizes/displays adequate comfort level or baseline comfort level  5/20/2025 1105 by Rosario Urbina RN  Outcome: Progressing  5/20/2025 0637 by Osei Parada RN  Outcome: Progressing

## 2025-05-20 NOTE — CARE COORDINATION
Chart reviewed day 6. Care provided by cards and IM. Pt is form home with her family. She is IPTA. CT surg consulted for CABG. Work up in progress. No surgery date scheduled yet. Will continue to follow course for needs. Noelle Dixon RN

## 2025-05-21 PROBLEM — I25.10 CORONARY ARTERY DISEASE: Status: ACTIVE | Noted: 2025-05-21

## 2025-05-21 PROCEDURE — 1200000000 HC SEMI PRIVATE

## 2025-05-21 PROCEDURE — 6370000000 HC RX 637 (ALT 250 FOR IP): Performed by: NURSE PRACTITIONER

## 2025-05-21 PROCEDURE — 6370000000 HC RX 637 (ALT 250 FOR IP): Performed by: STUDENT IN AN ORGANIZED HEALTH CARE EDUCATION/TRAINING PROGRAM

## 2025-05-21 PROCEDURE — 2500000003 HC RX 250 WO HCPCS: Performed by: STUDENT IN AN ORGANIZED HEALTH CARE EDUCATION/TRAINING PROGRAM

## 2025-05-21 PROCEDURE — 99222 1ST HOSP IP/OBS MODERATE 55: CPT | Performed by: CLINICAL NURSE SPECIALIST

## 2025-05-21 PROCEDURE — 6370000000 HC RX 637 (ALT 250 FOR IP): Performed by: INTERNAL MEDICINE

## 2025-05-21 PROCEDURE — 6360000002 HC RX W HCPCS: Performed by: STUDENT IN AN ORGANIZED HEALTH CARE EDUCATION/TRAINING PROGRAM

## 2025-05-21 PROCEDURE — 99232 SBSQ HOSP IP/OBS MODERATE 35: CPT

## 2025-05-21 RX ADMIN — SODIUM CHLORIDE, PRESERVATIVE FREE 10 ML: 5 INJECTION INTRAVENOUS at 20:53

## 2025-05-21 RX ADMIN — ACETAMINOPHEN 650 MG: 325 TABLET ORAL at 20:53

## 2025-05-21 RX ADMIN — METOPROLOL TARTRATE 25 MG: 25 TABLET, FILM COATED ORAL at 20:53

## 2025-05-21 RX ADMIN — ATORVASTATIN CALCIUM 80 MG: 80 TABLET, FILM COATED ORAL at 08:36

## 2025-05-21 RX ADMIN — ENOXAPARIN SODIUM 30 MG: 100 INJECTION SUBCUTANEOUS at 10:41

## 2025-05-21 RX ADMIN — ASPIRIN 81 MG 81 MG: 81 TABLET ORAL at 08:36

## 2025-05-21 RX ADMIN — METOPROLOL TARTRATE 25 MG: 25 TABLET, FILM COATED ORAL at 08:36

## 2025-05-21 RX ADMIN — SODIUM CHLORIDE, PRESERVATIVE FREE 10 ML: 5 INJECTION INTRAVENOUS at 08:37

## 2025-05-21 RX ADMIN — DICLOFENAC SODIUM 2 G: 10 GEL TOPICAL at 20:46

## 2025-05-21 RX ADMIN — ENOXAPARIN SODIUM 30 MG: 100 INJECTION SUBCUTANEOUS at 20:53

## 2025-05-21 ASSESSMENT — PAIN SCALES - GENERAL
PAINLEVEL_OUTOF10: 3
PAINLEVEL_OUTOF10: 3
PAINLEVEL_OUTOF10: 7
PAINLEVEL_OUTOF10: 7

## 2025-05-21 ASSESSMENT — PAIN DESCRIPTION - ORIENTATION: ORIENTATION: LEFT;RIGHT

## 2025-05-21 ASSESSMENT — PAIN DESCRIPTION - LOCATION: LOCATION: KNEE

## 2025-05-21 NOTE — CONSULTS
[facility]           Vascular Surgery Consultation  Rosario Riley  Medical Record #: [id number]  YOB: 1960      Date of Admission:  2025 11:18 AM  Date of Consultation:  2025      PCP:  Promise Taylor PA       Chief Complaint: Lightheadedness    History of Present Illness:   We are asked to see this patient in consultation by PHILOMENA Ambriz  regarding right internal carotid stenosis prior to coronary surgical revascularization.    Rosario Riley is a 64 y.o. female who was at work and had a near syncopal episode. Patient stated she felt faint but did not pass out. She recently had medication changes for her congestive heart failure, particularly the frequency of her torsemide. EMS was called.  In the ED she was fluid resuscitated as she had JANIS (Cr 1.6) and started on antibiotic for UTI. She was admitted for further evaluation and treatment. Cardiology was consulted due to an abnormal Lexiscan. LHC demonstrated multivessel CAD and referred to CT surgery. As part of surgical workup, a carotid duplex was obtained showing a HA stenosis of 50-69%. Vascular surgery was consulted.        Past Medical History:   Diagnosis Date    Essential hypertension 2024    Foot pain, bilateral     Hypercholesterolemia 2014    Hypercholesterolemia 2014    Multiple vessel coronary artery disease 2025    Osteoarthritis NA    Had for at least 2 or more years    Severe obesity (BMI 35.0-39.9) with comorbidity (HCC) 2024    Vitamin D deficiency 2014         Past Surgical History:   Procedure Laterality Date    CARDIAC PROCEDURE N/A 2025    Left and right heart cath / coronary angiography performed by Nakita Wolfe MD at [facility] CARDIAC CATH LAB     SECTION      KNEE ARTHROSCOPY Left     KNEE SURGERY  2022    Miniscus    TONSILLECTOMY  1963    TOTAL HIP ARTHROPLASTY Left 2024    LEFT ANTERIOR TOTAL HIP REPLACEMENT 
Consult Placed   Consult Called  Who: [facility]  Date:5/16/2025  Time:4:23 PM  
Department of Cardiovascular & Thoracic Surgery  History and Physical          DIAGNOSIS:  Near syncope, multivessel CAD    CHIEF COMPLAINT:    Chief Complaint   Patient presents with    near syncope     Pt to er [facility] near syncope at work. Pt felt faint but never passed out. Pt has had recent change to her torsemide rx, taking 40 mg twice a day for last 2 days       History Obtained From:  patient, electronic medical record    HISTORY OF PRESENT ILLNESS:      The patient is a 64 y.o. female with significant past medical history of HFpEF, HTN, obesity, former smoker, HLD who presented to [facility] ED on 25 for c/o light headedness and near syncope upon standing while at work.  Of note, Patient also complains of ongoing fatigue and shortness of breath with minimal exertion around the house and outside the last 6 months. She was recently seen outpatient by cardiology for BLE edema resistant to Lasix and was Changed to Torsemide 40 mg BID with improvement.  In the ED, she was suspected to have hypovolemia and found to have JANIS w/ Cr . 1.6 . She underwent stress testing on 25 that revealed a moderate sized, reversible inferior wall perfusion defect consistent w/ ischemia.  Cardiology was consulted and she underwent LHC 25 w/ Dr Wolfe that revealed 3V CADwith ` 90% pLAD, OM2, and mid-distal RCA. EF 65%.  We are asked to see in consideration for surgical revascularization.      Past Medical History:    Past Medical History:   Diagnosis Date    Essential hypertension 2024    Foot pain, bilateral     Hypercholesterolemia 2014    Hypercholesterolemia 2014    Osteoarthritis NA    Had for at least 2 or more years    Severe obesity (BMI 35.0-39.9) with comorbidity (HCC) 2024    Vitamin D deficiency 2014       Past Surgical History:    Past Surgical History:   Procedure Laterality Date     SECTION      KNEE ARTHROSCOPY Left     KNEE SURGERY  2022    Miniscus    TONSILLECTOMY  1963 
valve disease.    HOME MEDICATIONS  Prior to Admission medications    Medication Sig Start Date End Date Taking? Authorizing Provider   diclofenac sodium (VOLTAREN) 1 % GEL Apply 2 g topically 4 times daily as needed for Pain 5/16/25  Yes Thomas Winchester MD   furosemide (LASIX) 40 MG tablet Take 0.5 tablets by mouth daily 5/17/25  Yes Thomas Winchester MD   atorvastatin (LIPITOR) 80 MG tablet Take 1 tablet by mouth daily 5/5/25  Yes Promise Taylor PA   vitamin D (ERGOCALCIFEROL) 1.25 MG (44852 UT) CAPS capsule Take 1 capsule by mouth once a week 3/28/25  Yes Promise Taylor PA   olmesartan (BENICAR) 40 MG tablet Take 1 tablet by mouth daily    Provider, MD Brendan        ALLERGIES  Crestor [rosuvastatin], Asa [aspirin], and Pcn [penicillins]     REVIEW OF SYSTEMS  14 point ROS done and negative other than HPI      PHYSICAL EXAMINATION    Vitals:    05/17/25 0726   BP: (!) 132/92   Pulse: (!) 102   Resp: 18   Temp: 97.6 °F (36.4 °C)   SpO2: 100%    Weight - Scale: 98.7 kg (217 lb 11.2 oz)       General appearance - alert, cooperative, no distress, appears stated age  Neck - Supple, symmetrical, trachea midline, no adenopathy, thyroid: not enlarged, symmetric, no tenderness/mass/nodules, no carotid bruit or JVD  Lungs - Clear to auscultation bilaterally, respirations unlabored  Chest wall - No tenderness or deformity  Heart - Regular rate and rhythm, S1, S2 normal, no murmur, no rub or gallop  Extremities - Extremities normal, atraumatic, no cyanosis or edema  Pulses - 2+ and symmetric upper and lower extremities       LABS  Lab Results   Component Value Date     (H) 03/27/2025         CARDIAC STUDIES    EKG: Sinus rhythm    Echo: 5/8/2025    Left Ventricle: Normal left ventricular systolic function with a visually estimated EF of 60 - 65%. Left ventricle size is normal. Normal wall thickness. Normal wall motion. Indeterminate diastolic function.    Right Ventricle: Right ventricle size is normal. Normal

## 2025-05-21 NOTE — PLAN OF CARE
Problem: Discharge Planning  Goal: Discharge to home or other facility with appropriate resources  5/21/2025 0915 by Rosario Urbina RN  Outcome: Progressing  5/21/2025 0103 by Lashay Contreras RN  Outcome: Progressing  Flowsheets (Taken 5/20/2025 2030)  Discharge to home or other facility with appropriate resources: Identify barriers to discharge with patient and caregiver     Problem: Safety - Adult  Goal: Free from fall injury  5/21/2025 0915 by Rosario Urbina RN  Outcome: Progressing  5/21/2025 0103 by Lashay Contreras RN  Outcome: Progressing     Problem: Skin/Tissue Integrity - Adult  Goal: Skin integrity remains intact  5/21/2025 0915 by Rosario Urbina RN  Outcome: Progressing  5/21/2025 0103 by Lashay Contreras RN  Outcome: Progressing  Flowsheets (Taken 5/21/2025 0103)  Skin Integrity Remains Intact: Monitor for areas of redness and/or skin breakdown     Problem: Chronic Conditions and Co-morbidities  Goal: Patient's chronic conditions and co-morbidity symptoms are monitored and maintained or improved  5/21/2025 0915 by Rosario Urbina RN  Outcome: Progressing  5/21/2025 0103 by Lashay Contreras RN  Outcome: Progressing  Flowsheets (Taken 5/20/2025 2030)  Care Plan - Patient's Chronic Conditions and Co-Morbidity Symptoms are Monitored and Maintained or Improved: Monitor and assess patient's chronic conditions and comorbid symptoms for stability, deterioration, or improvement     Problem: Cardiovascular - Adult  Goal: Maintains optimal cardiac output and hemodynamic stability  5/21/2025 0915 by Rosario Urbina RN  Outcome: Progressing  5/21/2025 0103 by Lashay Contreras RN  Outcome: Progressing  Flowsheets (Taken 5/20/2025 2030)  Maintains optimal cardiac output and hemodynamic stability: Monitor blood pressure and heart rate  Goal: Absence of cardiac dysrhythmias or at baseline  5/21/2025 0915 by Rosario Urbina RN  Outcome: Progressing  5/21/2025 0103 by Lashay Contreras RN  Outcome:

## 2025-05-21 NOTE — PLAN OF CARE
Problem: Discharge Planning  Goal: Discharge to home or other facility with appropriate resources  5/21/2025 0103 by Lashay Contreras RN  Outcome: Progressing  Flowsheets (Taken 5/20/2025 2030)  Discharge to home or other facility with appropriate resources: Identify barriers to discharge with patient and caregiver  5/20/2025 1105 by Rosario Urbina RN  Outcome: Progressing     Problem: Safety - Adult  Goal: Free from fall injury  5/21/2025 0103 by Lashay Contreras RN  Outcome: Progressing  5/20/2025 1105 by Rosario Urbina RN  Outcome: Progressing     Problem: Skin/Tissue Integrity - Adult  Goal: Skin integrity remains intact  5/21/2025 0103 by Lashay Contreras RN  Outcome: Progressing  Flowsheets (Taken 5/21/2025 0103)  Skin Integrity Remains Intact: Monitor for areas of redness and/or skin breakdown  5/20/2025 1105 by Rosario Urbina RN  Outcome: Progressing     Problem: Cardiovascular - Adult  Goal: Maintains optimal cardiac output and hemodynamic stability  5/21/2025 0103 by Lashay Contreras RN  Outcome: Progressing  Flowsheets (Taken 5/20/2025 2030)  Maintains optimal cardiac output and hemodynamic stability: Monitor blood pressure and heart rate  5/20/2025 1105 by Rosario Urbina RN  Outcome: Progressing  Goal: Absence of cardiac dysrhythmias or at baseline  5/21/2025 0103 by Lashay Contreras RN  Outcome: Progressing  Flowsheets (Taken 5/20/2025 2030)  Absence of cardiac dysrhythmias or at baseline: Monitor cardiac rate and rhythm  5/20/2025 1105 by Rosario Urbina RN  Outcome: Progressing     Problem: Chronic Conditions and Co-morbidities  Goal: Patient's chronic conditions and co-morbidity symptoms are monitored and maintained or improved  5/21/2025 0103 by Lashay Contreras RN  Outcome: Progressing  Flowsheets (Taken 5/20/2025 2030)  Care Plan - Patient's Chronic Conditions and Co-Morbidity Symptoms are Monitored and Maintained or Improved: Monitor and assess patient's chronic conditions and comorbid

## 2025-05-21 NOTE — CARE COORDINATION
Chart reviewed day 7. Care provided by cards and IM. Pt is form home with her family. She is IPTA. CT surg consulted for CABG. Work up in progress. No surgery date scheduled yet. Will continue to follow course for needs. Noelle Dixon RN

## 2025-05-22 VITALS
RESPIRATION RATE: 18 BRPM | SYSTOLIC BLOOD PRESSURE: 157 MMHG | HEART RATE: 63 BPM | WEIGHT: 221.2 LBS | TEMPERATURE: 97.5 F | OXYGEN SATURATION: 99 % | DIASTOLIC BLOOD PRESSURE: 60 MMHG | HEIGHT: 67 IN | BODY MASS INDEX: 34.72 KG/M2

## 2025-05-22 LAB
ABO/RH: NORMAL
ANTIBODY SCREEN: NORMAL
APTT BLD: 28.8 SEC (ref 22.1–36.4)
BILIRUB UR QL STRIP.AUTO: NEGATIVE
CLARITY UR: CLEAR
COLOR UR: YELLOW
EST. AVERAGE GLUCOSE BLD GHB EST-MCNC: 119.8 MG/DL
FIBRINOGEN PPP-MCNC: 469 MG/DL (ref 227–534)
GLUCOSE UR STRIP.AUTO-MCNC: 250 MG/DL
HBA1C MFR BLD: 5.8 %
HGB UR QL STRIP.AUTO: ABNORMAL
INR PPP: 0.98 (ref 0.85–1.15)
KETONES UR STRIP.AUTO-MCNC: NEGATIVE MG/DL
LEUKOCYTE ESTERASE UR QL STRIP.AUTO: NEGATIVE
NITRITE UR QL STRIP.AUTO: NEGATIVE
PH UR STRIP.AUTO: 5.5 [PH] (ref 5–8)
PROT UR STRIP.AUTO-MCNC: 30 MG/DL
PROTHROMBIN TIME: 13.2 SEC (ref 11.9–14.9)
RBC #/AREA URNS HPF: ABNORMAL /HPF (ref 0–4)
SP GR UR STRIP.AUTO: 1.01 (ref 1–1.03)
UA COMPLETE W REFLEX CULTURE PNL UR: YES
UA DIPSTICK W REFLEX MICRO PNL UR: YES
URN SPEC COLLECT METH UR: ABNORMAL
UROBILINOGEN UR STRIP-ACNC: 0.2 E.U./DL
WBC #/AREA URNS HPF: ABNORMAL /HPF (ref 0–5)

## 2025-05-22 PROCEDURE — 6360000002 HC RX W HCPCS: Performed by: STUDENT IN AN ORGANIZED HEALTH CARE EDUCATION/TRAINING PROGRAM

## 2025-05-22 PROCEDURE — 6370000000 HC RX 637 (ALT 250 FOR IP): Performed by: STUDENT IN AN ORGANIZED HEALTH CARE EDUCATION/TRAINING PROGRAM

## 2025-05-22 PROCEDURE — 2500000003 HC RX 250 WO HCPCS: Performed by: STUDENT IN AN ORGANIZED HEALTH CARE EDUCATION/TRAINING PROGRAM

## 2025-05-22 PROCEDURE — 6370000000 HC RX 637 (ALT 250 FOR IP): Performed by: NURSE PRACTITIONER

## 2025-05-22 PROCEDURE — 6370000000 HC RX 637 (ALT 250 FOR IP): Performed by: INTERNAL MEDICINE

## 2025-05-22 RX ORDER — ASPIRIN 81 MG/1
81 TABLET, CHEWABLE ORAL DAILY
Qty: 30 TABLET | Refills: 3 | Status: SHIPPED | OUTPATIENT
Start: 2025-05-22

## 2025-05-22 RX ORDER — METOPROLOL TARTRATE 25 MG/1
25 TABLET, FILM COATED ORAL 2 TIMES DAILY
Qty: 60 TABLET | Refills: 3 | Status: SHIPPED | OUTPATIENT
Start: 2025-05-22

## 2025-05-22 RX ORDER — FUROSEMIDE 40 MG/1
20 TABLET ORAL DAILY
Qty: 30 TABLET | Refills: 3 | Status: SHIPPED | OUTPATIENT
Start: 2025-05-22

## 2025-05-22 RX ORDER — METOPROLOL TARTRATE 25 MG/1
25 TABLET, FILM COATED ORAL 2 TIMES DAILY
Qty: 60 TABLET | Refills: 3 | Status: SHIPPED | OUTPATIENT
Start: 2025-05-22 | End: 2025-05-22

## 2025-05-22 RX ADMIN — ACETAMINOPHEN 650 MG: 325 TABLET ORAL at 04:13

## 2025-05-22 RX ADMIN — SODIUM CHLORIDE, PRESERVATIVE FREE 10 ML: 5 INJECTION INTRAVENOUS at 08:21

## 2025-05-22 RX ADMIN — ATORVASTATIN CALCIUM 80 MG: 80 TABLET, FILM COATED ORAL at 08:20

## 2025-05-22 RX ADMIN — ENOXAPARIN SODIUM 30 MG: 100 INJECTION SUBCUTANEOUS at 08:20

## 2025-05-22 RX ADMIN — ASPIRIN 81 MG 81 MG: 81 TABLET ORAL at 08:20

## 2025-05-22 RX ADMIN — METOPROLOL TARTRATE 25 MG: 25 TABLET, FILM COATED ORAL at 08:20

## 2025-05-22 ASSESSMENT — PAIN DESCRIPTION - ORIENTATION: ORIENTATION: RIGHT

## 2025-05-22 ASSESSMENT — PAIN DESCRIPTION - LOCATION: LOCATION: KNEE

## 2025-05-22 ASSESSMENT — PAIN SCALES - GENERAL: PAINLEVEL_OUTOF10: 5

## 2025-05-22 NOTE — DISCHARGE SUMMARY
Hospital Medicine Discharge Summary    Patient: Rosario Riley   : 1960     Hospital:  [facility]  Admit Date: 2025   Discharge Date: 2025    Disposition:  Home   Code status:  Full  Condition at Discharge: Stable  Primary Care Provider: Promise Taylor PA    Admitting Provider: Thomas Winchester MD  Discharge Provider: Jere Acharya DO     Discharge Diagnoses:      Active Hospital Problems    Diagnosis     Multiple vessel coronary artery disease [I25.10]     Abnormal stress test [R94.39]     Pre-syncope [R55]        Presenting Admission History:      64 y.o. female who presented to [facility] with lightheadedness.  PMHx significant for HFpEF, essential hypertension, obesity, hyperlipidemia.     Patient presents to ED due to symptoms of lightheadedness and almost passing out upon standing up.  Patient denies having any chest pain, palpitations or shortness of breath.  Patient also denies orthopnea.  Does report during the past week was seen by her cardiologist for lower extremity edema that was resistant to Lasix and her cardiologist changed to torsemide and increased the dose to 40 mg twice daily right lower extremity improved patient started having worsening lightheadedness and eventually today was bad enough that came to ED.  In the ED laboratory evaluation significant for JANIS, mild leukocytosis and a UA that could represent UTI.  Patient denies having any dysuria, hematuria, increased urinary frequency or suprapubic pain.  Chest x-ray nonacute.     Assessment/Plan:      Patient was initially admitted for presyncope and was found to be most likely due to overdiuresis which improved.  During this hospitalization cardiology had recommended obtaining stress test which was done and became positive.  Subsequently patient underwent left heart catheterization which showed multivessel disease therefore CT surgery was consulted.  CT surgery recommending CABG.  From

## 2025-05-22 NOTE — PLAN OF CARE
Problem: Pain  Goal: Verbalizes/displays adequate comfort level or baseline comfort level  Outcome: Progressing  Flowsheets (Taken 5/22/2025 0117)  Verbalizes/displays adequate comfort level or baseline comfort level:   Encourage patient to monitor pain and request assistance   Assess pain using appropriate pain scale   Administer analgesics based on type and severity of pain and evaluate response   Implement non-pharmacological measures as appropriate and evaluate response   Pt c/o knee pain 7/10. Voltaren gel and Tylenol given per MAR.

## 2025-05-22 NOTE — PROGRESS NOTES
Rosario BAILEY Cake    Age 64 y.o.    female    1960    MRN [id number]    5/30/2025  Arrival Time_____________  OR Time____________419 Min     Procedure(s):  CORONARY ARTERY BYPASS GRAFT WITH LEFT ATRIAL APPENDAGE CLIP PLACEMENT                      General   Surgeon(s):  Claudia Justice, MD      DAY ADMIT ___  SDS/OP ___  OUTPT IN BED ___        Phone [phone] (home)                  PCP _____________________ Phone_________________ Epic ( ) Epic CE ( ) Appt ________    NOTES: _________________________________________ Consult/Cardio _______________    ____________________________________________________________________________    ____________________________________________________________________________  PAT APPT DATE:________ TIME: ________  FAXED QAD: _______  (__) H&P w/ Hospitalist    (__) PAT orders in EPIC    (__) Meet with PAT nurse  __________________________________________________________________________  Preop Nurse phone screen complete: _____________  (__) CBC     (__) W/ DIFF ___________  (__) CT CHEST  __________   (__) Hgb A1C    ___________  (__) CHEST X RAY   __________  (__) LIPID PROFILE  ___________  (__) EKG   __________  (__) PT-INR / APTT  ___________  (__) PFT's   __________  (__) BMP   ___________  (__) CAROTIDS  __________  (__) CMP   ___________  (__) VEIN MAPPING  __________  (__) U/A   ___________  ( X ) HISTORY & PHYSICAL __________  (__) URINE C & S  ___________  (__) CARDIAC CLEARANCE __________  (__) U/A W/ FLEX  ___________  (__) PULM. CLEARANCE __________  (__) SERUM PREGNANCY ___________  (__) Preop Orders in EPIC __________  (__) TYPE & SCREEN __________repeat ( ) (__)  __________________ __________  (__) Albumin   ___________  (__)  __________________ __________  (__) TRANSFERRIN  ___________  (__)  __________________ __________  (__) LIVER PROFILE  ___________  (__) URINE PREG DOS __________  (__) MRSA NASAL SWAB ___________  (__) BLOOD SUGAR DOS __________  (__) SED

## 2025-05-22 NOTE — CARE COORDINATION
CASE MANAGEMENT DISCHARGE SUMMARY      Discharge to: Home    IMM given: 5/21/2025    Transportation:    Family/car    Confirmed discharge plan with:     Patient: yes     Family:  yes     RN, name: Kamille Dixon RN

## 2025-05-22 NOTE — PROGRESS NOTES
Surgery Date and Time:    5/30/25 @ 7:30 am      Arrival Time:   5:30 am        __X__Do not eat or drink anything after Midnight the night before the surgery. NO gum, mints, candy or ice chips the day of surgery.        Only take the following medications with a small sip of water the morning of surgery:   METOPROLOL      Hold the following medications (per anesthesia or surgeon request): Olmesartan - hold 48 hrs prior   Last Dose:  Tues 5/27/25        Ibuprofen, Advil, Naproxen, Vitamin E and other Anti-inflammatory products and supplements should be stopped       for 5-7days before surgery or as directed by your physician/surgeon.   Continue Aspirin til 5/29 am     - Do not smoke or vape, and do not drink any alcoholic beverages 24 hours prior to surgery, this includes NA Beer. Refrain from using     any recreational drugs, including non-prescribed prescription drugs.     -You may brush your teeth and gargle the morning of surgery.  DO NOT SWALLOW WATER.    -You MUST plan for a responsible adult to stay on site while you are here and take you home after your surgery. You will not be allowed                to leave alone or drive yourself home. It is requested someone stay with you the first 24 hrs. Your surgery will be cancelled if you do not                have a ride home with a responsible adult.    -Please wear simple, loose-fitting clothing to the hospital. Do not bring valuables (money, credit cards, checkbooks, etc.)                Do not wear any makeup (including no eye makeup) and no nail polish if applicable or requested to remove.             - DO NOT wear any jewelry or body piercings day of surgery.  All body piercing jewelry must be removed.             - If you have dentures they will be removed before going to the OR; we will provide a container.  If you wear contact lenses                or glasses they will be removed, bring a case for them or wear glasses day of surgery.              -If

## 2025-05-22 NOTE — PLAN OF CARE
Problem: Discharge Planning  Goal: Discharge to home or other facility with appropriate resources  Outcome: Progressing     Problem: Safety - Adult  Goal: Free from fall injury  Outcome: Progressing     Problem: Skin/Tissue Integrity - Adult  Goal: Skin integrity remains intact  Outcome: Progressing     Problem: Chronic Conditions and Co-morbidities  Goal: Patient's chronic conditions and co-morbidity symptoms are monitored and maintained or improved  Outcome: Progressing     Problem: Cardiovascular - Adult  Goal: Maintains optimal cardiac output and hemodynamic stability  Outcome: Progressing  Goal: Absence of cardiac dysrhythmias or at baseline  Outcome: Progressing     Problem: Pain  Goal: Verbalizes/displays adequate comfort level or baseline comfort level  5/22/2025 0935 by Kamille Callaway, RN  Outcome: Progressing  5/22/2025 0117 by Janeth Montes, RN  Outcome: Progressing  Flowsheets (Taken 5/22/2025 0117)  Verbalizes/displays adequate comfort level or baseline comfort level:   Encourage patient to monitor pain and request assistance   Assess pain using appropriate pain scale   Administer analgesics based on type and severity of pain and evaluate response   Implement non-pharmacological measures as appropriate and evaluate response

## 2025-05-22 NOTE — PROGRESS NOTES
[facility] Medicine Progress Note  V 5.17      Date of Admission: 5/14/2025    Hospital Day: 8      Chief Admission Complaint: Lightheadedness and dizziness    Subjective: She is sitting up in a chair in no acute distress  Denies any chest pain or shortness of breath.    Presenting Admission History:       64 y.o. female who presented to [facility] with lightheadedness.  PMHx significant for HFpEF, essential hypertension, obesity, hyperlipidemia.     Patient presents to ED due to symptoms of lightheadedness and almost passing out upon standing up.  Patient denies having any chest pain, palpitations or shortness of breath.  Patient also denies orthopnea.  Does report during the past week was seen by her cardiologist for lower extremity edema that was resistant to Lasix and her cardiologist changed to torsemide and increased the dose to 40 mg twice daily right lower extremity improved patient started having worsening lightheadedness and eventually today was bad enough that came to ED.  In the ED laboratory evaluation significant for JANIS, mild leukocytosis and a UA that could represent UTI.  Patient denies having any dysuria, hematuria, increased urinary frequency or suprapubic pain.  Chest x-ray nonacute.       Assessment/Plan:      Presyncope : Suspect secondary to overdiuresis and hypovolemia.  Her torsemide dose was changed to 40 mg twice daily symptoms started after that.  Will continue to monitor on telemetry.  Symptoms resolved after holding torsemide.  Echocardiogram was completed 5/8/2025 and reviewed.  Have been following on telemetry and has been unremarkable.     Acute renal failure : Creatinine has improved  and will follow.  Encourage oral intake.  Will avoid nephrotoxins and check renal functions in a.m.  Have been holding torsemide and olmesartan     CHFpEF : History is noted.  Echocardiogram reviewed and she has LV ejection of 60 %.  Resumed on oral furosemide at 40 mg a day and 
     [facility]   Heart Failure Daily Progress Note    Admit Date:  5/14/2025  HPI:    Chief Complaint   Patient presents with    near syncope     Pt to er [facility] near syncope at work. Pt felt faint but never passed out. Pt has had recent change to her torsemide rx, taking 40 mg twice a day for last 2 days        Rosario Riley is being followed for near syncope.  Treated for JANIS; recently started on torsemide 40mg BID and was only taken for 1 day.     Completed stress test- abnormal. 5/16/2025  S/p LHC 5/19/2025- 3 V CAD- CTS consulted    Subjective:  Ms. Riley denies any chest pain. No shortness of breath up in the chair     Objective:   BP (!) 158/69   Pulse (!) 103   Temp 97.7 °F (36.5 °C) (Oral)   Resp 16   Ht 1.702 m (5' 7\")   Wt 98.6 kg (217 lb 4.8 oz)   LMP 01/30/2009   SpO2 99%   BMI 34.03 kg/m²     Intake/Output Summary (Last 24 hours) at 5/20/2025 0748  Last data filed at 5/20/2025 0514  Gross per 24 hour   Intake 960 ml   Output 10 ml   Net 950 ml       NYHA: III    Physical Exam:  General:  Awake, alert, NAD  Skin:  Warm and dry  Neck:  JVD<8  Chest:  Clear to auscultation, no wheezes/rhonchi/rales  Cardiovascular:  RRR S1S2, no m/r/g   Abdomen:  Soft, nontender, +bowel sounds  Extremities:  trace  bilateral lower extremity edema, right radial artery without hematoma or bruising     Medications:    aspirin  81 mg Oral Daily    losartan  50 mg Oral Daily    [Held by provider] furosemide  40 mg Oral Daily    sodium chloride flush  5-40 mL IntraVENous 2 times per day    enoxaparin  30 mg SubCUTAneous BID    atorvastatin  80 mg Oral Daily    lidocaine  1 patch TransDERmal Daily      sodium chloride      sodium chloride      sodium chloride         Lab Data:  CBC:   Recent Labs     05/19/25  0645 05/20/25  0618   WBC 9.4 9.8   HGB 10.3* 10.9*    342     BMP:    Recent Labs     05/19/25  0645 05/20/25  0618   * 139   K 4.7 4.5   CO2 24 23   BUN 18 17   CREATININE 0.9 0.9 
     [facility]   Heart Failure Daily Progress Note    Admit Date:  5/14/2025  HPI:    Chief Complaint   Patient presents with    near syncope     [facility] Pt felt faint but never passed out. Pt has had recent change to her torsemide rx, taking 40 mg twice a day for last 2 days        Rosario Riley is being followed for near syncope.  Treated for JANIS; recently started on torsemide 40mg BID and was only taken for 1 day.     Completed stress test- abnormal.     Subjective:  Ms. Riley denies any dizziness or lightheadedness since admission.     Objective:   /79   Pulse (!) 124   Temp 97.6 °F (36.4 °C) (Oral)   Resp 16   Ht 1.702 m (5' 7\")   Wt 99.3 kg (219 lb)   LMP 01/30/2009   SpO2 98%   BMI 34.30 kg/m²     Intake/Output Summary (Last 24 hours) at 5/18/2025 0918  Last data filed at 5/17/2025 2225  Gross per 24 hour   Intake 1080 ml   Output 0 ml   Net 1080 ml       NYHA: III    Physical Exam:  General:  Awake, alert, NAD  Skin:  Warm and dry  Neck:  JVD<8  Chest:  Clear to auscultation, no wheezes/rhonchi/rales  Cardiovascular:  RRR S1S2, no m/r/g   Abdomen:  Soft, nontender, +bowel sounds  Extremities:  trace  bilateral lower extremity edema    Medications:    levoFLOXacin  500 mg Oral Q24H    aspirin  81 mg Oral Daily    losartan  50 mg Oral Daily    [Held by provider] furosemide  40 mg Oral Daily    sodium chloride flush  5-40 mL IntraVENous 2 times per day    enoxaparin  30 mg SubCUTAneous BID    atorvastatin  80 mg Oral Daily    lidocaine  1 patch TransDERmal Daily      sodium chloride         Lab Data:  CBC:   Recent Labs     05/17/25  0622   WBC 9.1   HGB 10.7*        BMP:    Recent Labs     05/16/25  0557 05/17/25  0622    136   K 4.4 4.3   CO2 24 23   BUN 21* 18   CREATININE 0.9 0.9     INR:  No results for input(s): \"INR\" in the last 72 hours.  BNP:  No results for input(s): \"PROBNP\" in the last 72 hours.  No results found for: \"LVEF\", 
   05/19/25 1527   Oxygen Therapy/Pulse Ox   O2 Therapy Room air   O2 Device None (Room air)   Blood Gas  Performed? Yes   $ABG $Arterial Puncture   Van's Test #1 Pos   Site #1 Left Radial   Site Prepped #1 Yes   Number of Attempts #1 1   Pressure Held #1 Yes   Complications #1 None   Post-procedure #1 Standard   Specimen Status #1 To lab   How Tolerated? Tolerated well       
  [facility] Medicine Progress Note  V 5.1      Date of Admission: 5/14/2025    Hospital Day: 3      Chief Admission Complaint: Lightheaded    Subjective: She is sitting up in her chair, feeling better.  She denies chest pain or shortness of breath.    Presenting Admission History:       64 y.o. female who presented to [facility] with lightheadedness.  PMHx significant for HFpEF, essential hypertension, obesity, hyperlipidemia.     Patient presents to ED due to symptoms of lightheadedness and almost passing out upon standing up.  Patient denies having any chest pain, palpitations or shortness of breath.  Patient also denies orthopnea.  Does report during the past week was seen by her cardiologist for lower extremity edema that was resistant to Lasix and her cardiologist changed to torsemide and increased the dose to 40 mg twice daily right lower extremity improved patient started having worsening lightheadedness and eventually today was bad enough that came to ED.  In the ED laboratory evaluation significant for JANIS, mild leukocytosis and a UA that could represent UTI.  Patient denies having any dysuria, hematuria, increased urinary frequency or suprapubic pain.  Chest x-ray nonacute.       Assessment/Plan:      Presyncope : Suspect secondary to overdiuresis and hypovolemia.  Her torsemide dose was changed to 40 mg twice daily symptoms started after that.  Will continue to monitor on telemetry.  Echocardiogram was completed 5/8/2025 and I have reviewed.  Have been following on telemetry and has been unremarkable.     Acute renal failure : Creatinine has improved over the past 24 hours and will follow.  Encourage oral intake.  Will avoid nephrotoxins and check renal functions in a.m.  Have been holding torsemide and olmesartan     CHFpEF : History is noted.  Echocardiogram reviewed and she has LV ejection of 60 %.  Will resume on oral furosemide at 40 mg a day and follow her response.  Her blood 
  [facility] Medicine Progress Note  V 5.1      Date of Admission: 5/14/2025    Hospital Day: 4      Chief Admission Complaint: Lightheaded    Subjective: Patient seen and examined at bedside patient states she is feeling fine denies any complaints at this time.      Presenting Admission History:       64 y.o. female who presented to [facility] with lightheadedness.  PMHx significant for HFpEF, essential hypertension, obesity, hyperlipidemia.     Patient presents to ED due to symptoms of lightheadedness and almost passing out upon standing up.  Patient denies having any chest pain, palpitations or shortness of breath.  Patient also denies orthopnea.  Does report during the past week was seen by her cardiologist for lower extremity edema that was resistant to Lasix and her cardiologist changed to torsemide and increased the dose to 40 mg twice daily right lower extremity improved patient started having worsening lightheadedness and eventually today was bad enough that came to ED.  In the ED laboratory evaluation significant for JANIS, mild leukocytosis and a UA that could represent UTI.  Patient denies having any dysuria, hematuria, increased urinary frequency or suprapubic pain.  Chest x-ray nonacute.       Assessment/Plan:      Presyncope : Suspect secondary to overdiuresis and hypovolemia.  Her torsemide dose was changed to 40 mg twice daily symptoms started after that.  Will continue to monitor on telemetry.  Echocardiogram was completed 5/8/2025 and I have reviewed.  Have been following on telemetry and has been unremarkable.  Consulted cardiology, obtained Stress test which was abnormal so planned for [facility] on Monday 5/19.     Acute renal failure : Creatinine has improved over the past 24 hours and will follow.  Encourage oral intake.  Will avoid nephrotoxins and check renal functions in a.m.  Have been holding torsemide and olmesartan     CHFpEF : History is noted.  Echocardiogram reviewed and she 
  [facility] Medicine Progress Note  V 5.1      Date of Admission: 5/14/2025    Hospital Day: 2      Chief Admission Complaint: Lightheaded    Subjective: She is up in chair, states she is feeling better.  She denies any chest pain or pain or shortness of breath at this time.  States her legs seem like they are starting to swell     Presenting Admission History:       64 y.o. female who presented to [facility] with lightheadedness.  PMHx significant for HFpEF, essential hypertension, obesity, hyperlipidemia.     Patient presents to ED due to symptoms of lightheadedness and almost passing out upon standing up.  Patient denies having any chest pain, palpitations or shortness of breath.  Patient also denies orthopnea.  Does report during the past week was seen by her cardiologist for lower extremity edema that was resistant to Lasix and her cardiologist changed to torsemide and increased the dose to 40 mg twice daily right lower extremity improved patient started having worsening lightheadedness and eventually today was bad enough that came to ED.  In the ED laboratory evaluation significant for JANIS, mild leukocytosis and a UA that could represent UTI.  Patient denies having any dysuria, hematuria, increased urinary frequency or suprapubic pain.  Chest x-ray nonacute.       Assessment/Plan:      Presyncope : Suspect secondary to overdiuresis and hypovolemia.  Her torsemide dose was changed to 40 mg twice daily symptoms started after that.  Will continue to monitor on telemetry.  Echocardiogram was completed 5/8/2025 and I have reviewed.  Have been following on telemetry and has been unremarkable.    Acute renal failure : Creatinine has improved over the past 24 hours and will follow.  Encourage oral intake.  Will avoid nephrotoxins and check renal functions in a.m.  Have been holding torsemide and olmesartan    CHFpEF : History is noted.  Echocardiogram reviewed and she has LV ejection of 60 %.  Will 
  Physician Progress Note      PATIENT:               CAIT COWART  CSN #:                  [id number]  :                       1960  ADMIT DATE:       2025 11:18 AM  DISCH DATE:  RESPONDING  PROVIDER #:        Thomas FONSECA MD          QUERY TEXT:    Internal Medicine,    The attending physician is required to clarify conflicting documentation in   the medical record.  Noted documentation of orthostatic hypotension by   Cardiology.    -recent increased dosage of Torsemide  -Internal Med documents syncope due to hypovolemia from over diuresis,   Cardiology documents syncope due to orthostatic hypotension-received IVF,   stress test, LHC, EKG    Thank you    The clinical indicators include:  Options provided:  -- Syncope due to orthostatic hypotension related to hypovolemia from over   diuresis  -- Other - I will add my own diagnosis  -- Disagree - Not applicable / Not valid  -- Disagree - Clinically unable to determine / Unknown  -- Refer to Clinical Documentation Reviewer    PROVIDER RESPONSE TEXT:    After study, the syncope due to orthostatic hypotension related to hypovolemia   from over diuresis    Query created by: Charu Mc on 2025 9:10 PM      Electronically signed by:  Thomas FONSECA MD 2025 5:35 PM          
 Latest Reference Range & Units 05/19/25 15:27   pH, Arterial 7.350 - 7.450  7.426   pCO2, Arterial 35.0 - 45.0 mmHg 36.4   pO2, Arterial 75.0 - 108.0 mmHg 92.5   HCO3, Arterial 21.0 - 29.0 mmol/L 23.4   TCO2 (calc), Art Not Established mmol/L 24.5   Base Excess, Arterial -3.0 - 3.0 mmol/L -0.6   O2 Sat, Arterial >92 % 97.8     
4 Eyes Skin Assessment and Patient belongings     The patient is being assess for  Admission    I agree that 2 Nurses have performed a thorough Head to Toe Skin Assessment on the patient. ALL assessment sites listed below have been assessed.       Areas assessed by both nurses: MOMO, RN and IL, RN  [x]   Head, Face, and Ears   [x]   Shoulders, Back, and Chest  [x]   Arms, Elbows, and Hands   [x]   Coccyx, Sacrum, and IschIum  [x]   Legs, Feet, and Heels        Does the Patient have Skin Breakdown?  No         Jamar Prevention initiated:  No   Wound Care Orders initiated:  No      [facility] nurse consulted for Pressure Injury (Stage 3,4, Unstageable, DTI, NWPT, and Complex wounds), New and Established Ostomies:  No      I agree that 2 Nurses have reviewed patient belongings with the patient/family and documented in the flowsheet upon admission or transfer to the unit.     Belongings  Dental Appliances: None  Vision - Corrective Lenses: Eyeglasses  Hearing Aid: None  Clothing: Pants, Shirt, Socks, Undergarments  Jewelry: None  Electronic Devices: Cell Phone  Weapons (Notify Protective Services/Security): None  Home Medications: None  Valuables Given To: Patient  Provide Name(s) of Who Valuable(s) Were Given To: pt       Nurse 1 eSignature: Electronically signed by Rosario Urbina RN on 5/15/25 at 10:18 AM EDT    **SHARE this note so that the co-signing nurse is able to place an eSignature**    Nurse 2 eSignature: Electronically signed by Samira Madrigal RN on 5/15/25 at 10:26 AM EDT   
Assisted pt to restroom. Pt ambulated well.  
Bedside spirometry completed at this time. Patient had great effort and results will be placed in patients physical chart.   
Patient discharged. IV removed, telemetry box and leads removed and returned. Lockbox emptied. All belongings gathered and returned to patient. Discharge instructions reviewed with patient, all questions answered by RN. Medications picked up from outpatient pharmacy / prescriptions sent with patient.  No further needs.     
Report given to patients nurse Elizabeth FAY. Pt transferred to room 365. CMU called and monitor is on and verified.  
Exam Performed:      General appearance: She is sitting up in a chair, states she is feeling better in no acute distress is alert and cooperative   Respiratory: Bilateral breath sounds, decreased both bases, no rales or rhonchi noted on auscultation, normal respiratory effort.   Cardiovascular: S1, S2 regular rate and rhythm.  Abdomen:  Soft, non-tender, non-distended.+ Bowel sounds  Musculoskeletal: Trace bilateral lower extreme edema  Neurologic:  Non-focal  Psychiatric:  Alert and oriented x 3       BP (!) 153/64   Pulse 69   Temp 97.5 °F (36.4 °C) (Oral)   Resp 16   Ht 1.702 m (5' 7\")   Wt 99.3 kg (219 lb)   LMP 01/30/2009   SpO2 99%   BMI 34.30 kg/m²     Telemetry:      Personally reviewed and interpreted telemetry (Rhythm Strip) on 5/18/2025.  Patient is currently ON tele demonstrating NSR w/ controlled rate.    Diet: ADULT DIET; Regular    DVT Prophylaxis: PPX dose LMWH    Code status: Full Code    PT/OT Eval Status: Not yet ordered    Multi-Disciplinary Rounds with Case Management completed on 5/18/2025 with the following recs:     Anticipated Discharge Location: Home     Anticipated Discharge Day/Date:  1-2 days     Barriers to Discharge: need for cardiac cath    Likely rate limiting factor: need for cardiac cath    --------------------------------------------------    MDM (any 2 required for High level billing)    A. Problems (any 1)  [x] Acute/Chronic Illness/injury posing ongoing threat to life and/or bodily function without ongoing treatment    [] Severe exacerbation of chronic illness    --------------------------------------------------  B. Risk of Treatment (any 1)    [x] Drugs/treatments that require intensive monitoring for toxicity    [] IV ABX (Vancomycin, Aminoglycosides, etc)     [] Post-Cath/Contrast study requiring serial monitoring    [] IV Narcotic analgesia    [] Aggressive IV diuresis    [] Hypertonic Saline    [] Critical electrolyte abnormalities requiring IV replacement    [] 
\"INR\" in the last 72 hours.    XR CHEST PORTABLE 05/14/2025     Narrative  Clinical History: Near syncope.     Comparisons: 7/18/2024     Findings:     Single projection timed at 1134 hours. Transverse diameter of heart within  normal limits.  Clear lungs.     Impression  1. Negative portable chest.        Electronically signed by Bharat Donald     ECHO/GASPER: 5/8/25     Interpretation Summary  Show Result Comparison     Image quality is adequate.    Left Ventricle: Normal left ventricular systolic function with a visually estimated EF of 60 - 65%. Left ventricle size is normal. Normal wall thickness. Normal wall motion. Indeterminate diastolic function.    Right Ventricle: Right ventricle size is normal. Normal systolic function.    Aortic Valve: Sclerosis of the aortic valve cusps.    Mitral Valve: There is moderate annular calcification noted.    Left Atrium: Left atrium is moderately dilated.        Echo Findings     Left Ventricle Normal left ventricular systolic function with a visually estimated EF of 60 - 65%. Left ventricle size is normal. Normal wall thickness. Normal wall motion. Indeterminate diastolic function.   Left Atrium Left atrium is moderately dilated.   Interatrial Septum No interatrial shunt visualized with color Doppler.   Right Ventricle Right ventricle size is normal. Normal systolic function.   Right Atrium Right atrium size is normal.   Aortic Valve Sclerosis of the aortic valve cusps. No restricted motion. No regurgitation. No stenosis.   Mitral Valve There is moderate annular calcification noted. Trace regurgitation. No stenosis noted.   Tricuspid Valve Valve structure is normal. Trace regurgitation. No stenosis noted. Unable to assess RVSP due to inadequate or insignificant tricuspid regurgitation.   Pulmonic Valve The pulmonic valve was not well visualized. Trace regurgitation. No stenosis noted.   Aorta Normal sized aortic root. Ascending aorta is not well visualized.   IVC/Hepatic Veins 
sodium chloride flush  5-40 mL IntraVENous 2 times per day    enoxaparin  30 mg SubCUTAneous BID    atorvastatin  80 mg Oral Daily    lidocaine  1 patch TransDERmal Daily     PRN Meds: sodium chloride flush, sodium chloride, sodium chloride flush, sodium chloride, ondansetron, LORazepam, diclofenac sodium, sodium chloride flush, sodium chloride, ondansetron **OR** ondansetron, polyethylene glycol, acetaminophen **OR** acetaminophen      Physical Exam Performed:      General appearance: She is sitting up in bed,  she is in no acute distress is alert and is cooperative   Respiratory: Bilateral breath sounds, decreased both bases, no rales or rhonchi noted on auscultation, normal respiratory effort.   Cardiovascular: S1, S2 regular rate and rhythm.  Abdomen:  Soft, non-tender, non-distended.+ Bowel sounds  Musculoskeletal: Trace bilateral lower extreme edema  Neurologic:  Non-focal  Psychiatric:  Alert and oriented x 3       BP (!) 154/66   Pulse 81   Temp 97.5 °F (36.4 °C) (Oral)   Resp 16   Ht 1.702 m (5' 7\")   Wt 99.9 kg (220 lb 3.2 oz)   LMP 01/30/2009   SpO2 98%   BMI 34.49 kg/m²     Telemetry:      Personally reviewed and interpreted telemetry (Rhythm Strip) on 5/19/2025.  Patient is currently ON tele demonstrating NSR w/ controlled rate.    Diet: Diet NPO Exceptions are: Sips of Water with Meds    DVT Prophylaxis: PPX dose LMWH    Code status: Full Code    PT/OT Eval Status: Not yet ordered    Multi-Disciplinary Rounds with Case Management completed on 5/19/2025 with the following recs:     Anticipated Discharge Location: Home     Anticipated Discharge Day/Date:  1-2 days     Barriers to Discharge: Clinical Course and Subspecialty recs    Likely rate limiting factor: To go for cardiac catheterization    --------------------------------------------------    MDM (any 2 required for High level billing)    A. Problems (any 1)  [x] Acute/Chronic Illness/injury posing ongoing threat to life and/or bodily 
input(s): \"AST\", \"ALT\", \"BILIDIR\", \"BILITOT\", \"ALKPHOS\" in the last 72 hours.  No results for input(s): \"INR\", \"LACTA\", \"TSH\" in the last 72 hours.    Urine Cultures:   Lab Results   Component Value Date/Time    LABURIN  05/14/2025 03:58 PM     >50,000 CFU/ml mixed skin/urogenital barbara. No further workup     Blood Cultures: No results found for: \"BC\"  No results found for: \"BLOODCULT2\"  Organism:   Lab Results   Component Value Date/Time    ORG Escherichia coli 05/05/2025 03:14 PM         DENISE FONSECA MD   
the aortic valve.  2. Extensive coronary artery calcification.  3. Indeterminate pulmonary nodule right lower lobe which appears to been as on previous abdominal CT from 2010 and is presumably benign as a result.  4. No acute thoracic abnormality otherwise.    Electronically signed by Isaac Garrido MD      [facility]:  5/19/2025     Procedure Findings:  Three vessel calcified disease  ~90% proximal LAD  ~90% OM2  ~90% mid/distal RCA  Normal left ventricular function with ejection fraction of 65%  Normal left heart catheterization  Normal right heart cath     Conclusion/Recommendations:  CABG referral     Signed by: Nakita Wolfe MD on 5/19/2025 11:37 AM     Assess/Plan:     Labs, imaging and notes reviewed  Care per primary     [id number] Score for Atrial Fibrillation Stroke Risk    Risk   Factors   Component Value   C CHF No 0   H HTN Yes 1   A2 Age >= 75 No,  (64 y.o.) 0   D DM No 0   S2 Prior Stroke/TIA No 0   V Vascular Disease No 0   A Age 65-74 Yes,  (64 y.o.) 1   Sc Sex female 1     [id number]  Score   3   Score last updated 5/19/25 1:47 PM EDT     Click here for a link to the UpToDate guideline \"Atrial Fibrillation: Anticoagulation therapy to prevent embolization     Disclaimer: Risk Score calculation is dependent on accuracy of patient problem list and past encounter diagnosis.        STS Cardiac Surgery Risk profile:     Calculated w/o updated carotids         Procedure Type: Isolated CABG   PERIOPERATIVE OUTCOME ESTIMATE %   Operative Mortality 0.988%   Morbidity & Mortality 6.48%   Stroke 0.44%   Renal Failure 1.13%   Reoperation 1.53%   Prolonged Ventilation 3.62%   Deep Sternal Wound Infection 0.68%   Long Hospital Stay (>14 days) 5.95%   Short Hospital Stay (<6 days)* 37.2%               Clinical Summary         Planned Surgery: Isolated CABG, Urgent, First cardiovascular surgery   Demographics: 64 year old, White, female, 99.9kg, 170.2cm, BMI: 34.5 kg/m²   Insurance/Payor: Medicaid   Lab Values:

## 2025-05-23 ENCOUNTER — TELEPHONE (OUTPATIENT)
Dept: FAMILY MEDICINE CLINIC | Age: 65
End: 2025-05-23

## 2025-05-23 LAB — BACTERIA UR CULT: NORMAL

## 2025-05-29 ENCOUNTER — TELEPHONE (OUTPATIENT)
Dept: CARDIOLOGY CLINIC | Age: 65
End: 2025-05-29

## 2025-05-29 NOTE — TELEPHONE ENCOUNTER
Pt's daughter called worried that her mother's procedure scheduled for tomorrow. She wants to know if this is the proper procedure for her to have and [facility] or [facility].

## 2025-05-30 ENCOUNTER — ANESTHESIA EVENT (OUTPATIENT)
Dept: OPERATING ROOM | Age: 65
End: 2025-05-30
Payer: COMMERCIAL

## 2025-05-30 ENCOUNTER — HOSPITAL ENCOUNTER (INPATIENT)
Age: 65
LOS: 6 days | Discharge: HOME | DRG: 236 | End: 2025-06-05
Attending: THORACIC SURGERY (CARDIOTHORACIC VASCULAR SURGERY) | Admitting: THORACIC SURGERY (CARDIOTHORACIC VASCULAR SURGERY)
Payer: COMMERCIAL

## 2025-05-30 ENCOUNTER — APPOINTMENT (OUTPATIENT)
Dept: GENERAL RADIOLOGY | Age: 65
DRG: 236 | End: 2025-05-30
Attending: THORACIC SURGERY (CARDIOTHORACIC VASCULAR SURGERY)
Payer: COMMERCIAL

## 2025-05-30 ENCOUNTER — ANESTHESIA (OUTPATIENT)
Dept: OPERATING ROOM | Age: 65
End: 2025-05-30
Payer: COMMERCIAL

## 2025-05-30 DIAGNOSIS — I25.10 CORONARY ARTERY DISEASE: ICD-10-CM

## 2025-05-30 DIAGNOSIS — I25.10 MULTIPLE VESSEL CORONARY ARTERY DISEASE: Primary | ICD-10-CM

## 2025-05-30 LAB
ABO/RH: NORMAL
ACTIVATED CLOTTING TIME: 404 SEC (ref 99–130)
ACTIVATED CLOTTING TIME: 424 SEC (ref 99–130)
ACTIVATED CLOTTING TIME: 428 SEC (ref 99–130)
ACTIVATED CLOTTING TIME: 432 SEC (ref 99–130)
ACTIVATED CLOTTING TIME: 443 SEC (ref 99–130)
ACTIVATED CLOTTING TIME: 91 SEC (ref 99–130)
ACTIVATED CLOTTING TIME: 99 SEC (ref 99–130)
ALBUMIN SERPL-MCNC: 3.2 G/DL (ref 3.4–5)
ALBUMIN/GLOB SERPL: 1.9 {RATIO} (ref 1.1–2.2)
ALP SERPL-CCNC: 67 U/L (ref 40–129)
ALT SERPL-CCNC: 14 U/L (ref 10–40)
ANION GAP SERPL CALCULATED.3IONS-SCNC: 7 MMOL/L (ref 3–16)
ANTIBODY SCREEN: NORMAL
APTT BLD: 28.3 SEC (ref 22.1–36.4)
AST SERPL-CCNC: 27 U/L (ref 15–37)
BASE EXCESS BLDA CALC-SCNC: -1 MMOL/L (ref -3–3)
BASE EXCESS BLDA CALC-SCNC: -2 MMOL/L (ref -3–3)
BASE EXCESS BLDA CALC-SCNC: -3 MMOL/L (ref -3–3)
BASE EXCESS BLDA CALC-SCNC: -4 MMOL/L (ref -3–3)
BASE EXCESS BLDA CALC-SCNC: -4 MMOL/L (ref -3–3)
BASE EXCESS BLDA CALC-SCNC: 0 MMOL/L (ref -3–3)
BASE EXCESS BLDA CALC-SCNC: 0 MMOL/L (ref -3–3)
BASOPHILS # BLD: 0.1 K/UL (ref 0–0.2)
BASOPHILS NFR BLD: 0.4 %
BILIRUB SERPL-MCNC: 0.4 MG/DL (ref 0–1)
BUN SERPL-MCNC: 19 MG/DL (ref 7–20)
CA-I BLD-SCNC: 1.09 MMOL/L (ref 1.12–1.32)
CA-I BLD-SCNC: 1.1 MMOL/L (ref 1.12–1.32)
CA-I BLD-SCNC: 1.12 MMOL/L (ref 1.12–1.32)
CA-I BLD-SCNC: 1.16 MMOL/L (ref 1.12–1.32)
CA-I BLD-SCNC: 1.21 MMOL/L (ref 1.12–1.32)
CA-I BLD-SCNC: 1.21 MMOL/L (ref 1.12–1.32)
CA-I BLD-SCNC: 1.25 MMOL/L (ref 1.12–1.32)
CA-I BLD-SCNC: 1.26 MMOL/L (ref 1.12–1.32)
CA-I BLD-SCNC: 1.29 MMOL/L (ref 1.12–1.32)
CA-I BLD-SCNC: 1.32 MMOL/L (ref 1.12–1.32)
CALCIUM SERPL-MCNC: 8.2 MG/DL (ref 8.3–10.6)
CHLORIDE SERPL-SCNC: 112 MMOL/L (ref 99–110)
CO2 BLDA-SCNC: 24 MMOL/L
CO2 BLDA-SCNC: 25 MMOL/L
CO2 BLDA-SCNC: 26 MMOL/L
CO2 BLDA-SCNC: 27 MMOL/L
CO2 SERPL-SCNC: 22 MMOL/L (ref 21–32)
CREAT SERPL-MCNC: 0.8 MG/DL (ref 0.6–1.2)
DEPRECATED RDW RBC AUTO: 13.4 % (ref 12.4–15.4)
EOSINOPHIL # BLD: 0.3 K/UL (ref 0–0.6)
EOSINOPHIL NFR BLD: 2.1 %
FIBRINOGEN PPP-MCNC: 296 MG/DL (ref 227–534)
GFR SERPLBLD CREATININE-BSD FMLA CKD-EPI: 81 ML/MIN/{1.73_M2}
GLUCOSE BLD-MCNC: 107 MG/DL (ref 70–99)
GLUCOSE BLD-MCNC: 116 MG/DL (ref 70–99)
GLUCOSE BLD-MCNC: 122 MG/DL (ref 70–99)
GLUCOSE BLD-MCNC: 124 MG/DL (ref 70–99)
GLUCOSE BLD-MCNC: 125 MG/DL (ref 70–99)
GLUCOSE BLD-MCNC: 125 MG/DL (ref 70–99)
GLUCOSE BLD-MCNC: 127 MG/DL (ref 70–99)
GLUCOSE BLD-MCNC: 127 MG/DL (ref 70–99)
GLUCOSE BLD-MCNC: 129 MG/DL (ref 70–99)
GLUCOSE BLD-MCNC: 133 MG/DL (ref 70–99)
GLUCOSE BLD-MCNC: 138 MG/DL (ref 70–99)
GLUCOSE BLD-MCNC: 148 MG/DL (ref 70–99)
GLUCOSE BLD-MCNC: 152 MG/DL (ref 70–99)
GLUCOSE BLD-MCNC: 167 MG/DL (ref 70–99)
GLUCOSE BLD-MCNC: 184 MG/DL (ref 70–99)
GLUCOSE BLD-MCNC: 198 MG/DL (ref 70–99)
GLUCOSE SERPL-MCNC: 128 MG/DL (ref 70–99)
HCO3 BLDA-SCNC: 22.4 MMOL/L (ref 21–29)
HCO3 BLDA-SCNC: 22.6 MMOL/L (ref 21–29)
HCO3 BLDA-SCNC: 22.6 MMOL/L (ref 21–29)
HCO3 BLDA-SCNC: 22.7 MMOL/L (ref 21–29)
HCO3 BLDA-SCNC: 23.3 MMOL/L (ref 21–29)
HCO3 BLDA-SCNC: 23.6 MMOL/L (ref 21–29)
HCO3 BLDA-SCNC: 23.7 MMOL/L (ref 21–29)
HCO3 BLDA-SCNC: 24.1 MMOL/L (ref 21–29)
HCO3 BLDA-SCNC: 24.1 MMOL/L (ref 21–29)
HCO3 BLDA-SCNC: 24.2 MMOL/L (ref 21–29)
HCO3 BLDA-SCNC: 24.8 MMOL/L (ref 21–29)
HCO3 BLDA-SCNC: 25.9 MMOL/L (ref 21–29)
HCT VFR BLD AUTO: 21 % (ref 36–48)
HCT VFR BLD AUTO: 22 % (ref 36–48)
HCT VFR BLD AUTO: 22 % (ref 36–48)
HCT VFR BLD AUTO: 23 % (ref 36–48)
HCT VFR BLD AUTO: 23 % (ref 36–48)
HCT VFR BLD AUTO: 25 % (ref 36–48)
HCT VFR BLD AUTO: 27 % (ref 36–48)
HCT VFR BLD AUTO: 27 % (ref 36–48)
HCT VFR BLD AUTO: 28 % (ref 36–48)
HCT VFR BLD AUTO: 28.4 % (ref 36–48)
HCT VFR BLD AUTO: 33 % (ref 36–48)
HGB BLD CALC-MCNC: 11.1 GM/DL (ref 12–16)
HGB BLD CALC-MCNC: 11.1 GM/DL (ref 12–16)
HGB BLD CALC-MCNC: 11.3 GM/DL (ref 12–16)
HGB BLD CALC-MCNC: 11.4 GM/DL (ref 12–16)
HGB BLD CALC-MCNC: 7 GM/DL (ref 12–16)
HGB BLD CALC-MCNC: 7.4 GM/DL (ref 12–16)
HGB BLD CALC-MCNC: 7.5 GM/DL (ref 12–16)
HGB BLD CALC-MCNC: 7.7 GM/DL (ref 12–16)
HGB BLD CALC-MCNC: 7.8 GM/DL (ref 12–16)
HGB BLD CALC-MCNC: 8.4 GM/DL (ref 12–16)
HGB BLD CALC-MCNC: 9.2 GM/DL (ref 12–16)
HGB BLD CALC-MCNC: 9.3 GM/DL (ref 12–16)
HGB BLD CALC-MCNC: 9.4 GM/DL (ref 12–16)
HGB BLD-MCNC: 9.5 G/DL (ref 12–16)
INR PPP: 1.29 (ref 0.85–1.15)
LACTATE BLD-SCNC: 0.93 MMOL/L (ref 0.4–2)
LACTATE BLD-SCNC: 0.96 MMOL/L (ref 0.4–2)
LACTATE BLD-SCNC: 1.03 MMOL/L (ref 0.4–2)
LACTATE BLD-SCNC: 1.41 MMOL/L (ref 0.4–2)
LACTATE BLD-SCNC: 1.61 MMOL/L (ref 0.4–2)
LACTATE BLD-SCNC: 1.64 MMOL/L (ref 0.4–2)
LYMPHOCYTES # BLD: 1.5 K/UL (ref 1–5.1)
LYMPHOCYTES NFR BLD: 10.7 %
MAGNESIUM SERPL-MCNC: 3.52 MG/DL (ref 1.8–2.4)
MCH RBC QN AUTO: 30.9 PG (ref 26–34)
MCHC RBC AUTO-ENTMCNC: 33.5 G/DL (ref 31–36)
MCV RBC AUTO: 92.2 FL (ref 80–100)
MONOCYTES # BLD: 1 K/UL (ref 0–1.3)
MONOCYTES NFR BLD: 7.1 %
NEUTROPHILS # BLD: 11 K/UL (ref 1.7–7.7)
NEUTROPHILS NFR BLD: 79.7 %
PCO2 BLDA: 35.6 MM HG (ref 35–45)
PCO2 BLDA: 36.5 MM HG (ref 35–45)
PCO2 BLDA: 37.1 MM HG (ref 35–45)
PCO2 BLDA: 37.4 MM HG (ref 35–45)
PCO2 BLDA: 43.2 MM HG (ref 35–45)
PCO2 BLDA: 43.3 MM HG (ref 35–45)
PCO2 BLDA: 43.9 MM HG (ref 35–45)
PCO2 BLDA: 45.2 MM HG (ref 35–45)
PCO2 BLDA: 45.4 MM HG (ref 35–45)
PCO2 BLDA: 45.8 MM HG (ref 35–45)
PCO2 BLDA: 46.6 MM HG (ref 35–45)
PCO2 BLDA: 47.6 MM HG (ref 35–45)
PERFORMED ON: ABNORMAL
PH BLDA: 7.31 [PH] (ref 7.35–7.45)
PH BLDA: 7.32 [PH] (ref 7.35–7.45)
PH BLDA: 7.33 [PH] (ref 7.35–7.45)
PH BLDA: 7.34 [PH] (ref 7.35–7.45)
PH BLDA: 7.35 [PH] (ref 7.35–7.45)
PH BLDA: 7.4 [PH] (ref 7.35–7.45)
PH BLDA: 7.4 [PH] (ref 7.35–7.45)
PH BLDA: 7.42 [PH] (ref 7.35–7.45)
PH BLDA: 7.43 [PH] (ref 7.35–7.45)
PLATELET # BLD AUTO: 196 K/UL (ref 135–450)
PMV BLD AUTO: 7.7 FL (ref 5–10.5)
PO2 BLDA: 105.7 MM HG (ref 75–108)
PO2 BLDA: 116.9 MM HG (ref 75–108)
PO2 BLDA: 117.6 MM HG (ref 75–108)
PO2 BLDA: 117.7 MM HG (ref 75–108)
PO2 BLDA: 139.4 MM HG (ref 75–108)
PO2 BLDA: 141 MM HG (ref 75–108)
PO2 BLDA: 263.2 MM HG (ref 75–108)
PO2 BLDA: 333 MM HG (ref 75–108)
PO2 BLDA: 334 MM HG (ref 75–108)
PO2 BLDA: 426.4 MM HG (ref 75–108)
PO2 BLDA: 493.9 MM HG (ref 75–108)
PO2 BLDA: 538.6 MM HG (ref 75–108)
POC SAMPLE TYPE: ABNORMAL
POTASSIUM BLD-SCNC: 4.2 MMOL/L (ref 3.5–5.1)
POTASSIUM BLD-SCNC: 4.4 MMOL/L (ref 3.5–5.1)
POTASSIUM BLD-SCNC: 4.5 MMOL/L (ref 3.5–5.1)
POTASSIUM BLD-SCNC: 4.6 MMOL/L (ref 3.5–5.1)
POTASSIUM BLD-SCNC: 4.6 MMOL/L (ref 3.5–5.1)
POTASSIUM BLD-SCNC: 4.7 MMOL/L (ref 3.5–5.1)
POTASSIUM BLD-SCNC: 5.2 MMOL/L (ref 3.5–5.1)
POTASSIUM BLD-SCNC: 5.6 MMOL/L (ref 3.5–5.1)
POTASSIUM BLD-SCNC: 5.6 MMOL/L (ref 3.5–5.1)
POTASSIUM BLD-SCNC: 5.7 MMOL/L (ref 3.5–5.1)
POTASSIUM SERPL-SCNC: 4.7 MMOL/L (ref 3.5–5.1)
PROT SERPL-MCNC: 4.9 G/DL (ref 6.4–8.2)
PROTHROMBIN TIME: 16.2 SEC (ref 11.9–14.9)
RBC # BLD AUTO: 3.08 M/UL (ref 4–5.2)
SAO2 % BLDA: 100 % (ref 93–100)
SAO2 % BLDA: 98 % (ref 93–100)
SAO2 % BLDA: 99 % (ref 93–100)
SAO2 % BLDA: 99 % (ref 93–100)
SODIUM BLD-SCNC: 137 MMOL/L (ref 136–145)
SODIUM BLD-SCNC: 139 MMOL/L (ref 136–145)
SODIUM BLD-SCNC: 140 MMOL/L (ref 136–145)
SODIUM BLD-SCNC: 140 MMOL/L (ref 136–145)
SODIUM BLD-SCNC: 141 MMOL/L (ref 136–145)
SODIUM BLD-SCNC: 144 MMOL/L (ref 136–145)
SODIUM BLD-SCNC: 145 MMOL/L (ref 136–145)
SODIUM BLD-SCNC: 146 MMOL/L (ref 136–145)
SODIUM BLD-SCNC: 147 MMOL/L (ref 136–145)
SODIUM BLD-SCNC: 149 MMOL/L (ref 136–145)
SODIUM SERPL-SCNC: 141 MMOL/L (ref 136–145)
WBC # BLD AUTO: 13.9 K/UL (ref 4–11)

## 2025-05-30 PROCEDURE — 94761 N-INVAS EAR/PLS OXIMETRY MLT: CPT

## 2025-05-30 PROCEDURE — 2580000003 HC RX 258

## 2025-05-30 PROCEDURE — B246ZZ4 ULTRASONOGRAPHY OF RIGHT AND LEFT HEART, TRANSESOPHAGEAL: ICD-10-PCS | Performed by: THORACIC SURGERY (CARDIOTHORACIC VASCULAR SURGERY)

## 2025-05-30 PROCEDURE — 86850 RBC ANTIBODY SCREEN: CPT

## 2025-05-30 PROCEDURE — 86900 BLOOD TYPING SEROLOGIC ABO: CPT

## 2025-05-30 PROCEDURE — 30233N1 TRANSFUSION OF NONAUTOLOGOUS RED BLOOD CELLS INTO PERIPHERAL VEIN, PERCUTANEOUS APPROACH: ICD-10-PCS | Performed by: THORACIC SURGERY (CARDIOTHORACIC VASCULAR SURGERY)

## 2025-05-30 PROCEDURE — 82803 BLOOD GASES ANY COMBINATION: CPT

## 2025-05-30 PROCEDURE — 6360000002 HC RX W HCPCS: Performed by: THORACIC SURGERY (CARDIOTHORACIC VASCULAR SURGERY)

## 2025-05-30 PROCEDURE — 2500000003 HC RX 250 WO HCPCS: Performed by: THORACIC SURGERY (CARDIOTHORACIC VASCULAR SURGERY)

## 2025-05-30 PROCEDURE — 94640 AIRWAY INHALATION TREATMENT: CPT

## 2025-05-30 PROCEDURE — 2500000003 HC RX 250 WO HCPCS: Performed by: ANESTHESIOLOGY

## 2025-05-30 PROCEDURE — C1889 IMPLANT/INSERT DEVICE, NOC: HCPCS | Performed by: THORACIC SURGERY (CARDIOTHORACIC VASCULAR SURGERY)

## 2025-05-30 PROCEDURE — 6360000002 HC RX W HCPCS: Performed by: ANESTHESIOLOGY

## 2025-05-30 PROCEDURE — 3600000008 HC SURGERY OHS BASE: Performed by: THORACIC SURGERY (CARDIOTHORACIC VASCULAR SURGERY)

## 2025-05-30 PROCEDURE — 02L70CK OCCLUSION OF LEFT ATRIAL APPENDAGE WITH EXTRALUMINAL DEVICE, OPEN APPROACH: ICD-10-PCS | Performed by: THORACIC SURGERY (CARDIOTHORACIC VASCULAR SURGERY)

## 2025-05-30 PROCEDURE — 6370000000 HC RX 637 (ALT 250 FOR IP): Performed by: THORACIC SURGERY (CARDIOTHORACIC VASCULAR SURGERY)

## 2025-05-30 PROCEDURE — 3700000001 HC ADD 15 MINUTES (ANESTHESIA): Performed by: THORACIC SURGERY (CARDIOTHORACIC VASCULAR SURGERY)

## 2025-05-30 PROCEDURE — 2700000000 HC OXYGEN THERAPY PER DAY

## 2025-05-30 PROCEDURE — 0WP9X0Z REMOVAL OF DRAINAGE DEVICE FROM RIGHT PLEURAL CAVITY, EXTERNAL APPROACH: ICD-10-PCS | Performed by: THORACIC SURGERY (CARDIOTHORACIC VASCULAR SURGERY)

## 2025-05-30 PROCEDURE — 6370000000 HC RX 637 (ALT 250 FOR IP): Performed by: ANESTHESIOLOGY

## 2025-05-30 PROCEDURE — 76984 DX INTRAOP THORACIC AORTA US: CPT | Performed by: THORACIC SURGERY (CARDIOTHORACIC VASCULAR SURGERY)

## 2025-05-30 PROCEDURE — 0210099 BYPASS CORONARY ARTERY, ONE ARTERY FROM LEFT INTERNAL MAMMARY WITH AUTOLOGOUS VENOUS TISSUE, OPEN APPROACH: ICD-10-PCS | Performed by: THORACIC SURGERY (CARDIOTHORACIC VASCULAR SURGERY)

## 2025-05-30 PROCEDURE — 2580000003 HC RX 258: Performed by: ANESTHESIOLOGY

## 2025-05-30 PROCEDURE — 3700000000 HC ANESTHESIA ATTENDED CARE: Performed by: THORACIC SURGERY (CARDIOTHORACIC VASCULAR SURGERY)

## 2025-05-30 PROCEDURE — 85347 COAGULATION TIME ACTIVATED: CPT

## 2025-05-30 PROCEDURE — 6370000000 HC RX 637 (ALT 250 FOR IP)

## 2025-05-30 PROCEDURE — 33517 CABG ARTERY-VEIN SINGLE: CPT | Performed by: THORACIC SURGERY (CARDIOTHORACIC VASCULAR SURGERY)

## 2025-05-30 PROCEDURE — 84132 ASSAY OF SERUM POTASSIUM: CPT

## 2025-05-30 PROCEDURE — 85610 PROTHROMBIN TIME: CPT

## 2025-05-30 PROCEDURE — 86901 BLOOD TYPING SEROLOGIC RH(D): CPT

## 2025-05-30 PROCEDURE — 94002 VENT MGMT INPAT INIT DAY: CPT

## 2025-05-30 PROCEDURE — C1713 ANCHOR/SCREW BN/BN,TIS/BN: HCPCS | Performed by: THORACIC SURGERY (CARDIOTHORACIC VASCULAR SURGERY)

## 2025-05-30 PROCEDURE — 85730 THROMBOPLASTIN TIME PARTIAL: CPT

## 2025-05-30 PROCEDURE — 80053 COMPREHEN METABOLIC PANEL: CPT

## 2025-05-30 PROCEDURE — P9016 RBC LEUKOCYTES REDUCED: HCPCS

## 2025-05-30 PROCEDURE — 33572 OPEN CORONARY ENDARTERECTOMY: CPT | Performed by: THORACIC SURGERY (CARDIOTHORACIC VASCULAR SURGERY)

## 2025-05-30 PROCEDURE — 0210093 BYPASS CORONARY ARTERY, ONE ARTERY FROM CORONARY ARTERY WITH AUTOLOGOUS VENOUS TISSUE, OPEN APPROACH: ICD-10-PCS | Performed by: THORACIC SURGERY (CARDIOTHORACIC VASCULAR SURGERY)

## 2025-05-30 PROCEDURE — 2720000010 HC SURG SUPPLY STERILE: Performed by: THORACIC SURGERY (CARDIOTHORACIC VASCULAR SURGERY)

## 2025-05-30 PROCEDURE — C1729 CATH, DRAINAGE: HCPCS | Performed by: THORACIC SURGERY (CARDIOTHORACIC VASCULAR SURGERY)

## 2025-05-30 PROCEDURE — 0WCD3ZZ EXTIRPATION OF MATTER FROM PERICARDIAL CAVITY, PERCUTANEOUS APPROACH: ICD-10-PCS | Performed by: THORACIC SURGERY (CARDIOTHORACIC VASCULAR SURGERY)

## 2025-05-30 PROCEDURE — C1751 CATH, INF, PER/CENT/MIDLINE: HCPCS | Performed by: THORACIC SURGERY (CARDIOTHORACIC VASCULAR SURGERY)

## 2025-05-30 PROCEDURE — 85384 FIBRINOGEN ACTIVITY: CPT

## 2025-05-30 PROCEDURE — 02L73ZK OCCLUSION OF LEFT ATRIAL APPENDAGE, PERCUTANEOUS APPROACH: ICD-10-PCS | Performed by: THORACIC SURGERY (CARDIOTHORACIC VASCULAR SURGERY)

## 2025-05-30 PROCEDURE — 64450 NJX AA&/STRD OTHER PN/BRANCH: CPT | Performed by: ANESTHESIOLOGY

## 2025-05-30 PROCEDURE — 33268 EXCL LAA OPN OTH PX ANY METH: CPT | Performed by: THORACIC SURGERY (CARDIOTHORACIC VASCULAR SURGERY)

## 2025-05-30 PROCEDURE — 85025 COMPLETE CBC W/AUTO DIFF WBC: CPT

## 2025-05-30 PROCEDURE — 84295 ASSAY OF SERUM SODIUM: CPT

## 2025-05-30 PROCEDURE — 2580000003 HC RX 258: Performed by: THORACIC SURGERY (CARDIOTHORACIC VASCULAR SURGERY)

## 2025-05-30 PROCEDURE — 88304 TISSUE EXAM BY PATHOLOGIST: CPT

## 2025-05-30 PROCEDURE — 02HV33Z INSERTION OF INFUSION DEVICE INTO SUPERIOR VENA CAVA, PERCUTANEOUS APPROACH: ICD-10-PCS | Performed by: THORACIC SURGERY (CARDIOTHORACIC VASCULAR SURGERY)

## 2025-05-30 PROCEDURE — 5A1221Z PERFORMANCE OF CARDIAC OUTPUT, CONTINUOUS: ICD-10-PCS | Performed by: THORACIC SURGERY (CARDIOTHORACIC VASCULAR SURGERY)

## 2025-05-30 PROCEDURE — 06BP3ZZ EXCISION OF RIGHT SAPHENOUS VEIN, PERCUTANEOUS APPROACH: ICD-10-PCS | Performed by: THORACIC SURGERY (CARDIOTHORACIC VASCULAR SURGERY)

## 2025-05-30 PROCEDURE — 85014 HEMATOCRIT: CPT

## 2025-05-30 PROCEDURE — 2500000003 HC RX 250 WO HCPCS

## 2025-05-30 PROCEDURE — 33508 ENDOSCOPIC VEIN HARVEST: CPT | Performed by: THORACIC SURGERY (CARDIOTHORACIC VASCULAR SURGERY)

## 2025-05-30 PROCEDURE — 06BQ4ZZ EXCISION OF LEFT SAPHENOUS VEIN, PERCUTANEOUS ENDOSCOPIC APPROACH: ICD-10-PCS | Performed by: THORACIC SURGERY (CARDIOTHORACIC VASCULAR SURGERY)

## 2025-05-30 PROCEDURE — 86923 COMPATIBILITY TEST ELECTRIC: CPT

## 2025-05-30 PROCEDURE — 6360000002 HC RX W HCPCS

## 2025-05-30 PROCEDURE — 82947 ASSAY GLUCOSE BLOOD QUANT: CPT

## 2025-05-30 PROCEDURE — 83735 ASSAY OF MAGNESIUM: CPT

## 2025-05-30 PROCEDURE — 82330 ASSAY OF CALCIUM: CPT

## 2025-05-30 PROCEDURE — 06BQ3ZZ EXCISION OF LEFT SAPHENOUS VEIN, PERCUTANEOUS APPROACH: ICD-10-PCS | Performed by: THORACIC SURGERY (CARDIOTHORACIC VASCULAR SURGERY)

## 2025-05-30 PROCEDURE — 71045 X-RAY EXAM CHEST 1 VIEW: CPT

## 2025-05-30 PROCEDURE — 2709999900 HC NON-CHARGEABLE SUPPLY: Performed by: THORACIC SURGERY (CARDIOTHORACIC VASCULAR SURGERY)

## 2025-05-30 PROCEDURE — 83605 ASSAY OF LACTIC ACID: CPT

## 2025-05-30 PROCEDURE — P9045 ALBUMIN (HUMAN), 5%, 250 ML: HCPCS

## 2025-05-30 PROCEDURE — 7100000011 HC PHASE II RECOVERY - ADDTL 15 MIN

## 2025-05-30 PROCEDURE — 33533 CABG ARTERIAL SINGLE: CPT | Performed by: THORACIC SURGERY (CARDIOTHORACIC VASCULAR SURGERY)

## 2025-05-30 PROCEDURE — 3600000018 HC SURGERY OHS ADDTL 15MIN: Performed by: THORACIC SURGERY (CARDIOTHORACIC VASCULAR SURGERY)

## 2025-05-30 PROCEDURE — 7100000010 HC PHASE II RECOVERY - FIRST 15 MIN

## 2025-05-30 PROCEDURE — 2100000000 HC CCU R&B

## 2025-05-30 DEVICE — IMPLANTABLE DEVICE: Type: IMPLANTABLE DEVICE | Site: CHEST | Status: FUNCTIONAL

## 2025-05-30 DEVICE — CABLE STRNL TAPR 3 BLNT 1 CUT EDGE NDL SS: Type: IMPLANTABLE DEVICE | Site: CHEST | Status: FUNCTIONAL

## 2025-05-30 DEVICE — PLATE BNE 4 H BX SHP STERNALOCK BLU PRI CLSR SYS: Type: IMPLANTABLE DEVICE | Site: CHEST | Status: FUNCTIONAL

## 2025-05-30 DEVICE — LAA EXCLUSION SYSTEM, ACHM50
Type: IMPLANTABLE DEVICE | Site: HEART | Status: FUNCTIONAL
Brand: ATRICLIP® FLEX-MINI™ LAA EXCLUSION SYSTEM

## 2025-05-30 DEVICE — PLATE BNE 8 H JL SHP STERNALOCK BLU PRI CLSR SYS: Type: IMPLANTABLE DEVICE | Site: CHEST | Status: FUNCTIONAL

## 2025-05-30 RX ORDER — NOREPINEPHRINE BITARTRATE 1 MG/ML
INJECTION, SOLUTION INTRAVENOUS
Status: DISCONTINUED | OUTPATIENT
Start: 2025-05-30 | End: 2025-05-30 | Stop reason: SDUPTHER

## 2025-05-30 RX ORDER — ERGOCALCIFEROL 1.25 MG/1
50000 CAPSULE, LIQUID FILLED ORAL WEEKLY
Status: DISCONTINUED | OUTPATIENT
Start: 2025-05-31 | End: 2025-06-05 | Stop reason: HOSPADM

## 2025-05-30 RX ORDER — POLYETHYLENE GLYCOL 3350 17 G/17G
17 POWDER, FOR SOLUTION ORAL DAILY
Status: DISCONTINUED | OUTPATIENT
Start: 2025-05-31 | End: 2025-06-05 | Stop reason: HOSPADM

## 2025-05-30 RX ORDER — FAMOTIDINE 20 MG/1
20 TABLET, FILM COATED ORAL 2 TIMES DAILY
Status: DISCONTINUED | OUTPATIENT
Start: 2025-05-30 | End: 2025-06-05 | Stop reason: HOSPADM

## 2025-05-30 RX ORDER — DEXMEDETOMIDINE HYDROCHLORIDE 4 UG/ML
.1-1.5 INJECTION, SOLUTION INTRAVENOUS CONTINUOUS
Status: DISCONTINUED | OUTPATIENT
Start: 2025-05-30 | End: 2025-06-02

## 2025-05-30 RX ORDER — CLINDAMYCIN PHOSPHATE 900 MG/50ML
900 INJECTION, SOLUTION INTRAVENOUS EVERY 8 HOURS
Status: COMPLETED | OUTPATIENT
Start: 2025-05-30 | End: 2025-06-01

## 2025-05-30 RX ORDER — HYDRALAZINE HYDROCHLORIDE 20 MG/ML
5 INJECTION INTRAMUSCULAR; INTRAVENOUS EVERY 5 MIN PRN
Status: DISCONTINUED | OUTPATIENT
Start: 2025-05-30 | End: 2025-06-05 | Stop reason: HOSPADM

## 2025-05-30 RX ORDER — OXYCODONE HYDROCHLORIDE 5 MG/1
10 TABLET ORAL EVERY 4 HOURS PRN
Status: DISCONTINUED | OUTPATIENT
Start: 2025-05-30 | End: 2025-06-05 | Stop reason: HOSPADM

## 2025-05-30 RX ORDER — SODIUM CHLORIDE 0.9 % (FLUSH) 0.9 %
5-40 SYRINGE (ML) INJECTION EVERY 12 HOURS SCHEDULED
Status: DISCONTINUED | OUTPATIENT
Start: 2025-05-30 | End: 2025-06-05 | Stop reason: HOSPADM

## 2025-05-30 RX ORDER — MAGNESIUM SULFATE IN WATER 40 MG/ML
2000 INJECTION, SOLUTION INTRAVENOUS PRN
Status: DISCONTINUED | OUTPATIENT
Start: 2025-05-30 | End: 2025-06-05 | Stop reason: HOSPADM

## 2025-05-30 RX ORDER — NEOSTIGMINE METHYLSULFATE 1 MG/ML
INJECTION, SOLUTION INTRAVENOUS
Status: DISCONTINUED | OUTPATIENT
Start: 2025-05-30 | End: 2025-05-30 | Stop reason: SDUPTHER

## 2025-05-30 RX ORDER — ROCURONIUM BROMIDE 10 MG/ML
INJECTION, SOLUTION INTRAVENOUS
Status: DISCONTINUED | OUTPATIENT
Start: 2025-05-30 | End: 2025-05-30 | Stop reason: SDUPTHER

## 2025-05-30 RX ORDER — MUPIROCIN 20 MG/G
OINTMENT TOPICAL 2 TIMES DAILY
Status: DISPENSED | OUTPATIENT
Start: 2025-05-30 | End: 2025-06-04

## 2025-05-30 RX ORDER — FONDAPARINUX SODIUM 2.5 MG/.5ML
2.5 INJECTION SUBCUTANEOUS DAILY
Status: DISCONTINUED | OUTPATIENT
Start: 2025-05-31 | End: 2025-06-05

## 2025-05-30 RX ORDER — GLYCOPYRROLATE 0.2 MG/ML
INJECTION INTRAMUSCULAR; INTRAVENOUS
Status: DISCONTINUED | OUTPATIENT
Start: 2025-05-30 | End: 2025-05-30 | Stop reason: SDUPTHER

## 2025-05-30 RX ORDER — POTASSIUM CHLORIDE 29.8 MG/ML
20 INJECTION INTRAVENOUS PRN
Status: DISCONTINUED | OUTPATIENT
Start: 2025-05-30 | End: 2025-06-05 | Stop reason: HOSPADM

## 2025-05-30 RX ORDER — LANOLIN ALCOHOL/MO/W.PET/CERES
400 CREAM (GRAM) TOPICAL 2 TIMES DAILY
Status: DISCONTINUED | OUTPATIENT
Start: 2025-05-31 | End: 2025-06-04

## 2025-05-30 RX ORDER — FUROSEMIDE 40 MG/1
40 TABLET ORAL 2 TIMES DAILY
Status: DISCONTINUED | OUTPATIENT
Start: 2025-06-02 | End: 2025-05-31

## 2025-05-30 RX ORDER — ALBUMIN HUMAN 50 G/1000ML
25 SOLUTION INTRAVENOUS PRN
Status: ACTIVE | OUTPATIENT
Start: 2025-05-30 | End: 2025-06-01

## 2025-05-30 RX ORDER — DEXTROSE MONOHYDRATE 100 MG/ML
INJECTION, SOLUTION INTRAVENOUS CONTINUOUS PRN
Status: DISCONTINUED | OUTPATIENT
Start: 2025-05-30 | End: 2025-06-05 | Stop reason: HOSPADM

## 2025-05-30 RX ORDER — POTASSIUM CHLORIDE 1500 MG/1
20 TABLET, EXTENDED RELEASE ORAL
Status: DISCONTINUED | OUTPATIENT
Start: 2025-05-31 | End: 2025-06-04

## 2025-05-30 RX ORDER — FENTANYL CITRATE 50 UG/ML
50 INJECTION, SOLUTION INTRAMUSCULAR; INTRAVENOUS
Status: DISCONTINUED | OUTPATIENT
Start: 2025-05-30 | End: 2025-06-05 | Stop reason: HOSPADM

## 2025-05-30 RX ORDER — ALBUTEROL SULFATE 0.83 MG/ML
2.5 SOLUTION RESPIRATORY (INHALATION)
Status: DISCONTINUED | OUTPATIENT
Start: 2025-05-30 | End: 2025-06-05 | Stop reason: HOSPADM

## 2025-05-30 RX ORDER — ASPIRIN 81 MG/1
81 TABLET, CHEWABLE ORAL DAILY
Status: DISCONTINUED | OUTPATIENT
Start: 2025-05-31 | End: 2025-06-05 | Stop reason: HOSPADM

## 2025-05-30 RX ORDER — KETOROLAC TROMETHAMINE 30 MG/ML
15 INJECTION, SOLUTION INTRAMUSCULAR; INTRAVENOUS EVERY 6 HOURS
Status: DISPENSED | OUTPATIENT
Start: 2025-05-30 | End: 2025-06-04

## 2025-05-30 RX ORDER — INSULIN GLARGINE 100 [IU]/ML
0.15 INJECTION, SOLUTION SUBCUTANEOUS NIGHTLY
Status: DISCONTINUED | OUTPATIENT
Start: 2025-06-01 | End: 2025-06-05 | Stop reason: HOSPADM

## 2025-05-30 RX ORDER — NOREPINEPHRINE BITARTRATE 0.06 MG/ML
1-30 INJECTION, SOLUTION INTRAVENOUS CONTINUOUS PRN
Status: DISCONTINUED | OUTPATIENT
Start: 2025-05-30 | End: 2025-06-02

## 2025-05-30 RX ORDER — GABAPENTIN 100 MG/1
100 CAPSULE ORAL 3 TIMES DAILY
Status: DISCONTINUED | OUTPATIENT
Start: 2025-05-31 | End: 2025-05-30

## 2025-05-30 RX ORDER — SODIUM CHLORIDE 9 MG/ML
INJECTION, SOLUTION INTRAVENOUS PRN
Status: DISCONTINUED | OUTPATIENT
Start: 2025-05-30 | End: 2025-06-05 | Stop reason: HOSPADM

## 2025-05-30 RX ORDER — BUPIVACAINE HYDROCHLORIDE 5 MG/ML
INJECTION, SOLUTION EPIDURAL; INTRACAUDAL; PERINEURAL
Status: COMPLETED | OUTPATIENT
Start: 2025-05-30 | End: 2025-05-30

## 2025-05-30 RX ORDER — ONDANSETRON 4 MG/1
4 TABLET, ORALLY DISINTEGRATING ORAL EVERY 8 HOURS PRN
Status: DISCONTINUED | OUTPATIENT
Start: 2025-05-30 | End: 2025-06-05 | Stop reason: HOSPADM

## 2025-05-30 RX ORDER — ACETAMINOPHEN 500 MG
1000 TABLET ORAL ONCE
Status: COMPLETED | OUTPATIENT
Start: 2025-05-30 | End: 2025-05-30

## 2025-05-30 RX ORDER — FENTANYL CITRATE 50 UG/ML
25 INJECTION, SOLUTION INTRAMUSCULAR; INTRAVENOUS
Status: DISCONTINUED | OUTPATIENT
Start: 2025-05-30 | End: 2025-06-05 | Stop reason: HOSPADM

## 2025-05-30 RX ORDER — PROTAMINE SULFATE 10 MG/ML
50 INJECTION, SOLUTION INTRAVENOUS
Status: ACTIVE | OUTPATIENT
Start: 2025-05-30 | End: 2025-05-31

## 2025-05-30 RX ORDER — SODIUM CHLORIDE 0.9 % (FLUSH) 0.9 %
5-40 SYRINGE (ML) INJECTION PRN
Status: DISCONTINUED | OUTPATIENT
Start: 2025-05-30 | End: 2025-05-30 | Stop reason: HOSPADM

## 2025-05-30 RX ORDER — OXYCODONE HYDROCHLORIDE 5 MG/1
5 TABLET ORAL EVERY 4 HOURS PRN
Status: DISCONTINUED | OUTPATIENT
Start: 2025-05-30 | End: 2025-06-05 | Stop reason: HOSPADM

## 2025-05-30 RX ORDER — INDOMETHACIN 25 MG/1
50 CAPSULE ORAL EVERY 30 MIN PRN
Status: ACTIVE | OUTPATIENT
Start: 2025-05-30 | End: 2025-05-31

## 2025-05-30 RX ORDER — ETOMIDATE 2 MG/ML
INJECTION, SOLUTION INTRAVENOUS
Status: DISCONTINUED | OUTPATIENT
Start: 2025-05-30 | End: 2025-05-30 | Stop reason: SDUPTHER

## 2025-05-30 RX ORDER — FUROSEMIDE 10 MG/ML
20 INJECTION INTRAMUSCULAR; INTRAVENOUS EVERY 6 HOURS
Status: DISCONTINUED | OUTPATIENT
Start: 2025-05-31 | End: 2025-06-01

## 2025-05-30 RX ORDER — ACETAMINOPHEN 325 MG/1
650 TABLET ORAL EVERY 6 HOURS SCHEDULED
Status: DISCONTINUED | OUTPATIENT
Start: 2025-05-31 | End: 2025-06-05 | Stop reason: HOSPADM

## 2025-05-30 RX ORDER — KETAMINE HYDROCHLORIDE 100 MG/ML
INJECTION, SOLUTION INTRAMUSCULAR; INTRAVENOUS
Status: DISCONTINUED | OUTPATIENT
Start: 2025-05-30 | End: 2025-05-30 | Stop reason: SDUPTHER

## 2025-05-30 RX ORDER — CHLORHEXIDINE GLUCONATE ORAL RINSE 1.2 MG/ML
15 SOLUTION DENTAL 2 TIMES DAILY
Status: DISCONTINUED | OUTPATIENT
Start: 2025-05-30 | End: 2025-06-05 | Stop reason: HOSPADM

## 2025-05-30 RX ORDER — NOREPINEPHRINE BITARTRATE 0.06 MG/ML
1-100 INJECTION, SOLUTION INTRAVENOUS CONTINUOUS
Status: DISCONTINUED | OUTPATIENT
Start: 2025-05-30 | End: 2025-05-30

## 2025-05-30 RX ORDER — VANCOMYCIN HYDROCHLORIDE 1 G/20ML
INJECTION, POWDER, LYOPHILIZED, FOR SOLUTION INTRAVENOUS PRN
Status: DISCONTINUED | OUTPATIENT
Start: 2025-05-30 | End: 2025-05-30 | Stop reason: ALTCHOICE

## 2025-05-30 RX ORDER — GLUCAGON 1 MG/ML
1 KIT INJECTION PRN
Status: DISCONTINUED | OUTPATIENT
Start: 2025-05-30 | End: 2025-06-05 | Stop reason: HOSPADM

## 2025-05-30 RX ORDER — NITROGLYCERIN 20 MG/100ML
5 INJECTION INTRAVENOUS CONTINUOUS
Status: DISCONTINUED | OUTPATIENT
Start: 2025-05-30 | End: 2025-06-02

## 2025-05-30 RX ORDER — SODIUM CHLORIDE, SODIUM LACTATE, POTASSIUM CHLORIDE, CALCIUM CHLORIDE 600; 310; 30; 20 MG/100ML; MG/100ML; MG/100ML; MG/100ML
INJECTION, SOLUTION INTRAVENOUS
Status: DISCONTINUED | OUTPATIENT
Start: 2025-05-30 | End: 2025-05-30 | Stop reason: SDUPTHER

## 2025-05-30 RX ORDER — SODIUM CHLORIDE 0.9 % (FLUSH) 0.9 %
5-40 SYRINGE (ML) INJECTION PRN
Status: DISCONTINUED | OUTPATIENT
Start: 2025-05-30 | End: 2025-06-05 | Stop reason: HOSPADM

## 2025-05-30 RX ORDER — ONDANSETRON 2 MG/ML
4 INJECTION INTRAMUSCULAR; INTRAVENOUS EVERY 6 HOURS PRN
Status: DISCONTINUED | OUTPATIENT
Start: 2025-05-30 | End: 2025-06-05 | Stop reason: HOSPADM

## 2025-05-30 RX ORDER — SENNOSIDES 8.6 MG
325 CAPSULE ORAL ONCE
Status: COMPLETED | OUTPATIENT
Start: 2025-05-30 | End: 2025-05-30

## 2025-05-30 RX ORDER — SODIUM CHLORIDE 9 MG/ML
INJECTION, SOLUTION INTRAVENOUS
Status: DISCONTINUED | OUTPATIENT
Start: 2025-05-30 | End: 2025-05-30 | Stop reason: SDUPTHER

## 2025-05-30 RX ORDER — CLOPIDOGREL BISULFATE 75 MG/1
75 TABLET ORAL DAILY
Status: DISCONTINUED | OUTPATIENT
Start: 2025-05-31 | End: 2025-06-05 | Stop reason: HOSPADM

## 2025-05-30 RX ORDER — MEPERIDINE HYDROCHLORIDE 50 MG/ML
25 INJECTION INTRAMUSCULAR; INTRAVENOUS; SUBCUTANEOUS
Status: DISCONTINUED | OUTPATIENT
Start: 2025-05-30 | End: 2025-06-02

## 2025-05-30 RX ORDER — INSULIN LISPRO 100 [IU]/ML
0-12 INJECTION, SOLUTION INTRAVENOUS; SUBCUTANEOUS
Status: DISCONTINUED | OUTPATIENT
Start: 2025-06-02 | End: 2025-06-05 | Stop reason: HOSPADM

## 2025-05-30 RX ORDER — CLINDAMYCIN PHOSPHATE 900 MG/50ML
900 INJECTION, SOLUTION INTRAVENOUS ONCE
Status: COMPLETED | OUTPATIENT
Start: 2025-05-30 | End: 2025-05-30

## 2025-05-30 RX ORDER — PROTAMINE SULFATE 10 MG/ML
INJECTION, SOLUTION INTRAVENOUS
Status: DISCONTINUED | OUTPATIENT
Start: 2025-05-30 | End: 2025-05-30 | Stop reason: SDUPTHER

## 2025-05-30 RX ORDER — MIDAZOLAM HYDROCHLORIDE 1 MG/ML
INJECTION, SOLUTION INTRAMUSCULAR; INTRAVENOUS
Status: DISCONTINUED | OUTPATIENT
Start: 2025-05-30 | End: 2025-05-30 | Stop reason: SDUPTHER

## 2025-05-30 RX ORDER — SODIUM CHLORIDE 9 MG/ML
INJECTION, SOLUTION INTRAVENOUS PRN
Status: DISCONTINUED | OUTPATIENT
Start: 2025-05-30 | End: 2025-05-30 | Stop reason: HOSPADM

## 2025-05-30 RX ORDER — GABAPENTIN 100 MG/1
100 CAPSULE ORAL 3 TIMES DAILY
Status: DISCONTINUED | OUTPATIENT
Start: 2025-05-30 | End: 2025-06-05 | Stop reason: HOSPADM

## 2025-05-30 RX ORDER — SODIUM CHLORIDE 0.9 % (FLUSH) 0.9 %
5-40 SYRINGE (ML) INJECTION EVERY 12 HOURS SCHEDULED
Status: DISCONTINUED | OUTPATIENT
Start: 2025-05-30 | End: 2025-05-30 | Stop reason: HOSPADM

## 2025-05-30 RX ORDER — ATORVASTATIN CALCIUM 80 MG/1
80 TABLET, FILM COATED ORAL DAILY
Status: DISCONTINUED | OUTPATIENT
Start: 2025-05-31 | End: 2025-06-05 | Stop reason: HOSPADM

## 2025-05-30 RX ORDER — DEXAMETHASONE SODIUM PHOSPHATE 4 MG/ML
INJECTION, SOLUTION INTRA-ARTICULAR; INTRALESIONAL; INTRAMUSCULAR; INTRAVENOUS; SOFT TISSUE
Status: DISCONTINUED | OUTPATIENT
Start: 2025-05-30 | End: 2025-05-30 | Stop reason: SDUPTHER

## 2025-05-30 RX ORDER — METHOCARBAMOL 500 MG/1
500 TABLET, FILM COATED ORAL 4 TIMES DAILY
Status: DISCONTINUED | OUTPATIENT
Start: 2025-05-30 | End: 2025-06-05 | Stop reason: HOSPADM

## 2025-05-30 RX ORDER — ONDANSETRON 2 MG/ML
INJECTION INTRAMUSCULAR; INTRAVENOUS
Status: DISCONTINUED | OUTPATIENT
Start: 2025-05-30 | End: 2025-05-30 | Stop reason: SDUPTHER

## 2025-05-30 RX ORDER — HEPARIN SODIUM 1000 [USP'U]/ML
INJECTION, SOLUTION INTRAVENOUS; SUBCUTANEOUS
Status: DISCONTINUED | OUTPATIENT
Start: 2025-05-30 | End: 2025-05-30 | Stop reason: SDUPTHER

## 2025-05-30 RX ORDER — BISACODYL 10 MG
10 SUPPOSITORY, RECTAL RECTAL DAILY PRN
Status: DISCONTINUED | OUTPATIENT
Start: 2025-05-30 | End: 2025-06-05 | Stop reason: HOSPADM

## 2025-05-30 RX ORDER — METHOCARBAMOL 500 MG/1
500 TABLET, FILM COATED ORAL 4 TIMES DAILY
Status: DISCONTINUED | OUTPATIENT
Start: 2025-05-31 | End: 2025-05-30

## 2025-05-30 RX ORDER — FENTANYL CITRATE 0.05 MG/ML
INJECTION, SOLUTION INTRAMUSCULAR; INTRAVENOUS
Status: DISCONTINUED | OUTPATIENT
Start: 2025-05-30 | End: 2025-05-30 | Stop reason: SDUPTHER

## 2025-05-30 RX ORDER — SENNA AND DOCUSATE SODIUM 50; 8.6 MG/1; MG/1
1 TABLET, FILM COATED ORAL 2 TIMES DAILY
Status: DISCONTINUED | OUTPATIENT
Start: 2025-05-31 | End: 2025-06-05 | Stop reason: HOSPADM

## 2025-05-30 RX ADMIN — MIDAZOLAM 2 MG: 1 INJECTION INTRAMUSCULAR; INTRAVENOUS at 07:47

## 2025-05-30 RX ADMIN — CLINDAMYCIN PHOSPHATE 900 MG: 900 INJECTION, SOLUTION INTRAVENOUS at 08:14

## 2025-05-30 RX ADMIN — SODIUM CHLORIDE, SODIUM LACTATE, POTASSIUM CHLORIDE, AND CALCIUM CHLORIDE: .6; .31; .03; .02 INJECTION, SOLUTION INTRAVENOUS at 07:44

## 2025-05-30 RX ADMIN — FENTANYL CITRATE 25 MCG: 50 INJECTION INTRAMUSCULAR; INTRAVENOUS at 23:40

## 2025-05-30 RX ADMIN — GLYCOPYRROLATE 0.4 MG: 0.2 INJECTION, SOLUTION INTRAMUSCULAR; INTRAVENOUS at 08:44

## 2025-05-30 RX ADMIN — BUPIVACAINE HYDROCHLORIDE 20 ML: 5 INJECTION, SOLUTION EPIDURAL; INTRACAUDAL; PERINEURAL at 08:02

## 2025-05-30 RX ADMIN — FENTANYL CITRATE 50 MCG: 50 INJECTION INTRAMUSCULAR; INTRAVENOUS at 21:52

## 2025-05-30 RX ADMIN — HEPARIN SODIUM 5000 UNITS: 1000 INJECTION, SOLUTION INTRAVENOUS; SUBCUTANEOUS at 09:45

## 2025-05-30 RX ADMIN — POTASSIUM CHLORIDE 20 MEQ: 29.8 INJECTION, SOLUTION INTRAVENOUS at 23:38

## 2025-05-30 RX ADMIN — FENTANYL CITRATE 100 MCG: 50 INJECTION, SOLUTION INTRAMUSCULAR; INTRAVENOUS at 13:13

## 2025-05-30 RX ADMIN — Medication 3 MG: at 14:48

## 2025-05-30 RX ADMIN — FENTANYL CITRATE 100 MCG: 50 INJECTION, SOLUTION INTRAMUSCULAR; INTRAVENOUS at 08:52

## 2025-05-30 RX ADMIN — FAMOTIDINE 20 MG: 10 INJECTION, SOLUTION INTRAVENOUS at 20:26

## 2025-05-30 RX ADMIN — ALBUMIN (HUMAN) 25 G: 12.5 INJECTION, SOLUTION INTRAVENOUS at 23:04

## 2025-05-30 RX ADMIN — SODIUM CHLORIDE, PRESERVATIVE FREE 10 ML: 5 INJECTION INTRAVENOUS at 20:27

## 2025-05-30 RX ADMIN — Medication 2 AMPULE: at 06:26

## 2025-05-30 RX ADMIN — FENTANYL CITRATE 100 MCG: 50 INJECTION, SOLUTION INTRAMUSCULAR; INTRAVENOUS at 13:35

## 2025-05-30 RX ADMIN — KETAMINE HYDROCHLORIDE 50 MG: 100 INJECTION INTRAMUSCULAR; INTRAVENOUS at 13:42

## 2025-05-30 RX ADMIN — CHLORHEXIDINE GLUCONATE 15 ML: 1.2 RINSE ORAL at 20:26

## 2025-05-30 RX ADMIN — ETOMIDATE 20 MG: 2 INJECTION INTRAVENOUS at 07:47

## 2025-05-30 RX ADMIN — GABAPENTIN 100 MG: 100 CAPSULE ORAL at 20:26

## 2025-05-30 RX ADMIN — FENTANYL CITRATE 100 MCG: 50 INJECTION, SOLUTION INTRAMUSCULAR; INTRAVENOUS at 10:30

## 2025-05-30 RX ADMIN — ROCURONIUM BROMIDE 100 MG: 50 INJECTION, SOLUTION INTRAVENOUS at 07:48

## 2025-05-30 RX ADMIN — NOREPINEPHRINE BITARTRATE 16 MCG: 1 INJECTION INTRAVENOUS at 08:42

## 2025-05-30 RX ADMIN — NOREPINEPHRINE BITARTRATE 2 MCG/MIN: 64 SOLUTION INTRAVENOUS at 08:17

## 2025-05-30 RX ADMIN — KETOROLAC TROMETHAMINE 15 MG: 30 INJECTION, SOLUTION INTRAMUSCULAR at 19:52

## 2025-05-30 RX ADMIN — PROTAMINE SULFATE 250 MG: 10 INJECTION, SOLUTION INTRAVENOUS at 13:12

## 2025-05-30 RX ADMIN — FENTANYL CITRATE 150 MCG: 50 INJECTION, SOLUTION INTRAMUSCULAR; INTRAVENOUS at 09:16

## 2025-05-30 RX ADMIN — ASPIRIN 325 MG: 325 TABLET, COATED ORAL at 20:26

## 2025-05-30 RX ADMIN — SODIUM CHLORIDE: 9 INJECTION, SOLUTION INTRAVENOUS at 08:14

## 2025-05-30 RX ADMIN — MUPIROCIN: 20 OINTMENT TOPICAL at 20:27

## 2025-05-30 RX ADMIN — CLINDAMYCIN PHOSPHATE 900 MG: 900 INJECTION, SOLUTION INTRAVENOUS at 16:50

## 2025-05-30 RX ADMIN — ALBUMIN (HUMAN) 25 G: 12.5 INJECTION, SOLUTION INTRAVENOUS at 16:14

## 2025-05-30 RX ADMIN — ONDANSETRON 4 MG: 2 INJECTION INTRAMUSCULAR; INTRAVENOUS at 13:48

## 2025-05-30 RX ADMIN — NOREPINEPHRINE BITARTRATE 1 MCG/MIN: 64 SOLUTION INTRAVENOUS at 16:10

## 2025-05-30 RX ADMIN — KETAMINE HYDROCHLORIDE 50 MG: 100 INJECTION INTRAMUSCULAR; INTRAVENOUS at 07:47

## 2025-05-30 RX ADMIN — CLINDAMYCIN PHOSPHATE 900 MG: 900 INJECTION, SOLUTION INTRAVENOUS at 23:45

## 2025-05-30 RX ADMIN — BUPIVACAINE 20 ML: 13.3 INJECTION, SUSPENSION, LIPOSOMAL INFILTRATION at 08:02

## 2025-05-30 RX ADMIN — KETOROLAC TROMETHAMINE 15 MG: 30 INJECTION, SOLUTION INTRAMUSCULAR at 16:42

## 2025-05-30 RX ADMIN — GLYCOPYRROLATE 0.4 MG: 0.2 INJECTION, SOLUTION INTRAMUSCULAR; INTRAVENOUS at 14:48

## 2025-05-30 RX ADMIN — ALBUTEROL SULFATE 2.5 MG: 2.5 SOLUTION RESPIRATORY (INHALATION) at 19:23

## 2025-05-30 RX ADMIN — FENTANYL CITRATE 100 MCG: 50 INJECTION, SOLUTION INTRAMUSCULAR; INTRAVENOUS at 10:38

## 2025-05-30 RX ADMIN — FENTANYL CITRATE 150 MCG: 50 INJECTION, SOLUTION INTRAMUSCULAR; INTRAVENOUS at 07:47

## 2025-05-30 RX ADMIN — Medication 1500 MG: at 08:14

## 2025-05-30 RX ADMIN — HEPARIN SODIUM 25000 UNITS: 1000 INJECTION, SOLUTION INTRAVENOUS; SUBCUTANEOUS at 09:59

## 2025-05-30 RX ADMIN — METHOCARBAMOL 500 MG: 500 TABLET ORAL at 20:26

## 2025-05-30 RX ADMIN — FENTANYL CITRATE 100 MCG: 50 INJECTION, SOLUTION INTRAMUSCULAR; INTRAVENOUS at 10:02

## 2025-05-30 RX ADMIN — MIDAZOLAM 2 MG: 1 INJECTION INTRAMUSCULAR; INTRAVENOUS at 10:02

## 2025-05-30 RX ADMIN — AMINOCAPROIC ACID 10000 MG/HR: 250 INJECTION, SOLUTION INTRAVENOUS at 08:14

## 2025-05-30 RX ADMIN — ACETAMINOPHEN 1000 MG: 500 TABLET ORAL at 06:43

## 2025-05-30 RX ADMIN — SODIUM BICARBONATE 50 MEQ: 84 INJECTION INTRAVENOUS at 18:08

## 2025-05-30 RX ADMIN — FENTANYL CITRATE 100 MCG: 50 INJECTION, SOLUTION INTRAMUSCULAR; INTRAVENOUS at 13:41

## 2025-05-30 RX ADMIN — NITROGLYCERIN 5 MCG/MIN: 20 INJECTION INTRAVENOUS at 15:48

## 2025-05-30 RX ADMIN — SODIUM CHLORIDE 3 UNITS/HR: 9 INJECTION, SOLUTION INTRAVENOUS at 10:08

## 2025-05-30 RX ADMIN — DEXAMETHASONE SODIUM PHOSPHATE 8 MG: 4 INJECTION, SOLUTION INTRAMUSCULAR; INTRAVENOUS at 13:48

## 2025-05-30 RX ADMIN — INSULIN HUMAN 2 UNITS: 100 INJECTION, SOLUTION PARENTERAL at 12:20

## 2025-05-30 RX ADMIN — DEXMEDETOMIDINE 0.4 MCG/KG/HR: 100 INJECTION, SOLUTION, CONCENTRATE INTRAVENOUS at 10:01

## 2025-05-30 RX ADMIN — ROCURONIUM BROMIDE 50 MG: 50 INJECTION, SOLUTION INTRAVENOUS at 10:02

## 2025-05-30 ASSESSMENT — PAIN DESCRIPTION - ORIENTATION
ORIENTATION: RIGHT;LEFT
ORIENTATION: ANTERIOR
ORIENTATION: ANTERIOR

## 2025-05-30 ASSESSMENT — PAIN SCALES - GENERAL
PAINLEVEL_OUTOF10: 0
PAINLEVEL_OUTOF10: 10
PAINLEVEL_OUTOF10: 8
PAINLEVEL_OUTOF10: 4
PAINLEVEL_OUTOF10: 10
PAINLEVEL_OUTOF10: 0
PAINLEVEL_OUTOF10: 0

## 2025-05-30 ASSESSMENT — PULMONARY FUNCTION TESTS
PIF_VALUE: 24
PIF_VALUE: 24
PIF_VALUE: 21
PIF_VALUE: 21
PIF_VALUE: 22
PIF_VALUE: 22
PIF_VALUE: 23
PIF_VALUE: 16
PIF_VALUE: 24
PIF_VALUE: 30
PIF_VALUE: 22
PIF_VALUE: 22
PIF_VALUE: 15
PIF_VALUE: 26
PIF_VALUE: 15
PIF_VALUE: 16
PIF_VALUE: 22
PIF_VALUE: 21
PIF_VALUE: 22
PIF_VALUE: 24
PIF_VALUE: 23

## 2025-05-30 ASSESSMENT — PAIN DESCRIPTION - DESCRIPTORS
DESCRIPTORS: ACHING;SPASM;TENDER
DESCRIPTORS: ACHING

## 2025-05-30 ASSESSMENT — PAIN DESCRIPTION - LOCATION
LOCATION: CHEST
LOCATION: CHEST
LOCATION: KNEE

## 2025-05-30 NOTE — CONSENT
Informed Consent for Blood Component Transfusion Note    I have discussed with the patient the rationale for blood component transfusion; its benefits in treating or preventing fatigue, organ damage, or death; and its risk which includes mild transfusion reactions, rare risk of blood borne infection, or more serious but rare reactions. I have discussed the alternatives to transfusion, including the risk and consequences of not receiving transfusion. The patient had an opportunity to ask questions and had agreed to proceed with transfusion of blood components.    Electronically signed by Claudia Justice MD on 5/30/25 at 3:39 PM EDT

## 2025-05-30 NOTE — H&P
I have reviewed the H&P and  I have examined the patient and I find no changes.  I once again reviewed the operative procedure with the patient .  I also once again reviewed the risks, benefits and alternatives, including the alternative of not doing the intended procedure, with the patient and any family present.  There are no active changes and the patient wishes to proceed.The natural history of coronary artery disease was discussed with the patient including cardiac risk factors.  Specific anatomy found during cardiac catheterization was also explained to the patient.  Different treatment options including #1.  No change in medical therapy #2.  Initiation and optimization of goal-directed medical therapy #3 percutaneous intervention including PCI/stents and finally #4.  Surgical revascularization.  Our recommendations at this time are for surgical intervention.  STS risk score discussed in detail with the patient including risk of mortality of   1%        and risk of combined morbidity mortality 6.5 %      .  I also explained that this is a \"fix\" not a cure; as such going forward will be very important for him to partner with his primary care physician as well as cardiologist and emphasize risk factor modification.  The expected intraoperative, in-hospital postoperative as well as postdischarge recovery course has been explained including the importance of referral postdischarge for cardiac rehabilitation.  All questions have been answered and they wish to proceed with surgery.        epartment of Cardiovascular & Thoracic Surgery  History and Physical           DIAGNOSIS:  Near syncope, multivessel CAD     CHIEF COMPLAINT:         Chief Complaint   Patient presents with    near syncope       [facility]. Pt felt faint but never passed out. Pt has had recent change to her torsemide rx, taking 40 mg twice a day for last 2 days         History Obtained From:  patient, electronic medical

## 2025-05-30 NOTE — ANESTHESIA POSTPROCEDURE EVALUATION
Department of Anesthesiology  Postprocedure Note    Patient: Rosario Riley  MRN: [id number]  YOB: 1960  Date of evaluation: 5/30/2025    Procedure Summary       Date: 05/30/25 Room / Location: [address]    Anesthesia Start: 0744 Anesthesia Stop: 1448    Procedure: CORONARY ARTERY BYPASS GRAFT TIMES TWO, RIGHT AND LEFT LEG ENDOSCOPIC SAPHENOUS VEIN HARVEST, TOTAL CARDIOPULMONARY BYPASS, TRANSESOPHAGEAL ECHOCARDIOGRAM. POSTERIOR PERICARDIOTOMY, LEFT ATRIAL APPENDAGE CLIP PLACEMENT AND BILATERAL PECTORALIS BLOCKS (Chest) Diagnosis:       Coronary artery disease      (Coronary artery disease [I25.10])    Surgeons: Claudia Justice MD Responsible Provider: Beni Thomson MD    Anesthesia Type: general ASA Status: 4            Anesthesia Type: No value filed.    Jael Phase I: Jael Score: 10    Jael Phase II:      Anesthesia Post Evaluation    Patient location during evaluation: ICU  Patient participation: complete - patient cannot participate  Level of consciousness: sedated and ventilated  Pain score: 0  Airway patency: patent  Nausea & Vomiting: no nausea and no vomiting  Cardiovascular status: vasoactive/inotropes  Respiratory status: acceptable and ventilator  Hydration status: stable    No notable events documented.

## 2025-05-30 NOTE — PROGRESS NOTES
Patient admitted to [facility] 8 in preparation for surgery, VSS. Consent confirmed. IV inserted into R hand, NS infusing. Belongings to ICU. No liquids since 0430 (sip with meds), no solids since 2130. Family at bedside, phone number in system for text updates, call light within reach.

## 2025-05-30 NOTE — ANESTHESIA PRE PROCEDURE
Department of Anesthesiology  Preprocedure Note       Name:  Rosario Riley   Age:  64 y.o.  :  1960                                          MRN:  [id number]         Date:  2025      Surgeon: Surgeon(s):  Claudia Justice MD    Procedure: Procedure(s):  CORONARY ARTERY BYPASS GRAFT WITH LEFT ATRIAL APPENDAGE CLIP PLACEMENT    Medications prior to admission:   Prior to Admission medications    Medication Sig Start Date End Date Taking? Authorizing Provider   metoprolol tartrate (LOPRESSOR) 25 MG tablet Take 1 tablet by mouth 2 times daily 25  Yes Jere Acharya DO   diclofenac sodium (VOLTAREN) 1 % GEL Apply 2 g topically 4 times daily as needed for Pain  Patient taking differently: Apply 2 g topically 4 times daily as needed for Pain Indications: Arthritis 25  Yes Jere Acharya DO   chlorhexidine gluconate (HIBICLENS) 4 % SOLN external solution Apply topically daily Shower with solution the night before and the morning of surgery 25  Yes Claudia Justice MD   atorvastatin (LIPITOR) 80 MG tablet Take 1 tablet by mouth daily 25  Yes Promise Taylor PA   vitamin D (ERGOCALCIFEROL) 1.25 MG (42596 UT) CAPS capsule Take 1 capsule by mouth once a week 3/28/25  Yes Promise Taylor PA   aspirin 81 MG chewable tablet Take 1 tablet by mouth daily 25   Jere Acharya DO   furosemide (LASIX) 40 MG tablet Take 0.5 tablets by mouth daily  Patient not taking: Reported on 2025   Jere Acharya DO   olmesartan (BENICAR) 40 MG tablet Take 1 tablet by mouth daily    Provider, MD Brendan       Current medications:    Current Facility-Administered Medications   Medication Dose Route Frequency Provider Last Rate Last Admin   • aminocaproic acid (AMICAR) 10,000 mg in sodium chloride 0.9 % 500 mL infusion  1,000 mg/hr IntraVENous Continuous Beni Thomson MD       • dexmedeTOMIDine (PRECEDEX) 400 mcg in sodium chloride 0.9 % 100 mL infusion  0.1-1.5 mcg/kg/hr IntraVENous

## 2025-05-30 NOTE — ANESTHESIA PROCEDURE NOTES
Procedure Performed: GASPER       Start Time:  5/30/2025 8:30 AM       End Time:   5/30/2025 8:53 AM    Anesthesia Information  Performed Personally        Preanesthesia Checklist:  Patient identified, IV assessed, risks and benefits discussed, monitors and equipment assessed, procedure being performed at surgeon's request and anesthesia consent obtained.    General Procedure Information  Diagnostic Indications for Echo:  hemodynamic monitoring  Physician Requesting Echo: Claudia Justice MD  Location performed:  OR  Intubated  Bite block placed  Heart visualized  Probe Insertion:  Easy  Probe Type:  3D  Modalities:  2D, M-mode, pulse wave Doppler, continuous wave Doppler, color flow mapping and 3D    Echocardiographic and Doppler Measurements    Ventricles    Ventricle  Cavity Size  Cavity          Dimension  Hypertrophy  Thrombus  Global FXN  EF    RV  normal    No  No  normal      LV  normal    No  No  normal (50-70%)  55%           Valves     Valves  Annulus  Stenosis Measurements   Regurg  Leaflet   Morph  Leaflet   Motion Valve Comments    Aortic calcified Stenosis none.            none   normal normal       Mitral normal none           VC Width:0.4 cm    mild normal normal    Tricuspid normal   not present         trace   normal   normal      Pulmonic normal   not present         none              Aorta     Aorta  Size  Diam(cm)  Dissection Reed Classification    Ascending normal         Arch normal        Descending normal    Dissection not present.                Atria     Size  SEC (smoke)  Thrombus  Tumor  Device    Rt Atrium normal        Lt Atrium dilated         Left Atrial Appendage: normal        Septa    Interatrial Septal Morphology: normal          Interventricular Septal Morphology: normal          Diastolic Function Measurements:  Diastolic Dysfunction Grade=  E=  ms  A=  ms  E/A Ratio=  2  DT=  ms  S/D=   IVRT=    Other Findings  Pericardium:  normal  Pleural Effusion:  none  Pulmonary Arteries:

## 2025-05-30 NOTE — ANESTHESIA PROCEDURE NOTES
Peripheral Block    Patient location during procedure: OR  Reason for block: post-op pain management and at surgeon's request  Start time: 5/30/2025 8:02 AM  End time: 5/30/2025 8:08 AM  Staffing  Performed: anesthesiologist   Anesthesiologist: Beni Thomson MD  Performed by: Beni Thomson MD  Authorized by: Beni Thomson MD    Preanesthetic Checklist  Completed: patient identified, IV checked, site marked, risks and benefits discussed, surgical/procedural consents, equipment checked, pre-op evaluation, timeout performed, anesthesia consent given, oxygen available and monitors applied/VS acknowledged  Peripheral Block   Patient position: sitting  Prep: ChloraPrep  Provider prep: mask and sterile gloves  Patient monitoring: cardiac monitor, continuous pulse ox, frequent blood pressure checks and IV access  Block type: PECS II  Laterality: bilateral  Injection technique: single-shot  Guidance: ultrasound guided  Local infiltration: lidocaine  Infiltration strength: 1 %  Local infiltration: lidocaine  Dose: 3 mL    Needle   Needle type: insulated echogenic nerve stimulator needle   Needle gauge: 21 G  Needle localization: ultrasound guidance  Needle length: 10 cm  Assessment   Injection assessment: negative aspiration for heme, no paresthesia on injection and local visualized surrounding nerve on ultrasound  Paresthesia pain: none  Slow fractionated injection: yes  Hemodynamics: stable  Outcomes: patient tolerated procedure well    Medications Administered  BUPivacaine (MARCAINE) PF injection 0.5% - Perineural   20 mL - 5/30/2025 8:02:00 AM  BUPivacaine liposome (EXPAREL) injection 1.3% - Perineural   20 mL - 5/30/2025 8:02:00 AM

## 2025-05-30 NOTE — ANESTHESIA PROCEDURE NOTES
Central Venous Line:    A central venous line was placed using surface landmarks, in the OR for the following indication(s): central venous access and CVP monitoring.5/30/2025 7:57 AM5/30/2025 8:02 AM    Sterility preparation included the following: provider used sterile gloves, gown, hat and mask and maximum sterile barriers used during central venous catheter insertion.    The patient was placed in Trendelenburg position.The right internal jugular vein was prepped.    The site was prepped with Chloraprep.  A 9 Fr (size), 10 (length), introducer triple lumen was placed.    During the procedure, the following specific steps were taken: target vein identified, needle advanced into vein and blood aspirated and guidewire advanced into vein.    Intravenous verification was obtained by venous blood return.    Post insertion care included: all ports aspirated, all ports flushed easily, guidewire removed intact, Biopatch applied, line sutured in place and dressing applied.    During the procedure the patient experienced: patient tolerated procedure well with no complications and EBL < 5mL.      Outcomes: uncomplicated and patient tolerated procedure wellNo  Anesthesia type: general..No  Staffing  Performed: Anesthesiologist   Anesthesiologist: Beni Thomson MD  Performed by: Beni Thomson MD  Authorized by: Beni Thomson MD    Preanesthetic Checklist  Completed: patient identified, timeout performed and monitors applied/VS acknowledged

## 2025-05-30 NOTE — PROGRESS NOTES
Shift: 6931-4803    Procedure:   CORONARY ARTERY BYPASS GRAFT TIMES TWO, RIGHT AND LEFT LEG ENDOSCOPIC SAPHENOUS VEIN HARVEST, TOTAL CARDIOPULMONARY BYPASS, TRANSESOPHAGEAL ECHOCARDIOGRAM. POSTERIOR PERICARDIOTOMY, LEFT ATRIAL APPENDAGE CLIP PLACEMENT AND BILATERAL PECTORALIS BLOCKS (Chest)     Admit from OR (time and date): 5/30/25 @ 1500    Transition Time (Date @ Time)    Most recent vitals: BP (!) 124/57   Pulse 55   Temp 97.4 °F (36.3 °C) (Bladder)   Resp 14   Ht 1.702 m (5' 7\")   Wt 100.5 kg (221 lb 8 oz)   LMP 01/30/2009   SpO2 99%   BMI 34.69 kg/m²      Pre-Op Weight Weight - Scale: 100.2 kg (221 lb)  Today's weight   Wt Readings from Last 1 Encounters:   05/30/25 100.5 kg (221 lb 8 oz)         EF: Pre 55%  Post 55%    Rhythm:    [x] Normal Sinus Rhythm   [] Atrial Fibrillation    [] Sinus Tach   [x] Sinus Kiran    [] (adverse rhythms)     Current O2:   [] Room Air   [] NC  L   [] BiPaP    [x] Vented 30% Fi02  Peep 5    Shift Outputs:  Siegel 825 mL  Chest tubes:  M 120 mL  LP 30 mL  RP 70 mL    Air Leak [] Yes  [x] No    Hospital Course:  POD# 0 (Days)  -Out of OR at 1500  -x1 Bicarb given for base excess of -3.2  -Albumin 250 mg given   -Weaned off Precedex drip  -SBT started @ 1820  -Titrating insulin drip hourly       Electronically signed by Kirstie Nicholson RN on 5/30/2025

## 2025-05-30 NOTE — PROGRESS NOTES
05/30/25 1549   Patient Observation   Pulse 54   Respirations 14   SpO2 100 %   Vent Information   Vent Mode AC/VC   Ventilator Settings   Vt (Set, mL) 500 mL   Resp Rate (Set) 12 bpm   PEEP/CPAP (cmH2O) 5   FiO2  40 %   Pressure Support (cm H2O) 0 cm H2O   Vent Patient Data (Readings)   Vt (Measured) 520 mL   Peak Inspiratory Pressure (cmH2O) 24 cmH2O   Rate Measured 13 br/min   Minute Volume (L/min) 6.47 Liters   Mean Airway Pressure (cmH2O) 9.9 cmH20   Plateau Pressure (cm H2O) 0 cm H2O   Driving Pressure -5   I:E Ratio 1:3.00   Static Compliance (L/cm H2O) 0   Vent Alarm Settings   High Pressure (cmH2O) 40 cmH2O   Low Minute Volume (lpm) 2 L/min   High Minute Volume (lpm) 20 L/min   RR High (bpm) 40 br/min   Additional Respiratoray Assessments   Humidification Source HME   Airway Clearance   Suction ET Tube   Suction Device Inline suction catheter   Sputum Method Obtained Endotracheal   Sputum Amount Large   ETT    Placement Date/Time: 05/30/25 0752   Present on Admission/Arrival: No  Placed By: In surgery  Placement Verified By: Auscultation;Capnometry  Preoxygenation: Yes  Mask Ventilation: Ventilated by mask (1)  Technique: Video laryngoscopy  Airway Type: Cu...   Secured At 21 cm   Measured From Lips   ETT Placement Right   Secured By Commercial tube alcaraz   Site Assessment Cool;Dry

## 2025-05-30 NOTE — PROGRESS NOTES
05/30/25 1916   Patient Observation   Pulse 68   Respirations 23   SpO2 99 %   Vent Information   Vent Mode CPAP/PS   Ventilator Settings   PEEP/CPAP (cmH2O) 5   FiO2  30 %   Pressure Support (cm H2O) 10 cm H2O   Vent Patient Data (Readings)   Vt (Measured) 519 mL   Peak Inspiratory Pressure (cmH2O) 16 cmH2O   Rate Measured (S)  41 br/min   Minute Volume (L/min) 11.5 Liters   Mean Airway Pressure (cmH2O) 8.7 cmH20   Plateau Pressure (cm H2O) 0 cm H2O   Driving Pressure -5   I:E Ratio 1:3.00   Static Compliance (L/cm H2O) 0   Vent Alarm Settings   High Pressure (cmH2O) 40 cmH2O   Low Minute Volume (lpm) 2 L/min   High Minute Volume (lpm) 20 L/min   Low Exhaled Vt (ml) 200 mL   RR High (bpm) 40 br/min   Additional Respiratoray Assessments   Humidification Source HME   Ambu Bag With Mask At Bedside Yes   ETT    Placement Date/Time: 05/30/25 0752   Present on Admission/Arrival: No  Placed By: In surgery  Placement Verified By: Auscultation;Capnometry  Preoxygenation: Yes  Mask Ventilation: Ventilated by mask (1)  Technique: Video laryngoscopy  Airway Type: Cu...   Secured At 21 cm   Measured From Lips   ETT Placement Right   Secured By Commercial tube alcaraz   Site Assessment Dry   Cuff Pressure 30 cm H2O

## 2025-05-30 NOTE — PROGRESS NOTES
Patient admitted to CVU room 222 from CVOR and attached to ventilator and monitors. Report received from anesthesiologist Dr. Thomson. Chest x-ray ordered and complete, labs drawn and sent.  Assessment complete.  Hemodymanics stable and will continue to monitor.    RECOVERY PERIOD BEGINS @ 1445      Electronically signed by Kirstie Nicholson RN on 5/30/2025 at 2:55 PM

## 2025-05-31 ENCOUNTER — APPOINTMENT (OUTPATIENT)
Dept: GENERAL RADIOLOGY | Age: 65
DRG: 236 | End: 2025-05-31
Attending: THORACIC SURGERY (CARDIOTHORACIC VASCULAR SURGERY)
Payer: COMMERCIAL

## 2025-05-31 LAB
ANION GAP SERPL CALCULATED.3IONS-SCNC: 11 MMOL/L (ref 3–16)
BUN SERPL-MCNC: 21 MG/DL (ref 7–20)
CA-I BLD-SCNC: 1.11 MMOL/L (ref 1.12–1.32)
CALCIUM SERPL-MCNC: 8.4 MG/DL (ref 8.3–10.6)
CHLORIDE SERPL-SCNC: 112 MMOL/L (ref 99–110)
CO2 SERPL-SCNC: 20 MMOL/L (ref 21–32)
CREAT SERPL-MCNC: 1 MG/DL (ref 0.6–1.2)
DEPRECATED RDW RBC AUTO: 13.8 % (ref 12.4–15.4)
GFR SERPLBLD CREATININE-BSD FMLA CKD-EPI: 62 ML/MIN/{1.73_M2}
GLUCOSE BLD-MCNC: 104 MG/DL (ref 70–99)
GLUCOSE BLD-MCNC: 104 MG/DL (ref 70–99)
GLUCOSE BLD-MCNC: 108 MG/DL (ref 70–99)
GLUCOSE BLD-MCNC: 109 MG/DL (ref 70–99)
GLUCOSE BLD-MCNC: 110 MG/DL (ref 70–99)
GLUCOSE BLD-MCNC: 113 MG/DL (ref 70–99)
GLUCOSE BLD-MCNC: 119 MG/DL (ref 70–99)
GLUCOSE BLD-MCNC: 120 MG/DL (ref 70–99)
GLUCOSE BLD-MCNC: 121 MG/DL (ref 70–99)
GLUCOSE BLD-MCNC: 121 MG/DL (ref 70–99)
GLUCOSE BLD-MCNC: 126 MG/DL (ref 70–99)
GLUCOSE BLD-MCNC: 129 MG/DL (ref 70–99)
GLUCOSE BLD-MCNC: 136 MG/DL (ref 70–99)
GLUCOSE BLD-MCNC: 136 MG/DL (ref 70–99)
GLUCOSE BLD-MCNC: 144 MG/DL (ref 70–99)
GLUCOSE BLD-MCNC: 92 MG/DL (ref 70–99)
GLUCOSE SERPL-MCNC: 125 MG/DL (ref 70–99)
HCT VFR BLD AUTO: 29.1 % (ref 36–48)
HGB BLD-MCNC: 9.7 G/DL (ref 12–16)
INR PPP: 1.03 (ref 0.85–1.15)
MAGNESIUM SERPL-MCNC: 3.01 MG/DL (ref 1.8–2.4)
MCH RBC QN AUTO: 30.9 PG (ref 26–34)
MCHC RBC AUTO-ENTMCNC: 33.3 G/DL (ref 31–36)
MCV RBC AUTO: 92.7 FL (ref 80–100)
PERFORMED ON: ABNORMAL
PERFORMED ON: NORMAL
PH BLDV: 7.36 [PH] (ref 7.35–7.45)
PLATELET # BLD AUTO: 240 K/UL (ref 135–450)
PMV BLD AUTO: 8.3 FL (ref 5–10.5)
POTASSIUM SERPL-SCNC: 5.1 MMOL/L (ref 3.5–5.1)
PROTHROMBIN TIME: 13.7 SEC (ref 11.9–14.9)
RBC # BLD AUTO: 3.14 M/UL (ref 4–5.2)
SODIUM SERPL-SCNC: 143 MMOL/L (ref 136–145)
WBC # BLD AUTO: 14.5 K/UL (ref 4–11)

## 2025-05-31 PROCEDURE — 2140000000 HC CCU INTERMEDIATE R&B

## 2025-05-31 PROCEDURE — 94640 AIRWAY INHALATION TREATMENT: CPT

## 2025-05-31 PROCEDURE — 6360000002 HC RX W HCPCS

## 2025-05-31 PROCEDURE — 80048 BASIC METABOLIC PNL TOTAL CA: CPT

## 2025-05-31 PROCEDURE — 99024 POSTOP FOLLOW-UP VISIT: CPT | Performed by: THORACIC SURGERY (CARDIOTHORACIC VASCULAR SURGERY)

## 2025-05-31 PROCEDURE — 83735 ASSAY OF MAGNESIUM: CPT

## 2025-05-31 PROCEDURE — 2580000003 HC RX 258

## 2025-05-31 PROCEDURE — 2700000000 HC OXYGEN THERAPY PER DAY

## 2025-05-31 PROCEDURE — 97162 PT EVAL MOD COMPLEX 30 MIN: CPT

## 2025-05-31 PROCEDURE — 97530 THERAPEUTIC ACTIVITIES: CPT

## 2025-05-31 PROCEDURE — 6370000000 HC RX 637 (ALT 250 FOR IP)

## 2025-05-31 PROCEDURE — 2500000003 HC RX 250 WO HCPCS

## 2025-05-31 PROCEDURE — 97166 OT EVAL MOD COMPLEX 45 MIN: CPT

## 2025-05-31 PROCEDURE — 85610 PROTHROMBIN TIME: CPT

## 2025-05-31 PROCEDURE — 94761 N-INVAS EAR/PLS OXIMETRY MLT: CPT

## 2025-05-31 PROCEDURE — 85027 COMPLETE CBC AUTOMATED: CPT

## 2025-05-31 PROCEDURE — 71045 X-RAY EXAM CHEST 1 VIEW: CPT

## 2025-05-31 PROCEDURE — 82330 ASSAY OF CALCIUM: CPT

## 2025-05-31 PROCEDURE — 94669 MECHANICAL CHEST WALL OSCILL: CPT

## 2025-05-31 RX ORDER — METOPROLOL TARTRATE 25 MG/1
12.5 TABLET, FILM COATED ORAL 2 TIMES DAILY
Status: DISCONTINUED | OUTPATIENT
Start: 2025-05-31 | End: 2025-06-03

## 2025-05-31 RX ADMIN — OXYCODONE 5 MG: 5 TABLET ORAL at 23:20

## 2025-05-31 RX ADMIN — FUROSEMIDE 20 MG: 10 INJECTION, SOLUTION INTRAMUSCULAR; INTRAVENOUS at 07:56

## 2025-05-31 RX ADMIN — Medication 400 MG: at 07:54

## 2025-05-31 RX ADMIN — CHLORHEXIDINE GLUCONATE 15 ML: 1.2 RINSE ORAL at 08:01

## 2025-05-31 RX ADMIN — KETOROLAC TROMETHAMINE 15 MG: 30 INJECTION, SOLUTION INTRAMUSCULAR at 17:01

## 2025-05-31 RX ADMIN — ACETAMINOPHEN 650 MG: 325 TABLET ORAL at 17:02

## 2025-05-31 RX ADMIN — SODIUM CHLORIDE, PRESERVATIVE FREE 10 ML: 5 INJECTION INTRAVENOUS at 21:20

## 2025-05-31 RX ADMIN — SODIUM CHLORIDE 2.9 UNITS/HR: 9 INJECTION, SOLUTION INTRAVENOUS at 19:17

## 2025-05-31 RX ADMIN — ALBUTEROL SULFATE 2.5 MG: 2.5 SOLUTION RESPIRATORY (INHALATION) at 19:25

## 2025-05-31 RX ADMIN — METHOCARBAMOL 500 MG: 500 TABLET ORAL at 13:12

## 2025-05-31 RX ADMIN — FUROSEMIDE 20 MG: 10 INJECTION, SOLUTION INTRAMUSCULAR; INTRAVENOUS at 21:20

## 2025-05-31 RX ADMIN — ATORVASTATIN CALCIUM 80 MG: 80 TABLET, FILM COATED ORAL at 21:20

## 2025-05-31 RX ADMIN — ASPIRIN 81 MG: 81 TABLET, CHEWABLE ORAL at 07:54

## 2025-05-31 RX ADMIN — METHOCARBAMOL 500 MG: 500 TABLET ORAL at 07:54

## 2025-05-31 RX ADMIN — FONDAPARINUX SODIUM 2.5 MG: 2.5 INJECTION, SOLUTION SUBCUTANEOUS at 08:01

## 2025-05-31 RX ADMIN — CLINDAMYCIN PHOSPHATE 900 MG: 900 INJECTION, SOLUTION INTRAVENOUS at 23:27

## 2025-05-31 RX ADMIN — ALBUTEROL SULFATE 2.5 MG: 2.5 SOLUTION RESPIRATORY (INHALATION) at 11:43

## 2025-05-31 RX ADMIN — MUPIROCIN: 20 OINTMENT TOPICAL at 08:02

## 2025-05-31 RX ADMIN — MUPIROCIN: 20 OINTMENT TOPICAL at 21:20

## 2025-05-31 RX ADMIN — FAMOTIDINE 20 MG: 20 TABLET, FILM COATED ORAL at 07:55

## 2025-05-31 RX ADMIN — POLYETHYLENE GLYCOL 3350 17 G: 17 POWDER, FOR SOLUTION ORAL at 08:01

## 2025-05-31 RX ADMIN — METHOCARBAMOL 500 MG: 500 TABLET ORAL at 21:24

## 2025-05-31 RX ADMIN — FUROSEMIDE 20 MG: 10 INJECTION, SOLUTION INTRAMUSCULAR; INTRAVENOUS at 17:02

## 2025-05-31 RX ADMIN — CLINDAMYCIN PHOSPHATE 900 MG: 900 INJECTION, SOLUTION INTRAVENOUS at 08:08

## 2025-05-31 RX ADMIN — FENTANYL CITRATE 50 MCG: 50 INJECTION INTRAMUSCULAR; INTRAVENOUS at 16:34

## 2025-05-31 RX ADMIN — FENTANYL CITRATE 50 MCG: 50 INJECTION INTRAMUSCULAR; INTRAVENOUS at 05:07

## 2025-05-31 RX ADMIN — ACETAMINOPHEN 650 MG: 325 TABLET ORAL at 12:10

## 2025-05-31 RX ADMIN — OXYCODONE 10 MG: 5 TABLET ORAL at 10:19

## 2025-05-31 RX ADMIN — ALBUTEROL SULFATE 2.5 MG: 2.5 SOLUTION RESPIRATORY (INHALATION) at 15:33

## 2025-05-31 RX ADMIN — KETOROLAC TROMETHAMINE 15 MG: 30 INJECTION, SOLUTION INTRAMUSCULAR at 07:56

## 2025-05-31 RX ADMIN — ALBUTEROL SULFATE 2.5 MG: 2.5 SOLUTION RESPIRATORY (INHALATION) at 08:09

## 2025-05-31 RX ADMIN — KETOROLAC TROMETHAMINE 15 MG: 30 INJECTION, SOLUTION INTRAMUSCULAR at 02:39

## 2025-05-31 RX ADMIN — CLOPIDOGREL BISULFATE 75 MG: 75 TABLET, FILM COATED ORAL at 07:54

## 2025-05-31 RX ADMIN — CLINDAMYCIN PHOSPHATE 900 MG: 900 INJECTION, SOLUTION INTRAVENOUS at 17:45

## 2025-05-31 RX ADMIN — ACETAMINOPHEN 650 MG: 325 TABLET ORAL at 23:20

## 2025-05-31 RX ADMIN — SENNOSIDES AND DOCUSATE SODIUM 1 TABLET: 50; 8.6 TABLET ORAL at 07:56

## 2025-05-31 RX ADMIN — GABAPENTIN 100 MG: 100 CAPSULE ORAL at 21:21

## 2025-05-31 RX ADMIN — SENNOSIDES AND DOCUSATE SODIUM 1 TABLET: 50; 8.6 TABLET ORAL at 21:20

## 2025-05-31 RX ADMIN — GABAPENTIN 100 MG: 100 CAPSULE ORAL at 07:54

## 2025-05-31 RX ADMIN — Medication 400 MG: at 21:20

## 2025-05-31 RX ADMIN — ACETAMINOPHEN 650 MG: 325 TABLET ORAL at 05:14

## 2025-05-31 RX ADMIN — KETOROLAC TROMETHAMINE 15 MG: 30 INJECTION, SOLUTION INTRAMUSCULAR at 21:20

## 2025-05-31 RX ADMIN — FAMOTIDINE 20 MG: 20 TABLET, FILM COATED ORAL at 21:20

## 2025-05-31 ASSESSMENT — PAIN SCALES - GENERAL
PAINLEVEL_OUTOF10: 3
PAINLEVEL_OUTOF10: 5
PAINLEVEL_OUTOF10: 10
PAINLEVEL_OUTOF10: 8
PAINLEVEL_OUTOF10: 8
PAINLEVEL_OUTOF10: 4
PAINLEVEL_OUTOF10: 7
PAINLEVEL_OUTOF10: 10
PAINLEVEL_OUTOF10: 7
PAINLEVEL_OUTOF10: 10

## 2025-05-31 ASSESSMENT — PAIN DESCRIPTION - LOCATION
LOCATION: CHEST
LOCATION: CHEST;KNEE
LOCATION: CHEST
LOCATION: CHEST
LOCATION: CHEST;KNEE

## 2025-05-31 ASSESSMENT — PAIN DESCRIPTION - DESCRIPTORS
DESCRIPTORS: ACHING
DESCRIPTORS: ACHING

## 2025-05-31 ASSESSMENT — PAIN DESCRIPTION - ORIENTATION
ORIENTATION: ANTERIOR
ORIENTATION: RIGHT

## 2025-05-31 NOTE — PROGRESS NOTES
Department of Cardiovascular & Thoracic Surgery  Progress Note    Department of Cardiovascular & Thoracic Surgery  Progress Note      SUBJECTIVE:      Sitting up in chair and actually quite bright affect    OBJECTIVE:    Vitals:  Afebrile  BP      Weight   05/31/25 0600 105.7 kg (233 lb 0.4 oz)   05/30/25 0609 100.5 kg (221 lb 8 oz)      Admission 1500, extubation 2055 (5 hours 55 minutes)       24 HR INTAKE/OUTPUT:      Gross per 24 hour   Intake 3393.5   Output 2919   Net    UO: 2245  Mediastinal: 275 (110/165)  Vimal: Left-125, right-274    Data  Recent Labs     05/31/25  0537 05/30/25  1450   WBC 14.5* 13.9*   HCT 29.1* 28.4*    196   Preop hematocrit 31.9.  Received PRBCs intraoperatively     Recent Labs     05/31/25  0537 05/30/25  1450    141   K 5.1 4.7   CREATININE 1.0 0.8   BUN 21* 19      Chest x-ray okay      Current Cardiac Medications:  Aspirin  Lipitor  Plavix  Lasix 20 IV Q6  Current NonCardiac Medications:  Tylenol/Neurontin/Toradol/Robaxin  Proventil  Perioperative antibiotics: Cleocin/Vanco  Pepcid  Arixtra  Mag-Ox  Stool regimen  Vitamin D  Home medications:  Atorvastatin  Benicar  Torsemide 40 daily  Vitamin D    ASSESSMENT AND PLAN:    Postop day 1 CABG (incomplete revascularization) preserved EF  Overall doing well  DC mediastinal chest tubes  Begin beta-blocker with blood pressure parameters  Preoperative anemia-required transfusion intraoperatively-stable currently

## 2025-05-31 NOTE — PROGRESS NOTES
Shift: [id number]    Procedure:   CORONARY ARTERY BYPASS GRAFT TIMES TWO, RIGHT AND LEFT LEG ENDOSCOPIC SAPHENOUS VEIN HARVEST, TOTAL CARDIOPULMONARY BYPASS, TRANSESOPHAGEAL ECHOCARDIOGRAM. POSTERIOR PERICARDIOTOMY, LEFT ATRIAL APPENDAGE CLIP PLACEMENT AND BILATERAL PECTORALIS BLOCKS (Chest)     Admit from OR (time and date): 5/30/25 @ 1500    Transition Time (Date @ Time)    Most recent vitals: BP (!) 116/54   Pulse 68   Temp 98.2 °F (36.8 °C) (Oral)   Resp 24   Ht 1.702 m (5' 7\")   Wt 105.7 kg (233 lb 0.4 oz)   LMP 01/30/2009   SpO2 93%   BMI 36.50 kg/m²      Pre-Op Weight Weight - Scale: 100.2 kg (221 lb)  Today's weight   Wt Readings from Last 1 Encounters:   05/31/25 105.7 kg (233 lb 0.4 oz)         EF: Pre 55%  Post 55%    Rhythm:    [x] Normal Sinus Rhythm   [] Atrial Fibrillation    [] Sinus Tach   [] Sinus Kiran    [] (adverse rhythms)     Current O2:   [] Room Air   [x] NC  L   [] BiPaP    [] Vented 30% Fi02  Peep 5    Shift Outputs:  Siegel 825 mL  Chest tubes:  M 120 mL  LP 30 mL  RP 70 mL    Air Leak [] Yes  [x] No    Hospital Course:  POD# 0 (Days)  -Out of OR at 1500  -x1 Bicarb given for base excess of -3.2  -Albumin 250 mg given   -Weaned off Precedex drip  -SBT started @ 1820  -Titrating insulin drip hourly     POD#0 NIGHTS 5/30/25  -Urine 645  -L pl 80; R pl 189; M 165  -Extubated at 2055 to 4 l NC and is now on 2l NC  -Pain was a constant issue-gave prn Fent 50mcg x 2 and 25mcg once  -pt is very difficult to get moving as she screams and becomes increasingly anxious and difficult to direct.  -Up x 2 to chair this am  -Constantly demands something to drink.    -Gave 20meq of K x 1       Electronically signed by Lisa Mederos RN on 5/31/2025

## 2025-05-31 NOTE — OP NOTE
Operative Note      Patient: Rosario Riley  YOB: 1960  MRN: [id number]    Date of Procedure: 5/30/2025    Pre-Op Diagnosis Codes:      * Coronary artery disease [I25.10]    Post-Op Diagnosis: Same       Procedure(s):  CORONARY ARTERY BYPASS GRAFT TIMES TWO, RIGHT AND LEFT LEG ENDOSCOPIC SAPHENOUS VEIN HARVEST, TOTAL CARDIOPULMONARY BYPASS, TRANSESOPHAGEAL ECHOCARDIOGRAM. POSTERIOR PERICARDIOTOMY, LEFT ATRIAL APPENDAGE CLIP PLACEMENT AND BILATERAL PECTORALIS BLOCKS   epiaortic ultrasound  Endarterectomy of LAD/diagonal junction  Surgeon(s):  Claudia Justice MD    Assistant:   Surgical Assistant: Marcela Connors; Reva Astorga    Anesthesia: General    Estimated Blood Loss (mL): 500    Complications: None    Specimens:   ID Type Source Tests Collected by Time Destination   A : ENDARTERECTOMY PLAQUE Tissue Tissue SURGICAL PATHOLOGY Claudia Justice MD 5/30/2025 1223        Implants:  Implant Name Type Inv. Item Serial No.  Lot No. LRB No. Used Action   CABLE STRNL TAPR 3 BLNT 1 CUT EDGE NDL SS - [id number]  CABLE STRNL TAPR 3 BLNT 1 CUT EDGE NDL SS  [employer]-WD  N/A 1 Implanted   DEVICE HOMERO EXCLUSION CLP L 50 MM NIT SPRING POLYESTER CVR - [id number]  DEVICE HOMERO EXCLUSION CLP L 50 MM NIT SPRING POLYESTER CVR  [employer]-WD [id number] N/A 1 Implanted   PLATE BNE 4 H BX SHP STERNALOCK RAQUEL GLEN CLSR SYS - [id number]  PLATE BNE 4 H BX SHP STERNALOCK RAQUEL GLEN CLSR SYS  THALIA BIOMET MICROFIXATION-WD  N/A 2 Implanted   PLATE BNE 8 H JL SHP STERNALOCK RAQUEL GLNE CLSR SYS - [id number]  PLATE BNE 8 H JL SHP STERNALOCK RAQUEL GLEN CLSR SYS  HTALIA BIOMET MICROFIXATION-WD  N/A 1 Implanted   SCREW BNE L14MM DIA2.4MM G CANC TI SELF DRL SUNNY FULL THRD - [id number]  SCREW BNE L14MM DIA2.4MM G CANC TI SELF DRL SUNNY FULL THRD  THALIA BIOMET MICROFIXATION-WD  N/A 4 Implanted   SCREW BNE L16MM DIA2.4MM G CANC TI SELF DRL SUNNY FULL THRD - [id number]  SCREW BNE L16MM DIA2.4MM G CANC TI SELF DRL SUNNY FULL THRD

## 2025-05-31 NOTE — PROGRESS NOTES
Occupational Therapy  Facility/Department: [facility] C2 CARD TELEMETRY  Occupational Therapy Initial Assessment    Name: Rosario Riley  : 1960  MRN: [id number]  Date of Service: 2025    Discharge Recommendations:  IP Rehab  Therapy discharge recommendations take into account each patient's current medical complexities and are subject to input/oversight from the patient's healthcare team.   Barriers to Home Discharge:   [x] Steps to access home entry or bed/bath:   [x] Unable to transfer, ambulate, or propel wheelchair household distances without assist   [x] Limited available assist at home upon discharge    [x] Patient or family requests d/c to post-acute facility      [x] Patient is at risk for falls due to: decreased balance & knee pain at baseline      If pt is unable to be seen after this session, please let this note serve as discharge summary.  Please see case management note for discharge disposition.  Thank you.           Patient Diagnosis(es): The encounter diagnosis was Coronary artery disease.  Past Medical History:  has a past medical history of Does mobilize using cane, Essential hypertension, Foot pain, bilateral, Hypercholesterolemia, Hypercholesterolemia, Multiple vessel coronary artery disease, Osteoarthritis, Severe obesity (BMI 35.0-39.9) with comorbidity (HCC), and Vitamin D deficiency.  Past Surgical History:  has a past surgical history that includes  section; Tonsillectomy (); Knee arthroscopy (Left); knee surgery (2022); Total hip arthroplasty (Left, 2024); and Cardiac procedure (N/A, 2025).           Assessment  Performance deficits / Impairments: Decreased functional mobility ;Decreased endurance;Decreased ADL status;Decreased balance;Decreased high-level IADLs  Assessment: pt from home with family, reports normally independent with BADL's & functional mobility with cane, works part-time seated; admitted for CAD s/p CABG x 2, now mod assist of 2 for

## 2025-05-31 NOTE — PROGRESS NOTES
Shift: [id number]    Procedure:   CORONARY ARTERY BYPASS GRAFT TIMES TWO, RIGHT AND LEFT LEG ENDOSCOPIC SAPHENOUS VEIN HARVEST, TOTAL CARDIOPULMONARY BYPASS, TRANSESOPHAGEAL ECHOCARDIOGRAM. POSTERIOR PERICARDIOTOMY, LEFT ATRIAL APPENDAGE CLIP PLACEMENT AND BILATERAL PECTORALIS BLOCKS (Chest)     Admit from OR (time and date): 5/30/25 @ 1500    Transition Time (Date @ Time)    Most recent vitals: BP (!) 114/53   Pulse 68   Temp 98.7 °F (37.1 °C)   Resp 25   Ht 1.702 m (5' 7\")   Wt 105.7 kg (233 lb 0.4 oz)   LMP 01/30/2009   SpO2 96%   BMI 36.50 kg/m²      Pre-Op Weight Weight - Scale: 100.2 kg (221 lb)  Today's weight   Wt Readings from Last 1 Encounters:   05/31/25 105.7 kg (233 lb 0.4 oz)         EF: Pre 55%  Post 55%    Rhythm:    [x] Normal Sinus Rhythm   [] Atrial Fibrillation    [] Sinus Tach   [] Sinus Kiran    [] (adverse rhythms)     Current O2:   [x] Room Air   [] NC  L   [] BiPaP    [] Vented 30% Fi02  Peep 5    Shift Outputs:  Siegel 825 mL  Chest tubes:  M 120 mL  LP 30 mL  RP 70 mL    Air Leak [] Yes  [x] No    Hospital Course:  POD# 0 (Days)  -Out of OR at 1500  -x1 Bicarb given for base excess of -3.2  -Albumin 250 mg given   -Weaned off Precedex drip  -SBT started @ 1820  -Titrating insulin drip hourly     POD#0 NIGHTS 5/30/25  -Urine 645  -L pl 80; R pl 189; M 165  -Extubated at 2055 to 4 l NC and is now on 2l NC  -Pain was a constant issue-gave prn Fent 50mcg x 2 and 25mcg once  -pt is very difficult to get moving as she screams and becomes increasingly anxious and difficult to direct.  -Up x 2 to chair this am  -Constantly demands something to drink.    -Gave 20meq of K x 1       POD#1 Days 5/31/25  -Pt up in chair all morning   -A. Line pulled  -RN attempted to pull mediastinal chest tube-unsuccessful. MD notified   -Soft BP this afternoon, albumin given and levo started  -Urine o/p 675  -L pl 40; R pl 70; M 40      Electronically signed by Reyna Davenport RN on 5/31/2025

## 2025-05-31 NOTE — PROGRESS NOTES
Physical Therapy  Facility/Department: [facility] C2 CARD TELEMETRY  Physical Therapy Initial Assessment    Name: Rosario Riley  : 1960  MRN: [id number]  Date of Service: 2025    Discharge Recommendations:  IP Rehab, Patient able to tolerate 3 hours of therapy per day   PT Equipment Recommendations  Equipment Needed: No  Other: defer    Therapy discharge recommendations are subject to collaboration from the patient’s interdisciplinary healthcare team, including MD and case management recommendations.    Barriers to Home Discharge:   [x] Steps to access home entry or bed/bath:   [x] Unable to transfer, ambulate, or propel wheelchair household distances without assist   [x] Limited available assist at home upon discharge    [x] Patient or family requests d/c to post-acute facility    [] Poor cognition/safety awareness for d/c to home alone    [] Unable to maintain ordered weight bearing status    [] Patient with salient signs of long-standing immobility   [x] Decreased independence with ADLs   [x] Increased risk for falls   [] Other:    If pt is unable to be seen after this session, please let this note serve as discharge summary.  Please see case management note for discharge disposition.  Thank you.        Patient Diagnosis(es): The encounter diagnosis was Coronary artery disease.  Past Medical History:  has a past medical history of Does mobilize using cane, Essential hypertension, Foot pain, bilateral, Hypercholesterolemia, Hypercholesterolemia, Multiple vessel coronary artery disease, Osteoarthritis, Severe obesity (BMI 35.0-39.9) with comorbidity (HCC), and Vitamin D deficiency.  Past Surgical History:  has a past surgical history that includes  section; Tonsillectomy (); Knee arthroscopy (Left); knee surgery (2022); Total hip arthroplasty (Left, 2024); and Cardiac procedure (N/A, 2025).    Assessment  Body Structures, Functions, Activity Limitations Requiring Skilled

## 2025-06-01 ENCOUNTER — APPOINTMENT (OUTPATIENT)
Dept: GENERAL RADIOLOGY | Age: 65
DRG: 236 | End: 2025-06-01
Attending: THORACIC SURGERY (CARDIOTHORACIC VASCULAR SURGERY)
Payer: COMMERCIAL

## 2025-06-01 LAB
ANION GAP SERPL CALCULATED.3IONS-SCNC: 10 MMOL/L (ref 3–16)
BUN SERPL-MCNC: 22 MG/DL (ref 7–20)
CALCIUM SERPL-MCNC: 8.3 MG/DL (ref 8.3–10.6)
CHLORIDE SERPL-SCNC: 106 MMOL/L (ref 99–110)
CO2 SERPL-SCNC: 22 MMOL/L (ref 21–32)
CREAT SERPL-MCNC: 1.1 MG/DL (ref 0.6–1.2)
DEPRECATED RDW RBC AUTO: 13.8 % (ref 12.4–15.4)
EKG DIAGNOSIS: NORMAL
EKG Q-T INTERVAL: 346 MS
EKG QRS DURATION: 82 MS
EKG QTC CALCULATION (BAZETT): 497 MS
EKG R AXIS: 46 DEGREES
EKG T AXIS: -53 DEGREES
EKG VENTRICULAR RATE: 124 BPM
GFR SERPLBLD CREATININE-BSD FMLA CKD-EPI: 55 ML/MIN/{1.73_M2}
GLUCOSE BLD-MCNC: 102 MG/DL (ref 70–99)
GLUCOSE BLD-MCNC: 104 MG/DL (ref 70–99)
GLUCOSE BLD-MCNC: 106 MG/DL (ref 70–99)
GLUCOSE BLD-MCNC: 108 MG/DL (ref 70–99)
GLUCOSE BLD-MCNC: 116 MG/DL (ref 70–99)
GLUCOSE BLD-MCNC: 129 MG/DL (ref 70–99)
GLUCOSE BLD-MCNC: 163 MG/DL (ref 70–99)
GLUCOSE BLD-MCNC: 176 MG/DL (ref 70–99)
GLUCOSE BLD-MCNC: 89 MG/DL (ref 70–99)
GLUCOSE BLD-MCNC: 93 MG/DL (ref 70–99)
GLUCOSE BLD-MCNC: 94 MG/DL (ref 70–99)
GLUCOSE SERPL-MCNC: 97 MG/DL (ref 70–99)
HCT VFR BLD AUTO: 26.2 % (ref 36–48)
HGB BLD-MCNC: 8.7 G/DL (ref 12–16)
INR PPP: 1.13 (ref 0.85–1.15)
MAGNESIUM SERPL-MCNC: 2.73 MG/DL (ref 1.8–2.4)
MCH RBC QN AUTO: 30.7 PG (ref 26–34)
MCHC RBC AUTO-ENTMCNC: 33.3 G/DL (ref 31–36)
MCV RBC AUTO: 92.4 FL (ref 80–100)
PERFORMED ON: ABNORMAL
PERFORMED ON: NORMAL
PLATELET # BLD AUTO: 252 K/UL (ref 135–450)
PMV BLD AUTO: 7.8 FL (ref 5–10.5)
POTASSIUM SERPL-SCNC: 4.3 MMOL/L (ref 3.5–5.1)
PROTHROMBIN TIME: 14.8 SEC (ref 11.9–14.9)
RBC # BLD AUTO: 2.84 M/UL (ref 4–5.2)
SODIUM SERPL-SCNC: 138 MMOL/L (ref 136–145)
WBC # BLD AUTO: 14 K/UL (ref 4–11)

## 2025-06-01 PROCEDURE — 6360000002 HC RX W HCPCS

## 2025-06-01 PROCEDURE — 2500000003 HC RX 250 WO HCPCS: Performed by: THORACIC SURGERY (CARDIOTHORACIC VASCULAR SURGERY)

## 2025-06-01 PROCEDURE — 83735 ASSAY OF MAGNESIUM: CPT

## 2025-06-01 PROCEDURE — 97116 GAIT TRAINING THERAPY: CPT

## 2025-06-01 PROCEDURE — 6360000002 HC RX W HCPCS: Performed by: THORACIC SURGERY (CARDIOTHORACIC VASCULAR SURGERY)

## 2025-06-01 PROCEDURE — 71045 X-RAY EXAM CHEST 1 VIEW: CPT

## 2025-06-01 PROCEDURE — 97530 THERAPEUTIC ACTIVITIES: CPT

## 2025-06-01 PROCEDURE — 93005 ELECTROCARDIOGRAM TRACING: CPT | Performed by: THORACIC SURGERY (CARDIOTHORACIC VASCULAR SURGERY)

## 2025-06-01 PROCEDURE — 6370000000 HC RX 637 (ALT 250 FOR IP): Performed by: THORACIC SURGERY (CARDIOTHORACIC VASCULAR SURGERY)

## 2025-06-01 PROCEDURE — 93010 ELECTROCARDIOGRAM REPORT: CPT | Performed by: INTERNAL MEDICINE

## 2025-06-01 PROCEDURE — 94640 AIRWAY INHALATION TREATMENT: CPT

## 2025-06-01 PROCEDURE — 85610 PROTHROMBIN TIME: CPT

## 2025-06-01 PROCEDURE — 80048 BASIC METABOLIC PNL TOTAL CA: CPT

## 2025-06-01 PROCEDURE — 2500000003 HC RX 250 WO HCPCS

## 2025-06-01 PROCEDURE — 94669 MECHANICAL CHEST WALL OSCILL: CPT

## 2025-06-01 PROCEDURE — 85027 COMPLETE CBC AUTOMATED: CPT

## 2025-06-01 PROCEDURE — 6370000000 HC RX 637 (ALT 250 FOR IP)

## 2025-06-01 PROCEDURE — 99024 POSTOP FOLLOW-UP VISIT: CPT | Performed by: THORACIC SURGERY (CARDIOTHORACIC VASCULAR SURGERY)

## 2025-06-01 PROCEDURE — 2140000000 HC CCU INTERMEDIATE R&B

## 2025-06-01 RX ORDER — POTASSIUM CHLORIDE 1500 MG/1
20 TABLET, EXTENDED RELEASE ORAL ONCE
Status: COMPLETED | OUTPATIENT
Start: 2025-06-01 | End: 2025-06-01

## 2025-06-01 RX ORDER — FUROSEMIDE 10 MG/ML
20 INJECTION INTRAMUSCULAR; INTRAVENOUS 2 TIMES DAILY
Status: COMPLETED | OUTPATIENT
Start: 2025-06-01 | End: 2025-06-02

## 2025-06-01 RX ADMIN — AMIODARONE HYDROCHLORIDE 0.5 MG/MIN: 1.8 INJECTION, SOLUTION INTRAVENOUS at 16:04

## 2025-06-01 RX ADMIN — MUPIROCIN: 20 OINTMENT TOPICAL at 08:43

## 2025-06-01 RX ADMIN — SODIUM CHLORIDE, PRESERVATIVE FREE 10 ML: 5 INJECTION INTRAVENOUS at 08:43

## 2025-06-01 RX ADMIN — ATORVASTATIN CALCIUM 80 MG: 80 TABLET, FILM COATED ORAL at 21:30

## 2025-06-01 RX ADMIN — FAMOTIDINE 20 MG: 20 TABLET, FILM COATED ORAL at 08:37

## 2025-06-01 RX ADMIN — POTASSIUM CHLORIDE 20 MEQ: 1500 TABLET, EXTENDED RELEASE ORAL at 17:13

## 2025-06-01 RX ADMIN — POTASSIUM CHLORIDE 20 MEQ: 1500 TABLET, EXTENDED RELEASE ORAL at 10:29

## 2025-06-01 RX ADMIN — KETOROLAC TROMETHAMINE 15 MG: 30 INJECTION, SOLUTION INTRAMUSCULAR at 02:38

## 2025-06-01 RX ADMIN — FAMOTIDINE 20 MG: 20 TABLET, FILM COATED ORAL at 21:30

## 2025-06-01 RX ADMIN — AMIODARONE HYDROCHLORIDE 150 MG: 1.5 INJECTION, SOLUTION INTRAVENOUS at 10:20

## 2025-06-01 RX ADMIN — POLYETHYLENE GLYCOL 3350 17 G: 17 POWDER, FOR SOLUTION ORAL at 08:36

## 2025-06-01 RX ADMIN — FUROSEMIDE 20 MG: 10 INJECTION, SOLUTION INTRAMUSCULAR; INTRAVENOUS at 17:14

## 2025-06-01 RX ADMIN — CLOPIDOGREL BISULFATE 75 MG: 75 TABLET, FILM COATED ORAL at 08:36

## 2025-06-01 RX ADMIN — FONDAPARINUX SODIUM 2.5 MG: 2.5 INJECTION, SOLUTION SUBCUTANEOUS at 08:37

## 2025-06-01 RX ADMIN — ACETAMINOPHEN 650 MG: 325 TABLET ORAL at 17:12

## 2025-06-01 RX ADMIN — INSULIN GLARGINE 15 UNITS: 100 INJECTION, SOLUTION SUBCUTANEOUS at 21:30

## 2025-06-01 RX ADMIN — ALBUTEROL SULFATE 2.5 MG: 2.5 SOLUTION RESPIRATORY (INHALATION) at 19:15

## 2025-06-01 RX ADMIN — SENNOSIDES AND DOCUSATE SODIUM 1 TABLET: 50; 8.6 TABLET ORAL at 08:36

## 2025-06-01 RX ADMIN — FUROSEMIDE 20 MG: 10 INJECTION, SOLUTION INTRAMUSCULAR; INTRAVENOUS at 02:39

## 2025-06-01 RX ADMIN — KETOROLAC TROMETHAMINE 15 MG: 30 INJECTION, SOLUTION INTRAMUSCULAR at 08:35

## 2025-06-01 RX ADMIN — SENNOSIDES AND DOCUSATE SODIUM 1 TABLET: 50; 8.6 TABLET ORAL at 21:30

## 2025-06-01 RX ADMIN — METOPROLOL TARTRATE 12.5 MG: 25 TABLET, FILM COATED ORAL at 21:30

## 2025-06-01 RX ADMIN — ALBUTEROL SULFATE 2.5 MG: 2.5 SOLUTION RESPIRATORY (INHALATION) at 08:23

## 2025-06-01 RX ADMIN — METHOCARBAMOL 500 MG: 500 TABLET ORAL at 12:30

## 2025-06-01 RX ADMIN — KETOROLAC TROMETHAMINE 15 MG: 30 INJECTION, SOLUTION INTRAMUSCULAR at 21:32

## 2025-06-01 RX ADMIN — ACETAMINOPHEN 650 MG: 325 TABLET ORAL at 12:30

## 2025-06-01 RX ADMIN — POTASSIUM CHLORIDE 20 MEQ: 1500 TABLET, EXTENDED RELEASE ORAL at 12:30

## 2025-06-01 RX ADMIN — KETOROLAC TROMETHAMINE 15 MG: 30 INJECTION, SOLUTION INTRAMUSCULAR at 17:10

## 2025-06-01 RX ADMIN — ALBUTEROL SULFATE 2.5 MG: 2.5 SOLUTION RESPIRATORY (INHALATION) at 11:41

## 2025-06-01 RX ADMIN — SODIUM CHLORIDE, PRESERVATIVE FREE 10 ML: 5 INJECTION INTRAVENOUS at 21:31

## 2025-06-01 RX ADMIN — METHOCARBAMOL 500 MG: 500 TABLET ORAL at 17:12

## 2025-06-01 RX ADMIN — ACETAMINOPHEN 650 MG: 325 TABLET ORAL at 05:55

## 2025-06-01 RX ADMIN — AMIODARONE HYDROCHLORIDE 1 MG/MIN: 1.8 INJECTION, SOLUTION INTRAVENOUS at 10:31

## 2025-06-01 RX ADMIN — GABAPENTIN 100 MG: 100 CAPSULE ORAL at 08:36

## 2025-06-01 RX ADMIN — METHOCARBAMOL 500 MG: 500 TABLET ORAL at 21:30

## 2025-06-01 RX ADMIN — METHOCARBAMOL 500 MG: 500 TABLET ORAL at 08:36

## 2025-06-01 RX ADMIN — CHLORHEXIDINE GLUCONATE 15 ML: 1.2 RINSE ORAL at 08:43

## 2025-06-01 RX ADMIN — GABAPENTIN 100 MG: 100 CAPSULE ORAL at 21:30

## 2025-06-01 RX ADMIN — OXYCODONE 5 MG: 5 TABLET ORAL at 06:55

## 2025-06-01 RX ADMIN — Medication 400 MG: at 21:30

## 2025-06-01 RX ADMIN — ALBUTEROL SULFATE 2.5 MG: 2.5 SOLUTION RESPIRATORY (INHALATION) at 15:45

## 2025-06-01 RX ADMIN — ASPIRIN 81 MG: 81 TABLET, CHEWABLE ORAL at 08:37

## 2025-06-01 RX ADMIN — POTASSIUM CHLORIDE 20 MEQ: 1500 TABLET, EXTENDED RELEASE ORAL at 08:36

## 2025-06-01 RX ADMIN — MUPIROCIN: 20 OINTMENT TOPICAL at 21:31

## 2025-06-01 ASSESSMENT — PAIN SCALES - GENERAL
PAINLEVEL_OUTOF10: 10
PAINLEVEL_OUTOF10: 3
PAINLEVEL_OUTOF10: 9
PAINLEVEL_OUTOF10: 6
PAINLEVEL_OUTOF10: 7

## 2025-06-01 ASSESSMENT — PAIN DESCRIPTION - LOCATION
LOCATION: SHOULDER
LOCATION: CHEST

## 2025-06-01 ASSESSMENT — PAIN DESCRIPTION - ORIENTATION
ORIENTATION: ANTERIOR
ORIENTATION: MID
ORIENTATION: RIGHT;LEFT

## 2025-06-01 ASSESSMENT — PAIN DESCRIPTION - DESCRIPTORS: DESCRIPTORS: ACHING

## 2025-06-01 NOTE — PROGRESS NOTES
Physical Therapy  Facility/Department: [facility] C2 CARD TELEMETRY  Daily Treatment Note  NAME: Rosario Riley  : 1960  MRN: [id number]    Date of Service: 2025    Discharge Recommendations:  IP Rehab, Patient able to tolerate 3 hours of therapy per day   PT Equipment Recommendations  Equipment Needed: No  Other: defer    Therapy discharge recommendations are subject to collaboration from the patient’s interdisciplinary healthcare team, including MD and case management recommendations.     Barriers to Home Discharge:   [x] Steps to access home entry or bed/bath:   [x] Unable to transfer, ambulate, or propel wheelchair household distances without assist   [x] Limited available assist at home upon discharge    [x] Patient or family requests d/c to post-acute facility    [] Poor cognition/safety awareness for d/c to home alone    [] Unable to maintain ordered weight bearing status    [] Patient with salient signs of long-standing immobility   [x] Decreased independence with ADLs   [x] Increased risk for falls   [] Other:     If pt is unable to be seen after this session, please let this note serve as discharge summary.  Please see case management note for discharge disposition.  Thank you.    Patient Diagnosis(es): The encounter diagnosis was Coronary artery disease.    Assessment  Assessment: Pt is awake and agreeable to therapy session. She is not seen as a cotreatment this date d/t improved mobility, although does require 2 person moderate assist for bed mobility with RN assisting therapist. Pt is limited by pain, decreased activity tolerance, and sternal precautions. She requires minimal assistance with ambulation in room using walker, and CG-Chris for sit to stand attempts. Recommend IPR upon discharge to progress towards prior level of function and address remaining strength and mobility deficits.  Activity Tolerance: Patient tolerated evaluation without incident;Patient limited by fatigue;Patient limited  robert prominences offloaded;Gait belt;Left in chair;Nurse notified;Patient at risk for falls;Call light within reach  Restraints  Restraints Initially in Place: No         Goals  Short Term Goals  Time Frame for Short Term Goals: 6/7 (7 days) unless otherwise stated  Short Term Goal 1: Pt will perform supine <> sit with modA  Short Term Goal 2: Pt will perform all transfers with LRAD and CGA  Short Term Goal 3: Pt will ambulate 25 ft with LRAD and Chris- goal met 6/01, upgraded to 150' w/ CGA and LRAD  Short Term Goal 4: Pt will perform 10 reps of BLE exercises by 6/05  Short Term Goal 5: Pt will tolerate stairs assessment  Patient Goals   Patient Goals : \"to go to rehab\"    Education  Patient Education  Education Given To: Patient  Education Provided: Role of Therapy;Plan of Care;Precautions;Transfer Training;Energy Conservation;Home Exercise Program  Education Provided Comments: Disease Specific Education: Pt educated on sternal precautions (no pushing, pulling, lifting), safe mobility, and task modification.  Education Method: Verbal;Demonstration  Barriers to Learning: Readiness to Learn  Education Outcome: Verbalized understanding;Continued education needed    AM-PAC - Mobility    AM-PAC Basic Mobility - Inpatient   How much help is needed turning from your back to your side while in a flat bed without using bedrails?: A Lot  How much help is needed moving from lying on your back to sitting on the side of a flat bed without using bedrails?: A Lot  How much help is needed moving to and from a bed to a chair?: A Little  How much help is needed standing up from a chair using your arms?: A Little  How much help is needed walking in hospital room?: A Little  How much help is needed climbing 3-5 steps with a railing?: A Lot  AM-PAC Inpatient Mobility Raw Score : 15  AM-PAC Inpatient T-Scale Score : 39.45  Mobility Inpatient CMS 0-100% Score: 57.7  Mobility Inpatient CMS G-Code Modifier : CK         Therapy Time

## 2025-06-01 NOTE — PROGRESS NOTES
Occupational Therapy    Chart reviewed, attempted to see pt for follow up treatment this date;  per RN hold d/t pt in Afib.    Nargis Vegas,OT

## 2025-06-01 NOTE — PROGRESS NOTES
CTS  POD2  cabg x 2, HOMERO clip  Vss BP soft on levo gtt 4 mg/min RA  New afib this am into 140s, amio bolus and gtt  I/O -789 yest started on lasix  Plavix started yesterday  PE:  Neuro intact  Heart irreg irreg sternum stable incision dressed  Lungs clear anteriorly, RA  Abd soft active ntnd  Ext trace edema  Cxray wet  A:  Pod2  P:  amio for afib  BP soft, would try midodrine but incomplete revasc per note  Plavix started yesterday   Cont diuresis    Driss Cook MD

## 2025-06-01 NOTE — PROGRESS NOTES
Shift: [id number]    Procedure:   CORONARY ARTERY BYPASS GRAFT TIMES TWO, RIGHT AND LEFT LEG ENDOSCOPIC SAPHENOUS VEIN HARVEST, TOTAL CARDIOPULMONARY BYPASS, TRANSESOPHAGEAL ECHOCARDIOGRAM. POSTERIOR PERICARDIOTOMY, LEFT ATRIAL APPENDAGE CLIP PLACEMENT AND BILATERAL PECTORALIS BLOCKS (Chest)     Admit from OR (time and date): 5/30/25 @ 1500    Transition Time (Date @ Time)    Most recent vitals: BP (!) 89/44   Pulse 70   Temp 100.1 °F (37.8 °C) (Bladder)   Resp 23   Ht 1.702 m (5' 7\")   Wt 103.3 kg (227 lb 11.8 oz)   LMP 01/30/2009   SpO2 92%   BMI 35.67 kg/m²      Pre-Op Weight Weight - Scale: 100.2 kg (221 lb)  Today's weight   Wt Readings from Last 1 Encounters:   06/01/25 103.3 kg (227 lb 11.8 oz)         EF: Pre 55%  Post 55%    Rhythm:    [x] Normal Sinus Rhythm   [] Atrial Fibrillation    [] Sinus Tach   [] Sinus Kiran    [] (adverse rhythms)     Current O2:   [x] Room Air   [] NC  L   [] BiPaP    [] Vented 30% Fi02  Peep 5    Shift Outputs:  Siegel 825 mL  Chest tubes:  M 120 mL  LP 30 mL  RP 70 mL    Air Leak [] Yes  [x] No    Hospital Course:  POD# 0 (Days)  -Out of OR at 1500  -x1 Bicarb given for base excess of -3.2  -Albumin 250 mg given   -Weaned off Precedex drip  -SBT started @ 1820  -Titrating insulin drip hourly     POD#0 NIGHTS 5/30/25  -Urine 645  -L pl 80; R pl 189; M 165  -Extubated at 2055 to 4 l NC and is now on 2l NC  -Pain was a constant issue-gave prn Fent 50mcg x 2 and 25mcg once  -pt is very difficult to get moving as she screams and becomes increasingly anxious and difficult to direct.  -Up x 2 to chair this am  -Constantly demands something to drink.    -Gave 20meq of K x 1       POD#1 Days 5/31/25  -Pt up in chair all morning   -A. Line pulled  -RN attempted to pull mediastinal chest tube-unsuccessful. MD notified   -Soft BP this afternoon, albumin given and levo started  -Urine o/p 675  -L pl 40; R pl 70; M 40    POD #F1 Nights 5/31/25  -urine 845  -lL pl 105; R pl 170; M

## 2025-06-01 NOTE — PROGRESS NOTES
Shift: [id number]    Procedure:   CORONARY ARTERY BYPASS GRAFT TIMES TWO, RIGHT AND LEFT LEG ENDOSCOPIC SAPHENOUS VEIN HARVEST, TOTAL CARDIOPULMONARY BYPASS, TRANSESOPHAGEAL ECHOCARDIOGRAM. POSTERIOR PERICARDIOTOMY, LEFT ATRIAL APPENDAGE CLIP PLACEMENT AND BILATERAL PECTORALIS BLOCKS (Chest)     Admit from OR (time and date): 5/30/25 @ 1500    Transition Time (Date @ Time)    Most recent vitals: BP (!) 97/48   Pulse 63   Temp 100.2 °F (37.9 °C) (Core)   Resp 19   Ht 1.702 m (5' 7\")   Wt 103.3 kg (227 lb 11.8 oz)   LMP 01/30/2009   SpO2 100%   BMI 35.67 kg/m²      Pre-Op Weight Weight - Scale: 100.2 kg (221 lb)  Today's weight   Wt Readings from Last 1 Encounters:   06/01/25 103.3 kg (227 lb 11.8 oz)         EF: Pre 55%  Post 55%    Rhythm:    [x] Normal Sinus Rhythm   [] Atrial Fibrillation    [] Sinus Tach   [] Sinus Kiran    [] (adverse rhythms)     Current O2:   [x] Room Air   [] NC  L   [] BiPaP    [] Vented 30% Fi02  Peep 5    Shift Outputs:  Siegel 825 mL  Chest tubes:  M 120 mL  LP 30 mL  RP 70 mL    Air Leak [] Yes  [x] No    Hospital Course:  POD# 0 (Days)  -Out of OR at 1500  -x1 Bicarb given for base excess of -3.2  -Albumin 250 mg given   -Weaned off Precedex drip  -SBT started @ 1820  -Titrating insulin drip hourly     POD#0 NIGHTS 5/30/25  -Urine 645  -L pl 80; R pl 189; M 165  -Extubated at 2055 to 4 l NC and is now on 2l NC  -Pain was a constant issue-gave prn Fent 50mcg x 2 and 25mcg once  -pt is very difficult to get moving as she screams and becomes increasingly anxious and difficult to direct.  -Up x 2 to chair this am  -Constantly demands something to drink.    -Gave 20meq of K x 1       POD#1 Days 5/31/25  -Pt up in chair all morning   -A. Line pulled  -RN attempted to pull mediastinal chest tube-unsuccessful. MD notified   -Soft BP this afternoon, albumin given and levo started  -Urine o/p 675  -L pl 40; R pl 70; M 40    POD #F1 Nights 5/31/25  -urine 845  -lL pl 105; R pl 170; M

## 2025-06-02 ENCOUNTER — APPOINTMENT (OUTPATIENT)
Dept: GENERAL RADIOLOGY | Age: 65
DRG: 236 | End: 2025-06-02
Attending: THORACIC SURGERY (CARDIOTHORACIC VASCULAR SURGERY)
Payer: COMMERCIAL

## 2025-06-02 ENCOUNTER — ANESTHESIA EVENT (OUTPATIENT)
Dept: OPERATING ROOM | Age: 65
DRG: 236 | End: 2025-06-02
Payer: COMMERCIAL

## 2025-06-02 ENCOUNTER — ANESTHESIA (OUTPATIENT)
Dept: OPERATING ROOM | Age: 65
DRG: 236 | End: 2025-06-02
Payer: COMMERCIAL

## 2025-06-02 LAB
ANION GAP SERPL CALCULATED.3IONS-SCNC: 8 MMOL/L (ref 3–16)
BUN SERPL-MCNC: 26 MG/DL (ref 7–20)
CALCIUM SERPL-MCNC: 7.9 MG/DL (ref 8.3–10.6)
CHLORIDE SERPL-SCNC: 107 MMOL/L (ref 99–110)
CO2 SERPL-SCNC: 22 MMOL/L (ref 21–32)
CREAT SERPL-MCNC: 1.2 MG/DL (ref 0.6–1.2)
DEPRECATED RDW RBC AUTO: 13.9 % (ref 12.4–15.4)
GFR SERPLBLD CREATININE-BSD FMLA CKD-EPI: 50 ML/MIN/{1.73_M2}
GLUCOSE BLD-MCNC: 119 MG/DL (ref 70–99)
GLUCOSE BLD-MCNC: 126 MG/DL (ref 70–99)
GLUCOSE BLD-MCNC: 230 MG/DL (ref 70–99)
GLUCOSE SERPL-MCNC: 114 MG/DL (ref 70–99)
HCT VFR BLD AUTO: 25.4 % (ref 36–48)
HGB BLD-MCNC: 8.5 G/DL (ref 12–16)
INR PPP: 1.21 (ref 0.85–1.15)
MAGNESIUM SERPL-MCNC: 2.92 MG/DL (ref 1.8–2.4)
MCH RBC QN AUTO: 31.1 PG (ref 26–34)
MCHC RBC AUTO-ENTMCNC: 33.5 G/DL (ref 31–36)
MCV RBC AUTO: 92.8 FL (ref 80–100)
PERFORMED ON: ABNORMAL
PLATELET # BLD AUTO: 245 K/UL (ref 135–450)
PMV BLD AUTO: 8 FL (ref 5–10.5)
POTASSIUM SERPL-SCNC: 5.2 MMOL/L (ref 3.5–5.1)
PROTHROMBIN TIME: 15.5 SEC (ref 11.9–14.9)
RBC # BLD AUTO: 2.74 M/UL (ref 4–5.2)
SODIUM SERPL-SCNC: 137 MMOL/L (ref 136–145)
WBC # BLD AUTO: 12 K/UL (ref 4–11)

## 2025-06-02 PROCEDURE — 2140000000 HC CCU INTERMEDIATE R&B

## 2025-06-02 PROCEDURE — 94640 AIRWAY INHALATION TREATMENT: CPT

## 2025-06-02 PROCEDURE — 2500000003 HC RX 250 WO HCPCS: Performed by: THORACIC SURGERY (CARDIOTHORACIC VASCULAR SURGERY)

## 2025-06-02 PROCEDURE — 97530 THERAPEUTIC ACTIVITIES: CPT

## 2025-06-02 PROCEDURE — 2580000003 HC RX 258

## 2025-06-02 PROCEDURE — 3700000000 HC ANESTHESIA ATTENDED CARE: Performed by: THORACIC SURGERY (CARDIOTHORACIC VASCULAR SURGERY)

## 2025-06-02 PROCEDURE — 6360000002 HC RX W HCPCS: Performed by: THORACIC SURGERY (CARDIOTHORACIC VASCULAR SURGERY)

## 2025-06-02 PROCEDURE — 0WPB00Z REMOVAL OF DRAINAGE DEVICE FROM LEFT PLEURAL CAVITY, OPEN APPROACH: ICD-10-PCS | Performed by: THORACIC SURGERY (CARDIOTHORACIC VASCULAR SURGERY)

## 2025-06-02 PROCEDURE — 2709999900 HC NON-CHARGEABLE SUPPLY: Performed by: THORACIC SURGERY (CARDIOTHORACIC VASCULAR SURGERY)

## 2025-06-02 PROCEDURE — 80048 BASIC METABOLIC PNL TOTAL CA: CPT

## 2025-06-02 PROCEDURE — 2720000010 HC SURG SUPPLY STERILE: Performed by: THORACIC SURGERY (CARDIOTHORACIC VASCULAR SURGERY)

## 2025-06-02 PROCEDURE — 3700000001 HC ADD 15 MINUTES (ANESTHESIA): Performed by: THORACIC SURGERY (CARDIOTHORACIC VASCULAR SURGERY)

## 2025-06-02 PROCEDURE — 6360000002 HC RX W HCPCS

## 2025-06-02 PROCEDURE — 6370000000 HC RX 637 (ALT 250 FOR IP): Performed by: THORACIC SURGERY (CARDIOTHORACIC VASCULAR SURGERY)

## 2025-06-02 PROCEDURE — 71045 X-RAY EXAM CHEST 1 VIEW: CPT

## 2025-06-02 PROCEDURE — 97116 GAIT TRAINING THERAPY: CPT

## 2025-06-02 PROCEDURE — 6370000000 HC RX 637 (ALT 250 FOR IP)

## 2025-06-02 PROCEDURE — 20680 REMOVAL OF IMPLANT DEEP: CPT | Performed by: THORACIC SURGERY (CARDIOTHORACIC VASCULAR SURGERY)

## 2025-06-02 PROCEDURE — 3600000005 HC SURGERY LEVEL 5 BASE: Performed by: THORACIC SURGERY (CARDIOTHORACIC VASCULAR SURGERY)

## 2025-06-02 PROCEDURE — 2500000003 HC RX 250 WO HCPCS

## 2025-06-02 PROCEDURE — 85610 PROTHROMBIN TIME: CPT

## 2025-06-02 PROCEDURE — 83735 ASSAY OF MAGNESIUM: CPT

## 2025-06-02 PROCEDURE — 3600000015 HC SURGERY LEVEL 5 ADDTL 15MIN: Performed by: THORACIC SURGERY (CARDIOTHORACIC VASCULAR SURGERY)

## 2025-06-02 PROCEDURE — 94669 MECHANICAL CHEST WALL OSCILL: CPT

## 2025-06-02 PROCEDURE — 85027 COMPLETE CBC AUTOMATED: CPT

## 2025-06-02 RX ORDER — ONDANSETRON 2 MG/ML
4 INJECTION INTRAMUSCULAR; INTRAVENOUS ONCE
Status: CANCELLED | OUTPATIENT
Start: 2025-06-02 | End: 2025-06-02

## 2025-06-02 RX ORDER — SODIUM CHLORIDE, SODIUM LACTATE, POTASSIUM CHLORIDE, CALCIUM CHLORIDE 600; 310; 30; 20 MG/100ML; MG/100ML; MG/100ML; MG/100ML
INJECTION, SOLUTION INTRAVENOUS
Status: DISCONTINUED | OUTPATIENT
Start: 2025-06-02 | End: 2025-06-02 | Stop reason: SDUPTHER

## 2025-06-02 RX ORDER — PHENYLEPHRINE HCL IN 0.9% NACL 1 MG/10 ML
SYRINGE (ML) INTRAVENOUS
Status: DISCONTINUED | OUTPATIENT
Start: 2025-06-02 | End: 2025-06-02 | Stop reason: SDUPTHER

## 2025-06-02 RX ORDER — FENTANYL CITRATE 50 UG/ML
INJECTION, SOLUTION INTRAMUSCULAR; INTRAVENOUS
Status: DISCONTINUED | OUTPATIENT
Start: 2025-06-02 | End: 2025-06-02 | Stop reason: SDUPTHER

## 2025-06-02 RX ORDER — DEXAMETHASONE SODIUM PHOSPHATE 4 MG/ML
INJECTION, SOLUTION INTRA-ARTICULAR; INTRALESIONAL; INTRAMUSCULAR; INTRAVENOUS; SOFT TISSUE
Status: DISCONTINUED | OUTPATIENT
Start: 2025-06-02 | End: 2025-06-02 | Stop reason: SDUPTHER

## 2025-06-02 RX ORDER — EPHEDRINE SULFATE 50 MG/ML
INJECTION INTRAVENOUS
Status: DISCONTINUED | OUTPATIENT
Start: 2025-06-02 | End: 2025-06-02 | Stop reason: SDUPTHER

## 2025-06-02 RX ORDER — MIDAZOLAM HYDROCHLORIDE 1 MG/ML
INJECTION, SOLUTION INTRAMUSCULAR; INTRAVENOUS
Status: DISCONTINUED | OUTPATIENT
Start: 2025-06-02 | End: 2025-06-02 | Stop reason: SDUPTHER

## 2025-06-02 RX ORDER — ONDANSETRON 2 MG/ML
INJECTION INTRAMUSCULAR; INTRAVENOUS
Status: DISCONTINUED | OUTPATIENT
Start: 2025-06-02 | End: 2025-06-02 | Stop reason: SDUPTHER

## 2025-06-02 RX ORDER — CLINDAMYCIN PHOSPHATE 900 MG/50ML
900 INJECTION, SOLUTION INTRAVENOUS ONCE
Status: COMPLETED | OUTPATIENT
Start: 2025-06-02 | End: 2025-06-02

## 2025-06-02 RX ORDER — ROCURONIUM BROMIDE 10 MG/ML
INJECTION, SOLUTION INTRAVENOUS
Status: DISCONTINUED | OUTPATIENT
Start: 2025-06-02 | End: 2025-06-02 | Stop reason: SDUPTHER

## 2025-06-02 RX ORDER — LIDOCAINE HYDROCHLORIDE 20 MG/ML
INJECTION, SOLUTION EPIDURAL; INFILTRATION; INTRACAUDAL; PERINEURAL
Status: DISCONTINUED | OUTPATIENT
Start: 2025-06-02 | End: 2025-06-02 | Stop reason: SDUPTHER

## 2025-06-02 RX ORDER — CYCLOBENZAPRINE HCL 10 MG
10 TABLET ORAL ONCE AS NEEDED
Status: DISCONTINUED | OUTPATIENT
Start: 2025-06-02 | End: 2025-06-05 | Stop reason: HOSPADM

## 2025-06-02 RX ORDER — SODIUM CHLORIDE 0.9 % (FLUSH) 0.9 %
5-40 SYRINGE (ML) INJECTION EVERY 12 HOURS SCHEDULED
Status: CANCELLED | OUTPATIENT
Start: 2025-06-02

## 2025-06-02 RX ORDER — SODIUM CHLORIDE 0.9 % (FLUSH) 0.9 %
5-40 SYRINGE (ML) INJECTION PRN
Status: CANCELLED | OUTPATIENT
Start: 2025-06-02

## 2025-06-02 RX ORDER — NOREPINEPHRINE BITARTRATE 0.06 MG/ML
1-30 INJECTION, SOLUTION INTRAVENOUS CONTINUOUS PRN
Status: DISCONTINUED | OUTPATIENT
Start: 2025-06-02 | End: 2025-06-05 | Stop reason: HOSPADM

## 2025-06-02 RX ORDER — MIDAZOLAM HYDROCHLORIDE 1 MG/ML
2 INJECTION, SOLUTION INTRAMUSCULAR; INTRAVENOUS
Status: CANCELLED | OUTPATIENT
Start: 2025-06-02

## 2025-06-02 RX ORDER — OXYCODONE HYDROCHLORIDE 5 MG/1
5 TABLET ORAL PRN
Refills: 0 | Status: CANCELLED | OUTPATIENT
Start: 2025-06-02

## 2025-06-02 RX ORDER — OXYCODONE HYDROCHLORIDE 5 MG/1
10 TABLET ORAL PRN
Refills: 0 | Status: CANCELLED | OUTPATIENT
Start: 2025-06-02

## 2025-06-02 RX ORDER — NALOXONE HYDROCHLORIDE 0.4 MG/ML
INJECTION, SOLUTION INTRAMUSCULAR; INTRAVENOUS; SUBCUTANEOUS PRN
Status: CANCELLED | OUTPATIENT
Start: 2025-06-02

## 2025-06-02 RX ORDER — PROPOFOL 10 MG/ML
INJECTION, EMULSION INTRAVENOUS
Status: DISCONTINUED | OUTPATIENT
Start: 2025-06-02 | End: 2025-06-02 | Stop reason: SDUPTHER

## 2025-06-02 RX ORDER — DIPHENHYDRAMINE HYDROCHLORIDE 50 MG/ML
6.25 INJECTION, SOLUTION INTRAMUSCULAR; INTRAVENOUS
Status: CANCELLED | OUTPATIENT
Start: 2025-06-02

## 2025-06-02 RX ORDER — SODIUM CHLORIDE 9 MG/ML
INJECTION, SOLUTION INTRAVENOUS PRN
Status: CANCELLED | OUTPATIENT
Start: 2025-06-02

## 2025-06-02 RX ORDER — GLYCOPYRROLATE 0.2 MG/ML
INJECTION INTRAMUSCULAR; INTRAVENOUS
Status: DISCONTINUED | OUTPATIENT
Start: 2025-06-02 | End: 2025-06-02 | Stop reason: SDUPTHER

## 2025-06-02 RX ORDER — MEPERIDINE HYDROCHLORIDE 50 MG/ML
12.5 INJECTION INTRAMUSCULAR; INTRAVENOUS; SUBCUTANEOUS EVERY 5 MIN PRN
Refills: 0 | Status: CANCELLED | OUTPATIENT
Start: 2025-06-02

## 2025-06-02 RX ADMIN — SENNOSIDES AND DOCUSATE SODIUM 1 TABLET: 50; 8.6 TABLET ORAL at 08:32

## 2025-06-02 RX ADMIN — ACETAMINOPHEN 650 MG: 325 TABLET ORAL at 00:06

## 2025-06-02 RX ADMIN — DEXMEDETOMIDINE HYDROCHLORIDE 4 MCG: 100 INJECTION, SOLUTION INTRAVENOUS at 11:40

## 2025-06-02 RX ADMIN — CHLORHEXIDINE GLUCONATE 15 ML: 1.2 RINSE ORAL at 08:32

## 2025-06-02 RX ADMIN — MUPIROCIN: 20 OINTMENT TOPICAL at 20:16

## 2025-06-02 RX ADMIN — ROCURONIUM BROMIDE 80 MG: 50 INJECTION, SOLUTION INTRAVENOUS at 10:58

## 2025-06-02 RX ADMIN — EPHEDRINE SULFATE 10 MG: 50 INJECTION INTRAVENOUS at 11:20

## 2025-06-02 RX ADMIN — GABAPENTIN 100 MG: 100 CAPSULE ORAL at 20:15

## 2025-06-02 RX ADMIN — SODIUM CHLORIDE, PRESERVATIVE FREE 10 ML: 5 INJECTION INTRAVENOUS at 08:32

## 2025-06-02 RX ADMIN — KETOROLAC TROMETHAMINE 15 MG: 30 INJECTION, SOLUTION INTRAMUSCULAR at 03:11

## 2025-06-02 RX ADMIN — SUGAMMADEX 400 MG: 100 INJECTION, SOLUTION INTRAVENOUS at 11:45

## 2025-06-02 RX ADMIN — INSULIN LISPRO 4 UNITS: 100 INJECTION, SOLUTION INTRAVENOUS; SUBCUTANEOUS at 20:25

## 2025-06-02 RX ADMIN — ACETAMINOPHEN 650 MG: 325 TABLET ORAL at 12:43

## 2025-06-02 RX ADMIN — ONDANSETRON 4 MG: 2 INJECTION, SOLUTION INTRAMUSCULAR; INTRAVENOUS at 10:14

## 2025-06-02 RX ADMIN — KETOROLAC TROMETHAMINE 15 MG: 30 INJECTION, SOLUTION INTRAMUSCULAR at 20:17

## 2025-06-02 RX ADMIN — ALBUTEROL SULFATE 2.5 MG: 2.5 SOLUTION RESPIRATORY (INHALATION) at 07:49

## 2025-06-02 RX ADMIN — KETOROLAC TROMETHAMINE 15 MG: 30 INJECTION, SOLUTION INTRAMUSCULAR at 08:32

## 2025-06-02 RX ADMIN — METHOCARBAMOL 500 MG: 500 TABLET ORAL at 08:31

## 2025-06-02 RX ADMIN — MUPIROCIN: 20 OINTMENT TOPICAL at 08:33

## 2025-06-02 RX ADMIN — METOPROLOL TARTRATE 12.5 MG: 25 TABLET, FILM COATED ORAL at 20:15

## 2025-06-02 RX ADMIN — FUROSEMIDE 20 MG: 10 INJECTION, SOLUTION INTRAMUSCULAR; INTRAVENOUS at 08:32

## 2025-06-02 RX ADMIN — ACETAMINOPHEN 650 MG: 325 TABLET ORAL at 06:29

## 2025-06-02 RX ADMIN — FAMOTIDINE 20 MG: 20 TABLET, FILM COATED ORAL at 20:15

## 2025-06-02 RX ADMIN — Medication 200 MCG: at 11:22

## 2025-06-02 RX ADMIN — SODIUM CHLORIDE, SODIUM LACTATE, POTASSIUM CHLORIDE, AND CALCIUM CHLORIDE: .6; .31; .03; .02 INJECTION, SOLUTION INTRAVENOUS at 10:44

## 2025-06-02 RX ADMIN — FENTANYL CITRATE 25 MCG: 50 INJECTION, SOLUTION INTRAMUSCULAR; INTRAVENOUS at 11:41

## 2025-06-02 RX ADMIN — DEXAMETHASONE SODIUM PHOSPHATE 4 MG: 4 INJECTION, SOLUTION INTRAMUSCULAR; INTRAVENOUS at 11:10

## 2025-06-02 RX ADMIN — FENTANYL CITRATE 25 MCG: 50 INJECTION, SOLUTION INTRAMUSCULAR; INTRAVENOUS at 11:47

## 2025-06-02 RX ADMIN — FAMOTIDINE 20 MG: 20 TABLET, FILM COATED ORAL at 08:31

## 2025-06-02 RX ADMIN — METHOCARBAMOL 500 MG: 500 TABLET ORAL at 12:43

## 2025-06-02 RX ADMIN — FENTANYL CITRATE 25 MCG: 50 INJECTION, SOLUTION INTRAMUSCULAR; INTRAVENOUS at 10:57

## 2025-06-02 RX ADMIN — ONDANSETRON 4 MG: 2 INJECTION INTRAMUSCULAR; INTRAVENOUS at 11:30

## 2025-06-02 RX ADMIN — SODIUM CHLORIDE, PRESERVATIVE FREE 10 ML: 5 INJECTION INTRAVENOUS at 20:16

## 2025-06-02 RX ADMIN — EPHEDRINE SULFATE 10 MG: 50 INJECTION INTRAVENOUS at 12:06

## 2025-06-02 RX ADMIN — SODIUM CHLORIDE, SODIUM LACTATE, POTASSIUM CHLORIDE, AND CALCIUM CHLORIDE: .6; .31; .03; .02 INJECTION, SOLUTION INTRAVENOUS at 12:00

## 2025-06-02 RX ADMIN — METHOCARBAMOL 500 MG: 500 TABLET ORAL at 20:15

## 2025-06-02 RX ADMIN — Medication 20 MG: at 11:06

## 2025-06-02 RX ADMIN — ALBUTEROL SULFATE 2.5 MG: 2.5 SOLUTION RESPIRATORY (INHALATION) at 15:27

## 2025-06-02 RX ADMIN — OXYCODONE 5 MG: 5 TABLET ORAL at 06:30

## 2025-06-02 RX ADMIN — GABAPENTIN 100 MG: 100 CAPSULE ORAL at 14:15

## 2025-06-02 RX ADMIN — SENNOSIDES AND DOCUSATE SODIUM 1 TABLET: 50; 8.6 TABLET ORAL at 20:15

## 2025-06-02 RX ADMIN — ATORVASTATIN CALCIUM 80 MG: 80 TABLET, FILM COATED ORAL at 20:15

## 2025-06-02 RX ADMIN — PROPOFOL 50 MG: 10 INJECTION, EMULSION INTRAVENOUS at 11:08

## 2025-06-02 RX ADMIN — MIDAZOLAM 0.75 MG: 1 INJECTION INTRAMUSCULAR; INTRAVENOUS at 10:52

## 2025-06-02 RX ADMIN — FENTANYL CITRATE 25 MCG: 50 INJECTION, SOLUTION INTRAMUSCULAR; INTRAVENOUS at 11:10

## 2025-06-02 RX ADMIN — GABAPENTIN 100 MG: 100 CAPSULE ORAL at 08:32

## 2025-06-02 RX ADMIN — PROPOFOL 150 MG: 10 INJECTION, EMULSION INTRAVENOUS at 10:57

## 2025-06-02 RX ADMIN — Medication 200 MCG: at 11:49

## 2025-06-02 RX ADMIN — INSULIN GLARGINE 15 UNITS: 100 INJECTION, SOLUTION SUBCUTANEOUS at 20:25

## 2025-06-02 RX ADMIN — ACETAMINOPHEN 650 MG: 325 TABLET ORAL at 17:58

## 2025-06-02 RX ADMIN — CHLORHEXIDINE GLUCONATE 15 ML: 1.2 RINSE ORAL at 20:16

## 2025-06-02 RX ADMIN — Medication 200 MCG: at 11:20

## 2025-06-02 RX ADMIN — KETOROLAC TROMETHAMINE 15 MG: 30 INJECTION, SOLUTION INTRAMUSCULAR at 14:15

## 2025-06-02 RX ADMIN — CLINDAMYCIN PHOSPHATE 900 MG: 900 INJECTION, SOLUTION INTRAVENOUS at 11:01

## 2025-06-02 RX ADMIN — GLYCOPYRROLATE 0.1 MG: 0.2 INJECTION, SOLUTION INTRAMUSCULAR; INTRAVENOUS at 11:21

## 2025-06-02 RX ADMIN — ALBUTEROL SULFATE 2.5 MG: 2.5 SOLUTION RESPIRATORY (INHALATION) at 19:34

## 2025-06-02 RX ADMIN — PROPOFOL 20 MG: 10 INJECTION, EMULSION INTRAVENOUS at 11:41

## 2025-06-02 RX ADMIN — METHOCARBAMOL 500 MG: 500 TABLET ORAL at 17:58

## 2025-06-02 RX ADMIN — LIDOCAINE HYDROCHLORIDE 100 MG: 20 INJECTION, SOLUTION EPIDURAL; INFILTRATION; INTRACAUDAL at 10:57

## 2025-06-02 RX ADMIN — Medication 300 MCG: at 11:28

## 2025-06-02 ASSESSMENT — PAIN DESCRIPTION - DESCRIPTORS: DESCRIPTORS: ACHING

## 2025-06-02 ASSESSMENT — PAIN SCALES - GENERAL
PAINLEVEL_OUTOF10: 5
PAINLEVEL_OUTOF10: 4
PAINLEVEL_OUTOF10: 3

## 2025-06-02 ASSESSMENT — PAIN DESCRIPTION - ORIENTATION: ORIENTATION: RIGHT

## 2025-06-02 ASSESSMENT — PAIN DESCRIPTION - LOCATION: LOCATION: NECK

## 2025-06-02 NOTE — PROGRESS NOTES
Physical Therapy  Facility/Department: [facility] C2 CARD TELEMETRY  Daily Treatment Note  NAME: Rosario Riley  : 1960  MRN: [id number]    Date of Service: 2025    Discharge Recommendations:  IP Rehab, Patient able to tolerate 3 hours of therapy per day   PT Equipment Recommendations  Equipment Needed: No  Other: defer    Patient Diagnosis(es): The encounter diagnosis was Coronary artery disease.    Assessment  Assessment: Pt is awake and agreeable to therapy session with encouragment. Pt continues to requires mod Ax2 for supine>sit but demos improved endurance as she was able to ambulate 2x in the room with a RW and CGA. Pt does requires a seated rest between bouts of ambulation. Pt continues to be limited by decreased activity tolerance and and sternal precautions. Continue to recommend IPR upon discharge to progress towards prior level of function and address remaining strength and mobility deficits.  Activity Tolerance: Patient limited by fatigue;Patient limited by pain;Patient tolerated treatment well  Equipment Needed: No  Other: defer    Plan  Physical Therapy Plan  General Plan: 5-7 times per week  Current Treatment Recommendations: Strengthening;Balance training;Functional mobility training;Transfer training;Gait training;Endurance training;Stair training;Neuromuscular re-education;Therapeutic activities;Home exercise program;Safety education & training;Pain management    Restrictions  Restrictions/Precautions  Restrictions/Precautions: Cardiac, Surgical Protocols, Fall Risk  Position Activity Restriction  Sternal Precautions: No Pushing, No Pulling, 5# Lifting Restrictions  Other Position/Activity Restrictions: chest tube x 1, IJ, gaming catheter, ICU monitoring, wound vac     Subjective   Subjective  Pain: 3/10 pain in neck    Objective  Vitals  Pulse: 59  Heart Rate Source: Monitor  BP: (!) 115/56  BP Location: Right upper arm  BP Method: Automatic  Patient Position: Semi fowlers  MAP

## 2025-06-02 NOTE — ANESTHESIA POSTPROCEDURE EVALUATION
Value               Date/Time                  PROTIME                  15.5 (H)            06/02/2025 05:32 AM        INR                      1.21 (H)            06/02/2025 05:32 AM        APTT                     28.3                05/30/2025 02:50 PM     Intake & Output:  In: 2612.4 [P.O.:1060; I.V.:1552.4]  Out: 1720 [Urine:1200]    Nausea & Vomiting:  No    Level of Consciousness:  Awake    Pain Assessment:  Adequate analgesia    Anesthesia Complications:  No apparent anesthetic complications    SUMMARY      Vital signs stable  OK to discharge from Stage I post anesthesia care.  Care transferred from Anesthesiology department on discharge from perioperative area       No notable events documented.

## 2025-06-02 NOTE — CARE COORDINATION
Case Management Assessment  Initial Evaluation    Date/Time of Evaluation: 6/2/2025 10:44 AM  Assessment Completed by: Billie Coleman RN    If patient is discharged prior to next notation, then this note serves as note for discharge by case management.    Patient Name: Rosario Riley                   YOB: 1960  Diagnosis: Coronary artery disease [I25.10]  CAD, multiple vessel [I25.10]  Multiple vessel coronary artery disease [I25.10]                   Date / Time: 5/30/2025  5:51 AM    Patient Admission Status: Inpatient   Readmission Risk (Low < 19, Mod (19-27), High > 27): Readmission Risk Score: 17    Current PCP: Promise Taylor PA  PCP verified by CM? Yes    Chart Reviewed: Yes      History Provided by: Patient  Patient Orientation: Alert and Oriented, Person, Place, Situation, Self    Patient Cognition: Alert    Hospitalization in the last 30 days (Readmission):  Yes    If yes, Readmission Assessment in  Navigator will be completed.    Advance Directives:      Code Status: Full Code   Patient's Primary Decision Maker is: Legal Next of Kin    Primary Decision Maker: BhavyaCathleen - Child - [phone]    Discharge Planning:    Patient lives with: Children, Family Members Type of Home: House  Primary Care Giver: Self  Patient Support Systems include: Children, Family Members   Current Financial resources: Medicare, Medicaid  Current community resources: None  Current services prior to admission: None            Current DME:              Type of Home Care services:  None    ADLS  Prior functional level: Independent in ADLs/IADLs  Current functional level: Assistance with the following:, Cooking, Housework, Shopping, Mobility, Bathing    PT AM-PAC: 15 /24  OT AM-PAC: 10 /24    Family can provide assistance at DC: Yes (family works)  Would you like Case Management to discuss the discharge plan with any other family members/significant others, and if so, who? No  Plans to Return to Present

## 2025-06-02 NOTE — PROGRESS NOTES
Occupational Therapy  Facility/Department: [facility] C2 CARD TELEMETRY  Daily Treatment Note  NAME: Rosario Riley  : 1960  MRN: [id number]    Date of Service: 2025    Discharge Recommendations:  IP Rehab  OT Equipment Recommendations  Other: defer      Patient Diagnosis(es): The encounter diagnosis was Coronary artery disease.     Assessment   Pt seen in cotx this day to maximize functional progress towards goals that could not be achieved safely in 1:1 session.    Assessment: Pt is progressing towards goals. Able to moove around the room 2x this day with CGA and RW. Pt needing Mod A x2 for OOB and demos good safety with sternal px needing Min cues. Pt is having procedure this AM and will need new-orders post op, when medically ready. d/c from caseload aty this time.  Activity Tolerance: Patient tolerated treatment well  Discharge Recommendations: IP Rehab  Other: defer     Plan  Occupational Therapy Plan  Times Per Week: 4-6x/ week ICU  Current Treatment Recommendations: Balance training;ROM;Endurance training;Functional mobility training;Safety education & training;Self-Care / ADL;Pain management;Co-Treatment;Positioning    Restrictions  Restrictions/Precautions  Restrictions/Precautions: Cardiac;Surgical Protocols;Fall Risk  Position Activity Restriction  Sternal Precautions: No Pushing;No Pulling;5# Lifting Restrictions  Other Position/Activity Restrictions: chest tube x 1, IJ, gaming catheter, ICU monitoring, wound vac    Subjective  Subjective  Subjective: Pt pleasant and agreeable with motivation. RN approved. Pt reports pain in her neck however does not rate  Orientation  Overall Orientation Status: Within Normal Limits  Orientation Level: Oriented X4  Pain: 3/10 pain in neck          Objective  Vitals  Vitals  Pulse: 64  SpO2: 92 %  BP: (!) 151/56  MAP (Calculated): 88  BP Location: Right upper arm  BP Method: Automatic  Bed Mobility Training  Bed Mobility Training: Yes  Supine to Sit:

## 2025-06-02 NOTE — ANESTHESIA PRE PROCEDURE
Department of Anesthesiology  Preprocedure Note       Name:  Rosario Riley   Age:  64 y.o.  :  1960                                          MRN:  [id number]         Date:  2025      Surgeon: Surgeon(s):  Claudia Justice MD    Procedure: Procedure(s):  REMOVAL RETAINED CHEST TUBE POSSIBLE STERNAL WIRE HARDWARE REMOVAL AND REWIRE    Medications prior to admission:   Prior to Admission medications    Medication Sig Start Date End Date Taking? Authorizing Provider   metoprolol tartrate (LOPRESSOR) 25 MG tablet Take 1 tablet by mouth 2 times daily 25  Yes Jere Acharya DO   diclofenac sodium (VOLTAREN) 1 % GEL Apply 2 g topically 4 times daily as needed for Pain  Patient taking differently: Apply 2 g topically 4 times daily as needed for Pain Indications: Arthritis 25  Yes Jere Acharya DO   chlorhexidine gluconate (HIBICLENS) 4 % SOLN external solution Apply topically daily Shower with solution the night before and the morning of surgery 25  Yes Claudia Justice MD   atorvastatin (LIPITOR) 80 MG tablet Take 1 tablet by mouth daily 25  Yes Promise Taylor PA   vitamin D (ERGOCALCIFEROL) 1.25 MG (31131 UT) CAPS capsule Take 1 capsule by mouth once a week 3/28/25  Yes Promise Taylor PA   aspirin 81 MG chewable tablet Take 1 tablet by mouth daily 25   Jere Acharya DO   furosemide (LASIX) 40 MG tablet Take 0.5 tablets by mouth daily  Patient not taking: Reported on 2025   Jere Acharya DO   olmesartan (BENICAR) 40 MG tablet Take 1 tablet by mouth daily    Provider, MD Brendan       Current medications:    Current Facility-Administered Medications   Medication Dose Route Frequency Provider Last Rate Last Admin   • cyclobenzaprine (FLEXERIL) tablet 10 mg  10 mg Oral Once PRN Claudia Justice MD       • norepinephrine (LEVOPHED) 16 mg in sodium chloride 0.9 % 250 mL infusion (premix)  1-30 mcg/min IntraVENous Continuous PRN Sánchez Mosquera, PHILOMENA - CNP       •

## 2025-06-02 NOTE — PROGRESS NOTES
Shift: [id number]    Procedure:   CORONARY ARTERY BYPASS GRAFT TIMES TWO, RIGHT AND LEFT LEG ENDOSCOPIC SAPHENOUS VEIN HARVEST, TOTAL CARDIOPULMONARY BYPASS, TRANSESOPHAGEAL ECHOCARDIOGRAM. POSTERIOR PERICARDIOTOMY, LEFT ATRIAL APPENDAGE CLIP PLACEMENT AND BILATERAL PECTORALIS BLOCKS (Chest)     Admit from OR (time and date): 5/30/25 @ 1500    Transition Time (Date @ Time)    Most recent vitals: BP (!) 124/59   Pulse 61   Temp 99.1 °F (37.3 °C) (Oral)   Resp 18   Ht 1.702 m (5' 7\")   Wt 93.8 kg (206 lb 12.7 oz)   LMP 01/30/2009   SpO2 97%   BMI 32.39 kg/m²      Pre-Op Weight Weight - Scale: 100.2 kg (221 lb)  Today's weight   Wt Readings from Last 1 Encounters:   06/02/25 93.8 kg (206 lb 12.7 oz)         EF: Pre 55%  Post 55%    Rhythm:    [x] Normal Sinus Rhythm   [] Atrial Fibrillation    [] Sinus Tach   [] Sinus Kiran    [] (adverse rhythms)     Current O2:   [x] Room Air   [] NC  L   [] BiPaP    [] Vented 30% Fi02  Peep 5    Shift Outputs:  Siegel 825 mL  Chest tubes:  M 120 mL  LP 30 mL  RP 70 mL    Air Leak [] Yes  [x] No    Hospital Course:  POD# 0 (Days)  -Out of OR at 1500  -x1 Bicarb given for base excess of -3.2  -Albumin 250 mg given   -Weaned off Precedex drip  -SBT started @ 1820  -Titrating insulin drip hourly     POD#0 NIGHTS 5/30/25  -Urine 645  -L pl 80; R pl 189; M 165  -Extubated at 2055 to 4 l NC and is now on 2l NC  -Pain was a constant issue-gave prn Fent 50mcg x 2 and 25mcg once  -pt is very difficult to get moving as she screams and becomes increasingly anxious and difficult to direct.  -Up x 2 to chair this am  -Constantly demands something to drink.    -Gave 20meq of K x 1       POD#1 Days 5/31/25  -Pt up in chair all morning   -A. Line pulled  -RN attempted to pull mediastinal chest tube-unsuccessful. MD notified   -Soft BP this afternoon, albumin given and levo started  -Urine o/p 675  -L pl 40; R pl 70; M 40    POD #F1 Nights 5/31/25  -urine 845  -lL pl 105; R pl 170; M 0  -Pain

## 2025-06-02 NOTE — PROGRESS NOTES
Shift: [id number]    Procedure:   CORONARY ARTERY BYPASS GRAFT TIMES TWO, RIGHT AND LEFT LEG ENDOSCOPIC SAPHENOUS VEIN HARVEST, TOTAL CARDIOPULMONARY BYPASS, TRANSESOPHAGEAL ECHOCARDIOGRAM. POSTERIOR PERICARDIOTOMY, LEFT ATRIAL APPENDAGE CLIP PLACEMENT AND BILATERAL PECTORALIS BLOCKS (Chest)     Admit from OR (time and date): 5/30/25 @ 1500    Transition Time (Date @ Time)    Most recent vitals: /65   Pulse 73   Temp 99.1 °F (37.3 °C) (Oral)   Resp 19   Ht 1.702 m (5' 7\")   Wt 93.8 kg (206 lb 12.7 oz)   LMP 01/30/2009   SpO2 99%   BMI 32.39 kg/m²      Pre-Op Weight Weight - Scale: 100.2 kg (221 lb)  Today's weight   Wt Readings from Last 1 Encounters:   06/02/25 93.8 kg (206 lb 12.7 oz)         EF: Pre 55%  Post 55%    Rhythm:    [x] Normal Sinus Rhythm   [] Atrial Fibrillation    [] Sinus Tach   [] Sinus Kiran    [] (adverse rhythms)     Current O2:   [x] Room Air   [] NC  L   [] BiPaP    [] Vented 30% Fi02  Peep 5    Shift Outputs:  Siegel 575 mL  Chest tubes:  M 2 mL  LP 90 mL   mL    Air Leak [] Yes  [x] No    Hospital Course:  POD# 0 (Days)  -Out of OR at 1500  -x1 Bicarb given for base excess of -3.2  -Albumin 250 mg given   -Weaned off Precedex drip  -SBT started @ 1820  -Titrating insulin drip hourly     POD#0 NIGHTS 5/30/25  -Urine 645  -L pl 80; R pl 189; M 165  -Extubated at 2055 to 4 l NC and is now on 2l NC  -Pain was a constant issue-gave prn Fent 50mcg x 2 and 25mcg once  -pt is very difficult to get moving as she screams and becomes increasingly anxious and difficult to direct.  -Up x 2 to chair this am  -Constantly demands something to drink.    -Gave 20meq of K x 1       POD#1 Days 5/31/25  -Pt up in chair all morning   -A. Line pulled  -RN attempted to pull mediastinal chest tube-unsuccessful. MD notified   -Soft BP this afternoon, albumin given and levo started  -Urine o/p 675  -L pl 40; R pl 70; M 40    POD #F1 Nights 5/31/25  -urine 845  -lL pl 105; R pl 170; M 0  -Pain much

## 2025-06-03 ENCOUNTER — APPOINTMENT (OUTPATIENT)
Dept: GENERAL RADIOLOGY | Age: 65
DRG: 236 | End: 2025-06-03
Attending: THORACIC SURGERY (CARDIOTHORACIC VASCULAR SURGERY)
Payer: COMMERCIAL

## 2025-06-03 LAB
ANION GAP SERPL CALCULATED.3IONS-SCNC: 7 MMOL/L (ref 3–16)
BLOOD BANK DISPENSE STATUS: NORMAL
BLOOD BANK PRODUCT CODE: NORMAL
BPU ID: NORMAL
BUN SERPL-MCNC: 29 MG/DL (ref 7–20)
CALCIUM SERPL-MCNC: 8 MG/DL (ref 8.3–10.6)
CHLORIDE SERPL-SCNC: 104 MMOL/L (ref 99–110)
CO2 SERPL-SCNC: 22 MMOL/L (ref 21–32)
CREAT SERPL-MCNC: 1 MG/DL (ref 0.6–1.2)
DEPRECATED RDW RBC AUTO: 13.8 % (ref 12.4–15.4)
DESCRIPTION BLOOD BANK: NORMAL
GFR SERPLBLD CREATININE-BSD FMLA CKD-EPI: 62 ML/MIN/{1.73_M2}
GLUCOSE BLD-MCNC: 103 MG/DL (ref 70–99)
GLUCOSE BLD-MCNC: 162 MG/DL (ref 70–99)
GLUCOSE SERPL-MCNC: 114 MG/DL (ref 70–99)
HCT VFR BLD AUTO: 24.7 % (ref 36–48)
HGB BLD-MCNC: 8.1 G/DL (ref 12–16)
INR PPP: 1.13 (ref 0.85–1.15)
MAGNESIUM SERPL-MCNC: 2.95 MG/DL (ref 1.8–2.4)
MCH RBC QN AUTO: 30.2 PG (ref 26–34)
MCHC RBC AUTO-ENTMCNC: 32.7 G/DL (ref 31–36)
MCV RBC AUTO: 92.5 FL (ref 80–100)
PERFORMED ON: ABNORMAL
PERFORMED ON: ABNORMAL
PLATELET # BLD AUTO: 242 K/UL (ref 135–450)
PMV BLD AUTO: 8.3 FL (ref 5–10.5)
POTASSIUM SERPL-SCNC: 5.4 MMOL/L (ref 3.5–5.1)
PROTHROMBIN TIME: 14.7 SEC (ref 11.9–14.9)
RBC # BLD AUTO: 2.67 M/UL (ref 4–5.2)
SODIUM SERPL-SCNC: 133 MMOL/L (ref 136–145)
WBC # BLD AUTO: 11.5 K/UL (ref 4–11)

## 2025-06-03 PROCEDURE — 97168 OT RE-EVAL EST PLAN CARE: CPT

## 2025-06-03 PROCEDURE — 94669 MECHANICAL CHEST WALL OSCILL: CPT

## 2025-06-03 PROCEDURE — 97530 THERAPEUTIC ACTIVITIES: CPT

## 2025-06-03 PROCEDURE — 2140000000 HC CCU INTERMEDIATE R&B

## 2025-06-03 PROCEDURE — 6360000002 HC RX W HCPCS

## 2025-06-03 PROCEDURE — 85610 PROTHROMBIN TIME: CPT

## 2025-06-03 PROCEDURE — 94640 AIRWAY INHALATION TREATMENT: CPT

## 2025-06-03 PROCEDURE — 80048 BASIC METABOLIC PNL TOTAL CA: CPT

## 2025-06-03 PROCEDURE — 6370000000 HC RX 637 (ALT 250 FOR IP)

## 2025-06-03 PROCEDURE — 83735 ASSAY OF MAGNESIUM: CPT

## 2025-06-03 PROCEDURE — 2580000003 HC RX 258

## 2025-06-03 PROCEDURE — 71045 X-RAY EXAM CHEST 1 VIEW: CPT

## 2025-06-03 PROCEDURE — 97116 GAIT TRAINING THERAPY: CPT

## 2025-06-03 PROCEDURE — 97535 SELF CARE MNGMENT TRAINING: CPT

## 2025-06-03 PROCEDURE — 6370000000 HC RX 637 (ALT 250 FOR IP): Performed by: THORACIC SURGERY (CARDIOTHORACIC VASCULAR SURGERY)

## 2025-06-03 PROCEDURE — 97164 PT RE-EVAL EST PLAN CARE: CPT

## 2025-06-03 PROCEDURE — 2500000003 HC RX 250 WO HCPCS

## 2025-06-03 PROCEDURE — 85027 COMPLETE CBC AUTOMATED: CPT

## 2025-06-03 PROCEDURE — 99024 POSTOP FOLLOW-UP VISIT: CPT

## 2025-06-03 RX ORDER — METOPROLOL TARTRATE 25 MG/1
25 TABLET, FILM COATED ORAL 2 TIMES DAILY
Status: DISCONTINUED | OUTPATIENT
Start: 2025-06-03 | End: 2025-06-05 | Stop reason: HOSPADM

## 2025-06-03 RX ORDER — FUROSEMIDE 10 MG/ML
40 INJECTION INTRAMUSCULAR; INTRAVENOUS ONCE
Status: COMPLETED | OUTPATIENT
Start: 2025-06-03 | End: 2025-06-03

## 2025-06-03 RX ADMIN — ACETAMINOPHEN 650 MG: 325 TABLET ORAL at 12:51

## 2025-06-03 RX ADMIN — FONDAPARINUX SODIUM 2.5 MG: 2.5 INJECTION, SOLUTION SUBCUTANEOUS at 07:56

## 2025-06-03 RX ADMIN — CLOPIDOGREL BISULFATE 75 MG: 75 TABLET, FILM COATED ORAL at 07:56

## 2025-06-03 RX ADMIN — ALBUTEROL SULFATE 2.5 MG: 2.5 SOLUTION RESPIRATORY (INHALATION) at 11:29

## 2025-06-03 RX ADMIN — GABAPENTIN 100 MG: 100 CAPSULE ORAL at 19:41

## 2025-06-03 RX ADMIN — ALBUTEROL SULFATE 2.5 MG: 2.5 SOLUTION RESPIRATORY (INHALATION) at 20:16

## 2025-06-03 RX ADMIN — GABAPENTIN 100 MG: 100 CAPSULE ORAL at 12:51

## 2025-06-03 RX ADMIN — POLYETHYLENE GLYCOL 3350 17 G: 17 POWDER, FOR SOLUTION ORAL at 07:57

## 2025-06-03 RX ADMIN — SODIUM CHLORIDE, PRESERVATIVE FREE 10 ML: 5 INJECTION INTRAVENOUS at 07:57

## 2025-06-03 RX ADMIN — SODIUM CHLORIDE, PRESERVATIVE FREE 10 ML: 5 INJECTION INTRAVENOUS at 19:43

## 2025-06-03 RX ADMIN — ALBUTEROL SULFATE 2.5 MG: 2.5 SOLUTION RESPIRATORY (INHALATION) at 15:35

## 2025-06-03 RX ADMIN — ASPIRIN 81 MG: 81 TABLET, CHEWABLE ORAL at 07:56

## 2025-06-03 RX ADMIN — FUROSEMIDE 40 MG: 10 INJECTION, SOLUTION INTRAMUSCULAR; INTRAVENOUS at 07:57

## 2025-06-03 RX ADMIN — ALBUTEROL SULFATE 2.5 MG: 2.5 SOLUTION RESPIRATORY (INHALATION) at 07:51

## 2025-06-03 RX ADMIN — METHOCARBAMOL 500 MG: 500 TABLET ORAL at 18:37

## 2025-06-03 RX ADMIN — SENNOSIDES AND DOCUSATE SODIUM 1 TABLET: 50; 8.6 TABLET ORAL at 07:56

## 2025-06-03 RX ADMIN — ACETAMINOPHEN 650 MG: 325 TABLET ORAL at 05:25

## 2025-06-03 RX ADMIN — FAMOTIDINE 20 MG: 10 INJECTION, SOLUTION INTRAVENOUS at 19:41

## 2025-06-03 RX ADMIN — KETOROLAC TROMETHAMINE 15 MG: 30 INJECTION, SOLUTION INTRAMUSCULAR at 16:03

## 2025-06-03 RX ADMIN — KETOROLAC TROMETHAMINE 15 MG: 30 INJECTION, SOLUTION INTRAMUSCULAR at 07:57

## 2025-06-03 RX ADMIN — METHOCARBAMOL 500 MG: 500 TABLET ORAL at 12:51

## 2025-06-03 RX ADMIN — FAMOTIDINE 20 MG: 20 TABLET, FILM COATED ORAL at 07:56

## 2025-06-03 RX ADMIN — ACETAMINOPHEN 650 MG: 325 TABLET ORAL at 23:38

## 2025-06-03 RX ADMIN — OXYCODONE 5 MG: 5 TABLET ORAL at 07:56

## 2025-06-03 RX ADMIN — ACETAMINOPHEN 650 MG: 325 TABLET ORAL at 18:37

## 2025-06-03 RX ADMIN — CHLORHEXIDINE GLUCONATE 15 ML: 1.2 RINSE ORAL at 19:41

## 2025-06-03 RX ADMIN — GABAPENTIN 100 MG: 100 CAPSULE ORAL at 07:56

## 2025-06-03 RX ADMIN — KETOROLAC TROMETHAMINE 15 MG: 30 INJECTION, SOLUTION INTRAMUSCULAR at 19:40

## 2025-06-03 RX ADMIN — METHOCARBAMOL 500 MG: 500 TABLET ORAL at 07:56

## 2025-06-03 RX ADMIN — INSULIN GLARGINE 15 UNITS: 100 INJECTION, SOLUTION SUBCUTANEOUS at 19:42

## 2025-06-03 RX ADMIN — ATORVASTATIN CALCIUM 80 MG: 80 TABLET, FILM COATED ORAL at 19:40

## 2025-06-03 RX ADMIN — MUPIROCIN: 20 OINTMENT TOPICAL at 19:42

## 2025-06-03 RX ADMIN — ACETAMINOPHEN 650 MG: 325 TABLET ORAL at 00:32

## 2025-06-03 RX ADMIN — METHOCARBAMOL 500 MG: 500 TABLET ORAL at 22:39

## 2025-06-03 RX ADMIN — SENNOSIDES AND DOCUSATE SODIUM 1 TABLET: 50; 8.6 TABLET ORAL at 19:40

## 2025-06-03 RX ADMIN — METOPROLOL TARTRATE 25 MG: 25 TABLET, FILM COATED ORAL at 07:56

## 2025-06-03 ASSESSMENT — PAIN SCALES - GENERAL
PAINLEVEL_OUTOF10: 5

## 2025-06-03 NOTE — PROGRESS NOTES
Shift: 1900    Procedure:   CORONARY ARTERY BYPASS GRAFT TIMES TWO, RIGHT AND LEFT LEG ENDOSCOPIC SAPHENOUS VEIN HARVEST, TOTAL CARDIOPULMONARY BYPASS, TRANSESOPHAGEAL ECHOCARDIOGRAM. POSTERIOR PERICARDIOTOMY, LEFT ATRIAL APPENDAGE CLIP PLACEMENT AND BILATERAL PECTORALIS BLOCKS (Chest)     Admit from OR (time and date): 5/30/25 @ 1500    Transition Time (Date @ Time)    Most recent vitals: BP (!) 139/59   Pulse 63   Temp 98.4 °F (36.9 °C) (Oral)   Resp 18   Ht 1.702 m (5' 7\")   Wt 95 kg (209 lb 7 oz)   LMP 01/30/2009   SpO2 97%   BMI 32.80 kg/m²      Pre-Op Weight Weight - Scale: 100.2 kg (221 lb)  Today's weight   Wt Readings from Last 1 Encounters:   06/03/25 95 kg (209 lb 7 oz)         EF: Pre 55%  Post 55%    Rhythm:    [x] Normal Sinus Rhythm   [] Atrial Fibrillation    [] Sinus Tach   [] Sinus Kiran    [] (adverse rhythms)     Current O2:   [x] Room Air   [] NC  L   [] BiPaP    [] Vented 30% Fi02  Peep 5    Shift Outputs:  Gaming   Chest tubes:      Air Leak [] Yes  [x] No    Hospital Course:  POD# 0 (Days)  -Out of OR at 1500  -x1 Bicarb given for base excess of -3.2  -Albumin 250 mg given   -Weaned off Precedex drip  -SBT started @ 1820  -Titrating insulin drip hourly     POD#0 NIGHTS 5/30/25  -Urine 645  -L pl 80; R pl 189; M 165  -Extubated at 2055 to 4 l NC and is now on 2l NC  -Pain was a constant issue-gave prn Fent 50mcg x 2 and 25mcg once  -pt is very difficult to get moving as she screams and becomes increasingly anxious and difficult to direct.  -Up x 2 to chair this am  -Constantly demands something to drink.    -Gave 20meq of K x 1       POD#1 Days 5/31/25  -Pt up in chair all morning   -A. Line pulled  -RN attempted to pull mediastinal chest tube-unsuccessful. MD notified   -Soft BP this afternoon, albumin given and levo started  -Urine o/p 675  -L pl 40; R pl 70; M 40    6/2 nights  -blood pressure stable overnight  -pain well controlled with toradol and tylenol  -gaming removed, roman

## 2025-06-03 NOTE — PROGRESS NOTES
Physical Therapy  Facility/Department: [facility] C2 CARD TELEMETRY  Physical Therapy Re-evaluation/Treatment      Name: Rosario Riley  : 1960  MRN: [id number]  Date of Service: 6/3/2025    Discharge Recommendations:  IP Rehab, Patient able to tolerate 3 hours of therapy per day   PT Equipment Recommendations  Equipment Needed: No  Other: defer      Patient Diagnosis(es): The encounter diagnosis was Coronary artery disease.  Past Medical History:  has a past medical history of Does mobilize using cane, Essential hypertension, Foot pain, bilateral, Hypercholesterolemia, Hypercholesterolemia, Multiple vessel coronary artery disease, Osteoarthritis, Severe obesity (BMI 35.0-39.9) with comorbidity (HCC), and Vitamin D deficiency.  Past Surgical History:  has a past surgical history that includes  section; Tonsillectomy (); Knee arthroscopy (Left); knee surgery (2022); Total hip arthroplasty (Left, 2024); Cardiac procedure (N/A, 2025); Coronary artery bypass graft (N/A, 2025); Hardware Removal (N/A, 2025); and Chest surgery (N/A, 2025).    Barriers to Home Discharge:   [x] Steps to access home entry or bed/bath:   [x] Unable to transfer, ambulate, or propel wheelchair household distances without assist   [x] Limited available assist at home upon discharge    [x] Patient or family requests d/c to post-acute facility    [] Poor cognition/safety awareness for d/c to home alone    [] Unable to maintain ordered weight bearing status    [] Patient with salient signs of long-standing immobility   [x] Decreased independence with ADLs   [x] Increased risk for falls   [] Other:       Assessment  Body Structures, Functions, Activity Limitations Requiring Skilled Therapeutic Intervention: Decreased functional mobility ;Decreased ROM;Decreased safe awareness;Decreased body mechanics;Decreased cognition;Decreased strength;Decreased endurance;Decreased posture;Increased pain;Decreased

## 2025-06-03 NOTE — PROGRESS NOTES
Shift: [id number]    Procedure:   CORONARY ARTERY BYPASS GRAFT TIMES TWO, RIGHT AND LEFT LEG ENDOSCOPIC SAPHENOUS VEIN HARVEST, TOTAL CARDIOPULMONARY BYPASS, TRANSESOPHAGEAL ECHOCARDIOGRAM. POSTERIOR PERICARDIOTOMY, LEFT ATRIAL APPENDAGE CLIP PLACEMENT AND BILATERAL PECTORALIS BLOCKS (Chest)     Admit from OR (time and date): 5/30/25 @ 1500    Transition Time (Date @ Time)    Most recent vitals: BP (!) 88/49   Pulse 60   Temp 98.4 °F (36.9 °C) (Oral)   Resp 20   Ht 1.702 m (5' 7\")   Wt 109 kg (240 lb 3.2 oz)   LMP 01/30/2009   SpO2 96%   BMI 37.62 kg/m²      Pre-Op Weight Weight - Scale: 100.2 kg (221 lb)  Today's weight   Wt Readings from Last 1 Encounters:   06/03/25 109 kg (240 lb 3.2 oz)         EF: Pre 55%  Post 55%    Rhythm:    [x] Normal Sinus Rhythm   [] Atrial Fibrillation    [] Sinus Tach   [] Sinus Kiran    [] (adverse rhythms)     Current O2:   [x] Room Air   [] NC  L   [] BiPaP    [] Vented 30% Fi02  Peep 5    Shift Outputs:  Gaming   Chest tubes:      Air Leak [] Yes  [x] No    Hospital Course:  POD# 0 (Days)  -Out of OR at 1500  -x1 Bicarb given for base excess of -3.2  -Albumin 250 mg given   -Weaned off Precedex drip  -SBT started @ 1820  -Titrating insulin drip hourly     POD#0 NIGHTS 5/30/25  -Urine 645  -L pl 80; R pl 189; M 165  -Extubated at 2055 to 4 l NC and is now on 2l NC  -Pain was a constant issue-gave prn Fent 50mcg x 2 and 25mcg once  -pt is very difficult to get moving as she screams and becomes increasingly anxious and difficult to direct.  -Up x 2 to chair this am  -Constantly demands something to drink.    -Gave 20meq of K x 1       POD#1 Days 5/31/25  -Pt up in chair all morning   -A. Line pulled  -RN attempted to pull mediastinal chest tube-unsuccessful. MD notified   -Soft BP this afternoon, albumin given and levo started  -Urine o/p 675  -L pl 40; R pl 70; M 40    6/2 nights  -blood pressure stable overnight  -pain well controlled with toradol and tylenol  -gaming removed,  purewick in place  -bilat pleural chest tubes remain in place, minimal output    6/3 Days  -Pleural CT removed  -Walked with PT/OT      Electronically signed by Yahir Amador RN on 6/3/2025

## 2025-06-03 NOTE — PROGRESS NOTES
Department of Cardiovascular & Thoracic Surgery  Progress Note    Department of Cardiovascular & Thoracic Surgery  Progress Note      SUBJECTIVE:      Getting ready to get out of bed and use bathroom. Reports passing flatus but not BM yet.     OBJECTIVE:    Resting in bed, A&O, NAD    Vitals:    Afebrile  BP   HR 60-70s  O2 mid 90s on RA    Weight 5/30 (pre-op) 100.5 kg  Weight 6/3 109 kg    Admission 1500, extubation 2055 (5 hours 55 minutes)       24 HR INTAKE/OUTPUT:      Gross per 24 hour   Intake 3393.5   Output 2919   Net    UO: 2245  Mediastinal: 275 (110/165)  Vimal: Left-125, right-274    Data        Latest Reference Range & Units 06/03/25 05:00   Sodium 136 - 145 mmol/L 133 (L)   Potassium 3.5 - 5.1 mmol/L 5.4 (H)   Chloride 99 - 110 mmol/L 104   CARBON DIOXIDE 21 - 32 mmol/L 22   BUN,BUNPL 7 - 20 mg/dL 29 (H)   Creatinine 0.6 - 1.2 mg/dL 1.0   Anion Gap 3 - 16  7   (L): Data is abnormally low  (H): Data is abnormally high     Latest Reference Range & Units 06/03/25 05:00   WBC 4.0 - 11.0 K/uL 11.5 (H)   RBC 4.00 - 5.20 M/uL 2.67 (L)   Hemoglobin Quant 12.0 - 16.0 g/dL 8.1 (L)   Hematocrit 36.0 - 48.0 % 24.7 (L)   MCV 80.0 - 100.0 fL 92.5   MCH 26.0 - 34.0 pg 30.2   MCHC 31.0 - 36.0 g/dL 32.7   MPV 5.0 - 10.5 fL 8.3   RDW 12.4 - 15.4 % 13.8   Platelet Count 135 - 450 K/uL 242   (H): Data is abnormally high  (L): Data is abnormally low       Xray Result (most recent):  XR CHEST PORTABLE 06/03/2025    Narrative  XR CHEST PORTABLE    Indication: Postop    COMPARISON: June 2, 2025    Findings: Frontal view of the chest was obtained. Sternotomy wires are noted. The heart is stable in size and configuration. Right internal jugular venous sheath and bilateral thoracostomy tubes are unchanged.    There is no new pulmonary opacity. The lungs are unchanged in appearance. No pneumothorax or effusion.    Impression  Impression: Stable appearance of the chest.    Electronically signed by Simone Duckworth,

## 2025-06-03 NOTE — PROGRESS NOTES
Physical Therapy    Pt post-op REMOVAL RETAINED CHEST TUBE REMOVAL ONE STERNAL WIRE  CHEST HARDWARE REMOVAL on 6/02 and underwent general anesthesia. Pt will need new PT orders when appropriate. Thank you.     Debra Alvarado PT, DPT

## 2025-06-03 NOTE — OP NOTE
Operative Note      Patient: Rosario Riley  YOB: 1960  MRN: [id number]    Date of Procedure: 6/2/2025    Pre-Op Diagnosis Codes:      * Encounter for chest tube removal [Z46.82]    Post-Op Diagnosis:  Retained mediastinal chest tube       Procedure(s):  REMOVAL RETAINED CHEST TUBE REMOVAL ONE STERNAL WIRE  CHEST HARDWARE REMOVAL    Surgeon(s):  Claudia Justice MD    Assistant:   Surgical Assistant: Reva Astorga    Anesthesia: General    Estimated Blood Loss (mL): Minimal    Complications: None    Specimens:   * No specimens in log *    Implants:  * No implants in log *      Drains:   Chest Tube Right Pleural 1 (Active)   Chest Tube Airleak No 06/02/25 2100   Status Continuous Suction 06/02/25 2100   Suction Other (Comment) 06/02/25 2100   Y Connector Used No 06/02/25 1600   Drainage Description Sanguinous 06/02/25 2100   Dressing Status Clean, dry & intact 06/02/25 2100   Chest Tube Dressing Dry 06/02/25 2100   Site Assessment Clean, dry & intact 06/02/25 2100   Surrounding Skin Unable to view 06/02/25 1600   Output (ml) 40 ml 06/03/25 0500       Chest Tube Left Pleural 3 (Active)   Chest Tube Airleak No 06/02/25 2100   Status Continuous Suction 06/02/25 2100   Suction Other (Comment) 06/02/25 2100   Y Connector Used No 06/02/25 1600   Drainage Description Sanguinous 06/02/25 2100   Dressing Status Clean, dry & intact 06/02/25 2100   Chest Tube Dressing Dry 06/02/25 2100   Site Assessment Clean, dry & intact 06/02/25 2100   Surrounding Skin Unable to view 06/02/25 1600   Output (ml) 30 ml 06/03/25 0500       [REMOVED] Chest Tube Mediastinal 2 (Removed)   Chest Tube Airleak No 06/02/25 1000   Status Continuous Suction 06/02/25 0800   Suction -20 cm H2O 06/02/25 0800   Y Connector Used No 06/02/25 1000   Drainage Description Sanguinous 06/02/25 1200   Dressing Status Clean, dry & intact 06/02/25 1000   Chest Tube Dressing Dry 06/02/25 0800   Site Assessment Leaking 06/02/25 1200   Surrounding Skin

## 2025-06-03 NOTE — PROGRESS NOTES
Occupational Therapy  Facility/Department: [facility] C2 CARD TELEMETRY  Occupational Therapy Re- Assessment    Name: Rosario Riley  : 1960  MRN: [id number]  Date of Service: 6/3/2025    Discharge Recommendations:  IP Rehab, Patient able to tolerate 3 hours of therapy per day  OT Equipment Recommendations  Equipment Needed: No  Other: defer       Patient Diagnosis(es): The encounter diagnosis was Coronary artery disease.  Past Medical History:  has a past medical history of Does mobilize using cane, Essential hypertension, Foot pain, bilateral, Hypercholesterolemia, Hypercholesterolemia, Multiple vessel coronary artery disease, Osteoarthritis, Severe obesity (BMI 35.0-39.9) with comorbidity (HCC), and Vitamin D deficiency.  Past Surgical History:  has a past surgical history that includes  section; Tonsillectomy (); Knee arthroscopy (Left); knee surgery (2022); Total hip arthroplasty (Left, 2024); Cardiac procedure (N/A, 2025); Coronary artery bypass graft (N/A, 2025); Hardware Removal (N/A, 2025); and Chest surgery (N/A, 2025).   Therapy discharge recommendations are subject to collaboration from the patient’s interdisciplinary healthcare team, including MD and case management recommendations.    Barriers to Home Discharge:   [x] Steps to access home entry or bed/bath:   [x] Unable to transfer, ambulate, or propel wheelchair household distances without assist   [x] Limited available assist at home upon discharge    [x] Patient or family requests d/c to post-acute facility    [] Poor cognition/safety awareness for d/c to home alone    [] Unable to maintain ordered weight bearing status    [] Patient with salient signs of long-standing immobility   [x] Decreased independence with ADLs   [x] Increased risk for falls   [] Other:    If pt is unable to be seen after this session, please let this note serve as discharge summary.  Please see case management note for discharge  6/02  Subjective  Subjective: \"I hurt so bad\"  General Comment  Comments: RN cleared pt for OT eval; pt sitting up in chair, agreeable to therapy     Social/Functional History  Social/Functional History  Lives With: Family (2 dtrs, son--law(all work))  Type of Home: House  Home Layout: Bed/Bath upstairs, Multi-level, 1/2 bath on main level (14 steps up to 2nd floor)  Home Access: Stairs to enter without rails  Entrance Stairs - Number of Steps: 1 + 1 + 1  Bathroom Equipment: 3-in-1 Commode, Shower chair  Home Equipment: Cane, Adjustable bed, Long-handled shoehorn, Sock aid, Reacher  Has the patient had two or more falls in the past year or any fall with injury in the past year?: No  Prior Level of Assist for ADLs: Independent (with sock aid)  Homemaking Responsibilities: No  Prior Level of Assist for Ambulation: Independent household ambulator, with or without device (with cane)  Prior Level of Assist for Transfers: Independent  Active : No  Occupation: Part time employment  Type of Occupation: \"[employer](seats in chair)  Leisure & Hobbies: play in park with granddaughter(7y.o); watch TV, on phone playing games  Additional Comments: reports agreeable to \"short term rehab before going home\"    Objective  Temp: 98.1 °F (36.7 °C)  Pulse: 73  Heart Rate Source: Monitor  Respirations: 18  SpO2: 99 %  O2 Device: None (Room air)  BP: (!) 117/52  MAP (Calculated): 74  BP Location: Right upper arm  BP Method: Automatic  Patient Position: Semi fowlers  Comment: After ambulation /76, HR 74, and SpO2 98% on RA             Safety Devices  Type of Devices: All fall risk precautions in place;Chair alarm in place;Gait belt;Nurse notified;Patient at risk for falls;Left in bed (RN in room at EOS)  Restraints  Restraints Initially in Place: No  Bed Mobility Training  Bed Mobility Training: Yes  Supine to Sit: Unable to assess (In chair at start of session)  Sit to Supine: 2 Person assistance;Partial/Moderate

## 2025-06-04 ENCOUNTER — APPOINTMENT (OUTPATIENT)
Dept: GENERAL RADIOLOGY | Age: 65
DRG: 236 | End: 2025-06-04
Attending: THORACIC SURGERY (CARDIOTHORACIC VASCULAR SURGERY)
Payer: COMMERCIAL

## 2025-06-04 LAB
ANION GAP SERPL CALCULATED.3IONS-SCNC: 7 MMOL/L (ref 3–16)
BUN SERPL-MCNC: 32 MG/DL (ref 7–20)
CALCIUM SERPL-MCNC: 7.6 MG/DL (ref 8.3–10.6)
CHLORIDE SERPL-SCNC: 105 MMOL/L (ref 99–110)
CO2 SERPL-SCNC: 23 MMOL/L (ref 21–32)
CREAT SERPL-MCNC: 1.4 MG/DL (ref 0.6–1.2)
DEPRECATED RDW RBC AUTO: 14.2 % (ref 12.4–15.4)
GFR SERPLBLD CREATININE-BSD FMLA CKD-EPI: 41 ML/MIN/{1.73_M2}
GLUCOSE BLD-MCNC: 116 MG/DL (ref 70–99)
GLUCOSE BLD-MCNC: 157 MG/DL (ref 70–99)
GLUCOSE BLD-MCNC: 96 MG/DL (ref 70–99)
GLUCOSE SERPL-MCNC: 98 MG/DL (ref 70–99)
HCT VFR BLD AUTO: 23.9 % (ref 36–48)
HGB BLD-MCNC: 8 G/DL (ref 12–16)
INR PPP: 1.09 (ref 0.85–1.15)
MAGNESIUM SERPL-MCNC: 2.85 MG/DL (ref 1.8–2.4)
MCH RBC QN AUTO: 30.9 PG (ref 26–34)
MCHC RBC AUTO-ENTMCNC: 33.7 G/DL (ref 31–36)
MCV RBC AUTO: 91.8 FL (ref 80–100)
PERFORMED ON: ABNORMAL
PERFORMED ON: ABNORMAL
PERFORMED ON: NORMAL
PLATELET # BLD AUTO: 259 K/UL (ref 135–450)
PMV BLD AUTO: 8.1 FL (ref 5–10.5)
POTASSIUM SERPL-SCNC: 5.2 MMOL/L (ref 3.5–5.1)
PROTHROMBIN TIME: 14.3 SEC (ref 11.9–14.9)
RBC # BLD AUTO: 2.6 M/UL (ref 4–5.2)
SODIUM SERPL-SCNC: 135 MMOL/L (ref 136–145)
WBC # BLD AUTO: 10 K/UL (ref 4–11)

## 2025-06-04 PROCEDURE — 83735 ASSAY OF MAGNESIUM: CPT

## 2025-06-04 PROCEDURE — 99024 POSTOP FOLLOW-UP VISIT: CPT

## 2025-06-04 PROCEDURE — 85027 COMPLETE CBC AUTOMATED: CPT

## 2025-06-04 PROCEDURE — 85610 PROTHROMBIN TIME: CPT

## 2025-06-04 PROCEDURE — 97116 GAIT TRAINING THERAPY: CPT

## 2025-06-04 PROCEDURE — 71045 X-RAY EXAM CHEST 1 VIEW: CPT

## 2025-06-04 PROCEDURE — 6370000000 HC RX 637 (ALT 250 FOR IP)

## 2025-06-04 PROCEDURE — 97110 THERAPEUTIC EXERCISES: CPT

## 2025-06-04 PROCEDURE — 2500000003 HC RX 250 WO HCPCS

## 2025-06-04 PROCEDURE — 80048 BASIC METABOLIC PNL TOTAL CA: CPT

## 2025-06-04 PROCEDURE — 2140000000 HC CCU INTERMEDIATE R&B

## 2025-06-04 PROCEDURE — 6360000002 HC RX W HCPCS

## 2025-06-04 PROCEDURE — 97530 THERAPEUTIC ACTIVITIES: CPT

## 2025-06-04 PROCEDURE — 6370000000 HC RX 637 (ALT 250 FOR IP): Performed by: THORACIC SURGERY (CARDIOTHORACIC VASCULAR SURGERY)

## 2025-06-04 PROCEDURE — 94669 MECHANICAL CHEST WALL OSCILL: CPT

## 2025-06-04 PROCEDURE — 97535 SELF CARE MNGMENT TRAINING: CPT

## 2025-06-04 PROCEDURE — 94640 AIRWAY INHALATION TREATMENT: CPT

## 2025-06-04 RX ADMIN — METHOCARBAMOL 500 MG: 500 TABLET ORAL at 17:20

## 2025-06-04 RX ADMIN — OXYCODONE 5 MG: 5 TABLET ORAL at 20:30

## 2025-06-04 RX ADMIN — CHLORHEXIDINE GLUCONATE 15 ML: 1.2 RINSE ORAL at 20:19

## 2025-06-04 RX ADMIN — ASPIRIN 81 MG: 81 TABLET, CHEWABLE ORAL at 09:31

## 2025-06-04 RX ADMIN — SODIUM CHLORIDE, PRESERVATIVE FREE 10 ML: 5 INJECTION INTRAVENOUS at 09:32

## 2025-06-04 RX ADMIN — ACETAMINOPHEN 650 MG: 325 TABLET ORAL at 12:24

## 2025-06-04 RX ADMIN — INSULIN LISPRO 2 UNITS: 100 INJECTION, SOLUTION INTRAVENOUS; SUBCUTANEOUS at 20:29

## 2025-06-04 RX ADMIN — SENNOSIDES AND DOCUSATE SODIUM 1 TABLET: 50; 8.6 TABLET ORAL at 09:31

## 2025-06-04 RX ADMIN — GABAPENTIN 100 MG: 100 CAPSULE ORAL at 09:31

## 2025-06-04 RX ADMIN — ATORVASTATIN CALCIUM 80 MG: 80 TABLET, FILM COATED ORAL at 20:31

## 2025-06-04 RX ADMIN — INSULIN GLARGINE 15 UNITS: 100 INJECTION, SOLUTION SUBCUTANEOUS at 20:29

## 2025-06-04 RX ADMIN — OXYCODONE 5 MG: 5 TABLET ORAL at 12:24

## 2025-06-04 RX ADMIN — METOPROLOL TARTRATE 25 MG: 25 TABLET, FILM COATED ORAL at 20:30

## 2025-06-04 RX ADMIN — SENNOSIDES AND DOCUSATE SODIUM 1 TABLET: 50; 8.6 TABLET ORAL at 20:30

## 2025-06-04 RX ADMIN — METOPROLOL TARTRATE 25 MG: 25 TABLET, FILM COATED ORAL at 09:30

## 2025-06-04 RX ADMIN — KETOROLAC TROMETHAMINE 15 MG: 30 INJECTION, SOLUTION INTRAMUSCULAR at 02:24

## 2025-06-04 RX ADMIN — METHOCARBAMOL 500 MG: 500 TABLET ORAL at 20:31

## 2025-06-04 RX ADMIN — FAMOTIDINE 20 MG: 20 TABLET, FILM COATED ORAL at 09:31

## 2025-06-04 RX ADMIN — GABAPENTIN 100 MG: 100 CAPSULE ORAL at 20:29

## 2025-06-04 RX ADMIN — ALBUTEROL SULFATE 2.5 MG: 2.5 SOLUTION RESPIRATORY (INHALATION) at 07:45

## 2025-06-04 RX ADMIN — KETOROLAC TROMETHAMINE 15 MG: 30 INJECTION, SOLUTION INTRAMUSCULAR at 09:41

## 2025-06-04 RX ADMIN — CLOPIDOGREL BISULFATE 75 MG: 75 TABLET, FILM COATED ORAL at 09:31

## 2025-06-04 RX ADMIN — METHOCARBAMOL 500 MG: 500 TABLET ORAL at 09:30

## 2025-06-04 RX ADMIN — ACETAMINOPHEN 650 MG: 325 TABLET ORAL at 17:20

## 2025-06-04 RX ADMIN — FONDAPARINUX SODIUM 2.5 MG: 2.5 INJECTION, SOLUTION SUBCUTANEOUS at 09:31

## 2025-06-04 RX ADMIN — METHOCARBAMOL 500 MG: 500 TABLET ORAL at 12:24

## 2025-06-04 RX ADMIN — MUPIROCIN: 20 OINTMENT TOPICAL at 09:31

## 2025-06-04 RX ADMIN — CHLORHEXIDINE GLUCONATE 15 ML: 1.2 RINSE ORAL at 09:30

## 2025-06-04 RX ADMIN — ACETAMINOPHEN 650 MG: 325 TABLET ORAL at 06:15

## 2025-06-04 RX ADMIN — GABAPENTIN 100 MG: 100 CAPSULE ORAL at 13:58

## 2025-06-04 RX ADMIN — FAMOTIDINE 20 MG: 20 TABLET, FILM COATED ORAL at 20:31

## 2025-06-04 RX ADMIN — ALBUTEROL SULFATE 2.5 MG: 2.5 SOLUTION RESPIRATORY (INHALATION) at 11:29

## 2025-06-04 RX ADMIN — ALBUTEROL SULFATE 2.5 MG: 2.5 SOLUTION RESPIRATORY (INHALATION) at 19:11

## 2025-06-04 ASSESSMENT — PAIN DESCRIPTION - LOCATION
LOCATION: CHEST
LOCATION: RIB CAGE

## 2025-06-04 ASSESSMENT — PAIN SCALES - GENERAL
PAINLEVEL_OUTOF10: 3
PAINLEVEL_OUTOF10: 4
PAINLEVEL_OUTOF10: 3
PAINLEVEL_OUTOF10: 5
PAINLEVEL_OUTOF10: 3
PAINLEVEL_OUTOF10: 6

## 2025-06-04 ASSESSMENT — PAIN DESCRIPTION - ORIENTATION: ORIENTATION: MID

## 2025-06-04 ASSESSMENT — PAIN DESCRIPTION - DESCRIPTORS: DESCRIPTORS: ACHING

## 2025-06-04 NOTE — PROGRESS NOTES
Shift: [id number]    Procedure:   CORONARY ARTERY BYPASS GRAFT TIMES TWO, RIGHT AND LEFT LEG ENDOSCOPIC SAPHENOUS VEIN HARVEST, TOTAL CARDIOPULMONARY BYPASS, TRANSESOPHAGEAL ECHOCARDIOGRAM. POSTERIOR PERICARDIOTOMY, LEFT ATRIAL APPENDAGE CLIP PLACEMENT AND BILATERAL PECTORALIS BLOCKS (Chest)     Admit from OR (time and date): 5/30/25 @ 1500    Transition Time (Date @ Time)    Most recent vitals: BP (!) 119/53   Pulse 60   Temp 99.3 °F (37.4 °C) (Oral)   Resp 18   Ht 1.702 m (5' 7\")   Wt 108.6 kg (239 lb 8 oz)   LMP 01/30/2009   SpO2 98%   BMI 37.51 kg/m²      Pre-Op Weight Weight - Scale: 100.2 kg (221 lb)  Today's weight   Wt Readings from Last 1 Encounters:   06/03/25 109 kg (240 lb 3.2 oz)         EF: Pre 55%  Post 55%    Rhythm:    [x] Normal Sinus Rhythm   [] Atrial Fibrillation    [] Sinus Tach   [] Sinus Kiran    [] (adverse rhythms)     Current O2:   [x] Room Air   [] NC  L   [] BiPaP    [] Vented 30% Fi02  Peep 5    Shift Outputs:  Siegel   Chest tubes:      Air Leak [] Yes  [x] No    Hospital Course:  POD# 0 (Days)  -Out of OR at 1500  -x1 Bicarb given for base excess of -3.2  -Albumin 250 mg given   -Weaned off Precedex drip  -SBT started @ 1820  -Titrating insulin drip hourly     POD#0 NIGHTS 5/30/25  -Urine 645  -L pl 80; R pl 189; M 165  -Extubated at 2055 to 4 l NC and is now on 2l NC  -Pain was a constant issue-gave prn Fent 50mcg x 2 and 25mcg once  -pt is very difficult to get moving as she screams and becomes increasingly anxious and difficult to direct.  -Up x 2 to chair this am  -Constantly demands something to drink.    -Gave 20meq of K x 1       POD#1 Days 5/31/25  -Pt up in chair all morning   -A. Line pulled  -RN attempted to pull mediastinal chest tube-unsuccessful. MD notified   -Soft BP this afternoon, albumin given and levo started  -Urine o/p 675  -L pl 40; R pl 70; M 40    6/2 nights  -blood pressure stable overnight  -pain well controlled with toradol and tylenol  -mekhi

## 2025-06-04 NOTE — PROGRESS NOTES
Physical Therapy  Facility/Department: [facility] C2 CARD TELEMETRY  Daily Treatment Note  NAME: Rosario Riley  : 1960  MRN: [id number]    Date of Service: 2025    Discharge Recommendations:  IP Rehab, Patient able to tolerate 3 hours of therapy per day   PT Equipment Recommendations  Equipment Needed: No  Other: defer    Patient Diagnosis(es): The encounter diagnosis was Coronary artery disease.    Barriers to Home Discharge:   [x] Steps to access home entry or bed/bath:   [x] Unable to transfer, ambulate, or propel wheelchair household distances without assist   [x] Limited available assist at home upon discharge    [x] Patient or family requests d/c to post-acute facility    [] Poor cognition/safety awareness for d/c to home alone    [] Unable to maintain ordered weight bearing status    [] Patient with salient signs of long-standing immobility   [x] Decreased independence with ADLs   [x] Increased risk for falls   [] Other:    Assessment  Assessment: Pt with good participation in therapy this date and demos good progress towards her goals. Pt requires mod A for supine>sit and is able to ambulate multiple times in the smalls (75+75+150+150) Pt does requires a seated rest between bouts of ambulation. Initiated stair training this date, pt was only able to navigate up<>down 1 step with mod A and is limited by weakness as well as fear of falling. Pt will continue to benefit from skilled PT services to address current defecits. Continue to recommend IPR upon discharge to progress towards prior level of function and address remaining strength and mobility deficits.  Activity Tolerance: Patient tolerated treatment well  Equipment Needed: No  Other: defer    Plan  Physical Therapy Plan  General Plan: 5-7 times per week  Current Treatment Recommendations: Strengthening;Balance training;Functional mobility training;Transfer training;Gait training;Endurance training;Stair training;Neuromuscular  re-education;Therapeutic activities;Home exercise program;Safety education & training;Pain management    Restrictions  Restrictions/Precautions  Restrictions/Precautions: Cardiac, Surgical Protocols, Fall Risk  Position Activity Restriction  Sternal Precautions: No Pushing, No Pulling, 5# Lifting Restrictions  Other Position/Activity Restrictions: ICU monitoring, wound vac     Subjective   Subjective  Subjective: Pt in bed upon arrival, RN cleared for therapy. Pt pleasant and agreeable  Pain: 3/10 pain in chest    Objective  Vitals  Pulse: 60  BP: (!) 119/53  BP Location: Right upper arm  BP Method: Automatic  Patient Position: Semi fowlers  MAP (Calculated): 75  SpO2: 98 %  O2 Device: None (Room air)  Bed Mobility Training  Bed Mobility Training: Yes  Supine to Sit: Partial/Moderate assistance (HOB elevated, assist for trunk)  Sit to Supine: Unable to assess (In chair at EOS)  Balance  Sitting: Intact  Standing: Impaired  Standing - Static: Constant support;Good  Standing - Dynamic: Constant support;Fair  Transfer Training  Transfer Training: Yes  Interventions: Safety awareness training;Verbal cues  Sit to Stand: Stand by assistance;Contact guard assistance  Stand to Sit: Stand by assistance;Minimal assistance (min A 1x due to posterior LOB when backing up to a chair)  Bed to Chair: Contact guard assistance  Gait  Gait Training: Yes  Overall Level of Assistance: Stand by assistance  Distance (ft): 75 Feet (75+75 (1 stair in between)+150+150)  Assistive Device: Gait belt;Walker, rollator  Interventions: Safety awareness training;Verbal cues  Speed/Deanne: Pace decreased (< 100 feet/min)  Step Length: Right shortened;Left shortened  Gait Abnormalities: Decreased step clearance;Antalgic;Trunk sway increased  Rail Use: None  Stairs - Level of Assistance: Partial/Moderate assistance  Number of Stairs Trained: 1     PT Exercises  Exercise Treatment: seated BLE exercises 10x each: AP, LAQ, marches, hip abduction, glut

## 2025-06-04 NOTE — CARE COORDINATION
[facility] - Acute Rehab Unit   After review, this patient is felt to be:       []  Appropriate for Acute Inpatient Rehab    [x]  Appropriate for Acute Inpatient Rehab Pending Insurance Authorization    []  Not appropriate for Acute Inpatient Rehab    []  Referral received and ARU reviewing patient; Evaluation ongoing.      Precert initiated 6/4 for ARU admission. Pt in agreement per  conversation with pt this AM. Ref#: [id number] .  Will notify DCP with further updates. Thank you for the referral.    Naresh Broussard MPH, RRT  ARU Admissions Supervisor-[facility]  (P)[phone]  (F)[phone]   Electronically signed by Naresh Broussard on 6/4/2025 at 1:16 PM

## 2025-06-04 NOTE — PROGRESS NOTES
Department of Cardiovascular & Thoracic Surgery  Progress Note    Department of Cardiovascular & Thoracic Surgery  Progress Note    POD 5 CABG X2 (SVG-OM, LIMA-LAD), LLE EVH, LAAC, endarterectomy LAD/diag junction w/ Dr. Justice    SUBJECTIVE:      Seen ambulating in the halls, in good spirits.     OBJECTIVE:    Ambulating in halls with therapy, A&O, NAD    Vitals:    T max 99.5  BP 90-130s  HR 60-70s  O2 mid-high 90s on RA    Weight 5/30 (pre-op) 100.5 kg  Weight 6/3 109 kg  Weight 6/4 108.6 kg    Admission 1500, extubation 2055 (5 hours 55 minutes)       24 HR INTAKE/OUTPUT:     mL  No urine output charted     Data    Lab Results   Component Value Date/Time     06/04/2025 04:15 AM    K 5.2 06/04/2025 04:15 AM    K 4.3 05/15/2025 05:51 AM     06/04/2025 04:15 AM    CO2 23 06/04/2025 04:15 AM    BUN 32 06/04/2025 04:15 AM    CREATININE 1.4 06/04/2025 04:15 AM    GLUCOSE 98 06/04/2025 04:15 AM    CALCIUM 7.6 06/04/2025 04:15 AM    LABGLOM 41 06/04/2025 04:15 AM    LABGLOM 81 04/04/2024 11:46 AM      Recent Labs     06/04/25  0415 06/03/25  0500 06/02/25  0532   WBC 10.0 11.5* 12.0*   HGB 8.0* 8.1* 8.5*   HCT 23.9* 24.7* 25.4*   MCV 91.8 92.5 92.8    242 245       Xray Result (most recent):6/4/25    IMPRESSION:  1. Removal of chest tube with no pneumothorax.    Current Cardiac Medications:  DAPT  Lipitor 80 mg  Lopressor 25 mg BID- hold for SBP < 110 and/or HR < 60    Current NonCardiac Medications:    Tylenol/Neurontin/Toradol/Robaxin  SSI/Lantus  Proventil  Perioperative antibiotics: Cleocin/Vanco  Pepcid  Arixtra  Bowel regimen  Vitamin D      ASSESSMENT AND PLAN:    Labs, imaging and notes reviewed      MV CAD    POD #5 s/p CABG x 2, LAAC (incomplete revascularization) , POD # 2 surgical removal of MS chest tubes  Pre-op EF 55 % post-op EF 55%   Inpatient rehab consult  K 5.2, sCr 1.4- monitor    PHILOMENA Garza - CNP  06/04/25  8:55 AM

## 2025-06-04 NOTE — PROGRESS NOTES
Shift: [id number]    Procedure:   CORONARY ARTERY BYPASS GRAFT TIMES TWO, RIGHT AND LEFT LEG ENDOSCOPIC SAPHENOUS VEIN HARVEST, TOTAL CARDIOPULMONARY BYPASS, TRANSESOPHAGEAL ECHOCARDIOGRAM. POSTERIOR PERICARDIOTOMY, LEFT ATRIAL APPENDAGE CLIP PLACEMENT AND BILATERAL PECTORALIS BLOCKS (Chest)     Admit from OR (time and date): 5/30/25 @ 1500    Transition Time (Date @ Time)    Most recent vitals: BP (!) 110/58   Pulse 69   Temp 97.8 °F (36.6 °C) (Oral)   Resp 18   Ht 1.702 m (5' 7\")   Wt 109 kg (240 lb 3.2 oz)   LMP 01/30/2009   SpO2 100%   BMI 37.62 kg/m²      Pre-Op Weight Weight - Scale: 100.2 kg (221 lb)  Today's weight   Wt Readings from Last 1 Encounters:   06/03/25 109 kg (240 lb 3.2 oz)         EF: Pre 55%  Post 55%    Rhythm:    [x] Normal Sinus Rhythm   [] Atrial Fibrillation    [] Sinus Tach   [] Sinus Kiran    [] (adverse rhythms)     Current O2:   [x] Room Air   [] NC  L   [] BiPaP    [] Vented 30% Fi02  Peep 5    Shift Outputs:  Siegel   Chest tubes:      Air Leak [] Yes  [x] No    Hospital Course:  POD# 0 (Days)  -Out of OR at 1500  -x1 Bicarb given for base excess of -3.2  -Albumin 250 mg given   -Weaned off Precedex drip  -SBT started @ 1820  -Titrating insulin drip hourly     POD#0 NIGHTS 5/30/25  -Urine 645  -L pl 80; R pl 189; M 165  -Extubated at 2055 to 4 l NC and is now on 2l NC  -Pain was a constant issue-gave prn Fent 50mcg x 2 and 25mcg once  -pt is very difficult to get moving as she screams and becomes increasingly anxious and difficult to direct.  -Up x 2 to chair this am  -Constantly demands something to drink.    -Gave 20meq of K x 1       POD#1 Days 5/31/25  -Pt up in chair all morning   -A. Line pulled  -RN attempted to pull mediastinal chest tube-unsuccessful. MD notified   -Soft BP this afternoon, albumin given and levo started  -Urine o/p 675  -L pl 40; R pl 70; M 40    6/2 nights  -blood pressure stable overnight  -pain well controlled with toradol and tylenol  -mekhi

## 2025-06-04 NOTE — PROGRESS NOTES
Care;Equipment;Precautions;Orientation;Mobility Training  Education Provided Comments: Pt educated on sternal precautions, safe mobility, energy conservation, and task modification  Education Method: Demonstration;Verbal  Barriers to Learning: None  Education Outcome: Demonstrated understanding;Verbalized understanding;Continued education needed    Goals  Short Term Goals  Time Frame for Short Term Goals: 6/08 unless otherwise noted - goals ongoing 6/04  Short Term Goal 1: SPV with functional/toilet transfers by 6-05-25  Short Term Goal 2: set up for UE Light ADL's  Short Term Goal 3: tolerate 15 reps distal AROM to increase strength for ADL's  Short Term Goal 4: mod assist with LE self care with AE PRN by 6-07-25  Patient Goals   Patient goals : \"get stronger to walk by myself\"    AM-PAC - ADL  AM-PAC Daily Activity - Inpatient   How much help is needed for putting on and taking off regular lower body clothing?: A Lot  How much help is needed for bathing (which includes washing, rinsing, drying)?: A Lot  How much help is needed for toileting (which includes using toilet, bedpan, or urinal)?: A Lot  How much help is needed for putting on and taking off regular upper body clothing?: A Lot  How much help is needed for taking care of personal grooming?: A Little  How much help for eating meals?: None  AM-PAC Inpatient Daily Activity Raw Score: 15  AM-PAC Inpatient ADL T-Scale Score : 34.69  ADL Inpatient CMS 0-100% Score: 56.46  ADL Inpatient CMS G-Code Modifier : CK    Therapy Time   Individual Concurrent Group Co-treatment   Time In 0810         Time Out 0854         Minutes 44         Timed Code Treatment Minutes: 44 Minutes       Kay Malloy OT

## 2025-06-04 NOTE — CARE COORDINATION
Cm met with pt at bedside. Pt is agreeable to [facility]. She is aware they are submitting for approval.     Chart review day 5- from home with family, CABG POD 5; returned to OR to remove CT. [facility] precert started today. Will continue to follow

## 2025-06-04 NOTE — CONSULTS
Patient: Rosario Riley  [id number]  Date: 2025      Chief Complaint: shortness of breath    History of Present Illness/Hospital Course:  Rosario Riley is a 64 year old female with a past medical history significant for HTN, Hld, CAD, OA, vitamin D deficiency, obesity, former tobacco use, and recent admission for presyncope and SOB and found to have multivessel CAD who presented to [facility] on 25 for planned CABG x2 with left atrial appendage clip. Course has been notable for JANIS, hyperkalemia, and anemia. She continues to have functional deficits below her baseline. Today Bri is seen in her room. She reports feeling well. She is typically independent at baseline. She lives with multiple family members in a multilevel house with 1+1+1 ANALILIA. She reports that her bedroom and full bath are upstairs. She is retired, but is working at the "[employer]. She is highly motivated to work with therapies and is interested in ARU. She is also highly motivated to get back to work.      has a past medical history of Does mobilize using cane, Essential hypertension, Foot pain, bilateral, Hypercholesterolemia, Hypercholesterolemia, Multiple vessel coronary artery disease, Osteoarthritis, Severe obesity (BMI 35.0-39.9) with comorbidity (HCC), and Vitamin D deficiency.     has a past surgical history that includes  section; Tonsillectomy (); Knee arthroscopy (Left); knee surgery (2022); Total hip arthroplasty (Left, 2024); Cardiac procedure (N/A, 2025); Coronary artery bypass graft (N/A, 2025); Hardware Removal (N/A, 2025); and Chest surgery (N/A, 2025).     reports that she quit smoking about 6 years ago. Her smoking use included cigarettes. She started smoking about 44 years ago. She has a 37.8 pack-year smoking history. She has never used smokeless tobacco. She reports that she does not drink alcohol and does not use drugs.    family history includes Arthritis in her

## 2025-06-05 ENCOUNTER — HOSPITAL ENCOUNTER (INPATIENT)
Age: 65
LOS: 11 days | Discharge: HOME HEALTH | DRG: 949 | End: 2025-06-16
Attending: STUDENT IN AN ORGANIZED HEALTH CARE EDUCATION/TRAINING PROGRAM | Admitting: STUDENT IN AN ORGANIZED HEALTH CARE EDUCATION/TRAINING PROGRAM
Payer: COMMERCIAL

## 2025-06-05 ENCOUNTER — APPOINTMENT (OUTPATIENT)
Dept: GENERAL RADIOLOGY | Age: 65
DRG: 236 | End: 2025-06-05
Attending: THORACIC SURGERY (CARDIOTHORACIC VASCULAR SURGERY)
Payer: COMMERCIAL

## 2025-06-05 VITALS
SYSTOLIC BLOOD PRESSURE: 108 MMHG | BODY MASS INDEX: 37.64 KG/M2 | RESPIRATION RATE: 16 BRPM | HEIGHT: 67 IN | TEMPERATURE: 97.7 F | DIASTOLIC BLOOD PRESSURE: 46 MMHG | WEIGHT: 239.8 LBS | HEART RATE: 78 BPM | OXYGEN SATURATION: 100 %

## 2025-06-05 DIAGNOSIS — Z95.1 S/P CABG (CORONARY ARTERY BYPASS GRAFT): Primary | ICD-10-CM

## 2025-06-05 LAB
ANION GAP SERPL CALCULATED.3IONS-SCNC: 8 MMOL/L (ref 3–16)
BUN SERPL-MCNC: 26 MG/DL (ref 7–20)
CALCIUM SERPL-MCNC: 8.5 MG/DL (ref 8.3–10.6)
CHLORIDE SERPL-SCNC: 103 MMOL/L (ref 99–110)
CO2 SERPL-SCNC: 23 MMOL/L (ref 21–32)
CREAT SERPL-MCNC: 1.1 MG/DL (ref 0.6–1.2)
GFR SERPLBLD CREATININE-BSD FMLA CKD-EPI: 55 ML/MIN/{1.73_M2}
GLUCOSE BLD-MCNC: 104 MG/DL (ref 70–99)
GLUCOSE BLD-MCNC: 108 MG/DL (ref 70–99)
GLUCOSE BLD-MCNC: 131 MG/DL (ref 70–99)
GLUCOSE BLD-MCNC: 99 MG/DL (ref 70–99)
GLUCOSE SERPL-MCNC: 123 MG/DL (ref 70–99)
PERFORMED ON: ABNORMAL
PERFORMED ON: NORMAL
POTASSIUM SERPL-SCNC: 5.2 MMOL/L (ref 3.5–5.1)
SODIUM SERPL-SCNC: 134 MMOL/L (ref 136–145)

## 2025-06-05 PROCEDURE — 36415 COLL VENOUS BLD VENIPUNCTURE: CPT

## 2025-06-05 PROCEDURE — 71045 X-RAY EXAM CHEST 1 VIEW: CPT

## 2025-06-05 PROCEDURE — 99238 HOSP IP/OBS DSCHRG MGMT 30/<: CPT

## 2025-06-05 PROCEDURE — 6370000000 HC RX 637 (ALT 250 FOR IP): Performed by: STUDENT IN AN ORGANIZED HEALTH CARE EDUCATION/TRAINING PROGRAM

## 2025-06-05 PROCEDURE — 6370000000 HC RX 637 (ALT 250 FOR IP): Performed by: THORACIC SURGERY (CARDIOTHORACIC VASCULAR SURGERY)

## 2025-06-05 PROCEDURE — 6360000002 HC RX W HCPCS: Performed by: STUDENT IN AN ORGANIZED HEALTH CARE EDUCATION/TRAINING PROGRAM

## 2025-06-05 PROCEDURE — 80048 BASIC METABOLIC PNL TOTAL CA: CPT

## 2025-06-05 PROCEDURE — 94640 AIRWAY INHALATION TREATMENT: CPT

## 2025-06-05 PROCEDURE — 6360000002 HC RX W HCPCS

## 2025-06-05 PROCEDURE — 6370000000 HC RX 637 (ALT 250 FOR IP)

## 2025-06-05 PROCEDURE — 1280000000 HC REHAB R&B

## 2025-06-05 PROCEDURE — 94669 MECHANICAL CHEST WALL OSCILL: CPT

## 2025-06-05 PROCEDURE — 2500000003 HC RX 250 WO HCPCS

## 2025-06-05 RX ORDER — FUROSEMIDE 40 MG/1
40 TABLET ORAL DAILY
Status: DISPENSED | OUTPATIENT
Start: 2025-06-06 | End: 2025-06-13

## 2025-06-05 RX ORDER — INSULIN GLARGINE 100 [IU]/ML
14 INJECTION, SOLUTION SUBCUTANEOUS NIGHTLY
Status: DISCONTINUED | OUTPATIENT
Start: 2025-06-05 | End: 2025-06-12

## 2025-06-05 RX ORDER — SODIUM CHLORIDE 9 MG/ML
INJECTION, SOLUTION INTRAVENOUS PRN
Status: CANCELLED | OUTPATIENT
Start: 2025-06-05

## 2025-06-05 RX ORDER — ACETAMINOPHEN 325 MG/1
650 TABLET ORAL EVERY 4 HOURS PRN
Status: CANCELLED | OUTPATIENT
Start: 2025-06-05

## 2025-06-05 RX ORDER — HYDRALAZINE HYDROCHLORIDE 10 MG/1
10 TABLET, FILM COATED ORAL EVERY 6 HOURS PRN
Status: DISCONTINUED | OUTPATIENT
Start: 2025-06-05 | End: 2025-06-16 | Stop reason: HOSPADM

## 2025-06-05 RX ORDER — FAMOTIDINE 20 MG/1
20 TABLET, FILM COATED ORAL 2 TIMES DAILY
Refills: 0 | Status: ON HOLD | DISCHARGE
Start: 2025-06-05 | End: 2025-07-05

## 2025-06-05 RX ORDER — ACETAMINOPHEN 325 MG/1
650 TABLET ORAL EVERY 6 HOURS
Qty: 56 TABLET | Refills: 0 | Status: ON HOLD | DISCHARGE
Start: 2025-06-05

## 2025-06-05 RX ORDER — HEPARIN SODIUM 5000 [USP'U]/ML
5000 INJECTION, SOLUTION INTRAVENOUS; SUBCUTANEOUS EVERY 8 HOURS SCHEDULED
Status: DISCONTINUED | OUTPATIENT
Start: 2025-06-05 | End: 2025-06-05 | Stop reason: HOSPADM

## 2025-06-05 RX ORDER — METOPROLOL TARTRATE 25 MG/1
25 TABLET, FILM COATED ORAL 2 TIMES DAILY
Status: DISCONTINUED | OUTPATIENT
Start: 2025-06-05 | End: 2025-06-06

## 2025-06-05 RX ORDER — METHOCARBAMOL 500 MG/1
500 TABLET, FILM COATED ORAL 4 TIMES DAILY
Refills: 0 | Status: ON HOLD | DISCHARGE
Start: 2025-06-05 | End: 2025-06-15

## 2025-06-05 RX ORDER — ALBUTEROL SULFATE 0.83 MG/ML
2.5 SOLUTION RESPIRATORY (INHALATION) EVERY 4 HOURS PRN
Status: DISCONTINUED | OUTPATIENT
Start: 2025-06-05 | End: 2025-06-16 | Stop reason: HOSPADM

## 2025-06-05 RX ORDER — FUROSEMIDE 10 MG/ML
40 INJECTION INTRAMUSCULAR; INTRAVENOUS 2 TIMES DAILY
Status: DISCONTINUED | OUTPATIENT
Start: 2025-06-05 | End: 2025-06-05 | Stop reason: HOSPADM

## 2025-06-05 RX ORDER — CLOPIDOGREL BISULFATE 75 MG/1
75 TABLET ORAL DAILY
Status: CANCELLED | OUTPATIENT
Start: 2025-06-06

## 2025-06-05 RX ORDER — FERROUS SULFATE 325(65) MG
325 TABLET ORAL 2 TIMES DAILY WITH MEALS
Refills: 0 | Status: ON HOLD | DISCHARGE
Start: 2025-06-05 | End: 2025-07-05

## 2025-06-05 RX ORDER — FUROSEMIDE 20 MG/1
20 TABLET ORAL DAILY
Status: DISCONTINUED | OUTPATIENT
Start: 2025-06-13 | End: 2025-06-16 | Stop reason: HOSPADM

## 2025-06-05 RX ORDER — ONDANSETRON 4 MG/1
4 TABLET, ORALLY DISINTEGRATING ORAL EVERY 8 HOURS PRN
Status: DISCONTINUED | OUTPATIENT
Start: 2025-06-05 | End: 2025-06-16 | Stop reason: HOSPADM

## 2025-06-05 RX ORDER — ERGOCALCIFEROL 1.25 MG/1
50000 CAPSULE, LIQUID FILLED ORAL WEEKLY
Status: DISCONTINUED | OUTPATIENT
Start: 2025-06-07 | End: 2025-06-16 | Stop reason: HOSPADM

## 2025-06-05 RX ORDER — ATORVASTATIN CALCIUM 80 MG/1
80 TABLET, FILM COATED ORAL DAILY
Status: CANCELLED | OUTPATIENT
Start: 2025-06-05

## 2025-06-05 RX ORDER — ATORVASTATIN CALCIUM 80 MG/1
80 TABLET, FILM COATED ORAL NIGHTLY
Status: DISCONTINUED | OUTPATIENT
Start: 2025-06-05 | End: 2025-06-16 | Stop reason: HOSPADM

## 2025-06-05 RX ORDER — FUROSEMIDE 40 MG/1
TABLET ORAL
Status: ON HOLD | DISCHARGE
Start: 2025-06-05 | End: 2025-06-19

## 2025-06-05 RX ORDER — SENNA AND DOCUSATE SODIUM 50; 8.6 MG/1; MG/1
1 TABLET, FILM COATED ORAL 2 TIMES DAILY
Status: DISCONTINUED | OUTPATIENT
Start: 2025-06-05 | End: 2025-06-16 | Stop reason: HOSPADM

## 2025-06-05 RX ORDER — BISACODYL 10 MG
10 SUPPOSITORY, RECTAL RECTAL DAILY PRN
Status: DISCONTINUED | OUTPATIENT
Start: 2025-06-05 | End: 2025-06-16 | Stop reason: HOSPADM

## 2025-06-05 RX ORDER — OXYCODONE HYDROCHLORIDE 5 MG/1
10 TABLET ORAL EVERY 4 HOURS PRN
Refills: 0 | Status: DISCONTINUED | OUTPATIENT
Start: 2025-06-05 | End: 2025-06-16 | Stop reason: HOSPADM

## 2025-06-05 RX ORDER — ASPIRIN 81 MG/1
81 TABLET, CHEWABLE ORAL DAILY
Status: CANCELLED | OUTPATIENT
Start: 2025-06-06

## 2025-06-05 RX ORDER — CLOPIDOGREL BISULFATE 75 MG/1
75 TABLET ORAL DAILY
Status: DISCONTINUED | OUTPATIENT
Start: 2025-06-06 | End: 2025-06-16 | Stop reason: HOSPADM

## 2025-06-05 RX ORDER — SODIUM CHLORIDE 0.9 % (FLUSH) 0.9 %
5-40 SYRINGE (ML) INJECTION EVERY 12 HOURS SCHEDULED
Status: CANCELLED | OUTPATIENT
Start: 2025-06-05

## 2025-06-05 RX ORDER — ERGOCALCIFEROL 1.25 MG/1
50000 CAPSULE, LIQUID FILLED ORAL WEEKLY
Status: CANCELLED | OUTPATIENT
Start: 2025-06-07

## 2025-06-05 RX ORDER — FERROUS SULFATE 325(65) MG
325 TABLET ORAL 2 TIMES DAILY WITH MEALS
Status: DISCONTINUED | OUTPATIENT
Start: 2025-06-05 | End: 2025-06-05 | Stop reason: HOSPADM

## 2025-06-05 RX ORDER — OXYCODONE HYDROCHLORIDE 5 MG/1
5 TABLET ORAL EVERY 4 HOURS PRN
Refills: 0 | Status: DISCONTINUED | OUTPATIENT
Start: 2025-06-05 | End: 2025-06-16 | Stop reason: HOSPADM

## 2025-06-05 RX ORDER — INSULIN LISPRO 100 [IU]/ML
0-12 INJECTION, SOLUTION INTRAVENOUS; SUBCUTANEOUS
Status: DISCONTINUED | OUTPATIENT
Start: 2025-06-05 | End: 2025-06-16 | Stop reason: HOSPADM

## 2025-06-05 RX ORDER — ACETAMINOPHEN 325 MG/1
650 TABLET ORAL EVERY 8 HOURS SCHEDULED
Status: DISCONTINUED | OUTPATIENT
Start: 2025-06-05 | End: 2025-06-16 | Stop reason: HOSPADM

## 2025-06-05 RX ORDER — GABAPENTIN 100 MG/1
100 CAPSULE ORAL 3 TIMES DAILY
Status: DISCONTINUED | OUTPATIENT
Start: 2025-06-05 | End: 2025-06-16 | Stop reason: HOSPADM

## 2025-06-05 RX ORDER — LIDOCAINE 4 G/G
1 PATCH TOPICAL DAILY
Status: DISCONTINUED | OUTPATIENT
Start: 2025-06-05 | End: 2025-06-16 | Stop reason: HOSPADM

## 2025-06-05 RX ORDER — FERROUS SULFATE 325(65) MG
325 TABLET ORAL 2 TIMES DAILY WITH MEALS
Status: CANCELLED | OUTPATIENT
Start: 2025-06-05

## 2025-06-05 RX ORDER — POLYETHYLENE GLYCOL 3350 17 G/17G
17 POWDER, FOR SOLUTION ORAL DAILY
Status: DISCONTINUED | OUTPATIENT
Start: 2025-06-06 | End: 2025-06-16 | Stop reason: HOSPADM

## 2025-06-05 RX ORDER — ACETAMINOPHEN 325 MG/1
650 TABLET ORAL EVERY 4 HOURS PRN
Status: DISCONTINUED | OUTPATIENT
Start: 2025-06-05 | End: 2025-06-16 | Stop reason: HOSPADM

## 2025-06-05 RX ORDER — DEXTROSE MONOHYDRATE 100 MG/ML
INJECTION, SOLUTION INTRAVENOUS CONTINUOUS PRN
Status: CANCELLED | OUTPATIENT
Start: 2025-06-05

## 2025-06-05 RX ORDER — ONDANSETRON 4 MG/1
4 TABLET, ORALLY DISINTEGRATING ORAL EVERY 8 HOURS PRN
Status: CANCELLED | OUTPATIENT
Start: 2025-06-05

## 2025-06-05 RX ORDER — CLOPIDOGREL BISULFATE 75 MG/1
75 TABLET ORAL DAILY
Refills: 0 | Status: ON HOLD | DISCHARGE
Start: 2025-06-06

## 2025-06-05 RX ORDER — HYDRALAZINE HYDROCHLORIDE 10 MG/1
10 TABLET, FILM COATED ORAL EVERY 6 HOURS PRN
Status: CANCELLED | OUTPATIENT
Start: 2025-06-05

## 2025-06-05 RX ORDER — METOPROLOL TARTRATE 25 MG/1
25 TABLET, FILM COATED ORAL 2 TIMES DAILY
Refills: 0 | Status: ON HOLD | DISCHARGE
Start: 2025-06-05

## 2025-06-05 RX ORDER — GLUCAGON 1 MG/ML
1 KIT INJECTION PRN
Status: DISCONTINUED | OUTPATIENT
Start: 2025-06-05 | End: 2025-06-16 | Stop reason: HOSPADM

## 2025-06-05 RX ORDER — SODIUM CHLORIDE 0.9 % (FLUSH) 0.9 %
5-40 SYRINGE (ML) INJECTION PRN
Status: CANCELLED | OUTPATIENT
Start: 2025-06-05

## 2025-06-05 RX ORDER — SODIUM CHLORIDE 9 MG/ML
INJECTION, SOLUTION INTRAVENOUS PRN
Status: DISCONTINUED | OUTPATIENT
Start: 2025-06-05 | End: 2025-06-16 | Stop reason: HOSPADM

## 2025-06-05 RX ORDER — OXYCODONE HYDROCHLORIDE 5 MG/1
10 TABLET ORAL EVERY 4 HOURS PRN
Refills: 0 | Status: CANCELLED | OUTPATIENT
Start: 2025-06-05

## 2025-06-05 RX ORDER — DEXTROSE MONOHYDRATE 100 MG/ML
INJECTION, SOLUTION INTRAVENOUS CONTINUOUS PRN
Status: DISCONTINUED | OUTPATIENT
Start: 2025-06-05 | End: 2025-06-16 | Stop reason: HOSPADM

## 2025-06-05 RX ORDER — GABAPENTIN 100 MG/1
100 CAPSULE ORAL 3 TIMES DAILY
Refills: 0 | Status: ON HOLD | DISCHARGE
Start: 2025-06-05 | End: 2025-06-19

## 2025-06-05 RX ORDER — METHOCARBAMOL 500 MG/1
500 TABLET, FILM COATED ORAL 4 TIMES DAILY
Status: DISCONTINUED | OUTPATIENT
Start: 2025-06-05 | End: 2025-06-16 | Stop reason: HOSPADM

## 2025-06-05 RX ORDER — OXYCODONE HYDROCHLORIDE 5 MG/1
5 TABLET ORAL EVERY 4 HOURS PRN
Refills: 0 | Status: CANCELLED | OUTPATIENT
Start: 2025-06-05

## 2025-06-05 RX ORDER — SENNA AND DOCUSATE SODIUM 50; 8.6 MG/1; MG/1
1 TABLET, FILM COATED ORAL 2 TIMES DAILY
Status: CANCELLED | OUTPATIENT
Start: 2025-06-05

## 2025-06-05 RX ORDER — FUROSEMIDE 40 MG/1
40 TABLET ORAL DAILY
Status: CANCELLED | OUTPATIENT
Start: 2025-06-06 | End: 2025-06-13

## 2025-06-05 RX ORDER — METOPROLOL TARTRATE 25 MG/1
25 TABLET, FILM COATED ORAL 2 TIMES DAILY
Status: CANCELLED | OUTPATIENT
Start: 2025-06-05

## 2025-06-05 RX ORDER — INSULIN GLARGINE 100 [IU]/ML
14 INJECTION, SOLUTION SUBCUTANEOUS NIGHTLY
Status: CANCELLED | OUTPATIENT
Start: 2025-06-05

## 2025-06-05 RX ORDER — HEPARIN SODIUM 5000 [USP'U]/ML
5000 INJECTION, SOLUTION INTRAVENOUS; SUBCUTANEOUS EVERY 8 HOURS SCHEDULED
Status: DISCONTINUED | OUTPATIENT
Start: 2025-06-05 | End: 2025-06-16 | Stop reason: HOSPADM

## 2025-06-05 RX ORDER — POLYETHYLENE GLYCOL 3350 17 G/17G
17 POWDER, FOR SOLUTION ORAL DAILY
Status: CANCELLED | OUTPATIENT
Start: 2025-06-06

## 2025-06-05 RX ORDER — METHOCARBAMOL 500 MG/1
500 TABLET, FILM COATED ORAL 4 TIMES DAILY
Status: CANCELLED | OUTPATIENT
Start: 2025-06-05

## 2025-06-05 RX ORDER — FERROUS SULFATE 325(65) MG
325 TABLET ORAL 2 TIMES DAILY WITH MEALS
Status: DISCONTINUED | OUTPATIENT
Start: 2025-06-05 | End: 2025-06-16 | Stop reason: HOSPADM

## 2025-06-05 RX ORDER — FUROSEMIDE 20 MG/1
20 TABLET ORAL DAILY
Status: CANCELLED | OUTPATIENT
Start: 2025-06-13 | End: 2025-06-20

## 2025-06-05 RX ORDER — GABAPENTIN 100 MG/1
100 CAPSULE ORAL 3 TIMES DAILY
Status: CANCELLED | OUTPATIENT
Start: 2025-06-05

## 2025-06-05 RX ORDER — FAMOTIDINE 20 MG/1
20 TABLET, FILM COATED ORAL 2 TIMES DAILY
Status: DISCONTINUED | OUTPATIENT
Start: 2025-06-05 | End: 2025-06-16 | Stop reason: HOSPADM

## 2025-06-05 RX ORDER — ALBUTEROL SULFATE 0.83 MG/ML
2.5 SOLUTION RESPIRATORY (INHALATION)
Status: DISCONTINUED | OUTPATIENT
Start: 2025-06-05 | End: 2025-06-05

## 2025-06-05 RX ORDER — ALBUTEROL SULFATE 0.83 MG/ML
2.5 SOLUTION RESPIRATORY (INHALATION)
Status: CANCELLED | OUTPATIENT
Start: 2025-06-05

## 2025-06-05 RX ORDER — ASPIRIN 81 MG/1
81 TABLET, CHEWABLE ORAL DAILY
Status: DISCONTINUED | OUTPATIENT
Start: 2025-06-06 | End: 2025-06-16 | Stop reason: HOSPADM

## 2025-06-05 RX ORDER — BISACODYL 10 MG
10 SUPPOSITORY, RECTAL RECTAL DAILY PRN
Status: CANCELLED | OUTPATIENT
Start: 2025-06-05

## 2025-06-05 RX ORDER — ACETAMINOPHEN 325 MG/1
650 TABLET ORAL EVERY 8 HOURS SCHEDULED
Status: CANCELLED | OUTPATIENT
Start: 2025-06-05

## 2025-06-05 RX ORDER — SENNA AND DOCUSATE SODIUM 50; 8.6 MG/1; MG/1
1 TABLET, FILM COATED ORAL DAILY PRN
Status: ON HOLD | DISCHARGE
Start: 2025-06-05

## 2025-06-05 RX ORDER — OXYCODONE HYDROCHLORIDE 5 MG/1
5 TABLET ORAL EVERY 6 HOURS PRN
Qty: 28 TABLET | Refills: 0 | Status: ON HOLD | OUTPATIENT
Start: 2025-06-05 | End: 2025-06-12

## 2025-06-05 RX ORDER — FAMOTIDINE 20 MG/1
20 TABLET, FILM COATED ORAL 2 TIMES DAILY
Status: CANCELLED | OUTPATIENT
Start: 2025-06-05

## 2025-06-05 RX ORDER — SODIUM CHLORIDE 0.9 % (FLUSH) 0.9 %
5-40 SYRINGE (ML) INJECTION PRN
Status: DISCONTINUED | OUTPATIENT
Start: 2025-06-05 | End: 2025-06-16 | Stop reason: HOSPADM

## 2025-06-05 RX ORDER — HEPARIN SODIUM 5000 [USP'U]/ML
5000 INJECTION, SOLUTION INTRAVENOUS; SUBCUTANEOUS EVERY 8 HOURS SCHEDULED
Status: CANCELLED | OUTPATIENT
Start: 2025-06-05

## 2025-06-05 RX ORDER — GLUCAGON 1 MG/ML
1 KIT INJECTION PRN
Status: CANCELLED | OUTPATIENT
Start: 2025-06-05

## 2025-06-05 RX ORDER — SODIUM CHLORIDE 0.9 % (FLUSH) 0.9 %
5-40 SYRINGE (ML) INJECTION EVERY 12 HOURS SCHEDULED
Status: DISCONTINUED | OUTPATIENT
Start: 2025-06-05 | End: 2025-06-16 | Stop reason: HOSPADM

## 2025-06-05 RX ORDER — INSULIN LISPRO 100 [IU]/ML
0-12 INJECTION, SOLUTION INTRAVENOUS; SUBCUTANEOUS
Status: CANCELLED | OUTPATIENT
Start: 2025-06-05

## 2025-06-05 RX ADMIN — METOPROLOL TARTRATE 25 MG: 25 TABLET, FILM COATED ORAL at 08:18

## 2025-06-05 RX ADMIN — ALBUTEROL SULFATE 2.5 MG: 2.5 SOLUTION RESPIRATORY (INHALATION) at 12:21

## 2025-06-05 RX ADMIN — FUROSEMIDE 40 MG: 10 INJECTION, SOLUTION INTRAMUSCULAR; INTRAVENOUS at 08:18

## 2025-06-05 RX ADMIN — ACETAMINOPHEN 650 MG: 325 TABLET ORAL at 10:25

## 2025-06-05 RX ADMIN — CLOPIDOGREL BISULFATE 75 MG: 75 TABLET, FILM COATED ORAL at 08:18

## 2025-06-05 RX ADMIN — INSULIN GLARGINE 14 UNITS: 100 INJECTION, SOLUTION SUBCUTANEOUS at 21:35

## 2025-06-05 RX ADMIN — ACETAMINOPHEN 650 MG: 325 TABLET ORAL at 21:34

## 2025-06-05 RX ADMIN — HEPARIN SODIUM 5000 UNITS: 5000 INJECTION INTRAVENOUS; SUBCUTANEOUS at 21:35

## 2025-06-05 RX ADMIN — METHOCARBAMOL 500 MG: 500 TABLET ORAL at 13:45

## 2025-06-05 RX ADMIN — HEPARIN SODIUM 5000 UNITS: 5000 INJECTION INTRAVENOUS; SUBCUTANEOUS at 09:00

## 2025-06-05 RX ADMIN — METHOCARBAMOL 500 MG: 500 TABLET ORAL at 08:18

## 2025-06-05 RX ADMIN — GABAPENTIN 100 MG: 100 CAPSULE ORAL at 09:00

## 2025-06-05 RX ADMIN — OXYCODONE 5 MG: 5 TABLET ORAL at 03:34

## 2025-06-05 RX ADMIN — DICLOFENAC SODIUM 2 G: 10 GEL TOPICAL at 21:36

## 2025-06-05 RX ADMIN — FAMOTIDINE 20 MG: 20 TABLET, FILM COATED ORAL at 21:35

## 2025-06-05 RX ADMIN — GABAPENTIN 100 MG: 100 CAPSULE ORAL at 21:38

## 2025-06-05 RX ADMIN — ASPIRIN 81 MG: 81 TABLET, CHEWABLE ORAL at 08:18

## 2025-06-05 RX ADMIN — SODIUM CHLORIDE, PRESERVATIVE FREE 5 ML: 5 INJECTION INTRAVENOUS at 09:16

## 2025-06-05 RX ADMIN — ATORVASTATIN CALCIUM 80 MG: 80 TABLET, FILM COATED ORAL at 21:34

## 2025-06-05 RX ADMIN — OXYCODONE 10 MG: 5 TABLET ORAL at 16:59

## 2025-06-05 RX ADMIN — METOPROLOL TARTRATE 25 MG: 25 TABLET, FILM COATED ORAL at 21:34

## 2025-06-05 RX ADMIN — FAMOTIDINE 20 MG: 20 TABLET, FILM COATED ORAL at 08:18

## 2025-06-05 RX ADMIN — ACETAMINOPHEN 650 MG: 325 TABLET ORAL at 03:34

## 2025-06-05 RX ADMIN — METHOCARBAMOL 500 MG: 500 TABLET ORAL at 21:35

## 2025-06-05 RX ADMIN — FERROUS SULFATE TAB 325 MG (65 MG ELEMENTAL FE) 325 MG: 325 (65 FE) TAB at 18:21

## 2025-06-05 RX ADMIN — ALBUTEROL SULFATE 2.5 MG: 2.5 SOLUTION RESPIRATORY (INHALATION) at 08:46

## 2025-06-05 RX ADMIN — METHOCARBAMOL 500 MG: 500 TABLET ORAL at 16:59

## 2025-06-05 RX ADMIN — CHLORHEXIDINE GLUCONATE 15 ML: 1.2 RINSE ORAL at 08:18

## 2025-06-05 RX ADMIN — FERROUS SULFATE TAB 325 MG (65 MG ELEMENTAL FE) 325 MG: 325 (65 FE) TAB at 08:18

## 2025-06-05 ASSESSMENT — PAIN SCALES - GENERAL
PAINLEVEL_OUTOF10: 5
PAINLEVEL_OUTOF10: 8
PAINLEVEL_OUTOF10: 10

## 2025-06-05 ASSESSMENT — PAIN DESCRIPTION - LOCATION
LOCATION: CHEST;KNEE
LOCATION: CHEST
LOCATION: CHEST

## 2025-06-05 ASSESSMENT — PAIN DESCRIPTION - ORIENTATION
ORIENTATION: MID
ORIENTATION: MID

## 2025-06-05 ASSESSMENT — PAIN DESCRIPTION - DESCRIPTORS
DESCRIPTORS: ACHING;SORE
DESCRIPTORS: BURNING

## 2025-06-05 NOTE — CARE COORDINATION
CASE MANAGEMENT DISCHARGE SUMMARY      Discharge to: [facility]    IMM given: 06/05/25    Transportation: wheelchair    Confirmed discharge plan with: nursing and MD     Patient: yes       Facility/Agency, name:  [facility] pulled from [facility] per liaison.    Phone number for report to facility: [phone]    Note: Discharging nurse to complete [facility], reconcile AVS, and place final copy with patient's discharge packet. RN to ensure that written prescriptions for  Level II medications are sent with patient to the facility as per protocol.

## 2025-06-05 NOTE — PROGRESS NOTES
ASSESSMENT: ARU ADMISSION  [facility]    Patient:Rosario Riley     Rehab Dx/Hx: S/P CABG (coronary artery bypass graft) [Z95.1]   :1960  MRN:[id number]  Date of Admit: 2025  Room #: 0165/0165-01    Subjective:   Patient admitted to [id number]@ from c2 via w/c.   Alert and oriented x4.  Oriented to room and call light system. Oriented to rehab routine and therapy schedules. Informed about care conferences and ordering of meals with PCA.    Drug / Medication Review:   Medications were reviewed by RN at time of admission  [x]  No potential or actual clinically significant medication issues were noted.   []  Potential or actual clinically significant medication issues were found and MD was notified. (Specified below if applicable)   []  Allergy to medication   []  Drug interactions (drug/drug, drug/food, drug/disease interactions)   []  Duplicate drug   []  Omission (drug missing from prescribed regimen)   []  Non adherence   []  Adverse reaction   []  Wrong patient, drug, dose, route, time error   []  Ineffective drug therapy    Section GG: Eating   [x]  Code 06, Independent: if the patient completes the activity by themself with no assistance from a helper.   []  Code 05, Setup or clean up assist: if the helper sets up or cleans up; patient completes activity   []  Code 04, Supervision or touching assist: If the helper provides verbal cues and/or touching/steadying and/or contact guard assistance as patient completes activity   []  Code 03, Partial/Moderate Assist: If the helper does LESS THAN HALF the effort. [place] lifts, holds, or supports trunk or limbs, but provides less than half the effort.   []  Code 02, Substantial/Maximal Assist: if the helper does MORE THAN HALF the effort. [place] lifts or holds trunk or limbs and provides more than half the effort.  []  Code 01,Dependent: If the helper does ALL of the effort. Patient does none of the effort to complete the activity; or the  \"bed\":  []  0. No could not recall  []  1. Yes, after cueing  [x]  2. Yes, no cue required      4 Eyes Skin Assessment   The patient is being assessed for: Admission     I agree that 2 RN's have performed a thorough Head to Toe Skin Assessment on the patient. ALL assessment sites listed below have been assessed.       Areas assessed by both nurses:   [x]   Head, Face, and Ears   [x]   Shoulders, Back, and Chest, Abdomen  [x]   Arms, Elbows, and Hands   [x]   Coccyx, Sacrum, and Ischium  [x]   Legs, Feet, and Heel     Does the Patient have Skin Breakdown?  No         Jamar Prevention initiated:  Yes  Wound Care Orders initiated:  Not Applicable      [facility] nurse consulted for Pressure Injury (Stage 3,4, Unstageable, DTI, NWPT, Complex wounds)and New or Established Ostomies:  Not Applicable    Primary Nurse eSignature: Disha Davies RN  Co-signer eSignature:   Janet Cochran RN

## 2025-06-05 NOTE — DISCHARGE SUMMARY
patient underwent CABG x 2 , LAAC on 5/30/25  Post operative course was complicated by retained mediastinal chest tubes 2/2 being caught by the inferior most sternal cable requiring surgical removal.   The remainder of her post operative course was uncomplicated. As she was still up 8 kg from pre-op weight decision was made to send her on Lasix taper over 2 weeks. The patient was discharged on 6/5/25 to [facility] ARU.     Discharged Condition: stable    Disposition:  [facility] ARU    Medications:  Aspirin:continue  ADP Inhibitor (plavix/brillinta/effient):start  ACE/ARB:discontinue  Betablocker:decrease  Statin:continue      Discharge Medications:     Medication List        PAUSE taking these medications      olmesartan 40 MG tablet  Wait to take this until your doctor or other care provider tells you to start again.  Commonly known as: QUYEN            START taking these medications      acetaminophen 325 MG tablet  Commonly known as: TYLENOL  Take 2 tablets by mouth every 6 hours     clopidogrel 75 MG tablet  Commonly known as: PLAVIX  Take 1 tablet by mouth daily  Start taking on: June 6, 2025     famotidine 20 MG tablet  Commonly known as: PEPCID  Take 1 tablet by mouth 2 times daily     ferrous sulfate 325 (65 Fe) MG tablet  Commonly known as: IRON 325  Take 1 tablet by mouth 2 times daily (with meals)     gabapentin 100 MG capsule  Commonly known as: NEURONTIN  Take 1 capsule by mouth 3 times daily for 14 days.     methocarbamol 500 MG tablet  Commonly known as: ROBAXIN  Take 1 tablet by mouth 4 times daily for 10 days     oxyCODONE 5 MG immediate release tablet  Commonly known as: ROXICODONE  Take 1 tablet by mouth every 6 hours as needed for Pain for up to 7 days. Max Daily Amount: 20 mg     sennosides-docusate sodium 8.6-50 MG tablet  Commonly known as: SENOKOT-S  Take 1 tablet by mouth daily as needed for Constipation            CHANGE how you take these medications      furosemide 40 MG tablet  Commonly known as:  Lasix  Take 1 tablet by mouth daily for 7 days, THEN 0.5 tablets daily for 7 days.  Start taking on: June 5, 2025  What changed: See the new instructions.     metoprolol tartrate 25 MG tablet  Commonly known as: LOPRESSOR  Take 1 tablet by mouth 2 times daily Hold for SBP < 100 and/or HR  60  What changed: additional instructions            CONTINUE taking these medications      aspirin 81 MG chewable tablet  Take 1 tablet by mouth daily     atorvastatin 80 MG tablet  Commonly known as: LIPITOR  Take 1 tablet by mouth daily     diclofenac sodium 1 % Gel  Commonly known as: VOLTAREN  Apply 2 g topically 4 times daily as needed for Pain     vitamin D 1.25 MG (95075 UT) Caps capsule  Commonly known as: ERGOCALCIFEROL  Take 1 capsule by mouth once a week            STOP taking these medications      chlorhexidine gluconate 4 % Soln external solution  Commonly known as: Hibiclens               Where to Get Your Medications        You can get these medications from any pharmacy    Bring a paper prescription for each of these medications  oxyCODONE 5 MG immediate release tablet       Information about where to get these medications is not yet available    Ask your nurse or doctor about these medications  acetaminophen 325 MG tablet  clopidogrel 75 MG tablet  famotidine 20 MG tablet  ferrous sulfate 325 (65 Fe) MG tablet  furosemide 40 MG tablet  gabapentin 100 MG capsule  methocarbamol 500 MG tablet  metoprolol tartrate 25 MG tablet  sennosides-docusate sodium 8.6-50 MG tablet          Patient Instructions:      Activity: DO NOT LIFT, PUSH, OR PULL ANYTHING OVER 5 POUNDS FOR 6 WEEK from the day of surgery  Diet:  cardiac diet  Wound Care:  WASH YOUR INCISIONS DAILY WITH A CLEAN WASHCLOTH AND ANTIBACTERIAL SOAP. Do not wash your incisions after you have cleansed other parts of your body    Follow up with Cardiothoracic Surgeon, Dr. Justice in 1 week after D/C from ARU  Follow up with Cardiologist, in 1 month     Sánchez Mosquera

## 2025-06-05 NOTE — PROGRESS NOTES
Shift: [id number]    Procedure:   CORONARY ARTERY BYPASS GRAFT TIMES TWO, RIGHT AND LEFT LEG ENDOSCOPIC SAPHENOUS VEIN HARVEST, TOTAL CARDIOPULMONARY BYPASS, TRANSESOPHAGEAL ECHOCARDIOGRAM. POSTERIOR PERICARDIOTOMY, LEFT ATRIAL APPENDAGE CLIP PLACEMENT AND BILATERAL PECTORALIS BLOCKS (Chest)     Admit from OR (time and date): 5/30/25 @ 1500    Transition Time (Date @ Time)    Most recent vitals: /62   Pulse 56   Temp 97.7 °F (36.5 °C) (Axillary)   Resp 14   Ht 1.702 m (5' 7\")   Wt 108.8 kg (239 lb 12.8 oz)   LMP 01/30/2009   SpO2 95%   BMI 37.56 kg/m²      Pre-Op Weight Weight - Scale: 100.2 kg (221 lb)  Today's weight   Wt Readings from Last 1 Encounters:   06/05/25 108.8 kg (239 lb 12.8 oz)         EF: Pre 55%  Post 55%    Rhythm:    [x] Normal Sinus Rhythm   [] Atrial Fibrillation    [] Sinus Tach   [] Sinus Kiran    [] (adverse rhythms)     Current O2:   [x] Room Air   [] NC  L   [] BiPaP    [] Vented 30% Fi02  Peep 5    Shift Outputs:  Gaming   Chest tubes:      Air Leak [] Yes  [x] No    Hospital Course:  POD# 0 (Days)  -Out of OR at 1500  -x1 Bicarb given for base excess of -3.2  -Albumin 250 mg given   -Weaned off Precedex drip  -SBT started @ 1820  -Titrating insulin drip hourly     POD#0 NIGHTS 5/30/25  -Urine 645  -L pl 80; R pl 189; M 165  -Extubated at 2055 to 4 l NC and is now on 2l NC  -Pain was a constant issue-gave prn Fent 50mcg x 2 and 25mcg once  -pt is very difficult to get moving as she screams and becomes increasingly anxious and difficult to direct.  -Up x 2 to chair this am  -Constantly demands something to drink.    -Gave 20meq of K x 1       POD#1 Days 5/31/25  -Pt up in chair all morning   -A. Line pulled  -RN attempted to pull mediastinal chest tube-unsuccessful. MD notified   -Soft BP this afternoon, albumin given and levo started  -Urine o/p 675  -L pl 40; R pl 70; M 40    6/2 nights  -blood pressure stable overnight  -pain well controlled with toradol and tylenol  -gaming

## 2025-06-05 NOTE — PROGRESS NOTES
Patient: Rosario Riley  [id number]  Date: 2025      Chief Complaint: shortness of breath    History of Present Illness/Hospital Course:  Rosario Riley is a 64 year old female with a past medical history significant for HTN, Hld, CAD, OA, vitamin D deficiency, obesity, former tobacco use, and recent admission for presyncope and SOB and found to have multivessel CAD who presented to [facility] on 25 for planned CABG x2 with left atrial appendage clip. Course has been notable for JANIS, hyperkalemia, and anemia. She continues to have functional deficits below her baseline. She is typically independent at baseline. She lives with multiple family members in a multilevel house with 1+1+1 ANALILIA. She reports that her bedroom and full bath are upstairs. She is retired, but is working at the [employer]. She is highly motivated to work with therapies and is interested in ARU. She is also highly motivated to get back to work.     Interval History:   No acute events overnight. Today Bri is seen in her room. She reports continued LE edema and is being diuresed. Discussed that she was approved by insurance for ARU.      has a past medical history of Does mobilize using cane, Essential hypertension, Foot pain, bilateral, Hypercholesterolemia, Hypercholesterolemia, Multiple vessel coronary artery disease, Osteoarthritis, Severe obesity (BMI 35.0-39.9) with comorbidity (HCC), and Vitamin D deficiency.     has a past surgical history that includes  section; Tonsillectomy (); Knee arthroscopy (Left); knee surgery (2022); Total hip arthroplasty (Left, 2024); Cardiac procedure (N/A, 2025); Coronary artery bypass graft (N/A, 2025); Hardware Removal (N/A, 2025); and Chest surgery (N/A, 2025).     reports that she quit smoking about 6 years ago. Her smoking use included cigarettes. She started smoking about 44 years ago. She has a 37.8 pack-year smoking history. She has never  used smokeless tobacco. She reports that she does not drink alcohol and does not use drugs.    family history includes Arthritis in her maternal grandmother; Heart Disease in her mother; Rheumatologic Disease in her maternal grandmother.      REVIEW OF SYSTEMS:   Denies f/c, n/v, cp, sob    Physical Examination:  Vitals: Patient Vitals for the past 24 hrs:   BP Temp Temp src Pulse Resp SpO2 Weight   06/05/25 1223 -- -- -- 78 -- 100 % --   06/05/25 1222 -- -- -- 73 -- 99 % --   06/05/25 1200 (!) 108/46 -- -- 73 16 98 % --   06/05/25 1130 (!) 109/47 -- -- 63 -- -- --   06/05/25 1100 -- -- -- -- -- 97 % --   06/05/25 1000 (!) 110/90 -- -- 74 -- 100 % --   06/05/25 0900 (!) 149/72 -- -- 63 -- -- --   06/05/25 0848 -- -- -- 63 -- 99 % --   06/05/25 0847 -- -- -- 67 -- 99 % --   06/05/25 0818 136/67 -- -- 75 -- -- --   06/05/25 0641 -- -- -- -- -- -- 108.8 kg (239 lb 12.8 oz)   06/05/25 0500 108/62 -- -- 56 -- 95 % --   06/05/25 0404 -- -- -- -- 14 -- --   06/05/25 0400 (!) 108/52 -- -- 60 17 97 % --   06/05/25 0338 -- 97.7 °F (36.5 °C) Axillary -- -- -- --   06/05/25 0300 (!) 138/52 -- -- 73 -- 100 % --   06/05/25 0200 126/60 -- -- 56 -- 100 % --   06/05/25 0100 117/61 -- -- 70 -- 99 % --   06/05/25 0000 (!) 124/58 97.9 °F (36.6 °C) Oral 57 16 99 % --   06/04/25 2300 (!) 116/49 -- -- 62 -- 100 % --   06/04/25 2100 (!) 103/49 -- -- 64 18 100 % --   06/04/25 2030 -- -- -- -- 19 -- --   06/04/25 2000 (!) 114/52 98 °F (36.7 °C) Oral 67 22 97 % --   06/04/25 1913 -- -- -- 73 18 95 % --   06/04/25 1900 (!) 106/55 -- -- 77 -- 99 % --   06/04/25 1800 136/69 -- -- 87 -- 99 % --   06/04/25 1600 (!) 108/52 97.5 °F (36.4 °C) Oral 65 -- 97 % --   06/04/25 1503 (!) 119/53 -- -- 60 -- 98 % --   06/04/25 1400 (!) 109/55 -- -- 58 -- 97 % --     Mood: Stable  Const: No distress  ENT: Oral mucosa moist  Eyes: No discharge or injection  CV: extremities well perfused  Resp: Lungs clear to auscultation bilaterally, no rales wheezes or

## 2025-06-05 NOTE — DISCHARGE INSTR - COC
Continuity of Care Form    Patient Name: Rosario Riley   :  1960  MRN:  [id number]    Admit date:  2025  Discharge date:  ***    Code Status Order: Full Code   Advance Directives:    Date/Time Healthcare Directive Type of Healthcare Directive Copy in Chart Healthcare Agent Appointed Healthcare Agent's Name Healthcare Agent's Phone Number    25 0614 No, patient does not have an advance directive for healthcare treatment  --  --  --  --  --             Admitting Physician:  Claudia Justice MD  PCP: Promise Taylor PA    Discharging Nurse: ***  Discharging Hospital Unit/Room#: 0222/0222-01  Discharging Unit Phone Number: ***    Emergency Contact:   Extended Emergency Contact Information  Primary Emergency Contact: Cathleen Latif  Address: [address] [place] [address]            [address]  Home Phone: [phone]  Mobile Phone: [phone]  Relation: Child  Secondary Emergency Contact: Reva Riley           [facility]  Home Phone: [phone]  Relation: Child    Past Surgical History:  Past Surgical History:   Procedure Laterality Date    CARDIAC PROCEDURE N/A 2025    Left and right heart cath / coronary angiography performed by Nakita Wolfe MD at [facility] CARDIAC CATH LAB     SECTION      CHEST SURGERY N/A 2025    CHEST HARDWARE REMOVAL performed by Claudia Justice MD at [facility]    CORONARY ARTERY BYPASS GRAFT N/A 2025    CORONARY ARTERY BYPASS GRAFT TIMES TWO, RIGHT AND LEFT LEG ENDOSCOPIC SAPHENOUS VEIN HARVEST, TOTAL CARDIOPULMONARY BYPASS, TRANSESOPHAGEAL ECHOCARDIOGRAM. POSTERIOR PERICARDIOTOMY, LEFT ATRIAL APPENDAGE CLIP PLACEMENT AND BILATERAL PECTORALIS BLOCKS performed by Claudia Justice MD at [facility]    HARDWARE REMOVAL N/A 2025    REMOVAL RETAINED CHEST TUBE REMOVAL ONE STERNAL WIRE performed by Claudia Justice MD at [facility]    KNEE ARTHROSCOPY Left     KNEE SURGERY  2022    [facility]    TONSILLECTOMY

## 2025-06-05 NOTE — PLAN OF CARE
ARU PATIENT TREATMENT PLAN  [facility]  [address] [place]  [address]  [phone]    Rosario Riley    : 1960  [facility] #: [id number]  MRN: [id number]   PHYSICIAN:  Gaye Willson MD  Primary Problem    Patient Active Problem List   Diagnosis    Hypercholesterolemia    Severe obesity (BMI 35.0-39.9) with comorbidity (HCC)    Essential hypertension    Primary osteoarthritis involving multiple joints    Age-related osteoporosis without current pathological fracture    Primary osteoarthritis of left hip    Aftercare following joint replacement surgery    Complex tear of lat mensc, current injury, left knee, subs    Pre-syncope    Abnormal stress test    Multiple vessel coronary artery disease    Coronary artery disease    CAD, multiple vessel    S/P CABG (coronary artery bypass graft)       Rehabilitation Diagnosis:     S/P CABG (coronary artery bypass graft) [Z95.1]       ADMIT DATE:2025    Patient Goals: \"to be able to do everything so I can go home and to be stronger than when I came in\"     Admitting Impairments: Pt admitted d/t planned CABG on 25. Hospital course notable for JANIS, hyperkalemia, anemia, and LE edema.   Barriers: level of assistance, availability of assistance, endurance, pain, comorbidities   Participation: Prognosis: Good      CARE PLAN     NURSING:  Rosario Riley while on this unit will:     [x] Be continent of bowel and bladder     [x] Have an adequate number of bowel movements  [] Urinate with no urinary retention >300ml in bladder  [] Complete bladder protocol with gaming removal  [] Maintain O2 SATs at ___%  [x] Have pain managed while on ARU       [x] Be pain free by discharge   [x] Have no skin breakdown while on ARU  [x] Have improved skin integrity via wound measurements  [x] Have no signs/symptoms of infection at the wound site  [x] Be free from injury during hospitalization   [x] Complete education with patient/family with

## 2025-06-05 NOTE — PROGRESS NOTES
IRF MELVA Admission Assessment  [facility]    Patient:Rosario Riley     Rehab Dx/Hx: S/P CABG (coronary artery bypass graft) [Z95.1]   :1960  MRN:[id number]  Date of Admit: 2025     Pain Effect on Sleep:  Over the past 5 days, how much of the time has pain made it hard for you to sleep at night?  []  0. Does not apply - I have not had any pain or hurting in the past 5 days  []  1. Rarely or not at all  []  2. Occasionally  [x]  3. Frequently  []  4. Almost constantly  []  8. Unable to answer    Over the past 5 days, how often have you limited your participation in rehabilitation therapy sessions due to pain?  []  0. Does not apply - I have not received rehabilitation therapy in the past 5 days  []  1. Rarely or not at all  []  2. Occasionally  [x]  3. Frequently  []  4. Almost constantly  []  8. Unable to answer    Pain Interference with Day-to-Day Activities:  Over the past 5 days, how often have you limited your day-to-day activities (excluding rehabilitation therapy session)?  []  1. Rarely or not at all  []  2. Occasionally  [x]  3. Frequently  []  4. Almost constantly  []  8. Unable to answer      High-Risk Drug Classes: Use and Indication   Is taking: Check if the pt is taking any medications by pharmacological classification  Indication noted: If column 1 is checked, check if there is an indication noted for all meds in the drug class Is taking  (check all that apply) Indication noted (check all that apply)   Antipsychotic [] []   Anticoagulant [] []   Antibiotic [] []   Opioid [x] [x]   Antiplatelet [] []   Hypoglycemic (including insulin) [] []   None of the above [] []     Special Treatments, Procedures, and Programs   Check all of the following treatments, procedures, and programs that apply on admission.  On admission (check all that apply)   Cancer Treatments    A1. Chemotherapy []   A2. IV []   A3. Oral []   A10. Other []   B1. Radiation []   Respiratory Therapies     C1. Oxygen Therapy []   C2. Continuous []   C3. Intermittent []   C4. High-concentration []   D1. Suctioning []   D2. Scheduled []   D3. As needed []   E1. Tracheostomy Care []   F1. Invasive Mechanical Ventilator (ventilator or respirator) []   G1. Non-invasive Mechanical Ventilator []   G2. BiPAP []   G3. CPAP []   Other    H1. IV Medications []   H2. Vasoactive medications []   H3. Antibiotics []   H4. Anticoagulation []   H10. Other []   I1. Transfusions []   J1. Dialysis []   J2. Hemodialysis []   J3. Peritoneal dialysis []   O1. IV access []   O2. Peripheral []   O3. Midline []   O4. Central (PICC, tunneled, port) []   None of the above [x]     Signature:  Disha Davies RN

## 2025-06-06 ENCOUNTER — TELEPHONE (OUTPATIENT)
Dept: FAMILY MEDICINE CLINIC | Age: 65
End: 2025-06-06

## 2025-06-06 LAB
ALBUMIN SERPL-MCNC: 3 G/DL (ref 3.4–5)
ANION GAP SERPL CALCULATED.3IONS-SCNC: 8 MMOL/L (ref 3–16)
BACTERIA URNS QL MICRO: ABNORMAL /HPF
BASOPHILS # BLD: 0.1 K/UL (ref 0–0.2)
BASOPHILS NFR BLD: 1.2 %
BILIRUB UR QL STRIP.AUTO: NEGATIVE
BUN SERPL-MCNC: 16 MG/DL (ref 7–20)
CALCIUM SERPL-MCNC: 8.5 MG/DL (ref 8.3–10.6)
CHLORIDE SERPL-SCNC: 104 MMOL/L (ref 99–110)
CLARITY UR: CLEAR
CO2 SERPL-SCNC: 24 MMOL/L (ref 21–32)
COLOR UR: YELLOW
CREAT SERPL-MCNC: 0.9 MG/DL (ref 0.6–1.2)
DEPRECATED RDW RBC AUTO: 13.7 % (ref 12.4–15.4)
EOSINOPHIL # BLD: 0.4 K/UL (ref 0–0.6)
EOSINOPHIL NFR BLD: 4.9 %
GFR SERPLBLD CREATININE-BSD FMLA CKD-EPI: 70 ML/MIN/{1.73_M2}
GLUCOSE BLD-MCNC: 104 MG/DL (ref 70–99)
GLUCOSE BLD-MCNC: 105 MG/DL (ref 70–99)
GLUCOSE BLD-MCNC: 111 MG/DL (ref 70–99)
GLUCOSE BLD-MCNC: 98 MG/DL (ref 70–99)
GLUCOSE SERPL-MCNC: 96 MG/DL (ref 70–99)
GLUCOSE UR STRIP.AUTO-MCNC: NEGATIVE MG/DL
HCT VFR BLD AUTO: 25 % (ref 36–48)
HGB BLD-MCNC: 8.3 G/DL (ref 12–16)
HGB UR QL STRIP.AUTO: ABNORMAL
KETONES UR STRIP.AUTO-MCNC: NEGATIVE MG/DL
LEUKOCYTE ESTERASE UR QL STRIP.AUTO: ABNORMAL
LYMPHOCYTES # BLD: 1.9 K/UL (ref 1–5.1)
LYMPHOCYTES NFR BLD: 21.2 %
MCH RBC QN AUTO: 31.1 PG (ref 26–34)
MCHC RBC AUTO-ENTMCNC: 33.3 G/DL (ref 31–36)
MCV RBC AUTO: 93.5 FL (ref 80–100)
MONOCYTES # BLD: 1 K/UL (ref 0–1.3)
MONOCYTES NFR BLD: 11.4 %
NEUTROPHILS # BLD: 5.6 K/UL (ref 1.7–7.7)
NEUTROPHILS NFR BLD: 61.3 %
NITRITE UR QL STRIP.AUTO: NEGATIVE
PERFORMED ON: ABNORMAL
PERFORMED ON: NORMAL
PH UR STRIP.AUTO: 6 [PH] (ref 5–8)
PLATELET # BLD AUTO: 336 K/UL (ref 135–450)
PMV BLD AUTO: 7.7 FL (ref 5–10.5)
POTASSIUM SERPL-SCNC: 4.8 MMOL/L (ref 3.5–5.1)
PROT UR STRIP.AUTO-MCNC: NEGATIVE MG/DL
RBC # BLD AUTO: 2.68 M/UL (ref 4–5.2)
RBC #/AREA URNS HPF: ABNORMAL /HPF (ref 0–4)
RENAL EPI CELLS #/AREA UR COMP ASSIST: ABNORMAL /HPF (ref 0–1)
SODIUM SERPL-SCNC: 136 MMOL/L (ref 136–145)
SP GR UR STRIP.AUTO: 1.01 (ref 1–1.03)
UA DIPSTICK W REFLEX MICRO PNL UR: YES
URN SPEC COLLECT METH UR: ABNORMAL
UROBILINOGEN UR STRIP-ACNC: 0.2 E.U./DL
WBC # BLD AUTO: 9.1 K/UL (ref 4–11)
WBC #/AREA URNS HPF: ABNORMAL /HPF (ref 0–5)
YEAST URNS QL MICRO: PRESENT /HPF

## 2025-06-06 PROCEDURE — 97162 PT EVAL MOD COMPLEX 30 MIN: CPT

## 2025-06-06 PROCEDURE — 6360000002 HC RX W HCPCS: Performed by: STUDENT IN AN ORGANIZED HEALTH CARE EDUCATION/TRAINING PROGRAM

## 2025-06-06 PROCEDURE — 97530 THERAPEUTIC ACTIVITIES: CPT

## 2025-06-06 PROCEDURE — 80048 BASIC METABOLIC PNL TOTAL CA: CPT

## 2025-06-06 PROCEDURE — 97166 OT EVAL MOD COMPLEX 45 MIN: CPT

## 2025-06-06 PROCEDURE — 6370000000 HC RX 637 (ALT 250 FOR IP): Performed by: STUDENT IN AN ORGANIZED HEALTH CARE EDUCATION/TRAINING PROGRAM

## 2025-06-06 PROCEDURE — 6370000000 HC RX 637 (ALT 250 FOR IP): Performed by: INTERNAL MEDICINE

## 2025-06-06 PROCEDURE — 92523 SPEECH SOUND LANG COMPREHEN: CPT

## 2025-06-06 PROCEDURE — 82040 ASSAY OF SERUM ALBUMIN: CPT

## 2025-06-06 PROCEDURE — 1280000000 HC REHAB R&B

## 2025-06-06 PROCEDURE — 36415 COLL VENOUS BLD VENIPUNCTURE: CPT

## 2025-06-06 PROCEDURE — 81001 URINALYSIS AUTO W/SCOPE: CPT

## 2025-06-06 PROCEDURE — 97116 GAIT TRAINING THERAPY: CPT

## 2025-06-06 PROCEDURE — 85025 COMPLETE CBC W/AUTO DIFF WBC: CPT

## 2025-06-06 RX ORDER — METOPROLOL TARTRATE 25 MG/1
12.5 TABLET, FILM COATED ORAL 2 TIMES DAILY
Status: DISCONTINUED | OUTPATIENT
Start: 2025-06-06 | End: 2025-06-16 | Stop reason: HOSPADM

## 2025-06-06 RX ORDER — MECOBALAMIN 5000 MCG
5 TABLET,DISINTEGRATING ORAL NIGHTLY PRN
Status: DISCONTINUED | OUTPATIENT
Start: 2025-06-06 | End: 2025-06-16 | Stop reason: HOSPADM

## 2025-06-06 RX ADMIN — FERROUS SULFATE TAB 325 MG (65 MG ELEMENTAL FE) 325 MG: 325 (65 FE) TAB at 17:05

## 2025-06-06 RX ADMIN — FAMOTIDINE 20 MG: 20 TABLET, FILM COATED ORAL at 09:49

## 2025-06-06 RX ADMIN — METHOCARBAMOL 500 MG: 500 TABLET ORAL at 17:04

## 2025-06-06 RX ADMIN — METHOCARBAMOL 500 MG: 500 TABLET ORAL at 13:56

## 2025-06-06 RX ADMIN — CLOPIDOGREL BISULFATE 75 MG: 75 TABLET, FILM COATED ORAL at 09:49

## 2025-06-06 RX ADMIN — GABAPENTIN 100 MG: 100 CAPSULE ORAL at 13:56

## 2025-06-06 RX ADMIN — HEPARIN SODIUM 5000 UNITS: 5000 INJECTION INTRAVENOUS; SUBCUTANEOUS at 06:06

## 2025-06-06 RX ADMIN — ASPIRIN 81 MG: 81 TABLET, CHEWABLE ORAL at 09:49

## 2025-06-06 RX ADMIN — OXYCODONE 10 MG: 5 TABLET ORAL at 17:14

## 2025-06-06 RX ADMIN — OXYCODONE 10 MG: 5 TABLET ORAL at 13:05

## 2025-06-06 RX ADMIN — DICLOFENAC SODIUM 2 G: 10 GEL TOPICAL at 09:57

## 2025-06-06 RX ADMIN — OXYCODONE 10 MG: 5 TABLET ORAL at 06:11

## 2025-06-06 RX ADMIN — ATORVASTATIN CALCIUM 80 MG: 80 TABLET, FILM COATED ORAL at 21:40

## 2025-06-06 RX ADMIN — GABAPENTIN 100 MG: 100 CAPSULE ORAL at 09:48

## 2025-06-06 RX ADMIN — METHOCARBAMOL 500 MG: 500 TABLET ORAL at 09:49

## 2025-06-06 RX ADMIN — METOPROLOL TARTRATE 12.5 MG: 25 TABLET, FILM COATED ORAL at 21:39

## 2025-06-06 RX ADMIN — ACETAMINOPHEN 650 MG: 325 TABLET ORAL at 06:05

## 2025-06-06 RX ADMIN — FERROUS SULFATE TAB 325 MG (65 MG ELEMENTAL FE) 325 MG: 325 (65 FE) TAB at 09:49

## 2025-06-06 RX ADMIN — DICLOFENAC SODIUM 2 G: 10 GEL TOPICAL at 21:40

## 2025-06-06 RX ADMIN — HEPARIN SODIUM 5000 UNITS: 5000 INJECTION INTRAVENOUS; SUBCUTANEOUS at 21:38

## 2025-06-06 RX ADMIN — HEPARIN SODIUM 5000 UNITS: 5000 INJECTION INTRAVENOUS; SUBCUTANEOUS at 13:56

## 2025-06-06 RX ADMIN — FAMOTIDINE 20 MG: 20 TABLET, FILM COATED ORAL at 21:39

## 2025-06-06 RX ADMIN — GABAPENTIN 100 MG: 100 CAPSULE ORAL at 21:39

## 2025-06-06 RX ADMIN — ACETAMINOPHEN 650 MG: 325 TABLET ORAL at 13:56

## 2025-06-06 RX ADMIN — ACETAMINOPHEN 650 MG: 325 TABLET ORAL at 21:39

## 2025-06-06 RX ADMIN — INSULIN GLARGINE 14 UNITS: 100 INJECTION, SOLUTION SUBCUTANEOUS at 21:38

## 2025-06-06 RX ADMIN — METHOCARBAMOL 500 MG: 500 TABLET ORAL at 21:39

## 2025-06-06 RX ADMIN — OXYCODONE 10 MG: 5 TABLET ORAL at 21:39

## 2025-06-06 ASSESSMENT — PAIN DESCRIPTION - ORIENTATION
ORIENTATION: RIGHT
ORIENTATION: MID
ORIENTATION: RIGHT;LEFT
ORIENTATION: MID
ORIENTATION: MID

## 2025-06-06 ASSESSMENT — PAIN DESCRIPTION - LOCATION
LOCATION: KNEE
LOCATION: CHEST
LOCATION: CHEST;KNEE
LOCATION: KNEE
LOCATION: KNEE

## 2025-06-06 ASSESSMENT — PAIN DESCRIPTION - DESCRIPTORS
DESCRIPTORS: ACHING;THROBBING
DESCRIPTORS: ACHING;SORE
DESCRIPTORS: TIGHTNESS;ACHING
DESCRIPTORS: ACHING
DESCRIPTORS: TIGHTNESS
DESCRIPTORS: SORE

## 2025-06-06 ASSESSMENT — PAIN SCALES - GENERAL
PAINLEVEL_OUTOF10: 6
PAINLEVEL_OUTOF10: 8
PAINLEVEL_OUTOF10: 10
PAINLEVEL_OUTOF10: 5
PAINLEVEL_OUTOF10: 5
PAINLEVEL_OUTOF10: 6

## 2025-06-06 NOTE — TELEPHONE ENCOUNTER
Care Transitions Initial Follow Up Call    Outreach made within 2 business days of discharge: Yes    Patient: Rosario Riley Patient : 1960   MRN: [id number]  Reason for Admission: Coronary artery disease  Discharge Date: 25       Spoke with: Patient is currently admitted at [facility], once the patient is discharged from the hospital I will schedule for a Hospital follow up appointment.    Discharge department/facility: [facility]    within 7-14 days    Follow Up  Future Appointments   Date Time Provider Department Center   2025 10:00 AM Kay Arndt, SLP [facility] DOROTHEA [facility]   2025 12:30 PM [facility]   2025 10:00 AM John Carvajal MD [facility]   2025  2:00 PM John Carvajal MD [facility]       Kelsi Elizabeth LPN

## 2025-06-06 NOTE — PLAN OF CARE
Cognitive-linguistic evaluation completed.    Kay Arndt M.A. CCC-SLP  Speech Language Pathologist

## 2025-06-06 NOTE — H&P
Patient: Rosario Riley  [id number]  Date: 2025      Chief Complaint: leg swelling    History of Present Illness/Hospital Course:  Rosario Riley is a 64 year old female with a past medical history significant for HTN, Hld, CAD, OA, vitamin D deficiency, obesity, former tobacco use, and recent admission for presyncope and SOB and found to have multivessel CAD who presented to [facility] on 25 for planned CABG x2 with left atrial appendage clip. Course has been notable for JANIS, hyperkalemia, anemia, and LE edema. She was admitted to [facility] on 25 due to functional deficits below her baseline. Today Bri is seen with therapy present. She reports some sternal pain. She also reports chronic right knee pain. She is typically independent at baseline. She lives with multiple family members in a multilevel house with 1+1+1 ANALILIA. She reports that her bedroom and full bath are upstairs. She is retired, but is working at the [employer]. She is also highly motivated to get back to work.     Prior Level of Function:  Independent for self care, indoor mobility, stairs, functional cognition    Current Level of Function:  Supervision for sit to stand, chair/bed transfer, toilet transfer, walk 150 feet  Mod assist for 1 step  Max assist for toileting hygiene, lower body dressing     has a past medical history of Does mobilize using cane, Essential hypertension, Foot pain, bilateral, Hypercholesterolemia, Hypercholesterolemia, Multiple vessel coronary artery disease, Osteoarthritis, Severe obesity (BMI 35.0-39.9) with comorbidity (HCC), and Vitamin D deficiency.     has a past surgical history that includes  section; Tonsillectomy (); Knee arthroscopy (Left); knee surgery (2022); Total hip arthroplasty (Left, 2024); Cardiac procedure (N/A, 2025); Coronary artery bypass graft (N/A, 2025); Hardware Removal (N/A, 2025); and Chest surgery (N/A, 2025).     reports that she  06/03/2025    PROTIME 15.5 (H) 06/02/2025     Lab Results   Component Value Date    CREATININE 0.9 06/06/2025    BUN 16 06/06/2025     06/06/2025    K 4.8 06/06/2025     06/06/2025    CO2 24 06/06/2025     Lab Results   Component Value Date    ALT 14 05/30/2025    AST 27 05/30/2025    ALKPHOS 67 05/30/2025    BILITOT 0.4 05/30/2025     Most recent echocardiogram revealed EF 60-65%.     Most recent EKG revealed STEMI Poor data quality, interpretation may be adversely affectedAtrial fibrillation with rapid ventricular response.      IMAGING     XR Chest 6/4/25  Right IJ cordis in stable position. Cardiomegaly. Chest tube is been removed. No pneumothorax. Sternotomy wires and atrial appendage device in stable position. No acute osseous abnormality.     The above laboratory data have been reviewed.   The above imaging data have been reviewed.   The above medical testing have been reviewed.     Body mass index is 37.15 kg/m².    Barriers to Discharge: level of assistance, availability of assistance, endurance, pain, comorbidities    POST ADMISSION PHYSICIAN EVALUATION  The patient has agreed to being admitted to our comprehensive inpatient rehabilitation facility consisting of at least 180 minutes of therapy a day, 5 out of 7 days a week.     The patient/family has a good understanding of our discharge process. The patient has potential to make improvement and is in need of at least two of the following multidisciplinary therapies including but not limited to physical, occupational, respiratory, and speech, nutritional services, wound care, and prosthetics and orthotics. Given the patients complex condition and risk of further medical complications, rehabilitation services cannot be safely provided at a lower level of care such as a skilled nursing facility. I have compared the patients medical and functional status at the time of the preadmission screening and the same on this date, and there are no

## 2025-06-06 NOTE — CONSULTS
[facility]  Nephrology Consult Note           Reason for Consult: Lower extremity swelling  Need for diuresis, low blood pressure  Requesting Physician:  Dr. Willson    Chief Complaint:  No chief complaint on file.    History of Present Illness on 2025:    64 y.o. yo female with PMH of hypertension, hyperlipidemia, CAD, OA, vitamin D deficiency, obesity who is admitted to ARU after CABG x 2 with left atrial appendage clip on 2025  Patient reports of significant swelling on her legs    Past Medical History:        Diagnosis Date    Does mobilize using cane     Essential hypertension 2024    Foot pain, bilateral     Hypercholesterolemia 2014    Hypercholesterolemia 2014    Multiple vessel coronary artery disease 2025    Osteoarthritis NA    Had for at least 2 or more years    Severe obesity (BMI 35.0-39.9) with comorbidity (HCC) 2024    Vitamin D deficiency 2014       Past Surgical History:        Procedure Laterality Date    CARDIAC PROCEDURE N/A 2025    Left and right heart cath / coronary angiography performed by Nakita Wolfe MD at [facility] CARDIAC CATH LAB     SECTION      CHEST SURGERY N/A 2025    CHEST HARDWARE REMOVAL performed by Claudia Justice MD at [facility]    CORONARY ARTERY BYPASS GRAFT N/A 2025    CORONARY ARTERY BYPASS GRAFT TIMES TWO, RIGHT AND LEFT LEG ENDOSCOPIC SAPHENOUS VEIN HARVEST, TOTAL CARDIOPULMONARY BYPASS, TRANSESOPHAGEAL ECHOCARDIOGRAM. POSTERIOR PERICARDIOTOMY, LEFT ATRIAL APPENDAGE CLIP PLACEMENT AND BILATERAL PECTORALIS BLOCKS performed by Claudia Justice MD at [facility]    HARDWARE REMOVAL N/A 2025    REMOVAL RETAINED CHEST TUBE REMOVAL ONE STERNAL WIRE performed by Claudia Justice MD at [facility]    KNEE ARTHROSCOPY Left     KNEE SURGERY  2022    Miniscus    TONSILLECTOMY  1963    TOTAL HIP ARTHROPLASTY Left 2024    LEFT ANTERIOR TOTAL HIP REPLACEMENT performed by Nick Greenberg MD at [facility] OR       Happy Jack  than 110, decrease the dose to 12.5 mg twice daily  - Noted patient on Lasix 40 mg daily, hold for systolic blood pressure less than 100  - Serial labs    Thank you for the consultation.  Please do not hesitate to call with questions.    José Woods MD  Office: [phone]  Fax:    [phone]  [facility]

## 2025-06-06 NOTE — PLAN OF CARE
Problem: Safety - Adult  Goal: Free from fall injury  6/6/2025 1047 by Disha Davies RN  Outcome: Progressing     Problem: Pain  Goal: Verbalizes/displays adequate comfort level or baseline comfort level  6/6/2025 1047 by Disha Davies RN  Outcome: Progressing     Problem: Cognitive:  Goal: Knowledge of wound care  Description: Knowledge of wound care  6/6/2025 1047 by Disha Davies RN  Outcome: Progressing

## 2025-06-06 NOTE — PROGRESS NOTES
Physical Therapy  Facility/Department: [facility]  Physical Therapy Initial Assessment    Name: Rosario Riley  : 1960  MRN: [id number]  Date of Service: 2025    Discharge Recommendations:  Home with assist PRN, Home with Home health PT   PT Equipment Recommendations  Equipment Needed: Yes  Mobility Devices: Walker  Walker: Rolling      Patient Diagnosis(es): There were no encounter diagnoses.  Past Medical History:  has a past medical history of Does mobilize using cane, Essential hypertension, Foot pain, bilateral, Hypercholesterolemia, Hypercholesterolemia, Multiple vessel coronary artery disease, Osteoarthritis, Severe obesity (BMI 35.0-39.9) with comorbidity (HCC), and Vitamin D deficiency.  Past Surgical History:  has a past surgical history that includes  section; Tonsillectomy (); Knee arthroscopy (Left); knee surgery (2022); Total hip arthroplasty (Left, 2024); Cardiac procedure (N/A, 2025); Coronary artery bypass graft (N/A, 2025); Hardware Removal (N/A, 2025); and Chest surgery (N/A, 2025).    Assessment  Body Structures, Functions, Activity Limitations Requiring Skilled Therapeutic Intervention: Decreased functional mobility ;Decreased ROM;Decreased strength;Decreased endurance;Increased pain;Decreased posture  Assessment: pt is 65 yo female admit to [facility] s/p CABG x 2 as well as REMOVAL RETAINED CHEST TUBE REMOVAL ONE STERNAL WIRE  CHEST HARDWARE REMOVAL on . pt is ind at baseline and lives with family and uses an AD for ambulation at baseline, pt presents below baseline function requiring Min A for bed mobility while adhering to sternal precautions, Mod A to stand when fatigued, limited by R knee pain, pt will benefit from Skilled PT to address functional mobility deficits, PT recommends pt return home with A PRN, HHPT, and RW  Treatment Diagnosis: decreased functional mobility  Therapy Prognosis: Good  Decision Making: Medium Complexity  Barriers  bean bag from floor with reacher          ELOS = 10 days     Goals  Short Term Goals  Time Frame for Short Term Goals: 6/14 STG = LTG  Short Term Goal 1: pt will transfer supine to and from sit with ind  Short Term Goal 2: pt will transfer sit to and from stand with ind  Short Term Goal 3: pt will ambulate 150ft with RW with mod ind  Short Term Goal 4: pt will negotiate 14 steps with L rail with mod ind  Short Term Goal 5: pt will negotiate 3 curb steps with RW with sup  Patient Goals   Patient Goals : \"get me stronger, get me to where I can go home, be with my kids and go back to work\"       Education:   ARU expectations, schedule,      Patient     Education Provided  Role of Therapy; Mobility Training; Plan of Care; Precautions; Safety; Equipment; Transfer Training; Home Exercise Program; Fall Prevention Strategies   Education Provided Comments  ARU expectations, schedule   Education Method  Demonstration; Verbal   Barriers to Learning  None   Education Outcome  Verbalized understanding; Continued education needed; Demonstrated understanding       Therapy Time   Individual Concurrent Group Co-treatment   Time In 1230         Time Out 1330         Minutes 60         Timed Code Treatment Minutes: 50 Minutes       Rut Salvador, PT

## 2025-06-06 NOTE — PLAN OF CARE
Problem: Discharge Planning  Goal: Discharge to home or other facility with appropriate resources  Outcome: Progressing     Problem: Safety - Adult  Goal: Free from fall injury  6/6/2025 0327 by Lilli Lake RN  Outcome: Progressing  6/5/2025 1850 by Disha Davies RN  Outcome: Progressing     Problem: Pain  Goal: Verbalizes/displays adequate comfort level or baseline comfort level  6/6/2025 0327 by Lilli Lake RN  Outcome: Progressing  6/5/2025 1850 by Disha Davies RN  Outcome: Progressing     Problem: Cognitive:  Goal: Knowledge of wound care  Description: Knowledge of wound care  Outcome: Progressing  Goal: Understands risk factors for wounds  Description: Understands risk factors for wounds  Outcome: Progressing     Problem: Falls - Risk of:  Goal: Absence of physical injury  Description: Absence of physical injury  Outcome: Progressing

## 2025-06-06 NOTE — CARE COORDINATION
Case Management ARU Admission Assessment   [facility]    Patient:Rosario Riley     Rehab Dx/Hx: S/P CABG (coronary artery bypass graft) [Z95.1]   :1960  MRN:[id number]  Date of Admit: 2025  Room #: 0165/0165-01       Objective:  met with the patient to complete initial assessment and review the role of Case Management while on the ARU. Patient educated on team conferences. Discussed family training with the patient/family on how it is encouraged on the unit. Order for \"discharge planning\" has been addressed.          Family Present: none   Primary : Daughter Cathleen   [facility] Decision Maker:   Primary Decision Maker: Cathleen Latif - Child - [phone]   Current PCP:  [facility]       Admit date:  2025   Insurance: [facility] Dual (Medicare and Medicaid)   Precert required for SNF:  [] No       [x] Yes   3 Night stay required: [x] No       [] Yes   Admitting diagnosis: S/P CABG (coronary artery bypass graft) [Z95.1]       Current home situation: Lives with two daughters, their boyfriends, and two granddaughters in a two-story house w/ 3STE   DME at home: [] Walker     [x] Cane       [] RTS        [x] BSC      [x] Shower Chair        [] O2       [] HHN     [] CPAP       [] BiPap      [] Hospital Bed       [] W/C        [] Other:    Medical concerns requiring training: Post CABG restrictions   Medication concerns:  Require financial assistance with meds? [x] No       [] If yes, please explain:   [] Yes              Services prior to admission: none   Preference for HHC or OP Therapy: [x] Home health       [] Outpatient       [] No preference  Has never used HHC in the past- has no agency preference   Patient's goal(s): \"Get stronger and be more independent\"   Working or volunteering PTA? Retired but still choosing to work       Transportation needs: Active  prior to hospitalization- pt states that both of her daughters provide  American                [] Norwegian          [] Sammarinese or Sb  []  or      [] Greenlandic             [] Palauan  []  Prydeinig                                      [] Greek      [] Other   [] Chinese                                             [] Other        [] Pt unable to respond                      [] Pt declines to respond                  [] None of the above   Preferred Language: English   Have you received the 5216-3368 COVID vaccine? [] Yes  [x] No  [] Unable to answer     ECOC on chart for MD Signature.     Signature: Marlin Humphrey RN

## 2025-06-06 NOTE — PROGRESS NOTES
06/05/25 2016   RT Protocol   History Pulmonary Disease 1   Respiratory pattern 0   Breath sounds 0   Cough 0   Indications for Bronchodilator Therapy None   Bronchodilator Assessment Score 1

## 2025-06-06 NOTE — PROGRESS NOTES
Speech Language Pathology  Facility/Department: [facility]  Initial Speech/Language/Cognitive Assessment       NAME:Rosario Riley  : 1960 (64 y.o.)   MRN: [id number]  ROOM: 0165/0165-01  ADMISSION DATE: 2025  PATIENT DIAGNOSIS(ES): S/P CABG (coronary artery bypass graft) [Z95.1]  Patient Active Problem List    Diagnosis Date Noted    S/P CABG (coronary artery bypass graft) 2025    CAD, multiple vessel 2025    Coronary artery disease 2025    Multiple vessel coronary artery disease 2025    Abnormal stress test 2025    Pre-syncope 2025    Aftercare following joint replacement surgery 2024    Primary osteoarthritis of left hip 2024    Age-related osteoporosis without current pathological fracture 2024    Severe obesity (BMI 35.0-39.9) with comorbidity (HCC) 2024    Essential hypertension 2024    Primary osteoarthritis involving multiple joints 2024    Complex tear of lat mensc, current injury, left knee, subs 2022    Hypercholesterolemia 2014     Past Medical History:   Diagnosis Date    Does mobilize using cane     Essential hypertension 2024    Foot pain, bilateral     Hypercholesterolemia 2014    Hypercholesterolemia 2014    Multiple vessel coronary artery disease 2025    Osteoarthritis NA    Had for at least 2 or more years    Severe obesity (BMI 35.0-39.9) with comorbidity (HCC) 2024    Vitamin D deficiency 2014     Past Surgical History:   Procedure Laterality Date    CARDIAC PROCEDURE N/A 2025    Left and right heart cath / coronary angiography performed by Nakita Wolfe MD at [facility] CARDIAC CATH LAB     SECTION      CHEST SURGERY N/A 2025    CHEST HARDWARE REMOVAL performed by Claudia Justice MD at [facility] CVOR    CORONARY ARTERY BYPASS GRAFT N/A 2025    CORONARY ARTERY BYPASS GRAFT TIMES TWO, RIGHT AND LEFT LEG ENDOSCOPIC SAPHENOUS VEIN HARVEST, TOTAL  Function 40-24 30 [facility]   Language 37-29 31 [facility]   Visuospatial 105-82 96 [facility]   Clock Drawing 13-12 11 Mild - pt acknowledged mistakes   Composite 4.0-1.0 4.0 [facility]        Goals: N/A    Objective:   Cranial nerve / Oral motor exam:   CN V (trigeminal): ophthalmic, maxillary, and mandibular facial sensation- [facility]  CN VII (facial): [facility]  CN IX/X (glossopharyngeal/vagus): vocal quality: [facility]; cough: Strong-perceptually  CN XII (hypoglossal): [facility]    Oral motor exam     Dentition: intact    Motor Speech: [facility]    Comprehension:   Auditory Comprehension: [facility]      Expression:   Primary Mode of Expression: Verbal  Verbal Expression: [facility]    Written Expression: [facility]; Dominant Hand: Right     Pragmatics/Social Functioning: [facility]      Cognition:  Overall Orientation : .[facility]    Attention: [facility]    Memory: [facility]    Problem Solving: [facility]    Numeric Reasoning: [facility]    Abstract Reasoning: [facility]    Safety/Judgement: [facility]    Voice:   Voice: [facility]       Plan  Prognosis:  Speech Therapy Prognosis  Prognosis: Excellent  Prognosis Considerations: N/A  Individuals consulted and agree with results and recommendations: Patient   Safety Devices in place: Yes  Type of Devices: All fall risk precautions in place , Call light within reach, and Left in chair    Recommendations:   Requires SLP Intervention: no    Education:  Patient education: Role of SLP, Rationale of eval, Results of eval, Goals, and POC  Patient Education Responses: pt verbalized understanding      Total Treatment Time / Charges       Time in Time out Total Time / units   Speech / Lang / Cog Eval:  1000 1100 60 min / 1 unit     Signature:  Kay Arndt M.A. CCC-SLP  Speech Language Pathologist

## 2025-06-06 NOTE — CONSULTS
Comprehensive Nutrition Assessment    Type and Reason for Visit:  Initial, Consult    Nutrition Recommendations/Plan:   Continue low sodium diet with 1500 mL FR - monitor need for liberalization   Encourage PO intakes for healing s/p CABG   Monitor diet education needs - declined today   Monitor nutrition adequacy, pertinent labs, bowel habits, wt changes, and clinical progress     Malnutrition Assessment:  Malnutrition Status:  At risk for malnutrition (06/06/25 1233)    Context:  Acute Illness     Findings of the 6 clinical characteristics of malnutrition:  Energy Intake:  Mild decrease in energy intake  Weight Loss:  No weight loss     Body Fat Loss:  No body fat loss     Muscle Mass Loss:  No muscle mass loss    Fluid Accumulation:  Mild Extremities   Strength:  Not Performed    Nutrition Assessment:    New ARU pt admitted s/p CABG x2 with HOMERO clip. Hx of HTN, HLD and CAD. Consulted for ONS. Pt nutritionally at risk AEB decreased appetite and recent surgery. Pt reports appetite lower than normal, but attempting to eat what she can. PO intakes of % per EMR. Weights trending up, likely fluid related. Currently ordered on low sodium diet with 1500 mL FR, will continue to minimize further fluid retention. Declined need for ONS and diet education this date. Will continue to monitor.    Nutrition Related Findings:    Labs reviewed. Active BS. BM on 6/4. Wound Type: Surgical Incision       Current Nutrition Intake & Therapies:    Average Meal Intake: 51-75%, %  Average Supplements Intake: None Ordered  ADULT DIET; Regular; Low Sodium (2 gm); 1500 ml    Anthropometric Measures:  Height: 170.2 cm (5' 7\")  Ideal Body Weight (IBW): 135 lbs (61 kg)       Current Body Weight: 107.5 kg (237 lb), 175.6 % IBW. Weight Source: Standing scale  Current BMI (kg/m2): 37.1  Usual Body Weight: 98.4 kg (217 lb) (standing scale prior to CABG)     % Weight Change (Calculated): 9.2                    BMI Categories: Obese  Class 2 (BMI 35.0 -39.9)    Estimated Daily Nutrient Needs:  Energy Requirements Based On: Kcal/kg (15-20)  Weight Used for Energy Requirements: Current  Energy (kcal/day): 8036-4408 kcal  Weight Used for Protein Requirements: Ideal (1.2-1.4 g/kg)  Protein (g/day): 73-86 g     Fluid (ml/day): 1500 mL FR    Nutrition Diagnosis:   Inadequate oral intake related to decreased appetite, cardiac dysfunction as evidenced by poor intake prior to admission, s/p surgery, rehab for strength and conditioning    Nutrition Interventions:   Food and/or Nutrient Delivery: Continue Current Diet  Nutrition Education/Counseling: Education/Counseling declined  Coordination of Nutrition Care: Continue to monitor while inpatient, Interdisciplinary Rounds       Goals:  Goals: PO intake 50% or greater, prior to discharge  Type of Goal: New goal  Previous Goal Met: New Goal    Nutrition Monitoring and Evaluation:   Behavioral-Environmental Outcomes: None Identified  Food/Nutrient Intake Outcomes: Food and Nutrient Intake  Physical Signs/Symptoms Outcomes: Biochemical Data, Nutrition Focused Physical Findings, Weight    Discharge Planning:    Continue current diet     Jeanie Wisdom, MS, RD, LD  Contact: [phone]

## 2025-06-06 NOTE — PROGRESS NOTES
Occupational Therapy  Facility/Department: [facility]  Rehabilitation Occupational Therapy Evaluation       Date: 25  Patient Name: Rosario Riley       Room: 0165/0165-01  MRN: [id number]  Account: [id number]   : 1960  (64 y.o.) Gender: female     Pt was bathed during OT session this date. Pt was Showered with soap/water with Minimal Assistance.     Referring Practitioner: Dr. Willson  Diagnosis: S/P CABG (coronary artery bypass graft)    Restrictions  Restrictions/Precautions: Cardiac;Surgical Protocols;Fall Risk  Other Position/Activity Restrictions: sternal precautions  Required Braces or Orthoses  Right Lower Extremity Brace: Knee Brace (due to arthritis in R knee.)  Required Braces or Orthoses?: Yes    Subjective  Subjective: Patient lying supine in bed upon therapist arrival. Agreeable to OT evaluation and motivated to participate in session.    Vitals  Temp: 97.5 °F (36.4 °C)  Pulse: 67  Respirations: 16  BP: 121/50  Oxygen Therapy  SpO2: 98 %  O2 Device: None (Room air)  Level of Consciousness: Alert (0)    Objective  Vision - Basic Assessment  Prior Vision: Wears glasses all the time  Visual History: No significant visual history  Patient Visual Report: No visual complaint reported.  Cognition  Overall Cognitive Status: Exceptions  Arousal/Alertness: Appears intact  Following Commands: Appears intact  Attention Span: Appears intact  Memory: Appears intact  Safety Judgement: Good awareness of safety precautions  Problem Solving: Appears intact  Insights: Appears intact  Initiation: Appears intact  Sequencing: Appears intact  Orientation  Overall Orientation Status: Within Normal Limits  Orientation Level: Oriented X4   Perception  Overall Perceptual Status: WFL  Sensation  Overall Sensation Status: WFL (denies numbness/tingling in UEs.)   ROM  LUE AROM (degrees)  LUE AROM : WFL  Left Hand AROM (degrees)  Left Hand AROM: WFL  RUE AROM (degrees)  RUE AROM : WFL  Right Hand AROM  On/Taking Off Footwear: Dependent/Total  Putting On/Taking Off Footwear Skilled Clinical Factors: to don/doff socks. unable to get BLEs into figure-four postion or bend down to put socks on.  Toileting: Contact guard assistance  Toileting Skilled Clinical Factors: SPV to void and perform pericare while seated. CGA to perform clothing management in stance.  Functional Mobility: Contact guard assistance  Functional Mobility Skilled Clinical Factors: with the use of a gait belt and RW.  Product Used : Soap and water    Goals  Patient Goals   Patient goals : \"to be able to do everything so I can go home and to be stronger than when I came in\"  Short Term Goals  Time Frame for Short Term Goals: 6/10/25  Short Term Goal 1: Patient will hygiene and grooming tasks in stance at the sink with SBA.  Short Term Goal 2: Patient will perform toilet transfers with SBA.  Short Term Goal 3: Patient will perform toileting tasks with SBA.  Short Term Goal 4: Patient will perform LB dressing with SBA.  Short Term Goal 5: Patient will perform don/doff footwear with SPV and AE as needed.  Long Term Goals  Time Frame for Long Term Goals : 6/14/25  Long Term Goal 1: Patient will perform toilet transfers with Karina.  Long Term Goal 2: Patient will perform toileting tasks with Karina.  Long Term Goal 3: Patient will perform FB dressing with Karina.  Long Term Goal 4: Patient will perform FB bathing with Karina.  Long Term Goal 5: Patient will perform a simple IADL task with SPV.    Assessment  Performance deficits / Impairments: Decreased functional mobility ;Decreased ADL status;Decreased strength;Decreased safe awareness;Decreased endurance;Decreased balance;Decreased high-level IADLs    Assessment: Per admission H&P from MD on 6/6/25 \"Rosario Riley is a 64 year old female with a past medical history significant for HTN, Hld, CAD, OA, vitamin D deficiency, obesity, former tobacco use, and recent admission for presyncope and SOB and found to have

## 2025-06-07 LAB
ALBUMIN SERPL-MCNC: 3.3 G/DL (ref 3.4–5)
ANION GAP SERPL CALCULATED.3IONS-SCNC: 8 MMOL/L (ref 3–16)
BUN SERPL-MCNC: 15 MG/DL (ref 7–20)
CALCIUM SERPL-MCNC: 8.6 MG/DL (ref 8.3–10.6)
CHLORIDE SERPL-SCNC: 105 MMOL/L (ref 99–110)
CO2 SERPL-SCNC: 24 MMOL/L (ref 21–32)
CORTIS AM PEAK SERPL-MCNC: 11.1 UG/DL (ref 4.3–22.4)
CREAT SERPL-MCNC: 0.9 MG/DL (ref 0.6–1.2)
GFR SERPLBLD CREATININE-BSD FMLA CKD-EPI: 70 ML/MIN/{1.73_M2}
GLUCOSE BLD-MCNC: 100 MG/DL (ref 70–99)
GLUCOSE BLD-MCNC: 107 MG/DL (ref 70–99)
GLUCOSE BLD-MCNC: 149 MG/DL (ref 70–99)
GLUCOSE BLD-MCNC: 195 MG/DL (ref 70–99)
GLUCOSE SERPL-MCNC: 96 MG/DL (ref 70–99)
PERFORMED ON: ABNORMAL
PHOSPHATE SERPL-MCNC: 3.9 MG/DL (ref 2.5–4.9)
POTASSIUM SERPL-SCNC: 4.6 MMOL/L (ref 3.5–5.1)
SODIUM SERPL-SCNC: 137 MMOL/L (ref 136–145)

## 2025-06-07 PROCEDURE — 97535 SELF CARE MNGMENT TRAINING: CPT

## 2025-06-07 PROCEDURE — 82533 TOTAL CORTISOL: CPT

## 2025-06-07 PROCEDURE — 97116 GAIT TRAINING THERAPY: CPT

## 2025-06-07 PROCEDURE — 6360000002 HC RX W HCPCS: Performed by: STUDENT IN AN ORGANIZED HEALTH CARE EDUCATION/TRAINING PROGRAM

## 2025-06-07 PROCEDURE — 6370000000 HC RX 637 (ALT 250 FOR IP): Performed by: STUDENT IN AN ORGANIZED HEALTH CARE EDUCATION/TRAINING PROGRAM

## 2025-06-07 PROCEDURE — 6370000000 HC RX 637 (ALT 250 FOR IP): Performed by: INTERNAL MEDICINE

## 2025-06-07 PROCEDURE — 1280000000 HC REHAB R&B

## 2025-06-07 PROCEDURE — 97110 THERAPEUTIC EXERCISES: CPT

## 2025-06-07 PROCEDURE — 80069 RENAL FUNCTION PANEL: CPT

## 2025-06-07 PROCEDURE — 97530 THERAPEUTIC ACTIVITIES: CPT

## 2025-06-07 PROCEDURE — 36415 COLL VENOUS BLD VENIPUNCTURE: CPT

## 2025-06-07 RX ADMIN — HEPARIN SODIUM 5000 UNITS: 5000 INJECTION INTRAVENOUS; SUBCUTANEOUS at 14:02

## 2025-06-07 RX ADMIN — GABAPENTIN 100 MG: 100 CAPSULE ORAL at 14:01

## 2025-06-07 RX ADMIN — ASPIRIN 81 MG: 81 TABLET, CHEWABLE ORAL at 08:15

## 2025-06-07 RX ADMIN — ERGOCALCIFEROL 50000 UNITS: 1.25 CAPSULE ORAL at 08:16

## 2025-06-07 RX ADMIN — FERROUS SULFATE TAB 325 MG (65 MG ELEMENTAL FE) 325 MG: 325 (65 FE) TAB at 16:35

## 2025-06-07 RX ADMIN — GABAPENTIN 100 MG: 100 CAPSULE ORAL at 20:34

## 2025-06-07 RX ADMIN — SENNOSIDES AND DOCUSATE SODIUM 1 TABLET: 50; 8.6 TABLET ORAL at 08:15

## 2025-06-07 RX ADMIN — METOPROLOL TARTRATE 12.5 MG: 25 TABLET, FILM COATED ORAL at 08:14

## 2025-06-07 RX ADMIN — ACETAMINOPHEN 650 MG: 325 TABLET ORAL at 14:01

## 2025-06-07 RX ADMIN — OXYCODONE 10 MG: 5 TABLET ORAL at 08:15

## 2025-06-07 RX ADMIN — FAMOTIDINE 20 MG: 20 TABLET, FILM COATED ORAL at 20:45

## 2025-06-07 RX ADMIN — METHOCARBAMOL 500 MG: 500 TABLET ORAL at 14:01

## 2025-06-07 RX ADMIN — HEPARIN SODIUM 5000 UNITS: 5000 INJECTION INTRAVENOUS; SUBCUTANEOUS at 20:38

## 2025-06-07 RX ADMIN — ACETAMINOPHEN 650 MG: 325 TABLET ORAL at 05:41

## 2025-06-07 RX ADMIN — GABAPENTIN 100 MG: 100 CAPSULE ORAL at 08:15

## 2025-06-07 RX ADMIN — DICLOFENAC SODIUM 2 G: 10 GEL TOPICAL at 21:02

## 2025-06-07 RX ADMIN — METOPROLOL TARTRATE 12.5 MG: 25 TABLET, FILM COATED ORAL at 20:35

## 2025-06-07 RX ADMIN — DICLOFENAC SODIUM 2 G: 10 GEL TOPICAL at 08:14

## 2025-06-07 RX ADMIN — FERROUS SULFATE TAB 325 MG (65 MG ELEMENTAL FE) 325 MG: 325 (65 FE) TAB at 08:15

## 2025-06-07 RX ADMIN — INSULIN GLARGINE 14 UNITS: 100 INJECTION, SOLUTION SUBCUTANEOUS at 20:40

## 2025-06-07 RX ADMIN — METHOCARBAMOL 500 MG: 500 TABLET ORAL at 20:34

## 2025-06-07 RX ADMIN — CLOPIDOGREL BISULFATE 75 MG: 75 TABLET, FILM COATED ORAL at 08:15

## 2025-06-07 RX ADMIN — FAMOTIDINE 20 MG: 20 TABLET, FILM COATED ORAL at 08:15

## 2025-06-07 RX ADMIN — FUROSEMIDE 40 MG: 40 TABLET ORAL at 08:15

## 2025-06-07 RX ADMIN — SENNOSIDES AND DOCUSATE SODIUM 1 TABLET: 50; 8.6 TABLET ORAL at 20:34

## 2025-06-07 RX ADMIN — METHOCARBAMOL 500 MG: 500 TABLET ORAL at 16:35

## 2025-06-07 RX ADMIN — METHOCARBAMOL 500 MG: 500 TABLET ORAL at 08:15

## 2025-06-07 RX ADMIN — OXYCODONE 10 MG: 5 TABLET ORAL at 14:01

## 2025-06-07 RX ADMIN — HEPARIN SODIUM 5000 UNITS: 5000 INJECTION INTRAVENOUS; SUBCUTANEOUS at 05:41

## 2025-06-07 RX ADMIN — INSULIN LISPRO 2 UNITS: 100 INJECTION, SOLUTION INTRAVENOUS; SUBCUTANEOUS at 20:39

## 2025-06-07 RX ADMIN — ACETAMINOPHEN 650 MG: 325 TABLET ORAL at 20:45

## 2025-06-07 RX ADMIN — ATORVASTATIN CALCIUM 80 MG: 80 TABLET, FILM COATED ORAL at 20:34

## 2025-06-07 ASSESSMENT — PAIN DESCRIPTION - ONSET: ONSET: ON-GOING

## 2025-06-07 ASSESSMENT — PAIN DESCRIPTION - LOCATION
LOCATION: KNEE
LOCATION: CHEST;RIB CAGE

## 2025-06-07 ASSESSMENT — PAIN SCALES - GENERAL
PAINLEVEL_OUTOF10: 5
PAINLEVEL_OUTOF10: 6
PAINLEVEL_OUTOF10: 7
PAINLEVEL_OUTOF10: 7

## 2025-06-07 ASSESSMENT — PAIN DESCRIPTION - DESCRIPTORS
DESCRIPTORS: ACHING
DESCRIPTORS: SORE

## 2025-06-07 ASSESSMENT — PAIN DESCRIPTION - PAIN TYPE: TYPE: CHRONIC PAIN

## 2025-06-07 ASSESSMENT — PAIN DESCRIPTION - ORIENTATION: ORIENTATION: RIGHT;LEFT

## 2025-06-07 ASSESSMENT — PAIN - FUNCTIONAL ASSESSMENT: PAIN_FUNCTIONAL_ASSESSMENT: ACTIVITIES ARE NOT PREVENTED

## 2025-06-07 ASSESSMENT — PAIN DESCRIPTION - FREQUENCY: FREQUENCY: CONTINUOUS

## 2025-06-07 NOTE — PROGRESS NOTES
[facility]  Nephrology Follow up Note           Reason for Consult: Lower extremity swelling  Need for diuresis, low blood pressure  Requesting Physician:  Dr. Willson    Sub/interval history  Feels ok  C/o swelling  BP better    Last 24 h uop 900 ml; 1l today   wt 237 lbs     ROS: No chest pain/shortness of breath/fever/nausea/vomiting  PSFH: No visitor    Scheduled Meds:   metoprolol tartrate  12.5 mg Oral BID    acetaminophen  650 mg Oral 3 times per day    aspirin  81 mg Oral Daily    atorvastatin  80 mg Oral Nightly    clopidogrel  75 mg Oral Daily    famotidine  20 mg Oral BID    ferrous sulfate  325 mg Oral BID WC    gabapentin  100 mg Oral TID    furosemide  40 mg Oral Daily    Followed by    [START ON 6/13/2025] furosemide  20 mg Oral Daily    heparin (porcine)  5,000 Units SubCUTAneous 3 times per day    insulin glargine  14 Units SubCUTAneous Nightly    insulin lispro  0-12 Units SubCUTAneous 4x Daily AC & HS    polyethylene glycol  17 g Oral Daily    sennosides-docusate sodium  1 tablet Oral BID    sodium chloride flush  5-40 mL IntraVENous 2 times per day    vitamin D  50,000 Units Oral Weekly    methocarbamol  500 mg Oral 4x Daily    lidocaine  1 patch TransDERmal Daily    diclofenac sodium  2 g Topical BID     Continuous Infusions:   sodium chloride      dextrose       PRN Meds:.melatonin, sodium chloride, bisacodyl, dextrose, dextrose bolus **OR** dextrose bolus, glucagon (rDNA), glucose, hydrALAZINE, magnesium hydroxide, ondansetron, oxyCODONE **OR** oxyCODONE, sodium chloride flush, acetaminophen, albuterol    History of Present Illness on 6/6/2025:    64 y.o. yo female with PMH of hypertension, hyperlipidemia, CAD, OA, vitamin D deficiency, obesity who is admitted to ARU after CABG x 2 with left atrial appendage clip on 5/30/2025  Patient reports of significant swelling on her legs    Physical exam:   Constitutional:  VITALS:  /80   Pulse 84   Temp 97.9 °F (36.6 °C) (Oral)   Resp

## 2025-06-07 NOTE — PROGRESS NOTES
Occupational Therapy  Facility/Department: [facility]  Rehabilitation Occupational Therapy Daily Treatment Note    Date: 25  Patient Name: Rosario Riley       Room: 0165/0165-01  MRN: [id number]  Account: [id number]   : 1960  (64 y.o.) Gender: female                    Past Medical History:  has a past medical history of Does mobilize using cane, Essential hypertension, Foot pain, bilateral, Hypercholesterolemia, Hypercholesterolemia, Multiple vessel coronary artery disease, Osteoarthritis, Severe obesity (BMI 35.0-39.9) with comorbidity (HCC), and Vitamin D deficiency.  Past Surgical History:   has a past surgical history that includes  section; Tonsillectomy (); Knee arthroscopy (Left); knee surgery (2022); Total hip arthroplasty (Left, 2024); Cardiac procedure (N/A, 2025); Coronary artery bypass graft (N/A, 2025); Hardware Removal (N/A, 2025); and Chest surgery (N/A, 2025).    Restrictions  Restrictions/Precautions: Cardiac, Surgical Protocols, Fall Risk  Other Position/Activity Restrictions: sternal precautions  Required Braces or Orthoses  Right Lower Extremity Brace: Knee Brace  Required Braces or Orthoses?: Yes    Subjective  Subjective: Pt at EOB at start of session. Pleasant. Denies pain. /61, HR 81, SPo2 93% on RA.  Restrictions/Precautions: Cardiac;Surgical Protocols;Fall Risk             Objective     Cognition  Overall Cognitive Status: Exceptions  Arousal/Alertness: Appears intact  Following Commands: Appears intact  Attention Span: Appears intact  Memory: Appears intact  Safety Judgement: Good awareness of safety precautions  Problem Solving: Appears intact  Insights: Appears intact  Initiation: Appears intact  Sequencing: Appears intact  Orientation  Overall Orientation Status: Within Normal Limits  Orientation Level: Oriented X4         ADL  Grooming/Oral Hygiene  Assistance Level: Stand by assist  Skilled Clinical Factors: stands at sink to

## 2025-06-07 NOTE — PROGRESS NOTES
Department of Physical Medicine & Rehabilitation  Progress Note    Patient Identification:  Rosario Riley  [id number]  : 1960  Admit date: 2025    Chief Complaint: S/P CABG (coronary artery bypass graft)    Subjective:   No acute events overnight. Patient seen this am.  She is happy with her care so far on the rehab unit and is working on protecting her sternal precautions.  She was able to sleep well last night.  Continues to have poor endurance.    ROS: No f/c, n/v, cp     Objective:  Patient Vitals for the past 24 hrs:   BP Temp Temp src Pulse Resp SpO2 Weight   25 0812 128/80 -- -- 84 18 98 % --   25 0332 -- -- -- -- -- -- 107.6 kg (237 lb 4.8 oz)   25 2135 (!) 125/46 97.9 °F (36.6 °C) Oral 73 18 100 % --   25 1714 -- -- -- -- 16 -- --     Const: Alert. No distress, pleasant.   HEENT: Normocephalic, atraumatic. Normal sclera/conjunctiva. MMM.   CV: Regular rate and rhythm.   Resp: No respiratory distress. Lungs CTAB.   Abd: Soft, nontender, nondistended, NABS+   Ext: + edema.   Neuro: Alert, oriented, appropriately interactive.   Psych: Cooperative, appropriate mood and affect    Laboratory data: Available via EMR.   Last 24 hour lab  Recent Results (from the past 24 hours)   Urinalysis with Microscopic    Collection Time: 25  1:55 PM   Result Value Ref Range    Color, UA Yellow Straw/Yellow    Clarity, UA Clear Clear    Glucose, Ur Negative Negative mg/dL    Bilirubin, Urine Negative Negative    Ketones, Urine Negative Negative mg/dL    Specific Gravity, UA 1.015 1.005 - 1.030    Blood, Urine TRACE (A) Negative    pH, Urine 6.0 5.0 - 8.0    Protein, UA Negative Negative mg/dL    Urobilinogen, Urine 0.2 <2.0 E.U./dL    Nitrite, Urine Negative Negative    Leukocyte Esterase, Urine TRACE (A) Negative    Microscopic Examination YES     Urine Type NotGiven     WBC, UA 6-9 (A) 0 - 5 /HPF    RBC, UA 3-4 0 - 4 /HPF    Renal Epithelial, UA 0-1 0 - 1 /HPF    Bacteria, UA 2+  medical history significant for HTN, Hld, CAD, OA, vitamin D deficiency, obesity, former tobacco use, and recent admission for presyncope and SOB and found to have multivessel CAD who presented to [facility] 5/30/25 for planned CABG x2 with left atrial appendage clip. She was admitted to [facility] on 6/5/25 due to functional deficits below her baseline.      CAD s/p CABG x2   - asa, plavix, metoprolol, atorvastatin  - sternal precautions  - incision care  - PT, OT     BLE edema  - on lasix 20 mg daily at home  - continue lasix wean per CT Surgery  - monitor K while on lasix  - daily weights, I/Os     Anemia  - iron  - monitor and transfuse for Hb<7     HTN  - holding home olmesartan  - on metoprolol, has been hypotensive     HLD  - atorvastatin      Class 2 obesity  - Body mass index is 37.15 kg/m².  - encourage lifestyle modifications   Bowels: adjust medications as needed for regular bowel movements     Rehab Progress: Improving  Anticipated Dispo: home  Services/DME: NORI [facility]ERETT: NORI Rocha D.O. M.P.H  PM&R  6/7/2025  1:35 PM

## 2025-06-07 NOTE — PROGRESS NOTES
Physical Therapy  Facility/Department: [facility]  Rehabilitation Physical Therapy Treatment Note    NAME: Rosario Riley  : 1960 (64 y.o.)  MRN: [id number]  CODE STATUS: Full Code    Date of Service: 25       Restrictions:  Restrictions/Precautions: Cardiac, Surgical Protocols, Fall Risk  Required Braces or Orthoses  Right Lower Extremity Brace: Knee Brace  Position Activity Restriction  Sternal Precautions: No Pushing, No Pulling, 5# Lifting Restrictions  Other Position/Activity Restrictions: sternal precautions     SUBJECTIVE  Subjective: Pt was in bed upon arrival to room and agreeable to PT treatment with encouragement d/t increased pain this date.  Pain: 9/10 chest pain. RN notified and administered pain medication.       OBJECTIVE  Cognition  Overall Cognitive Status: Exceptions  Arousal/Alertness: Appears intact  Following Commands: Appears intact  Attention Span: Appears intact  Memory: Appears intact  Safety Judgement: Good awareness of safety precautions  Problem Solving: Appears intact  Insights: Appears intact  Initiation: Appears intact  Sequencing: Appears intact  Cognition Comment: Pt intermittently tearful d/t pain; cues required for sternal precautions with bed mobility  Orientation  Overall Orientation Status: Within Normal Limits    Functional Mobility  Supine to Sit  Assistance Level: Stand by assist  Skilled Clinical Factors: cues for sternal precautions; performed with HOB elevated toward right  Scooting  Assistance Level: Stand by assist  Skilled Clinical Factors: cues for sternal precautions  Balance  Sitting Balance: Independent  Standing Balance: Contact guard assistance (CGA dynamic; SBA static at RW)  Transfers  Surface: From bed;Raised toilet Seat  Additional Factors: Verbal cues  Sit to Stand  Assistance Level: Contact guard assist;Minimal assistance (Min A from EOB and toilet and first rep from w/c progressing to CGA with increased reps (2-3 more reps from EOB and  week  Current Treatment Recommendations: Strengthening;Balance training;Functional mobility training;Transfer training;Gait training;Endurance training;Stair training;Neuromuscular re-education;Therapeutic activities;Home exercise program;Safety education & training;Pain management;Equipment evaluation, education, & procurement;Modalities;Positioning;Patient/Caregiver education & training  Safety Devices  Type of Devices: All fall risk precautions in place;Call light within reach;Gait belt;Nurse notified;Left in bed;Bed alarm in place (left seated at EOB; RN aware)  Restraints  Restraints Initially in Place: No    EDUCATION  Education  Education Given To: Patient  Education Provided: Role of Therapy;Mobility Training;Plan of Care;Precautions;Equipment;Transfer Training;Home Exercise Program;Fall Prevention Strategies;Energy Conservation  Education Method: Demonstration;Verbal  Barriers to Learning: None  Education Outcome: Verbalized understanding;Continued education needed;Demonstrated understanding        Therapy Time   Individual Concurrent Group Co-treatment   Time In 0730         Time Out 0830         Minutes 60           Timed Code Treatment Minutes: 60 Minutes       Leanne Mera, [id number], DPT, 06/07/25 at 11:30 AM         Addendum 2nd Session:  Patient is agreeable to PT session though reporting increased RLE pain and fatigue, requesting seated therapy exercises. Frequent seated rest breaks to complete. Left in recliner with BLE elevated and all needs within reach.  Therapeutic Exercise:  20x AP, 20x LAQ, 10x marching, 10x hip abduction against purple TB, 10x hip adduction via pillow squeeze   Second Session Therapy Time:   Individual Concurrent Group Co-treatment   Time In 0930         Time Out 1000         Minutes 30           Timed Code Treatment Minutes:  30  Total Treatment Minutes:  90  Rosario Bueno PT, DPT

## 2025-06-07 NOTE — PLAN OF CARE
Problem: Safety - Adult  Goal: Free from fall injury  Outcome: Progressing  Flowsheets (Taken 6/7/2025 1544)  Free From Fall Injury:   Instruct family/caregiver on patient safety   Based on caregiver fall risk screen, instruct family/caregiver to ask for assistance with transferring infant if caregiver noted to have fall risk factors     Problem: Pain  Goal: Verbalizes/displays adequate comfort level or baseline comfort level  Outcome: Progressing  Flowsheets (Taken 6/7/2025 1548)  Verbalizes/displays adequate comfort level or baseline comfort level:   Encourage patient to monitor pain and request assistance   Administer analgesics based on type and severity of pain and evaluate response   Consider cultural and social influences on pain and pain management   Assess pain using appropriate pain scale   Implement non-pharmacological measures as appropriate and evaluate response   Notify Licensed Independent Practitioner if interventions unsuccessful or patient reports new pain

## 2025-06-08 LAB
GLUCOSE BLD-MCNC: 107 MG/DL (ref 70–99)
GLUCOSE BLD-MCNC: 118 MG/DL (ref 70–99)
GLUCOSE BLD-MCNC: 215 MG/DL (ref 70–99)
GLUCOSE BLD-MCNC: 93 MG/DL (ref 70–99)
PERFORMED ON: ABNORMAL
PERFORMED ON: NORMAL

## 2025-06-08 PROCEDURE — 6360000002 HC RX W HCPCS: Performed by: STUDENT IN AN ORGANIZED HEALTH CARE EDUCATION/TRAINING PROGRAM

## 2025-06-08 PROCEDURE — 6370000000 HC RX 637 (ALT 250 FOR IP): Performed by: INTERNAL MEDICINE

## 2025-06-08 PROCEDURE — 1280000000 HC REHAB R&B

## 2025-06-08 PROCEDURE — 6370000000 HC RX 637 (ALT 250 FOR IP): Performed by: STUDENT IN AN ORGANIZED HEALTH CARE EDUCATION/TRAINING PROGRAM

## 2025-06-08 PROCEDURE — 2500000003 HC RX 250 WO HCPCS: Performed by: STUDENT IN AN ORGANIZED HEALTH CARE EDUCATION/TRAINING PROGRAM

## 2025-06-08 RX ORDER — COSYNTROPIN 0.25 MG/ML
250 INJECTION, POWDER, FOR SOLUTION INTRAMUSCULAR; INTRAVENOUS ONCE
Status: COMPLETED | OUTPATIENT
Start: 2025-06-09 | End: 2025-06-09

## 2025-06-08 RX ADMIN — ATORVASTATIN CALCIUM 80 MG: 80 TABLET, FILM COATED ORAL at 21:11

## 2025-06-08 RX ADMIN — HEPARIN SODIUM 5000 UNITS: 5000 INJECTION INTRAVENOUS; SUBCUTANEOUS at 21:14

## 2025-06-08 RX ADMIN — INSULIN LISPRO 2 UNITS: 100 INJECTION, SOLUTION INTRAVENOUS; SUBCUTANEOUS at 21:21

## 2025-06-08 RX ADMIN — ASPIRIN 81 MG: 81 TABLET, CHEWABLE ORAL at 08:11

## 2025-06-08 RX ADMIN — GABAPENTIN 100 MG: 100 CAPSULE ORAL at 21:11

## 2025-06-08 RX ADMIN — ACETAMINOPHEN 650 MG: 325 TABLET ORAL at 21:09

## 2025-06-08 RX ADMIN — FERROUS SULFATE TAB 325 MG (65 MG ELEMENTAL FE) 325 MG: 325 (65 FE) TAB at 17:20

## 2025-06-08 RX ADMIN — GABAPENTIN 100 MG: 100 CAPSULE ORAL at 08:11

## 2025-06-08 RX ADMIN — FUROSEMIDE 40 MG: 40 TABLET ORAL at 08:11

## 2025-06-08 RX ADMIN — SODIUM CHLORIDE, PRESERVATIVE FREE 10 ML: 5 INJECTION INTRAVENOUS at 21:10

## 2025-06-08 RX ADMIN — OXYCODONE 10 MG: 5 TABLET ORAL at 08:11

## 2025-06-08 RX ADMIN — FERROUS SULFATE TAB 325 MG (65 MG ELEMENTAL FE) 325 MG: 325 (65 FE) TAB at 08:12

## 2025-06-08 RX ADMIN — DICLOFENAC SODIUM 2 G: 10 GEL TOPICAL at 21:17

## 2025-06-08 RX ADMIN — CLOPIDOGREL BISULFATE 75 MG: 75 TABLET, FILM COATED ORAL at 08:12

## 2025-06-08 RX ADMIN — METHOCARBAMOL 500 MG: 500 TABLET ORAL at 21:11

## 2025-06-08 RX ADMIN — METOPROLOL TARTRATE 12.5 MG: 25 TABLET, FILM COATED ORAL at 08:11

## 2025-06-08 RX ADMIN — METHOCARBAMOL 500 MG: 500 TABLET ORAL at 08:12

## 2025-06-08 RX ADMIN — METHOCARBAMOL 500 MG: 500 TABLET ORAL at 17:20

## 2025-06-08 RX ADMIN — HEPARIN SODIUM 5000 UNITS: 5000 INJECTION INTRAVENOUS; SUBCUTANEOUS at 06:52

## 2025-06-08 RX ADMIN — FAMOTIDINE 20 MG: 20 TABLET, FILM COATED ORAL at 08:11

## 2025-06-08 RX ADMIN — GABAPENTIN 100 MG: 100 CAPSULE ORAL at 14:01

## 2025-06-08 RX ADMIN — HEPARIN SODIUM 5000 UNITS: 5000 INJECTION INTRAVENOUS; SUBCUTANEOUS at 14:01

## 2025-06-08 RX ADMIN — ACETAMINOPHEN 650 MG: 325 TABLET ORAL at 06:52

## 2025-06-08 RX ADMIN — FAMOTIDINE 20 MG: 20 TABLET, FILM COATED ORAL at 21:11

## 2025-06-08 RX ADMIN — DICLOFENAC SODIUM 2 G: 10 GEL TOPICAL at 08:10

## 2025-06-08 RX ADMIN — METHOCARBAMOL 500 MG: 500 TABLET ORAL at 14:02

## 2025-06-08 RX ADMIN — INSULIN GLARGINE 14 UNITS: 100 INJECTION, SOLUTION SUBCUTANEOUS at 21:16

## 2025-06-08 RX ADMIN — ACETAMINOPHEN 650 MG: 325 TABLET ORAL at 14:01

## 2025-06-08 ASSESSMENT — PAIN DESCRIPTION - FREQUENCY: FREQUENCY: CONTINUOUS

## 2025-06-08 ASSESSMENT — PAIN DESCRIPTION - ORIENTATION: ORIENTATION: RIGHT;LEFT

## 2025-06-08 ASSESSMENT — PAIN SCALES - GENERAL
PAINLEVEL_OUTOF10: 5
PAINLEVEL_OUTOF10: 7
PAINLEVEL_OUTOF10: 7

## 2025-06-08 ASSESSMENT — PAIN DESCRIPTION - ONSET: ONSET: ON-GOING

## 2025-06-08 ASSESSMENT — PAIN - FUNCTIONAL ASSESSMENT: PAIN_FUNCTIONAL_ASSESSMENT: ACTIVITIES ARE NOT PREVENTED

## 2025-06-08 ASSESSMENT — PAIN DESCRIPTION - PAIN TYPE: TYPE: CHRONIC PAIN

## 2025-06-08 ASSESSMENT — PAIN DESCRIPTION - DESCRIPTORS: DESCRIPTORS: ACHING

## 2025-06-08 ASSESSMENT — PAIN DESCRIPTION - LOCATION: LOCATION: KNEE;LEG

## 2025-06-08 NOTE — PLAN OF CARE
Problem: Pain  Goal: Verbalizes/displays adequate comfort level or baseline comfort level  Outcome: Progressing  Flowsheets (Taken 6/8/2025 0191)  Verbalizes/displays adequate comfort level or baseline comfort level:   Encourage patient to monitor pain and request assistance   Administer analgesics based on type and severity of pain and evaluate response   Consider cultural and social influences on pain and pain management   Assess pain using appropriate pain scale   Implement non-pharmacological measures as appropriate and evaluate response   Notify Licensed Independent Practitioner if interventions unsuccessful or patient reports new pain     Problem: Safety - Adult  Goal: Free from fall injury  Outcome: Progressing

## 2025-06-08 NOTE — PROGRESS NOTES
[facility]  Nephrology Follow up Note           Reason for Consult: Lower extremity swelling  Need for diuresis, low blood pressure  Requesting Physician:  Dr. Willson    Sub/interval history  Feels better  Swelling is getting better    Last 24 h uop 1.1 l  wt 237 lbs     ROS: No chest pain/shortness of breath/fever/nausea/vomiting  PSFH: No visitor    Scheduled Meds:   metoprolol tartrate  12.5 mg Oral BID    acetaminophen  650 mg Oral 3 times per day    aspirin  81 mg Oral Daily    atorvastatin  80 mg Oral Nightly    clopidogrel  75 mg Oral Daily    famotidine  20 mg Oral BID    ferrous sulfate  325 mg Oral BID WC    gabapentin  100 mg Oral TID    furosemide  40 mg Oral Daily    Followed by    [START ON 6/13/2025] furosemide  20 mg Oral Daily    heparin (porcine)  5,000 Units SubCUTAneous 3 times per day    insulin glargine  14 Units SubCUTAneous Nightly    insulin lispro  0-12 Units SubCUTAneous 4x Daily AC & HS    polyethylene glycol  17 g Oral Daily    sennosides-docusate sodium  1 tablet Oral BID    sodium chloride flush  5-40 mL IntraVENous 2 times per day    vitamin D  50,000 Units Oral Weekly    methocarbamol  500 mg Oral 4x Daily    lidocaine  1 patch TransDERmal Daily    diclofenac sodium  2 g Topical BID     Continuous Infusions:   sodium chloride      dextrose       PRN Meds:.melatonin, sodium chloride, bisacodyl, dextrose, dextrose bolus **OR** dextrose bolus, glucagon (rDNA), glucose, hydrALAZINE, magnesium hydroxide, ondansetron, oxyCODONE **OR** oxyCODONE, sodium chloride flush, acetaminophen, albuterol    History of Present Illness on 6/6/2025:    64 y.o. yo female with PMH of hypertension, hyperlipidemia, CAD, OA, vitamin D deficiency, obesity who is admitted to ARU after CABG x 2 with left atrial appendage clip on 5/30/2025  Patient reports of significant swelling on her legs    Physical exam:   Constitutional:  VITALS:  BP (!) 111/59   Pulse 79   Temp 97.7 °F (36.5 °C) (Oral)   Resp  0192  [facility]

## 2025-06-09 LAB
ANION GAP SERPL CALCULATED.3IONS-SCNC: 13 MMOL/L (ref 3–16)
BASOPHILS # BLD: 0.1 K/UL (ref 0–0.2)
BASOPHILS NFR BLD: 0.8 %
BUN SERPL-MCNC: 12 MG/DL (ref 7–20)
CALCIUM SERPL-MCNC: 8.5 MG/DL (ref 8.3–10.6)
CHLORIDE SERPL-SCNC: 105 MMOL/L (ref 99–110)
CO2 SERPL-SCNC: 26 MMOL/L (ref 21–32)
CORTIS SERPL-MCNC: 12.6 UG/DL
CORTIS SERPL-MCNC: 28.9 UG/DL
CREAT SERPL-MCNC: 0.9 MG/DL (ref 0.6–1.2)
DEPRECATED RDW RBC AUTO: 14.3 % (ref 12.4–15.4)
EOSINOPHIL # BLD: 0.4 K/UL (ref 0–0.6)
EOSINOPHIL NFR BLD: 5.2 %
GFR SERPLBLD CREATININE-BSD FMLA CKD-EPI: 70 ML/MIN/{1.73_M2}
GLUCOSE BLD-MCNC: 113 MG/DL (ref 70–99)
GLUCOSE BLD-MCNC: 123 MG/DL (ref 70–99)
GLUCOSE BLD-MCNC: 183 MG/DL (ref 70–99)
GLUCOSE BLD-MCNC: 95 MG/DL (ref 70–99)
GLUCOSE SERPL-MCNC: 85 MG/DL (ref 70–99)
HCT VFR BLD AUTO: 25.1 % (ref 36–48)
HGB BLD-MCNC: 8.5 G/DL (ref 12–16)
LYMPHOCYTES # BLD: 1.7 K/UL (ref 1–5.1)
LYMPHOCYTES NFR BLD: 21.9 %
MCH RBC QN AUTO: 31.5 PG (ref 26–34)
MCHC RBC AUTO-ENTMCNC: 33.8 G/DL (ref 31–36)
MCV RBC AUTO: 93 FL (ref 80–100)
MONOCYTES # BLD: 0.8 K/UL (ref 0–1.3)
MONOCYTES NFR BLD: 10.1 %
NEUTROPHILS # BLD: 4.9 K/UL (ref 1.7–7.7)
NEUTROPHILS NFR BLD: 62 %
PERFORMED ON: ABNORMAL
PERFORMED ON: NORMAL
PLATELET # BLD AUTO: 360 K/UL (ref 135–450)
PMV BLD AUTO: 7.6 FL (ref 5–10.5)
POTASSIUM SERPL-SCNC: 4.1 MMOL/L (ref 3.5–5.1)
RBC # BLD AUTO: 2.7 M/UL (ref 4–5.2)
SODIUM SERPL-SCNC: 144 MMOL/L (ref 136–145)
WBC # BLD AUTO: 7.9 K/UL (ref 4–11)

## 2025-06-09 PROCEDURE — 82533 TOTAL CORTISOL: CPT

## 2025-06-09 PROCEDURE — 6360000002 HC RX W HCPCS: Performed by: STUDENT IN AN ORGANIZED HEALTH CARE EDUCATION/TRAINING PROGRAM

## 2025-06-09 PROCEDURE — 6360000002 HC RX W HCPCS: Performed by: INTERNAL MEDICINE

## 2025-06-09 PROCEDURE — 97530 THERAPEUTIC ACTIVITIES: CPT

## 2025-06-09 PROCEDURE — 97110 THERAPEUTIC EXERCISES: CPT

## 2025-06-09 PROCEDURE — 6370000000 HC RX 637 (ALT 250 FOR IP): Performed by: STUDENT IN AN ORGANIZED HEALTH CARE EDUCATION/TRAINING PROGRAM

## 2025-06-09 PROCEDURE — 97535 SELF CARE MNGMENT TRAINING: CPT

## 2025-06-09 PROCEDURE — 80048 BASIC METABOLIC PNL TOTAL CA: CPT

## 2025-06-09 PROCEDURE — 85025 COMPLETE CBC W/AUTO DIFF WBC: CPT

## 2025-06-09 PROCEDURE — 2500000003 HC RX 250 WO HCPCS: Performed by: STUDENT IN AN ORGANIZED HEALTH CARE EDUCATION/TRAINING PROGRAM

## 2025-06-09 PROCEDURE — 36415 COLL VENOUS BLD VENIPUNCTURE: CPT

## 2025-06-09 PROCEDURE — 97116 GAIT TRAINING THERAPY: CPT

## 2025-06-09 PROCEDURE — 6370000000 HC RX 637 (ALT 250 FOR IP): Performed by: INTERNAL MEDICINE

## 2025-06-09 PROCEDURE — 1280000000 HC REHAB R&B

## 2025-06-09 RX ADMIN — FUROSEMIDE 40 MG: 40 TABLET ORAL at 07:46

## 2025-06-09 RX ADMIN — HEPARIN SODIUM 5000 UNITS: 5000 INJECTION INTRAVENOUS; SUBCUTANEOUS at 14:00

## 2025-06-09 RX ADMIN — METHOCARBAMOL 500 MG: 500 TABLET ORAL at 12:55

## 2025-06-09 RX ADMIN — ATORVASTATIN CALCIUM 80 MG: 80 TABLET, FILM COATED ORAL at 20:31

## 2025-06-09 RX ADMIN — CLOPIDOGREL BISULFATE 75 MG: 75 TABLET, FILM COATED ORAL at 07:45

## 2025-06-09 RX ADMIN — FERROUS SULFATE TAB 325 MG (65 MG ELEMENTAL FE) 325 MG: 325 (65 FE) TAB at 07:45

## 2025-06-09 RX ADMIN — Medication 5 MG: at 20:31

## 2025-06-09 RX ADMIN — ACETAMINOPHEN 650 MG: 325 TABLET ORAL at 20:31

## 2025-06-09 RX ADMIN — DICLOFENAC SODIUM 2 G: 10 GEL TOPICAL at 09:07

## 2025-06-09 RX ADMIN — METHOCARBAMOL 500 MG: 500 TABLET ORAL at 16:51

## 2025-06-09 RX ADMIN — OXYCODONE 10 MG: 5 TABLET ORAL at 12:55

## 2025-06-09 RX ADMIN — DICLOFENAC SODIUM 2 G: 10 GEL TOPICAL at 20:31

## 2025-06-09 RX ADMIN — HEPARIN SODIUM 5000 UNITS: 5000 INJECTION INTRAVENOUS; SUBCUTANEOUS at 05:56

## 2025-06-09 RX ADMIN — INSULIN LISPRO 2 UNITS: 100 INJECTION, SOLUTION INTRAVENOUS; SUBCUTANEOUS at 20:29

## 2025-06-09 RX ADMIN — ASPIRIN 81 MG: 81 TABLET, CHEWABLE ORAL at 07:45

## 2025-06-09 RX ADMIN — METHOCARBAMOL 500 MG: 500 TABLET ORAL at 07:46

## 2025-06-09 RX ADMIN — METOPROLOL TARTRATE 12.5 MG: 25 TABLET, FILM COATED ORAL at 07:45

## 2025-06-09 RX ADMIN — FAMOTIDINE 20 MG: 20 TABLET, FILM COATED ORAL at 20:31

## 2025-06-09 RX ADMIN — METOPROLOL TARTRATE 12.5 MG: 25 TABLET, FILM COATED ORAL at 20:29

## 2025-06-09 RX ADMIN — FERROUS SULFATE TAB 325 MG (65 MG ELEMENTAL FE) 325 MG: 325 (65 FE) TAB at 16:51

## 2025-06-09 RX ADMIN — METHOCARBAMOL 500 MG: 500 TABLET ORAL at 20:31

## 2025-06-09 RX ADMIN — OXYCODONE 10 MG: 5 TABLET ORAL at 20:30

## 2025-06-09 RX ADMIN — ACETAMINOPHEN 650 MG: 325 TABLET ORAL at 05:57

## 2025-06-09 RX ADMIN — ACETAMINOPHEN 650 MG: 325 TABLET ORAL at 15:05

## 2025-06-09 RX ADMIN — GABAPENTIN 100 MG: 100 CAPSULE ORAL at 07:45

## 2025-06-09 RX ADMIN — GABAPENTIN 100 MG: 100 CAPSULE ORAL at 15:05

## 2025-06-09 RX ADMIN — COSYNTROPIN 250 MCG: 0.25 INJECTION, POWDER, LYOPHILIZED, FOR SOLUTION INTRAMUSCULAR; INTRAVENOUS at 07:44

## 2025-06-09 RX ADMIN — HEPARIN SODIUM 5000 UNITS: 5000 INJECTION INTRAVENOUS; SUBCUTANEOUS at 20:29

## 2025-06-09 RX ADMIN — FAMOTIDINE 20 MG: 20 TABLET, FILM COATED ORAL at 07:45

## 2025-06-09 RX ADMIN — INSULIN GLARGINE 14 UNITS: 100 INJECTION, SOLUTION SUBCUTANEOUS at 20:29

## 2025-06-09 RX ADMIN — GABAPENTIN 100 MG: 100 CAPSULE ORAL at 20:31

## 2025-06-09 RX ADMIN — SODIUM CHLORIDE, PRESERVATIVE FREE 10 ML: 5 INJECTION INTRAVENOUS at 07:58

## 2025-06-09 ASSESSMENT — PAIN SCALES - GENERAL
PAINLEVEL_OUTOF10: 9
PAINLEVEL_OUTOF10: 0
PAINLEVEL_OUTOF10: 0
PAINLEVEL_OUTOF10: 6
PAINLEVEL_OUTOF10: 7
PAINLEVEL_OUTOF10: 0
PAINLEVEL_OUTOF10: 5

## 2025-06-09 ASSESSMENT — PAIN DESCRIPTION - LOCATION
LOCATION: CHEST
LOCATION: GENERALIZED
LOCATION: KNEE;CHEST
LOCATION: CHEST

## 2025-06-09 ASSESSMENT — PAIN DESCRIPTION - DESCRIPTORS
DESCRIPTORS: ACHING;SQUEEZING
DESCRIPTORS: ACHING
DESCRIPTORS: JABBING;NUMBNESS

## 2025-06-09 ASSESSMENT — PAIN SCALES - WONG BAKER
WONGBAKER_NUMERICALRESPONSE: NO HURT
WONGBAKER_NUMERICALRESPONSE: NO HURT

## 2025-06-09 ASSESSMENT — PAIN - FUNCTIONAL ASSESSMENT: PAIN_FUNCTIONAL_ASSESSMENT: PREVENTS OR INTERFERES WITH MANY ACTIVE NOT PASSIVE ACTIVITIES

## 2025-06-09 ASSESSMENT — PAIN DESCRIPTION - ORIENTATION
ORIENTATION: MID
ORIENTATION: MID
ORIENTATION: RIGHT;LEFT

## 2025-06-09 NOTE — CARE COORDINATION
CM update: Spoke with patient in room to evaluate any questions/concerns she may have. Patient states that therapy is going well and she had a good weekend. Assured patient that I will update her with discharge recommendations after care conference meeting tomorrow- patient verbalizes understanding and has no questions/concerns at this time. Will continue to follow.    Marlin Humphrey RN

## 2025-06-09 NOTE — PROGRESS NOTES
Department of Physical Medicine & Rehabilitation  Progress Note    Patient Identification:  Rosario Riley  [id number]  : 1960  Admit date: 2025    Chief Complaint: S/P CABG (coronary artery bypass graft)    Subjective:   No acute events overnight. Today Bri is seen in her room. She reports some sternal pain. She reports feeling that her heart is beating fast. RRR on auscultation. Swelling in legs is improved.     Personally reviewed labs.     ROS: No f/c, n/v, cp     Objective:  Patient Vitals for the past 24 hrs:   BP Temp Temp src Pulse Resp SpO2 Weight   25 0745 (!) 149/69 -- -- 81 -- -- --   25 0605 -- -- -- -- -- -- 105.7 kg (233 lb)   25 1923 (!) 116/47 97.5 °F (36.4 °C) Oral 76 16 99 % 106.8 kg (235 lb 8 oz)     Const: Alert. No distress, pleasant.   HEENT: Normocephalic, atraumatic. Normal sclera/conjunctiva. MMM.   CV: regular rate and rhythm   Resp: No respiratory distress. On room air  Abd: Soft, nontender, nondistended   Ext: + edema, improved from prior  Neuro: Alert, oriented, appropriately interactive.   Psych: Cooperative, appropriate mood and affect    Laboratory data: Available via EMR.   Last 24 hour lab  Recent Results (from the past 24 hours)   POCT Glucose    Collection Time: 25  4:03 PM   Result Value Ref Range    POC Glucose 107 (H) 70 - 99 mg/dl    Performed on ACCU-CHEK    POCT Glucose    Collection Time: 25  7:27 PM   Result Value Ref Range    POC Glucose 215 (H) 70 - 99 mg/dl    Performed on ACCU-CHEK    POCT Glucose    Collection Time: 25  5:31 AM   Result Value Ref Range    POC Glucose 95 70 - 99 mg/dl    Performed on ACCU-CHEK    Basic Metabolic Panel w/ Reflex to MG    Collection Time: 25  5:44 AM   Result Value Ref Range    Sodium 144 136 - 145 mmol/L    Potassium reflex Magnesium 4.1 3.5 - 5.1 mmol/L    Chloride 105 99 - 110 mmol/L    CO2 26 21 - 32 mmol/L    Anion Gap 13 3 - 16    Glucose 85 70 - 99 mg/dL    BUN 12 7 - 20  left atrial appendage clip. She was admitted to [facility] on 6/5/25 due to functional deficits below her baseline.      CAD s/p CABG x2   - asa, plavix, metoprolol, atorvastatin  - sternal precautions  - incision care  - PT, OT     BLE edema  - on lasix 20 mg daily at home  - continue lasix wean per [facility] Surgery  - monitor K while on lasix  - daily weights, I/Os  - Consult Nephrology for assistance with diuresis in the setting of hypotension, appreciate assistance, cosyntropin test 6/9     Anemia  - iron  - monitor and transfuse for Hb<7     HTN  - holding home olmesartan  - metoprolol decreased to 12.5 mg BID     HLD  - atorvastatin      Class 2 obesity  - Body mass index is 37.15 kg/m².  - encourage lifestyle modifications   Bowels: adjust medications as needed for regular bowel movements     Rehab Progress: pending therapies today  Anticipated Dispo: home  Services/DME: NORI  ELOS: NORI Willson MD  PM&R  6/9/2025  11:15 AM

## 2025-06-09 NOTE — PROGRESS NOTES
Physical Therapy  Facility/Department: [facility]  Rehabilitation Physical Therapy Treatment Note    NAME: Rosario Riley  : 1960 (64 y.o.)  MRN: [id number]  CODE STATUS: Full Code    Date of Service: 25       Restrictions:  Restrictions/Precautions: Cardiac, Surgical Protocols, Fall Risk  Required Braces or Orthoses  Right Lower Extremity Brace: Knee Brace  Position Activity Restriction  Sternal Precautions: No Pushing, No Pulling, 5# Lifting Restrictions  Other Position/Activity Restrictions: sternal precautions     SUBJECTIVE  Subjective: Pt sitting up in chair upon arrival, agreeable to PT eval  Pain: 5/10 middle of chest; RN notified       OBJECTIVE  Orientation  Overall Orientation Status: Within Normal Limits  Orientation Level: Oriented X4    Functional Mobility  Balance  Sitting Balance: Independent  Standing Balance: Contact guard assistance  Transfers  Surface: From bed;Raised toilet Seat  Additional Factors: Verbal cues  Sit to Stand  Assistance Level: Contact guard assist;Minimal assistance  Skilled Clinical Factors: pt using momentum due to sternal precautions and R knee pain; min(A) from chair;  CGA from elevated toilet  Stand to Sit  Assistance Level: Contact guard assist  Skilled Clinical Factors: decreased eccentric control      Environmental Mobility  Ambulation  Surface: Level surface  Device: Rolling walker  Distance: 125  Assistance Level: Stand by assist  Gait Deviations: Decreased step length bilateral;Decreased weight shift bilateral;Decreased heel strike left;Decreased heel strike right  Skilled Clinical Factors: Antalgic, R knee pain; overall no LOB      PT Exercises  Exercise Treatment: Pt performed DF, PF, LAQ, marches Bx15 with 2# ankle weights. Pt also performed 2x5 repeated STS      ASSESSMENT/PROGRESS TOWARDS GOALS  Vital Signs  Pulse: 88  Heart Rate Source: Monitor  BP: (!) 128/52  BP Location: Right upper arm  MAP (Calculated): 77  SpO2: 99 %  O2 Device: None (Room

## 2025-06-09 NOTE — PROGRESS NOTES
Occupational Therapy  Facility/Department: [facility]  Rehabilitation Occupational Therapy Daily Treatment Note    Date: 25  Patient Name: Rosario Riley       Room: 0165/0165-01  MRN: [id number]  Account: [id number]   : 1960  (64 y.o.) Gender: female       Past Medical History:  has a past medical history of Does mobilize using cane, Essential hypertension, Foot pain, bilateral, Hypercholesterolemia, Hypercholesterolemia, Multiple vessel coronary artery disease, Osteoarthritis, Severe obesity (BMI 35.0-39.9) with comorbidity (HCC), and Vitamin D deficiency.  Past Surgical History:   has a past surgical history that includes  section; Tonsillectomy (); Knee arthroscopy (Left); knee surgery (2022); Total hip arthroplasty (Left, 2024); Cardiac procedure (N/A, 2025); Coronary artery bypass graft (N/A, 2025); Hardware Removal (N/A, 2025); and Chest surgery (N/A, 2025).    Restrictions  Restrictions/Precautions: Cardiac, Surgical Protocols, Fall Risk  Other Position/Activity Restrictions: sternal precautions  Required Braces or Orthoses  Right Lower Extremity Brace: Knee Brace  Required Braces or Orthoses?: Yes    Subjective  Subjective: Pt received for OT session resting in bedside chair, agreeable to session. /63, O2 96% on RA, 74 pulse.  Restrictions/Precautions: Cardiac;Surgical Protocols;Fall Risk     Objective   Cognition  Overall Cognitive Status: Exceptions  Arousal/Alertness: Appears intact  Following Commands: Appears intact  Attention Span: Appears intact  Memory: Appears intact  Safety Judgement: Good awareness of safety precautions  Problem Solving: Appears intact  Insights: Appears intact  Initiation: Appears intact  Sequencing: Appears intact        Functional Mobility  Device: Rolling walker  Activity: To/From therapy gym  Assistance Level: Stand by assist  Transfers  Surface: To chair with arms;From chair with arms;To mat;From mat  Sit to  Session Therapy Time:   Individual Concurrent Group Co-treatment   Time In 0900         Time Out 1000         Minutes 60           Timed Code Treatment Minutes:  60 Minutes    Second Session Therapy Time   Individual Concurrent Group Co-treatment   Time In 1100         Time Out 1130         Minutes 30         Timed Code Treatment Minutes: 30 Minutes    Total Treatment Time Minutes: 90 Minutes    First Session: YANET Cordoba/SABI Valero OT

## 2025-06-09 NOTE — PLAN OF CARE
Problem: Safety - Adult  Goal: Free from fall injury  6/9/2025 1331 by Disha Davies RN  Outcome: Progressing     Problem: Pain  Goal: Verbalizes/displays adequate comfort level or baseline comfort level  Outcome: Progressing     Problem: Falls - Risk of:  Goal: Absence of physical injury  Description: Absence of physical injury  Outcome: Progressing     Problem: Skin/Tissue Integrity  Goal: Skin integrity remains intact  Description: 1.  Monitor for areas of redness and/or skin breakdown2.  Assess vascular access sites hourly3.  Every 4-6 hours minimum:  Change oxygen saturation probe site4.  Every 4-6 hours:  If on nasal continuous positive airway pressure, respiratory therapy assess nares and determine need for appliance change or resting period  Outcome: Progressing

## 2025-06-09 NOTE — PATIENT CARE CONFERENCE
SBA. Pt seated in chair at end of session w/ all needs met. Continue POC.         SPEECH THERAPY (intentionally left blank if not actively being seen by this service):      NUTRITION  Weight - Scale: 105.7 kg (233 lb) / Body mass index is 36.49 kg/m².  Diet Order: ADULT DIET; Regular; Low Sodium (2 gm); 1500 ml  PO Meals Eaten (%): 76 - 100%  Malnutrition Status: At risk for malnutrition  Nutrition Education/Counseling: Education/Counseling declined    CASE MANAGEMENT  Assessment: Patient lives with two daughters, their sig others, and two granddaughters in a two-story house. Patient reports that her daughter plans to take off of work when patient will be discharged home so that she will have 24hr support/supervision if recommended. Patient states that she prefers HHC over OP therapy and she also confirms that her daughters will plan to provide all needed transportation after discharge. Patient denies transportation barriers. Will continue to follow DME recs and provide at discharge as appropriate.         Interdisciplinary Goals:   1.) Patient will perform toilet transfers with SPV.  2.) Patient will perform bed mobility with Supervision  3.)  4.)  5.)      []  Family Training discussed at conference and to be scheduled.      Discharge Plan   Estimated discharge date: 6/17/25  Destination: home health  Pass: No  Services at Discharge: Home Health  Physical Therapy, Occupational Therapy, and Nursing   Equipment at Discharge: RW    Team Members Present at Conference:  : Marlin Humphrey RN    Occupational Therapist: Michael Vela, OTR/L  Physical Therapist: Rosario Bueno PT, DPT  Speech Therapist: N/A  Nurse: Disha Davies RN  Dietician: Promise Balbuena, RD, LD  Scribe: Laura Sánchez, PT, DPT  : Preethi Hdez, OTR/L  Psychiatry: N/A    Family members present at conference: No      I led this team conference and I approve the established interdisciplinary plan of care as documented within  the medical record of Rosario Wayne MD: Electronically signed by Gaye Willson MD on 6/10/2025 at 1:11 PM

## 2025-06-10 LAB
GLUCOSE BLD-MCNC: 100 MG/DL (ref 70–99)
GLUCOSE BLD-MCNC: 108 MG/DL (ref 70–99)
GLUCOSE BLD-MCNC: 159 MG/DL (ref 70–99)
GLUCOSE BLD-MCNC: 99 MG/DL (ref 70–99)
PERFORMED ON: ABNORMAL
PERFORMED ON: NORMAL

## 2025-06-10 PROCEDURE — 6370000000 HC RX 637 (ALT 250 FOR IP): Performed by: INTERNAL MEDICINE

## 2025-06-10 PROCEDURE — 97116 GAIT TRAINING THERAPY: CPT

## 2025-06-10 PROCEDURE — 6360000002 HC RX W HCPCS: Performed by: STUDENT IN AN ORGANIZED HEALTH CARE EDUCATION/TRAINING PROGRAM

## 2025-06-10 PROCEDURE — 97530 THERAPEUTIC ACTIVITIES: CPT

## 2025-06-10 PROCEDURE — 6370000000 HC RX 637 (ALT 250 FOR IP): Performed by: STUDENT IN AN ORGANIZED HEALTH CARE EDUCATION/TRAINING PROGRAM

## 2025-06-10 PROCEDURE — 97110 THERAPEUTIC EXERCISES: CPT

## 2025-06-10 PROCEDURE — 97535 SELF CARE MNGMENT TRAINING: CPT

## 2025-06-10 PROCEDURE — 1280000000 HC REHAB R&B

## 2025-06-10 RX ORDER — HYDROCHLOROTHIAZIDE 25 MG/1
25 TABLET ORAL DAILY
Status: DISCONTINUED | OUTPATIENT
Start: 2025-06-10 | End: 2025-06-16 | Stop reason: HOSPADM

## 2025-06-10 RX ADMIN — METOPROLOL TARTRATE 12.5 MG: 25 TABLET, FILM COATED ORAL at 07:34

## 2025-06-10 RX ADMIN — METOPROLOL TARTRATE 12.5 MG: 25 TABLET, FILM COATED ORAL at 20:24

## 2025-06-10 RX ADMIN — HYDROCHLOROTHIAZIDE 25 MG: 25 TABLET ORAL at 14:03

## 2025-06-10 RX ADMIN — GABAPENTIN 100 MG: 100 CAPSULE ORAL at 13:18

## 2025-06-10 RX ADMIN — ATORVASTATIN CALCIUM 80 MG: 80 TABLET, FILM COATED ORAL at 20:24

## 2025-06-10 RX ADMIN — HEPARIN SODIUM 5000 UNITS: 5000 INJECTION INTRAVENOUS; SUBCUTANEOUS at 14:03

## 2025-06-10 RX ADMIN — ACETAMINOPHEN 650 MG: 325 TABLET ORAL at 20:23

## 2025-06-10 RX ADMIN — METHOCARBAMOL 500 MG: 500 TABLET ORAL at 13:17

## 2025-06-10 RX ADMIN — CLOPIDOGREL BISULFATE 75 MG: 75 TABLET, FILM COATED ORAL at 07:34

## 2025-06-10 RX ADMIN — FERROUS SULFATE TAB 325 MG (65 MG ELEMENTAL FE) 325 MG: 325 (65 FE) TAB at 17:42

## 2025-06-10 RX ADMIN — ASPIRIN 81 MG: 81 TABLET, CHEWABLE ORAL at 07:34

## 2025-06-10 RX ADMIN — OXYCODONE 10 MG: 5 TABLET ORAL at 13:17

## 2025-06-10 RX ADMIN — METHOCARBAMOL 500 MG: 500 TABLET ORAL at 17:42

## 2025-06-10 RX ADMIN — ACETAMINOPHEN 650 MG: 325 TABLET ORAL at 13:18

## 2025-06-10 RX ADMIN — HEPARIN SODIUM 5000 UNITS: 5000 INJECTION INTRAVENOUS; SUBCUTANEOUS at 06:32

## 2025-06-10 RX ADMIN — Medication 5 MG: at 20:24

## 2025-06-10 RX ADMIN — METHOCARBAMOL 500 MG: 500 TABLET ORAL at 20:23

## 2025-06-10 RX ADMIN — ACETAMINOPHEN 650 MG: 325 TABLET ORAL at 06:31

## 2025-06-10 RX ADMIN — FAMOTIDINE 20 MG: 20 TABLET, FILM COATED ORAL at 07:34

## 2025-06-10 RX ADMIN — OXYCODONE 10 MG: 5 TABLET ORAL at 20:24

## 2025-06-10 RX ADMIN — INSULIN GLARGINE 14 UNITS: 100 INJECTION, SOLUTION SUBCUTANEOUS at 20:23

## 2025-06-10 RX ADMIN — GABAPENTIN 100 MG: 100 CAPSULE ORAL at 07:34

## 2025-06-10 RX ADMIN — GABAPENTIN 100 MG: 100 CAPSULE ORAL at 20:23

## 2025-06-10 RX ADMIN — FERROUS SULFATE TAB 325 MG (65 MG ELEMENTAL FE) 325 MG: 325 (65 FE) TAB at 07:34

## 2025-06-10 RX ADMIN — DICLOFENAC SODIUM 2 G: 10 GEL TOPICAL at 07:41

## 2025-06-10 RX ADMIN — OXYCODONE 10 MG: 5 TABLET ORAL at 07:34

## 2025-06-10 RX ADMIN — FAMOTIDINE 20 MG: 20 TABLET, FILM COATED ORAL at 20:23

## 2025-06-10 RX ADMIN — METHOCARBAMOL 500 MG: 500 TABLET ORAL at 09:24

## 2025-06-10 RX ADMIN — FUROSEMIDE 40 MG: 40 TABLET ORAL at 07:34

## 2025-06-10 RX ADMIN — HEPARIN SODIUM 5000 UNITS: 5000 INJECTION INTRAVENOUS; SUBCUTANEOUS at 20:23

## 2025-06-10 ASSESSMENT — PAIN DESCRIPTION - DESCRIPTORS
DESCRIPTORS: SORE
DESCRIPTORS: ACHING;SORE;THROBBING
DESCRIPTORS: ACHING;SORE

## 2025-06-10 ASSESSMENT — PAIN SCALES - GENERAL
PAINLEVEL_OUTOF10: 4
PAINLEVEL_OUTOF10: 5
PAINLEVEL_OUTOF10: 5
PAINLEVEL_OUTOF10: 7
PAINLEVEL_OUTOF10: 6

## 2025-06-10 ASSESSMENT — PAIN DESCRIPTION - LOCATION
LOCATION: KNEE;CHEST
LOCATION: KNEE;CHEST
LOCATION: CHEST;KNEE

## 2025-06-10 ASSESSMENT — PAIN DESCRIPTION - ORIENTATION
ORIENTATION: RIGHT;LEFT

## 2025-06-10 NOTE — PROGRESS NOTES
Occupational Therapy  Facility/Department: [facility]  Rehabilitation Occupational Therapy Daily Treatment Note    Date: 6/10/25  Patient Name: Rosario Riley       Room: 0165/0165-01  MRN: [id number]  Account: [id number]   : 1960  (64 y.o.) Gender: female     Past Medical History:  has a past medical history of Does mobilize using cane, Essential hypertension, Foot pain, bilateral, Hypercholesterolemia, Hypercholesterolemia, Multiple vessel coronary artery disease, Osteoarthritis, Severe obesity (BMI 35.0-39.9) with comorbidity (HCC), and Vitamin D deficiency.  Past Surgical History:   has a past surgical history that includes  section; Tonsillectomy (); Knee arthroscopy (Left); knee surgery (2022); Total hip arthroplasty (Left, 2024); Cardiac procedure (N/A, 2025); Coronary artery bypass graft (N/A, 2025); Hardware Removal (N/A, 2025); and Chest surgery (N/A, 2025).    Restrictions  Restrictions/Precautions: Cardiac, Surgical Protocols, Fall Risk  Sternal Precautions Comments: Pt able to state precautions & follow throughout session.  Other Position/Activity Restrictions: sternal precautions  Required Braces or Orthoses  Right Lower Extremity Brace: Knee Brace  Required Braces or Orthoses?: Yes    Subjective  Subjective: Patient sitting up in recliner. Agreeable to OT treatment session with complaints of sorenss at sternal incision site, however states she already received pain medication. Vitals: /67, HR 55, SpO2 98%.  Restrictions/Precautions: Cardiac;Surgical Protocols;Fall Risk    Objective  Cognition  Overall Cognitive Status: WNL  Orientation  Overall Orientation Status: Within Normal Limits  Orientation Level: Oriented X4     ADL  Grooming/Oral Hygiene  Assistance Level: Stand by assist  Skilled Clinical Factors: to wash hands in stance at the sink.  Toileting  Assistance Level: Stand by assist  Skilled Clinical Factors: SPV to void while seated, SBA to

## 2025-06-10 NOTE — PLAN OF CARE
Problem: Discharge Planning  Goal: Discharge to home or other facility with appropriate resources  Outcome: Progressing  Flowsheets (Taken 6/9/2025 2000)  Discharge to home or other facility with appropriate resources:   Identify barriers to discharge with patient and caregiver   Arrange for needed discharge resources and transportation as appropriate   Identify discharge learning needs (meds, wound care, etc)   Arrange for interpreters to assist at discharge as needed   Refer to discharge planning if patient needs post-hospital services based on physician order or complex needs related to functional status, cognitive ability or social support system     Problem: Safety - Adult  Goal: Free from fall injury  6/9/2025 2354 by Iliana Curran RN  Outcome: Progressing  6/9/2025 1331 by Disha Davies RN  Outcome: Progressing     Problem: Pain  Goal: Verbalizes/displays adequate comfort level or baseline comfort level  6/9/2025 2354 by Iliana Curran RN  Outcome: Progressing  6/9/2025 1331 by Disha Davies RN  Outcome: Progressing     Problem: Cognitive:  Goal: Knowledge of wound care  Description: Knowledge of wound care  Outcome: Progressing  Goal: Understands risk factors for wounds  Description: Understands risk factors for wounds  Outcome: Progressing     Problem: Falls - Risk of:  Goal: Absence of physical injury  Description: Absence of physical injury  6/9/2025 2354 by Iliana Curran RN  Outcome: Progressing  6/9/2025 1331 by Disha Davies RN  Outcome: Progressing     Problem: Nutrition Deficit:  Goal: Optimize nutritional status  Outcome: Progressing     Problem: Skin/Tissue Integrity  Goal: Skin integrity remains intact  Description: 1.  Monitor for areas of redness and/or skin breakdown2.  Assess vascular access sites hourly3.  Every 4-6 hours minimum:  Change oxygen saturation probe site4.  Every 4-6 hours:  If on nasal continuous positive airway pressure, respiratory

## 2025-06-10 NOTE — PROGRESS NOTES
Physical Therapy  Facility/Department: [facility]  Rehabilitation Physical Therapy Treatment Note    NAME: Rosario Riley  : 1960 (64 y.o.)  MRN: [id number]  CODE STATUS: Full Code    Date of Service: 6/10/25     Restrictions:  Restrictions/Precautions: Cardiac, Surgical Protocols, Fall Risk  Required Braces or Orthoses  Right Lower Extremity Brace: Knee Brace  Position Activity Restriction  Sternal Precautions: No Pushing, No Pulling, 5# Lifting Restrictions  Sternal Precautions Comments: Pt able to state precautions & follow throughout session.  Other Position/Activity Restrictions: sternal precautions     SUBJECTIVE  Subjective: Pt sitting in recliner on arrival, motivated for PT.  Pain: 5/10 sternal discomfort \"not bad, just the start of the day feeling\"     OBJECTIVE  Cognition  Overall Cognitive Status: WNL  Orientation  Overall Orientation Status: Within Normal Limits  Orientation Level: Oriented X4    Functional Mobility  Sit to Stand  Assistance Level: Supervision (from recliner, to RW, momentum method using cardiac pillow)  Stand to Sit  Assistance Level: Supervision    Environmental Mobility  Ambulation  Surface: Level surface  Device: Rolling walker  Distance: 125 + 125 with ~30 sec standing rest break between  Assistance Level: Supervision  Gait Deviations:  (increased trunk sway, increased ana, decreased heel strike bilaterally)  Skilled Clinical Factors: no loss in balance noted; cues to decrease speed for safety     PT Exercises  Exercise Treatment: BLE ther-ex while seated: resisted AP and hip abd with TB, 2# ankle weights for LAQ & alternating hip flex x 15 reps each.    ASSESSMENT/PROGRESS TOWARDS GOALS  Vital Signs  Pulse: 80  BP: 129/68  BP Location: Right upper arm  MAP (Calculated): 88  SpO2: 97 %  O2 Device: None (Room air)    Assessment  Assessment: Pt is making progress with her mobility and motivated throughout PT; requiring SUP for transfers and SBA with gait training using  Code Treatment Minutes: 30 Minutes     Nelsy Donald, PT, 06/10/25 at 12:12 PM       Addendum 2nd Session:  Patient is agreeable to PT session though requesting to focus less on stairs, will plan to have first floor setup at initial discharge home. Reporting significant R knee pain, unable to wear brace d/t swelling and discomfort.    Gait Trainin x 100ft, 150ft with RW SBA    Therapeutic Activities:  STS from recliner, toilet, wheelchair: SBA, able to maintain sternal precautions  K tape over anterior R knee for pain management    Therapeutic Exercise:  13 minutes SCIFIT using BLE level 1.5, 2-3 seated rest breaks to complete d/t pain    Returned to room and elevated BLE in recliner, also applied moist hot pack to R knee for pain management. Educated that hot pack should be used sparingly to avoid further inflammation and edema, patient verbalized understanding. Will continue with skilled PT services per POC.    Second Session Therapy Time:   Individual Concurrent Group Co-treatment   Time In 1300         Time Out 1400         Minutes 60           Timed Code Treatment Minutes:  60    Total Treatment Minutes:  30    Rosario Bueno PT, DPT

## 2025-06-10 NOTE — PROGRESS NOTES
Department of Physical Medicine & Rehabilitation  Progress Note    Patient Identification:  Rosario Riley  [id number]  : 1960  Admit date: 2025    Chief Complaint: S/P CABG (coronary artery bypass graft)    Subjective:   No acute events overnight. Today Bri is seen in her room. She is worried about her blood sugar. Discussed Hba1c and risk for diabetes. Will have dietician provide education about carbs.     Personally reviewed labs.     ROS: No f/c, n/v, cp     Objective:  Patient Vitals for the past 24 hrs:   BP Temp Temp src Pulse Resp SpO2   06/10/25 1317 -- -- -- -- 16 --   06/10/25 0820 129/68 -- -- 80 -- 97 %   06/10/25 0804 -- -- -- -- 16 --   06/10/25 0715 118/61 97.7 °F (36.5 °C) Oral 63 16 98 %   25 -- -- -- -- 18 --   25 (!) 141/61 98 °F (36.7 °C) Oral 87 18 99 %     Const: Alert. No distress, pleasant.   HEENT: Normocephalic, atraumatic. Normal sclera/conjunctiva. MMM.   CV: extremities well perfused  Resp: No respiratory distress. On room air  Abd: Soft, nontender, nondistended   Ext: + edema, improved from prior  Neuro: Alert, oriented, appropriately interactive. Speech fluent   Psych: Cooperative, appropriate mood and affect    Laboratory data: Available via EMR.   Last 24 hour lab  Recent Results (from the past 24 hours)   POCT Glucose    Collection Time: 25  3:58 PM   Result Value Ref Range    POC Glucose 123 (H) 70 - 99 mg/dl    Performed on ACCU-CHEK    POCT Glucose    Collection Time: 25  7:21 PM   Result Value Ref Range    POC Glucose 183 (H) 70 - 99 mg/dl    Performed on ACCU-CHEK    POCT Glucose    Collection Time: 06/10/25  6:16 AM   Result Value Ref Range    POC Glucose 100 (H) 70 - 99 mg/dl    Performed on ACCU-CHEK    POCT Glucose    Collection Time: 06/10/25 10:46 AM   Result Value Ref Range    POC Glucose 99 70 - 99 mg/dl    Performed on ACCU-CHEK        Therapy progress:  PT  Required Braces or Orthoses  Right Lower Extremity Brace: Knee  modifications   Bowels: adjust medications as needed for regular bowel movements     Rehab Progress: requiring SUP for transfers and SBA with gait training using RW   Anticipated Dispo: home  Services:  PT, OT, nursing   DME: TRES  EDOD: 6/16    Interdisciplinary team conference was held today with entire rehab treatment team including PT, OT, SLP (if applicable), Dietician, RN, and SW. Discussion focused on progress toward rehab goals and discharge planning. Making progress. Barriers include level of assistance, availability of assistance, endurance, pain, comorbidities. We as a medical team, and I as the physician , made a plan to work on these barriers to facilitate safe discharge. Plan will be presented to patient/family (if available).     Gaye Willson MD  PM&R  6/10/2025  2:00 PM

## 2025-06-10 NOTE — PROGRESS NOTES
Nutrition Education    Pt seen for diet education. Provided pt with written instruction on DM and CAD nutrition therapy. Handouts discuss sources of carbohydrate, carb counting, label reading, meal planning, importance of BG control, heart healthy diet. Pt voiced understanding and declined verbal review at this time. Will monitor.     Educated on carb counting and heart healthy diet  Learners: Patient  Readiness: Acceptance  Method: Handout  Response: Verbalizes Understanding  Contact name and number provided.    Promise Balbuena RD, LD  Contact Number: [phone]

## 2025-06-10 NOTE — CARE COORDINATION
Weekly team care conference with interdisciplinary team on: 6/10/25    Chart reviewed for length of stay. IP day # 5   Unit: ARU   Diagnosis and current status as per MD progress: S/P CABG, edema-diuresis  Consultants Following: Physical Medicine, Nephrology  Planned Discharge Date: 6/16/25  Durable medical equipment needed: RW  Discharge Plan: Home with [facility]    CM update: Spoke with patient and sister in room and informed that discharge is planned for Monday 6/16 with a recommendation of [facility]. Patient verbalizes understanding and states that she has no C agency preference. Patient also confirms that she does not have the recommended RW. Assured patient that I would work with Anthony to provide a RW at discharge. Offered to call and speak with family. Patient states she would like me to update daughter Cathleen- informed patient that I will plan to call daughter tomorrow. Patient expresses no further questions/concerns at this time. Will continue to follow.    Marlin Humphrey RN

## 2025-06-10 NOTE — PLAN OF CARE
Problem: Safety - Adult  Goal: Free from fall injury  6/10/2025 1016 by Disha Davies RN  Outcome: Progressing     Problem: Pain  Goal: Verbalizes/displays adequate comfort level or baseline comfort level  6/10/2025 1016 by Disha Davies RN  Outcome: Progressing     Problem: Falls - Risk of:  Goal: Absence of physical injury  Description: Absence of physical injury  6/10/2025 1016 by Disha Davies RN  Outcome: Progressing

## 2025-06-10 NOTE — PROGRESS NOTES
[email or web address]  Nephrology Follow up Note           Reason for Consult: Lower extremity swelling  Need for diuresis, low blood pressure  Requesting Physician:  Dr. Willson    Sub/interval history  Feels better  Swelling is getting better but very slowly   Getting stronger  Kidney function has been good     ROS: No chest pain/shortness of breath/fever/nausea/vomiting  PSFH: No visitor    Scheduled Meds:   hydroCHLOROthiazide  25 mg Oral Daily    metoprolol tartrate  12.5 mg Oral BID    acetaminophen  650 mg Oral 3 times per day    aspirin  81 mg Oral Daily    atorvastatin  80 mg Oral Nightly    clopidogrel  75 mg Oral Daily    famotidine  20 mg Oral BID    ferrous sulfate  325 mg Oral BID WC    gabapentin  100 mg Oral TID    furosemide  40 mg Oral Daily    Followed by    [START ON 6/13/2025] furosemide  20 mg Oral Daily    heparin (porcine)  5,000 Units SubCUTAneous 3 times per day    insulin glargine  14 Units SubCUTAneous Nightly    insulin lispro  0-12 Units SubCUTAneous 4x Daily AC & HS    polyethylene glycol  17 g Oral Daily    sennosides-docusate sodium  1 tablet Oral BID    sodium chloride flush  5-40 mL IntraVENous 2 times per day    vitamin D  50,000 Units Oral Weekly    methocarbamol  500 mg Oral 4x Daily    lidocaine  1 patch TransDERmal Daily    diclofenac sodium  2 g Topical BID     Continuous Infusions:   sodium chloride      dextrose       PRN Meds:.melatonin, sodium chloride, bisacodyl, dextrose, dextrose bolus **OR** dextrose bolus, glucagon (rDNA), glucose, hydrALAZINE, magnesium hydroxide, ondansetron, oxyCODONE **OR** oxyCODONE, sodium chloride flush, acetaminophen, albuterol    History of Present Illness on 6/6/2025:    64 y.o. yo female with PMH of hypertension, hyperlipidemia, CAD, OA, vitamin D deficiency, obesity who is admitted to ARU after CABG x 2 with left atrial appendage clip on 5/30/2025  Patient reports of significant swelling on her legs    Physical exam:   Constitutional:

## 2025-06-11 LAB
ALBUMIN SERPL-MCNC: 3.5 G/DL (ref 3.4–5)
ANION GAP SERPL CALCULATED.3IONS-SCNC: 12 MMOL/L (ref 3–16)
BASOPHILS # BLD: 0.1 K/UL (ref 0–0.2)
BASOPHILS NFR BLD: 0.9 %
BUN SERPL-MCNC: 12 MG/DL (ref 7–20)
CALCIUM SERPL-MCNC: 8.8 MG/DL (ref 8.3–10.6)
CHLORIDE SERPL-SCNC: 100 MMOL/L (ref 99–110)
CO2 SERPL-SCNC: 27 MMOL/L (ref 21–32)
CREAT SERPL-MCNC: 1 MG/DL (ref 0.6–1.2)
DEPRECATED RDW RBC AUTO: 14.3 % (ref 12.4–15.4)
EOSINOPHIL # BLD: 0.5 K/UL (ref 0–0.6)
EOSINOPHIL NFR BLD: 5.1 %
GFR SERPLBLD CREATININE-BSD FMLA CKD-EPI: 62 ML/MIN/{1.73_M2}
GLUCOSE BLD-MCNC: 125 MG/DL (ref 70–99)
GLUCOSE BLD-MCNC: 133 MG/DL (ref 70–99)
GLUCOSE BLD-MCNC: 87 MG/DL (ref 70–99)
GLUCOSE BLD-MCNC: 96 MG/DL (ref 70–99)
GLUCOSE SERPL-MCNC: 92 MG/DL (ref 70–99)
HCT VFR BLD AUTO: 26.3 % (ref 36–48)
HGB BLD-MCNC: 8.7 G/DL (ref 12–16)
LYMPHOCYTES # BLD: 1.8 K/UL (ref 1–5.1)
LYMPHOCYTES NFR BLD: 18.8 %
MCH RBC QN AUTO: 30.7 PG (ref 26–34)
MCHC RBC AUTO-ENTMCNC: 32.9 G/DL (ref 31–36)
MCV RBC AUTO: 93.1 FL (ref 80–100)
MONOCYTES # BLD: 0.8 K/UL (ref 0–1.3)
MONOCYTES NFR BLD: 8.7 %
NEUTROPHILS # BLD: 6.3 K/UL (ref 1.7–7.7)
NEUTROPHILS NFR BLD: 66.5 %
PERFORMED ON: ABNORMAL
PERFORMED ON: ABNORMAL
PERFORMED ON: NORMAL
PERFORMED ON: NORMAL
PHOSPHATE SERPL-MCNC: 4 MG/DL (ref 2.5–4.9)
PLATELET # BLD AUTO: 374 K/UL (ref 135–450)
PMV BLD AUTO: 7.7 FL (ref 5–10.5)
POTASSIUM SERPL-SCNC: 3.8 MMOL/L (ref 3.5–5.1)
POTASSIUM SERPL-SCNC: 3.8 MMOL/L (ref 3.5–5.1)
RBC # BLD AUTO: 2.83 M/UL (ref 4–5.2)
SODIUM SERPL-SCNC: 139 MMOL/L (ref 136–145)
WBC # BLD AUTO: 9.5 K/UL (ref 4–11)

## 2025-06-11 PROCEDURE — 6370000000 HC RX 637 (ALT 250 FOR IP): Performed by: STUDENT IN AN ORGANIZED HEALTH CARE EDUCATION/TRAINING PROGRAM

## 2025-06-11 PROCEDURE — 97530 THERAPEUTIC ACTIVITIES: CPT

## 2025-06-11 PROCEDURE — 85025 COMPLETE CBC W/AUTO DIFF WBC: CPT

## 2025-06-11 PROCEDURE — 6370000000 HC RX 637 (ALT 250 FOR IP): Performed by: INTERNAL MEDICINE

## 2025-06-11 PROCEDURE — 97116 GAIT TRAINING THERAPY: CPT

## 2025-06-11 PROCEDURE — 36415 COLL VENOUS BLD VENIPUNCTURE: CPT

## 2025-06-11 PROCEDURE — 1280000000 HC REHAB R&B

## 2025-06-11 PROCEDURE — 6360000002 HC RX W HCPCS: Performed by: STUDENT IN AN ORGANIZED HEALTH CARE EDUCATION/TRAINING PROGRAM

## 2025-06-11 PROCEDURE — 80069 RENAL FUNCTION PANEL: CPT

## 2025-06-11 PROCEDURE — 97110 THERAPEUTIC EXERCISES: CPT

## 2025-06-11 PROCEDURE — 97535 SELF CARE MNGMENT TRAINING: CPT

## 2025-06-11 RX ORDER — DIPHENHYDRAMINE HCL 25 MG
25 TABLET ORAL EVERY 6 HOURS PRN
Status: DISCONTINUED | OUTPATIENT
Start: 2025-06-11 | End: 2025-06-16 | Stop reason: HOSPADM

## 2025-06-11 RX ADMIN — METOPROLOL TARTRATE 12.5 MG: 25 TABLET, FILM COATED ORAL at 20:37

## 2025-06-11 RX ADMIN — FERROUS SULFATE TAB 325 MG (65 MG ELEMENTAL FE) 325 MG: 325 (65 FE) TAB at 16:54

## 2025-06-11 RX ADMIN — HEPARIN SODIUM 5000 UNITS: 5000 INJECTION INTRAVENOUS; SUBCUTANEOUS at 06:15

## 2025-06-11 RX ADMIN — HEPARIN SODIUM 5000 UNITS: 5000 INJECTION INTRAVENOUS; SUBCUTANEOUS at 14:43

## 2025-06-11 RX ADMIN — Medication 5 MG: at 20:36

## 2025-06-11 RX ADMIN — METHOCARBAMOL 500 MG: 500 TABLET ORAL at 20:36

## 2025-06-11 RX ADMIN — INSULIN GLARGINE 14 UNITS: 100 INJECTION, SOLUTION SUBCUTANEOUS at 20:40

## 2025-06-11 RX ADMIN — GABAPENTIN 100 MG: 100 CAPSULE ORAL at 20:37

## 2025-06-11 RX ADMIN — OXYCODONE 10 MG: 5 TABLET ORAL at 10:02

## 2025-06-11 RX ADMIN — HEPARIN SODIUM 5000 UNITS: 5000 INJECTION INTRAVENOUS; SUBCUTANEOUS at 20:40

## 2025-06-11 RX ADMIN — DICLOFENAC SODIUM 2 G: 10 GEL TOPICAL at 09:45

## 2025-06-11 RX ADMIN — OXYCODONE 10 MG: 5 TABLET ORAL at 06:14

## 2025-06-11 RX ADMIN — DIPHENHYDRAMINE HYDROCHLORIDE 25 MG: 25 TABLET ORAL at 10:02

## 2025-06-11 RX ADMIN — GABAPENTIN 100 MG: 100 CAPSULE ORAL at 09:43

## 2025-06-11 RX ADMIN — CLOPIDOGREL BISULFATE 75 MG: 75 TABLET, FILM COATED ORAL at 09:44

## 2025-06-11 RX ADMIN — ACETAMINOPHEN 650 MG: 325 TABLET ORAL at 20:36

## 2025-06-11 RX ADMIN — ACETAMINOPHEN 650 MG: 325 TABLET ORAL at 06:15

## 2025-06-11 RX ADMIN — FAMOTIDINE 20 MG: 20 TABLET, FILM COATED ORAL at 09:43

## 2025-06-11 RX ADMIN — ACETAMINOPHEN 650 MG: 325 TABLET ORAL at 14:43

## 2025-06-11 RX ADMIN — ATORVASTATIN CALCIUM 80 MG: 80 TABLET, FILM COATED ORAL at 20:36

## 2025-06-11 RX ADMIN — ASPIRIN 81 MG: 81 TABLET, CHEWABLE ORAL at 09:45

## 2025-06-11 RX ADMIN — FAMOTIDINE 20 MG: 20 TABLET, FILM COATED ORAL at 20:36

## 2025-06-11 RX ADMIN — HYDROCHLOROTHIAZIDE 25 MG: 25 TABLET ORAL at 09:45

## 2025-06-11 RX ADMIN — GABAPENTIN 100 MG: 100 CAPSULE ORAL at 14:43

## 2025-06-11 RX ADMIN — OXYCODONE 10 MG: 5 TABLET ORAL at 20:37

## 2025-06-11 RX ADMIN — FERROUS SULFATE TAB 325 MG (65 MG ELEMENTAL FE) 325 MG: 325 (65 FE) TAB at 06:15

## 2025-06-11 RX ADMIN — METHOCARBAMOL 500 MG: 500 TABLET ORAL at 16:53

## 2025-06-11 RX ADMIN — METHOCARBAMOL 500 MG: 500 TABLET ORAL at 09:45

## 2025-06-11 RX ADMIN — METHOCARBAMOL 500 MG: 500 TABLET ORAL at 14:43

## 2025-06-11 ASSESSMENT — PAIN DESCRIPTION - DESCRIPTORS
DESCRIPTORS: DISCOMFORT
DESCRIPTORS: SORE

## 2025-06-11 ASSESSMENT — PAIN DESCRIPTION - ORIENTATION
ORIENTATION: MID
ORIENTATION: MID

## 2025-06-11 ASSESSMENT — PAIN DESCRIPTION - LOCATION
LOCATION: BACK;RIB CAGE;CHEST
LOCATION: BACK;CHEST
LOCATION: BACK;RIB CAGE;CHEST

## 2025-06-11 ASSESSMENT — PAIN - FUNCTIONAL ASSESSMENT
PAIN_FUNCTIONAL_ASSESSMENT: PREVENTS OR INTERFERES SOME ACTIVE ACTIVITIES AND ADLS
PAIN_FUNCTIONAL_ASSESSMENT: ACTIVITIES ARE NOT PREVENTED

## 2025-06-11 ASSESSMENT — PAIN SCALES - GENERAL
PAINLEVEL_OUTOF10: 7
PAINLEVEL_OUTOF10: 7
PAINLEVEL_OUTOF10: 6

## 2025-06-11 NOTE — PLAN OF CARE
Problem: Discharge Planning  Goal: Discharge to home or other facility with appropriate resources  Outcome: Progressing     Problem: Safety - Adult  Goal: Free from fall injury  Outcome: Progressing     Problem: Pain  Goal: Verbalizes/displays adequate comfort level or baseline comfort level  Outcome: Progressing     Problem: Cognitive:  Goal: Knowledge of wound care  Description: Knowledge of wound care  Outcome: Progressing  Goal: Understands risk factors for wounds  Description: Understands risk factors for wounds  Outcome: Progressing     Problem: Falls - Risk of:  Goal: Absence of physical injury  Description: Absence of physical injury  Outcome: Progressing     Problem: Nutrition Deficit:  Goal: Optimize nutritional status  Outcome: Progressing     Problem: Skin/Tissue Integrity  Goal: Skin integrity remains intact  Description: 1.  Monitor for areas of redness and/or skin breakdown2.  Assess vascular access sites hourly3.  Every 4-6 hours minimum:  Change oxygen saturation probe site4.  Every 4-6 hours:  If on nasal continuous positive airway pressure, respiratory therapy assess nares and determine need for appliance change or resting period  Outcome: Progressing

## 2025-06-11 NOTE — PROGRESS NOTES
[email or web address]  Nephrology Follow up Note           Reason for Consult: Lower extremity swelling  Need for diuresis, low blood pressure  Requesting Physician:  Dr. Willson    Sub/interval history  Feels better  Swelling is getting better but very slowly   Getting stronger  Kidney function has been good     ROS: No chest pain/shortness of breath/fever/nausea/vomiting  PSFH: No visitor    Scheduled Meds:   hydroCHLOROthiazide  25 mg Oral Daily    metoprolol tartrate  12.5 mg Oral BID    acetaminophen  650 mg Oral 3 times per day    aspirin  81 mg Oral Daily    atorvastatin  80 mg Oral Nightly    clopidogrel  75 mg Oral Daily    famotidine  20 mg Oral BID    ferrous sulfate  325 mg Oral BID WC    gabapentin  100 mg Oral TID    furosemide  40 mg Oral Daily    Followed by    [START ON 6/13/2025] furosemide  20 mg Oral Daily    heparin (porcine)  5,000 Units SubCUTAneous 3 times per day    insulin glargine  14 Units SubCUTAneous Nightly    insulin lispro  0-12 Units SubCUTAneous 4x Daily AC & HS    polyethylene glycol  17 g Oral Daily    sennosides-docusate sodium  1 tablet Oral BID    sodium chloride flush  5-40 mL IntraVENous 2 times per day    vitamin D  50,000 Units Oral Weekly    methocarbamol  500 mg Oral 4x Daily    lidocaine  1 patch TransDERmal Daily    diclofenac sodium  2 g Topical BID     Continuous Infusions:   sodium chloride      dextrose       PRN Meds:.diphenhydrAMINE, melatonin, sodium chloride, bisacodyl, dextrose, dextrose bolus **OR** dextrose bolus, glucagon (rDNA), glucose, hydrALAZINE, magnesium hydroxide, ondansetron, oxyCODONE **OR** oxyCODONE, sodium chloride flush, acetaminophen, albuterol    History of Present Illness on 6/6/2025:    64 y.o. yo female with PMH of hypertension, hyperlipidemia, CAD, OA, vitamin D deficiency, obesity who is admitted to ARU after CABG x 2 with left atrial appendage clip on 5/30/2025  Patient reports of significant swelling on her legs    Physical exam:

## 2025-06-11 NOTE — PROGRESS NOTES
Department of Physical Medicine & Rehabilitation  Progress Note    Patient Identification:  Rosario Riley  [id number]  : 1960  Admit date: 2025    Chief Complaint: S/P CABG (coronary artery bypass graft)    Subjective:   No acute events overnight. Today Bri is seen in her room. She reports that the swelling in her legs has improved. She is now able to lift them easier. Discussed plan for discharge home next Monday.     Personally reviewed labs.     ROS: No f/c, n/v, cp     Objective:  Patient Vitals for the past 24 hrs:   BP Temp Temp src Pulse Resp SpO2 Weight   25 0937 (!) 101/57 98.2 °F (36.8 °C) Oral 65 18 98 % --   2548 -- -- -- -- -- -- 104.8 kg (231 lb 1.6 oz)   2514 -- -- -- -- 17 -- --   06/10/25 1959 (!) 135/54 97 °F (36.1 °C) Oral 75 16 95 % --     Const: Alert. No distress, pleasant.   HEENT: Normocephalic, atraumatic. Normal sclera/conjunctiva. MMM.   CV: extremities well perfused  Resp: No respiratory distress.   Abd: Soft, nontender, nondistended   Ext: + edema significantly improved from prior  Neuro: Alert, oriented, appropriately interactive. Lifts legs against gravity  Psych: Cooperative, appropriate mood and affect    Laboratory data: Available via EMR.   Last 24 hour lab  Recent Results (from the past 24 hours)   POCT Glucose    Collection Time: 06/10/25  3:59 PM   Result Value Ref Range    POC Glucose 108 (H) 70 - 99 mg/dl    Performed on ACCU-CHEK    POCT Glucose    Collection Time: 06/10/25  7:56 PM   Result Value Ref Range    POC Glucose 159 (H) 70 - 99 mg/dl    Performed on ACCU-CHEK    POCT Glucose    Collection Time: 25  6:03 AM   Result Value Ref Range    POC Glucose 96 70 - 99 mg/dl    Performed on ACCU-CHEK    Basic Metabolic Panel w/ Reflex to MG    Collection Time: 25  6:22 AM   Result Value Ref Range    Sodium 139 136 - 145 mmol/L    Potassium reflex Magnesium 3.8 3.5 - 5.1 mmol/L    Chloride 100 99 - 110 mmol/L    CO2 27 21 - 32  Yes  Mobility Devices: Walker  Walker: Rolling  Toilet - Technique: Ambulating  Equipment Used: Standard toilet (BSC placed over top. has same set-up at home.)  Assessment        SLP          Body mass index is 36.2 kg/m².    Assessment and Plan:  Rosario Riley is a 64 year old female with a past medical history significant for HTN, Hld, CAD, OA, vitamin D deficiency, obesity, former tobacco use, and recent admission for presyncope and SOB and found to have multivessel CAD who presented to [facility] on 5/30/25 for planned CABG x2 with left atrial appendage clip. She was admitted to [facility] on 6/5/25 due to functional deficits below her baseline.      CAD s/p CABG x2   - asa, plavix, metoprolol, atorvastatin  - sternal precautions  - incision care  - PT, OT     BLE edema  - on lasix 20 mg daily at home  - continue lasix wean per [facility]  - monitor K while on lasix  - daily weights, I/Os  - Consult Nephrology for assistance with diuresis in the setting of hypotension, appreciate assistance, cosyntropin test 6/9. Added HCTZ    Pre-diabetes  - lantus plus SSI while admitted  - Dietician consulted for education  - follow up with PCP     Anemia  - iron  - monitor and transfuse for Hb<7     HTN  - holding home olmesartan  - metoprolol decreased to 12.5 mg BID  - HCTZ     HLD  - atorvastatin      Class 2 obesity  - Body mass index is 37.15 kg/m².  - encourage lifestyle modifications   Bowels: adjust medications as needed for regular bowel movements     Rehab Progress: Patient required supervision for functional transfer performance and functional mobility during the session   Anticipated Dispo: home  Services: HH PT, OT, nursing   DME: TRES  EDOD: 6/16    Gaye Willson MD  PM&R  6/11/2025  3:22 PM

## 2025-06-11 NOTE — CARE COORDINATION
CM update: Attempt made to speak with daughter Cathleen to discuss discharge planning. Called- no answer- voicemail left reinforcing discharge home Monday 6/16 w/ C and that a RW would be provided. Encouraged to call back with any questions/concerns.     Referral made to [facility]- pending    Marlin Humphrey RN

## 2025-06-11 NOTE — PROGRESS NOTES
Occupational Therapy  Facility/Department: [facility]  Rehabilitation Occupational Therapy Daily Treatment Note    Date: 25  Patient Name: Rosario Riley       Room: 0165/0165-01  MRN: [id number]  Account: [id number]   : 1960  (64 y.o.) Gender: female     Pt was bathed during OT session this date. Pt was Showered with soap/water with Supervision.     Past Medical History:  has a past medical history of Does mobilize using cane, Essential hypertension, Foot pain, bilateral, Hypercholesterolemia, Hypercholesterolemia, Multiple vessel coronary artery disease, Osteoarthritis, Severe obesity (BMI 35.0-39.9) with comorbidity (HCC), and Vitamin D deficiency.  Past Surgical History:   has a past surgical history that includes  section; Tonsillectomy (); Knee arthroscopy (Left); knee surgery (2022); Total hip arthroplasty (Left, 2024); Cardiac procedure (N/A, 2025); Coronary artery bypass graft (N/A, 2025); Hardware Removal (N/A, 2025); and Chest surgery (N/A, 2025).    Restrictions  Restrictions/Precautions: Cardiac, Surgical Protocols, Fall Risk  Sternal Precautions Comments: Pt able to state precautions & follow throughout session.  Other Position/Activity Restrictions: sternal precautions  Required Braces or Orthoses  Right Lower Extremity Brace: Knee Brace (switched to k-tape over anterior knee d/t edema and pain)  Required Braces or Orthoses?: Yes    Subjective  Subjective: Patient sitting up in recliner. Patient with verbalized complaints of sorenss at sternal incision site and in right knee, but states its nothing more than normal. Vitals: /57, HR 80, SpO2 98%.  Restrictions/Precautions: Cardiac;Surgical Protocols;Fall Risk    Objective     Cognition  Overall Cognitive Status: WNL  Orientation  Overall Orientation Status: Within Normal Limits  Orientation Level: Oriented X4     ADL  Feeding  Assistance Level: Independent  Skilled Clinical Factors: with food  dressing tasks and transported them to the shower. Patient completed the task in ~3 minutes with supervision.     Functional Mobility  Device: Rolling walker  Activity: To/From bathroom;To/From therapy gym;Retrieve items  Assistance Level: Supervision  Skilled Clinical Factors: with the use of a gait belt and RW.  Transfers  Surface: To chair with arms;From chair with arms  Device: Walker  Sit to Stand  Assistance Level: Supervision  Stand to Sit  Assistance Level: Supervision  Bed To/From Chair  Technique:  (ambulating)  Assistance Level: Supervision  Skilled Clinical Factors: with the use of a gait belt and RW.   OT Exercises  Static Standing Balance Exercises: Patient participated in a pattern matching activity in stance without UE support to address balance, standing tolerance, functional reach, and sequencing needed to perform functional activities with increased safety and independence. Patient performed x 2 trials, stood for 4 minutes and 5 seconds, 4 minutes and 34 seconds, and required supervision with no overt LOB noted.  Dynamic Standing Balance Exercises: Patient participated in an item retrieval activity with the use of a rolling walker to address balance, standing tolerance, and endurance needed to perform functional activities with increased safety and independence. Patient performed tolerated being on her feet for 4 minutes and 12 seconds with no overt LOB noted, however limited by complaints of arthritic knee pain.     Assessment  Assessment  Assessment: Patient agreeable to OT treatment session. Patient required supervision for functional transfer performance and functional mobility during the session. Patient also completed full ADL routine during session with supervision required for hygiene and grooming tasks in stance, modified independence with UB dressing, modified independence with UB bathing, supervision with LB dressing, supervision with LB bathing, modified independence with footwear, and

## 2025-06-11 NOTE — PROGRESS NOTES
Physical Therapy  Facility/Department: [facility]  Rehabilitation Physical Therapy Treatment Note    NAME: Rosario Riley  : 1960 (64 y.o.)  MRN: [id number]  CODE STATUS: Full Code    Date of Service: 25       Restrictions:  Restrictions/Precautions: Cardiac, Surgical Protocols, Fall Risk  Required Braces or Orthoses  Right Lower Extremity Brace: Knee Brace (switched to k-tape over anterior knee d/t edema and pain)  Position Activity Restriction  Sternal Precautions: No Pushing, No Pulling, 5# Lifting Restrictions  Sternal Precautions Comments: Pt able to state precautions & follow throughout session.  Other Position/Activity Restrictions: sternal precautions     SUBJECTIVE  Subjective: Patient agreeable to PT session  Pain: 3/10 left sternal pain from movement in the night       OBJECTIVE  Cognition  Overall Cognitive Status: WNL  Orientation  Overall Orientation Status: Within Normal Limits  Orientation Level: Oriented X4    Functional Mobility  Transfers  Surface: To chair with arms;From chair with arms  Additional Factors: Verbal cues  Sit to Stand  Assistance Level: Supervision  Stand to Sit  Assistance Level: Supervision      Environmental Mobility  Ambulation  Surface: Level surface  Device: Rolling walker  Distance: 1st session: 130ft + 200ft + 60ft + 40ft + 130ft / 2nd session: 130ft x 2  Activity: Within Unit;Within Room  Assistance Level: Supervision  Gait Deviations: Wide base of support;Decreased heel strike right;Decreased heel strike left  Stairs  Stair Height: 6''  Device: Bilateral handrails  Number of Stairs: 8  Additional Factors: Non-reciprocal going down;Non-reciprocal going up;Verbal cues  Assistance Level: Stand by assist             PT Exercises  Exercise Treatment: BLE ther-ex while seated: resisted AP and hip abd with TB, 2# ankle weights for LAQ & alternating hip flex 3x15 reps each.  Resistive Exercises: in // bars: 4 x 4\" step and 4 x 6\" step, VC for maintaining sternal

## 2025-06-12 ENCOUNTER — TELEPHONE (OUTPATIENT)
Dept: FAMILY MEDICINE CLINIC | Age: 65
End: 2025-06-12

## 2025-06-12 LAB
GLUCOSE BLD-MCNC: 104 MG/DL (ref 70–99)
GLUCOSE BLD-MCNC: 118 MG/DL (ref 70–99)
GLUCOSE BLD-MCNC: 125 MG/DL (ref 70–99)
GLUCOSE BLD-MCNC: 155 MG/DL (ref 70–99)
PERFORMED ON: ABNORMAL

## 2025-06-12 PROCEDURE — 6360000002 HC RX W HCPCS: Performed by: STUDENT IN AN ORGANIZED HEALTH CARE EDUCATION/TRAINING PROGRAM

## 2025-06-12 PROCEDURE — 97116 GAIT TRAINING THERAPY: CPT

## 2025-06-12 PROCEDURE — 97535 SELF CARE MNGMENT TRAINING: CPT

## 2025-06-12 PROCEDURE — 97530 THERAPEUTIC ACTIVITIES: CPT

## 2025-06-12 PROCEDURE — 97110 THERAPEUTIC EXERCISES: CPT

## 2025-06-12 PROCEDURE — 6370000000 HC RX 637 (ALT 250 FOR IP): Performed by: INTERNAL MEDICINE

## 2025-06-12 PROCEDURE — 1280000000 HC REHAB R&B

## 2025-06-12 PROCEDURE — 6370000000 HC RX 637 (ALT 250 FOR IP): Performed by: STUDENT IN AN ORGANIZED HEALTH CARE EDUCATION/TRAINING PROGRAM

## 2025-06-12 RX ORDER — INSULIN GLARGINE 100 [IU]/ML
10 INJECTION, SOLUTION SUBCUTANEOUS NIGHTLY
Status: DISCONTINUED | OUTPATIENT
Start: 2025-06-12 | End: 2025-06-16 | Stop reason: HOSPADM

## 2025-06-12 RX ADMIN — METHOCARBAMOL 500 MG: 500 TABLET ORAL at 20:30

## 2025-06-12 RX ADMIN — HEPARIN SODIUM 5000 UNITS: 5000 INJECTION INTRAVENOUS; SUBCUTANEOUS at 05:09

## 2025-06-12 RX ADMIN — HEPARIN SODIUM 5000 UNITS: 5000 INJECTION INTRAVENOUS; SUBCUTANEOUS at 20:32

## 2025-06-12 RX ADMIN — METHOCARBAMOL 500 MG: 500 TABLET ORAL at 09:01

## 2025-06-12 RX ADMIN — METOPROLOL TARTRATE 12.5 MG: 25 TABLET, FILM COATED ORAL at 11:03

## 2025-06-12 RX ADMIN — DICLOFENAC SODIUM 2 G: 10 GEL TOPICAL at 09:13

## 2025-06-12 RX ADMIN — FERROUS SULFATE TAB 325 MG (65 MG ELEMENTAL FE) 325 MG: 325 (65 FE) TAB at 17:36

## 2025-06-12 RX ADMIN — CLOPIDOGREL BISULFATE 75 MG: 75 TABLET, FILM COATED ORAL at 09:13

## 2025-06-12 RX ADMIN — GABAPENTIN 100 MG: 100 CAPSULE ORAL at 20:30

## 2025-06-12 RX ADMIN — OXYCODONE 10 MG: 5 TABLET ORAL at 09:01

## 2025-06-12 RX ADMIN — OXYCODONE 10 MG: 5 TABLET ORAL at 13:01

## 2025-06-12 RX ADMIN — HYDROCHLOROTHIAZIDE 25 MG: 25 TABLET ORAL at 11:03

## 2025-06-12 RX ADMIN — FUROSEMIDE 40 MG: 40 TABLET ORAL at 11:03

## 2025-06-12 RX ADMIN — HEPARIN SODIUM 5000 UNITS: 5000 INJECTION INTRAVENOUS; SUBCUTANEOUS at 14:59

## 2025-06-12 RX ADMIN — FAMOTIDINE 20 MG: 20 TABLET, FILM COATED ORAL at 20:30

## 2025-06-12 RX ADMIN — OXYCODONE 10 MG: 5 TABLET ORAL at 20:30

## 2025-06-12 RX ADMIN — ACETAMINOPHEN 650 MG: 325 TABLET ORAL at 05:09

## 2025-06-12 RX ADMIN — ASPIRIN 81 MG: 81 TABLET, CHEWABLE ORAL at 09:13

## 2025-06-12 RX ADMIN — INSULIN GLARGINE 10 UNITS: 100 INJECTION, SOLUTION SUBCUTANEOUS at 20:26

## 2025-06-12 RX ADMIN — FAMOTIDINE 20 MG: 20 TABLET, FILM COATED ORAL at 09:13

## 2025-06-12 RX ADMIN — FERROUS SULFATE TAB 325 MG (65 MG ELEMENTAL FE) 325 MG: 325 (65 FE) TAB at 09:13

## 2025-06-12 RX ADMIN — GABAPENTIN 100 MG: 100 CAPSULE ORAL at 14:59

## 2025-06-12 RX ADMIN — GABAPENTIN 100 MG: 100 CAPSULE ORAL at 09:01

## 2025-06-12 RX ADMIN — ACETAMINOPHEN 650 MG: 325 TABLET ORAL at 14:59

## 2025-06-12 RX ADMIN — METHOCARBAMOL 500 MG: 500 TABLET ORAL at 13:02

## 2025-06-12 RX ADMIN — INSULIN LISPRO 2 UNITS: 100 INJECTION, SOLUTION INTRAVENOUS; SUBCUTANEOUS at 17:36

## 2025-06-12 RX ADMIN — ACETAMINOPHEN 650 MG: 325 TABLET ORAL at 20:30

## 2025-06-12 RX ADMIN — ATORVASTATIN CALCIUM 80 MG: 80 TABLET, FILM COATED ORAL at 20:30

## 2025-06-12 RX ADMIN — SENNOSIDES AND DOCUSATE SODIUM 1 TABLET: 50; 8.6 TABLET ORAL at 09:13

## 2025-06-12 RX ADMIN — METHOCARBAMOL 500 MG: 500 TABLET ORAL at 17:36

## 2025-06-12 RX ADMIN — METOPROLOL TARTRATE 12.5 MG: 25 TABLET, FILM COATED ORAL at 20:30

## 2025-06-12 ASSESSMENT — PAIN DESCRIPTION - ORIENTATION
ORIENTATION: RIGHT;LEFT
ORIENTATION: RIGHT;LEFT

## 2025-06-12 ASSESSMENT — PAIN SCALES - GENERAL
PAINLEVEL_OUTOF10: 7
PAINLEVEL_OUTOF10: 8
PAINLEVEL_OUTOF10: 5
PAINLEVEL_OUTOF10: 5

## 2025-06-12 ASSESSMENT — PAIN DESCRIPTION - DESCRIPTORS
DESCRIPTORS: ACHING;SORE
DESCRIPTORS: ACHING

## 2025-06-12 ASSESSMENT — PAIN DESCRIPTION - LOCATION
LOCATION: KNEE;CHEST
LOCATION: KNEE
LOCATION: KNEE

## 2025-06-12 NOTE — PROGRESS NOTES
Physical Therapy  Facility/Department: [facility]  Rehabilitation Physical Therapy Treatment Note    NAME: Rosario Riley  : 1960 (64 y.o.)  MRN: [id number]  CODE STATUS: Full Code    Date of Service: 25       Restrictions:  Restrictions/Precautions: Cardiac, Surgical Protocols, Fall Risk  Required Braces or Orthoses  Right Lower Extremity Brace: Knee Brace  Position Activity Restriction  Sternal Precautions: No Pushing, No Pulling, 5# Lifting Restrictions  Sternal Precautions Comments: Pt able to state precautions & follow throughout session.  Other Position/Activity Restrictions: sternal precautions     SUBJECTIVE  Subjective: Patient agreeable to PT session  Pain: Minimal knee pain with weightbearing noted       OBJECTIVE  Cognition  Overall Cognitive Status: WNL  Orientation  Overall Orientation Status: Within Normal Limits  Orientation Level: Oriented X4    Functional Mobility  Balance  Sitting Balance: Independent  Standing Balance: Stand by assistance  Standing Balance  Activity: during standing therapeutic exercise  Transfers  Surface: To chair with arms;From chair with arms  Additional Factors: Verbal cues  Sit to Stand  Assistance Level: Supervision  Skilled Clinical Factors: maintains sternal precautions  Stand to Sit  Assistance Level: Supervision  Skilled Clinical Factors: decreased eccentric control      Environmental Mobility  Ambulation  Surface: Level surface  Device: Rolling walker  Distance: RW: 150ft + 30 / without AD 100ft  Activity: Within Unit;Within Room  Assistance Level: Supervision  Gait Deviations: Wide base of support;Decreased heel strike right;Decreased heel strike left  Stairs  Stair Height: 6''  Device: Bilateral handrails  Number of Stairs: 8  Additional Factors: Non-reciprocal going down;Non-reciprocal going up;Verbal cues  Assistance Level: Stand by assist             PT Exercises  Exercise Treatment: BLE ther-ex while seated: 3# ankle weights AP, hip add/abd, LAQ &

## 2025-06-12 NOTE — PROGRESS NOTES
Occupational Therapy  Facility/Department: [facility]  Rehabilitation Occupational Therapy Daily Treatment Note    Date: 25  Patient Name: Rosario Riley       Room: 0165/0165-01  MRN: [id number]  Account: [id number]   : 1960  (64 y.o.) Gender: female       Past Medical History:  has a past medical history of Does mobilize using cane, Essential hypertension, Foot pain, bilateral, Hypercholesterolemia, Hypercholesterolemia, Multiple vessel coronary artery disease, Osteoarthritis, Severe obesity (BMI 35.0-39.9) with comorbidity (HCC), and Vitamin D deficiency.  Past Surgical History:   has a past surgical history that includes  section; Tonsillectomy (); Knee arthroscopy (Left); knee surgery (2022); Total hip arthroplasty (Left, 2024); Cardiac procedure (N/A, 2025); Coronary artery bypass graft (N/A, 2025); Hardware Removal (N/A, 2025); and Chest surgery (N/A, 2025).    Restrictions  Restrictions/Precautions: Cardiac, Surgical Protocols, Fall Risk  Sternal Precautions Comments: Pt able to state precautions & follow throughout session.  Other Position/Activity Restrictions: sternal precautions  Required Braces or Orthoses  Right Lower Extremity Brace: Knee Brace  Required Braces or Orthoses?: Yes    Subjective  Subjective: Patient sitting up in recliner. Patient with verbalized complaints of 6/10 pain at sternal incision site and in right knee. Agreeable to OT treatment session with emotional support. Vitals: /60, HR 88, SpO2 98%.  Restrictions/Precautions: Cardiac;Surgical Protocols;Fall Risk    Objective     Cognition  Overall Cognitive Status: WNL  Orientation  Overall Orientation Status: Within Normal Limits  Orientation Level: Oriented X4     ADL  Feeding  Assistance Level: Independent  Skilled Clinical Factors: with food and beverage management.  Grooming/Oral Hygiene  Assistance Level: Supervision  Skilled Clinical Factors: to perform oral hygiene and  precautions  Education Method: Demonstration;Verbal  Education Outcome: Verbalized understanding;Demonstrated understanding    Plan  Occupational Therapy Plan  Times Per Week: 5-7x  Current Treatment Recommendations: Balance training;Functional mobility training;Endurance training;Strengthening;Self-Care / ADL;Safety education & training;Equipment evaluation, education, & procurement;Patient/Caregiver education & training;ROM    Goals  Patient Goals   Patient goals : \"to be able to do everything so I can go home and to be stronger than when I came in\"  Short Term Goals  Time Frame for Short Term Goals: 6/10/25  Short Term Goal 1: Patient will hygiene and grooming tasks in stance at the sink with SBA. GOAL MET 6/10  Short Term Goal 2: Patient will perform toilet transfers with SBA. GOAL MET 6/10  Short Term Goal 3: Patient will perform toileting tasks with SBA. GOAL MET 6/10  Short Term Goal 4: Patient will perform LB dressing with SBA. GOAL MET 6/11  Short Term Goal 5: Patient will perform don/doff footwear with SPV and AE as needed. GOAL MET 6/11  Long Term Goals  Time Frame for Long Term Goals : 6/14/25 - extend to 6/16 due to LOS  Long Term Goal 1: Patient will perform toilet transfers with Karina.  Long Term Goal 2: Patient will perform toileting tasks with Karina.  Long Term Goal 3: Patient will perform FB dressing with Karina.  Long Term Goal 4: Patient will perform FB bathing with Karina.  Long Term Goal 5: Patient will perform a simple IADL task with SPV. GOAL MET 6/11    Therapy Time   Individual Concurrent Group Co-treatment   Time In 0800         Time Out 0900         Minutes 60         Timed Code Treatment Minutes: 60 Minutes     Second Session Therapy Time:   Individual Concurrent Group Co-treatment   Time In 1030         Time Out 1100         Minutes 30           Timed Code Treatment Minutes:  30 Minutes    Total Treatment Minutes: 90 Minutes      Michael Vela OT

## 2025-06-12 NOTE — TELEPHONE ENCOUNTER
Patient is going to be DC home from [facility] on Monday. Sami with [facility] is asking for a verbal order for home care, PT, and OT. Please advise.     [facility] is secure so you can leave the information there.

## 2025-06-12 NOTE — PROGRESS NOTES
Department of Physical Medicine & Rehabilitation  Progress Note    Patient Identification:  Rosario Riley  [id number]  : 1960  Admit date: 2025    Chief Complaint: S/P CABG (coronary artery bypass graft)    Subjective:   No acute events overnight. Today Bri is seen in her room. She reports feeling well and denies acute complaints at this time. She denies any shortness of breath.     Personally reviewed labs.     ROS: No f/c, n/v, cp     Objective:  Patient Vitals for the past 24 hrs:   BP Temp Temp src Pulse Resp SpO2 Weight   25 1103 135/66 -- -- 89 -- -- --   25 1000 122/61 -- -- 77 -- 94 % --   25 0931 -- -- -- -- 16 -- --   25 0901 -- -- -- -- 16 -- --   25 0859 106/60 97.7 °F (36.5 °C) Oral 87 16 98 % --   25 0610 -- -- -- -- -- -- 104.5 kg (230 lb 4.8 oz)   25 134/66 98.1 °F (36.7 °C) Oral 90 18 99 % --     Const: Alert. No distress, pleasant.   HEENT: Normocephalic, atraumatic. Normal sclera/conjunctiva. MMM.   CV: RRR  Resp: No respiratory distress. Lungs CTAB  Abd: Soft, nontender, nondistended   Ext: + edema significantly improved from prior  Neuro: Alert, oriented, appropriately interactive.   Psych: Cooperative, appropriate mood and affect    Laboratory data: Available via EMR.   Last 24 hour lab  Recent Results (from the past 24 hours)   POCT Glucose    Collection Time: 25  4:08 PM   Result Value Ref Range    POC Glucose 125 (H) 70 - 99 mg/dl    Performed on ACCU-CHEK    POCT Glucose    Collection Time: 25  7:17 PM   Result Value Ref Range    POC Glucose 133 (H) 70 - 99 mg/dl    Performed on ACCU-CHEK    POCT Glucose    Collection Time: 25  5:55 AM   Result Value Ref Range    POC Glucose 118 (H) 70 - 99 mg/dl    Performed on ACCU-CHEK    POCT Glucose    Collection Time: 25 11:27 AM   Result Value Ref Range    POC Glucose 104 (H) 70 - 99 mg/dl    Performed on ACCU-CHEK        Therapy progress:  PT  Required Braces or  Orthoses  Right Lower Extremity Brace: Knee Brace  Position Activity Restriction  Sternal Precautions: No Pushing, No Pulling, 5# Lifting Restrictions  Sternal Precautions Comments: Pt able to state precautions & follow throughout session.  Other Position/Activity Restrictions: sternal precautions  Objective     Sit to Stand: Stand by assistance (from recliner, from bed)  Stand to Sit: Stand by assistance (to bed, to recliner)  Bed to Chair: Stand by assistance (RW via ambulation)  Device: Rolling Walker  Assistance: Contact guard assistance  Distance: 150ft  OT  PT Equipment Recommendations  Equipment Needed: Yes  Mobility Devices: Walker  Walker: Rolling  Toilet - Technique: Ambulating  Equipment Used: Standard toilet (BSC placed over top. has same set-up at home.)  Assessment        SLP          Body mass index is 36.07 kg/m².    Assessment and Plan:  Rosario Riley is a 64 year old female with a past medical history significant for HTN, Hld, CAD, OA, vitamin D deficiency, obesity, former tobacco use, and recent admission for presyncope and SOB and found to have multivessel CAD who presented to [facility] on 5/30/25 for planned CABG x2 with left atrial appendage clip. She was admitted to [facility] on 6/5/25 due to functional deficits below her baseline.      CAD s/p CABG x2   - asa, plavix, metoprolol, atorvastatin  - sternal precautions  - incision care  - PT, OT     BLE edema  - on lasix 20 mg daily at home  - continue lasix wean per [facility] Surgery  - monitor K while on lasix  - daily weights, I/Os  - Consult Nephrology for assistance with diuresis in the setting of hypotension, appreciate assistance, cosyntropin test 6/9. Added HCTZ    Pre-diabetes  - lantus decreased to 10 units qhs, plus SSI while admitted  - Dietician consulted for education  - follow up with PCP     Anemia  - iron  - monitor and transfuse for Hb<7     HTN  - holding home olmesartan  - metoprolol decreased to 12.5 mg BID  - HCTZ added per

## 2025-06-12 NOTE — CARE COORDINATION
CM update: Spoke with Rodrigo from [facility] who confirms they are able to accept HHC referral.    Spoke with patient in room and updated that [facility] will be providing HHC once home. Also reinforced to patient that I have spoken with her daughter. Patient expresses no questions/concerns at this time. Will continue to follow.    Marlin Humphrey RN

## 2025-06-12 NOTE — CARE COORDINATION
CM update: Received confirmation from [facility] that they cannot accept referral. Pending referral made to [facility].    Called and spoke with daughter Cathleen over the phone. Daughter understands that discharge is planned for Monday 6/16 w/ a recommendation of [facility]. Daughter understands that d/t insurance it may be difficult to find an accepting [facility] agency. Reinforced that I will be working with Anthony to provide the recommended RW at discharge. Assured daughter that I will continue to keep her updated. Daughter does request to have nursing contact her to answer questions r/t medications patient is currently receiving. Daughter has no further questions for case management at this time. Will continue to follow.    Informed Beatriz FAY of daughter's request to call and speak with her.     Marlin Humphrey, RN

## 2025-06-13 LAB
ANION GAP SERPL CALCULATED.3IONS-SCNC: 11 MMOL/L (ref 3–16)
BASOPHILS # BLD: 0.1 K/UL (ref 0–0.2)
BASOPHILS NFR BLD: 0.9 %
BUN SERPL-MCNC: 14 MG/DL (ref 7–20)
CALCIUM SERPL-MCNC: 8.4 MG/DL (ref 8.3–10.6)
CHLORIDE SERPL-SCNC: 100 MMOL/L (ref 99–110)
CO2 SERPL-SCNC: 27 MMOL/L (ref 21–32)
CREAT SERPL-MCNC: 1.1 MG/DL (ref 0.6–1.2)
DEPRECATED RDW RBC AUTO: 14.5 % (ref 12.4–15.4)
EOSINOPHIL # BLD: 0.5 K/UL (ref 0–0.6)
EOSINOPHIL NFR BLD: 5 %
GFR SERPLBLD CREATININE-BSD FMLA CKD-EPI: 55 ML/MIN/{1.73_M2}
GLUCOSE BLD-MCNC: 103 MG/DL (ref 70–99)
GLUCOSE BLD-MCNC: 110 MG/DL (ref 70–99)
GLUCOSE BLD-MCNC: 211 MG/DL (ref 70–99)
GLUCOSE BLD-MCNC: 72 MG/DL (ref 70–99)
GLUCOSE SERPL-MCNC: 103 MG/DL (ref 70–99)
HCT VFR BLD AUTO: 24.2 % (ref 36–48)
HGB BLD-MCNC: 8 G/DL (ref 12–16)
LYMPHOCYTES # BLD: 1.3 K/UL (ref 1–5.1)
LYMPHOCYTES NFR BLD: 14 %
MCH RBC QN AUTO: 30.6 PG (ref 26–34)
MCHC RBC AUTO-ENTMCNC: 33.1 G/DL (ref 31–36)
MCV RBC AUTO: 92.5 FL (ref 80–100)
MONOCYTES # BLD: 0.9 K/UL (ref 0–1.3)
MONOCYTES NFR BLD: 10.3 %
NEUTROPHILS # BLD: 6.3 K/UL (ref 1.7–7.7)
NEUTROPHILS NFR BLD: 69.8 %
PERFORMED ON: ABNORMAL
PERFORMED ON: NORMAL
PLATELET # BLD AUTO: 336 K/UL (ref 135–450)
PMV BLD AUTO: 7.8 FL (ref 5–10.5)
POTASSIUM SERPL-SCNC: 3.8 MMOL/L (ref 3.5–5.1)
RBC # BLD AUTO: 2.62 M/UL (ref 4–5.2)
SODIUM SERPL-SCNC: 138 MMOL/L (ref 136–145)
WBC # BLD AUTO: 9 K/UL (ref 4–11)

## 2025-06-13 PROCEDURE — 6370000000 HC RX 637 (ALT 250 FOR IP): Performed by: INTERNAL MEDICINE

## 2025-06-13 PROCEDURE — 97535 SELF CARE MNGMENT TRAINING: CPT

## 2025-06-13 PROCEDURE — 97110 THERAPEUTIC EXERCISES: CPT

## 2025-06-13 PROCEDURE — 6360000002 HC RX W HCPCS: Performed by: STUDENT IN AN ORGANIZED HEALTH CARE EDUCATION/TRAINING PROGRAM

## 2025-06-13 PROCEDURE — 6370000000 HC RX 637 (ALT 250 FOR IP): Performed by: STUDENT IN AN ORGANIZED HEALTH CARE EDUCATION/TRAINING PROGRAM

## 2025-06-13 PROCEDURE — 85025 COMPLETE CBC W/AUTO DIFF WBC: CPT

## 2025-06-13 PROCEDURE — 80048 BASIC METABOLIC PNL TOTAL CA: CPT

## 2025-06-13 PROCEDURE — 97112 NEUROMUSCULAR REEDUCATION: CPT

## 2025-06-13 PROCEDURE — 97116 GAIT TRAINING THERAPY: CPT

## 2025-06-13 PROCEDURE — 97530 THERAPEUTIC ACTIVITIES: CPT

## 2025-06-13 PROCEDURE — 1280000000 HC REHAB R&B

## 2025-06-13 PROCEDURE — 36415 COLL VENOUS BLD VENIPUNCTURE: CPT

## 2025-06-13 RX ADMIN — DICLOFENAC SODIUM 2 G: 10 GEL TOPICAL at 08:20

## 2025-06-13 RX ADMIN — GABAPENTIN 100 MG: 100 CAPSULE ORAL at 20:49

## 2025-06-13 RX ADMIN — METHOCARBAMOL 500 MG: 500 TABLET ORAL at 08:18

## 2025-06-13 RX ADMIN — GABAPENTIN 100 MG: 100 CAPSULE ORAL at 13:49

## 2025-06-13 RX ADMIN — FAMOTIDINE 20 MG: 20 TABLET, FILM COATED ORAL at 20:48

## 2025-06-13 RX ADMIN — HYDROCHLOROTHIAZIDE 25 MG: 25 TABLET ORAL at 08:18

## 2025-06-13 RX ADMIN — SENNOSIDES AND DOCUSATE SODIUM 1 TABLET: 50; 8.6 TABLET ORAL at 12:06

## 2025-06-13 RX ADMIN — GABAPENTIN 100 MG: 100 CAPSULE ORAL at 08:18

## 2025-06-13 RX ADMIN — HEPARIN SODIUM 5000 UNITS: 5000 INJECTION INTRAVENOUS; SUBCUTANEOUS at 05:30

## 2025-06-13 RX ADMIN — METHOCARBAMOL 500 MG: 500 TABLET ORAL at 20:48

## 2025-06-13 RX ADMIN — ACETAMINOPHEN 650 MG: 325 TABLET ORAL at 20:49

## 2025-06-13 RX ADMIN — OXYCODONE 10 MG: 5 TABLET ORAL at 13:49

## 2025-06-13 RX ADMIN — OXYCODONE 10 MG: 5 TABLET ORAL at 08:18

## 2025-06-13 RX ADMIN — HEPARIN SODIUM 5000 UNITS: 5000 INJECTION INTRAVENOUS; SUBCUTANEOUS at 13:49

## 2025-06-13 RX ADMIN — INSULIN LISPRO 4 UNITS: 100 INJECTION, SOLUTION INTRAVENOUS; SUBCUTANEOUS at 17:33

## 2025-06-13 RX ADMIN — ATORVASTATIN CALCIUM 80 MG: 80 TABLET, FILM COATED ORAL at 20:48

## 2025-06-13 RX ADMIN — HEPARIN SODIUM 5000 UNITS: 5000 INJECTION INTRAVENOUS; SUBCUTANEOUS at 20:53

## 2025-06-13 RX ADMIN — ASPIRIN 81 MG: 81 TABLET, CHEWABLE ORAL at 08:18

## 2025-06-13 RX ADMIN — FUROSEMIDE 20 MG: 20 TABLET ORAL at 08:18

## 2025-06-13 RX ADMIN — FERROUS SULFATE TAB 325 MG (65 MG ELEMENTAL FE) 325 MG: 325 (65 FE) TAB at 08:18

## 2025-06-13 RX ADMIN — ACETAMINOPHEN 650 MG: 325 TABLET ORAL at 05:30

## 2025-06-13 RX ADMIN — OXYCODONE 10 MG: 5 TABLET ORAL at 20:46

## 2025-06-13 RX ADMIN — METOPROLOL TARTRATE 12.5 MG: 25 TABLET, FILM COATED ORAL at 08:18

## 2025-06-13 RX ADMIN — METHOCARBAMOL 500 MG: 500 TABLET ORAL at 13:49

## 2025-06-13 RX ADMIN — SENNOSIDES AND DOCUSATE SODIUM 1 TABLET: 50; 8.6 TABLET ORAL at 20:48

## 2025-06-13 RX ADMIN — FAMOTIDINE 20 MG: 20 TABLET, FILM COATED ORAL at 08:18

## 2025-06-13 RX ADMIN — FERROUS SULFATE TAB 325 MG (65 MG ELEMENTAL FE) 325 MG: 325 (65 FE) TAB at 17:32

## 2025-06-13 RX ADMIN — ACETAMINOPHEN 650 MG: 325 TABLET ORAL at 13:49

## 2025-06-13 RX ADMIN — CLOPIDOGREL BISULFATE 75 MG: 75 TABLET, FILM COATED ORAL at 08:18

## 2025-06-13 RX ADMIN — METHOCARBAMOL 500 MG: 500 TABLET ORAL at 17:33

## 2025-06-13 ASSESSMENT — PAIN SCALES - GENERAL
PAINLEVEL_OUTOF10: 4
PAINLEVEL_OUTOF10: 7
PAINLEVEL_OUTOF10: 5

## 2025-06-13 ASSESSMENT — PAIN DESCRIPTION - DESCRIPTORS
DESCRIPTORS: ACHING;SORE
DESCRIPTORS: ACHING;SORE

## 2025-06-13 ASSESSMENT — PAIN DESCRIPTION - FREQUENCY: FREQUENCY: CONTINUOUS

## 2025-06-13 ASSESSMENT — PAIN DESCRIPTION - ONSET: ONSET: ON-GOING

## 2025-06-13 ASSESSMENT — PAIN DESCRIPTION - LOCATION
LOCATION: KNEE
LOCATION: STERNUM
LOCATION: KNEE

## 2025-06-13 ASSESSMENT — PAIN - FUNCTIONAL ASSESSMENT: PAIN_FUNCTIONAL_ASSESSMENT: ACTIVITIES ARE NOT PREVENTED

## 2025-06-13 ASSESSMENT — PAIN DESCRIPTION - ORIENTATION
ORIENTATION: RIGHT;LEFT
ORIENTATION: MID
ORIENTATION: LEFT;RIGHT

## 2025-06-13 ASSESSMENT — PAIN DESCRIPTION - PAIN TYPE: TYPE: CHRONIC PAIN

## 2025-06-13 NOTE — PLAN OF CARE
Problem: Safety - Adult  Goal: Free from fall injury  Outcome: Progressing     Problem: Pain  Goal: Verbalizes/displays adequate comfort level or baseline comfort level  Outcome: Progressing     Problem: Falls - Risk of:  Goal: Absence of physical injury  Description: Absence of physical injury  Outcome: Progressing     Problem: Nutrition Deficit:  Goal: Optimize nutritional status  Outcome: Progressing

## 2025-06-13 NOTE — PROGRESS NOTES
Physical Therapy  Facility/Department: [facility]  Rehabilitation Physical Therapy Treatment Note    NAME: Rosario Riley  : 1960 (64 y.o.)  MRN: [id number]  CODE STATUS: Full Code    Date of Service: 25       Restrictions:  Restrictions/Precautions: Cardiac, Surgical Protocols, Fall Risk  Required Braces or Orthoses  Right Lower Extremity Brace: Knee Brace  Position Activity Restriction  Sternal Precautions: No Pushing, No Pulling, 5# Lifting Restrictions  Sternal Precautions Comments: Pt able to state precautions & follow throughout session.  Other Position/Activity Restrictions: sternal precautions     SUBJECTIVE  Subjective: pt motivated to participate despite feeling \"icky\", reports having low grade fever last night, normal today  Pain: none reported, pt states tape on knee has helped       OBJECTIVE  Cognition  Overall Cognitive Status: WNL  Orientation  Overall Orientation Status: Within Normal Limits  Orientation Level: Oriented X4    Functional Mobility  Balance  Sitting Balance: Independent  Standing Balance: Stand by assistance  Standing Balance  Activity: ambulating 80ft total around gym without device to  10 bean bags with one hand while other hand holds bin to collect them, SBA  Transfers  Surface: To chair with arms;From chair with arms;Raised toilet Seat  Additional Factors: Verbal cues  Device: Walker  Sit to Stand  Assistance Level: Supervision  Skilled Clinical Factors: maintains sternal precautions  Stand to Sit  Assistance Level: Supervision  Skilled Clinical Factors: decreased eccentric control      Environmental Mobility  Ambulation  Surface: Level surface  Device: Rolling walker  Distance: 150ft with RW + 80ft without AD  Activity: Within Unit;Within Room  Assistance Level: Supervision  Gait Deviations: Wide base of support;Decreased heel strike right;Decreased heel strike left  Stairs  Stair Height: 6''  Device: Bilateral handrails  Number of Stairs: 8  Additional

## 2025-06-13 NOTE — PROGRESS NOTES
Occupational Therapy  Facility/Department: [facility]  Rehabilitation Occupational Therapy Daily Treatment Note    Date: 25  Patient Name: Rosario Riley       Room: 0165/0165-01  MRN: [id number]  Account: [id number]   : 1960  (64 y.o.) Gender: female     Past Medical History:  has a past medical history of Does mobilize using cane, Essential hypertension, Foot pain, bilateral, Hypercholesterolemia, Hypercholesterolemia, Multiple vessel coronary artery disease, Osteoarthritis, Severe obesity (BMI 35.0-39.9) with comorbidity (HCC), and Vitamin D deficiency.  Past Surgical History:   has a past surgical history that includes  section; Tonsillectomy (); Knee arthroscopy (Left); knee surgery (2022); Total hip arthroplasty (Left, 2024); Cardiac procedure (N/A, 2025); Coronary artery bypass graft (N/A, 2025); Hardware Removal (N/A, 2025); and Chest surgery (N/A, 2025).    Restrictions  Restrictions/Precautions: Cardiac, Surgical Protocols, Fall Risk  Sternal Precautions Comments: Pt able to state precautions & follow throughout session.  Other Position/Activity Restrictions: sternal precautions  Required Braces or Orthoses  Right Lower Extremity Brace: Knee Brace  Required Braces or Orthoses?: Yes    Subjective  Subjective: Patient found in therapy gym upon arrival. Patient with verbalized complaints of 5/10 pain in right knee. Agreeable to OT treatment session with emotional support. Vitals: /66, HR 68, SpO2 98%.  Restrictions/Precautions: Cardiac;Surgical Protocols;Fall Risk    Objective  Cognition  Overall Cognitive Status: WNL  Orientation  Overall Orientation Status: Within Normal Limits  Orientation Level: Oriented X4     ADL  Patient politely declined ADLs during session.     Functional Mobility  Device: Rolling walker  Activity: To/From therapy gym  Assistance Level: Supervision  Skilled Clinical Factors: with the use of a gait belt and  gym  Assistance Level: Supervision  Skilled Clinical Factors: with the use of a gait belt and RW.  Transfers  Surface: To chair with arms;From chair with arms  Additional Factors: Increased time to complete  Device: Walker  Sit to Stand  Assistance Level: Supervision  Stand to Sit  Assistance Level: Supervision  Bed To/From Chair  Technique:  (ambulating)  Assistance Level: Supervision  Skilled Clinical Factors: with the use of a gait belt and RW.   OT Exercises  Resistive Exercises: Patient performed exercises with the use of a 2 pound dumbbell x 20 reps for wrist flexion/extension, forearm prontation/supination, elbow flexion/extension, scapular protraction, shoulder internal/external rotation, and forearm abduction/adduction with elbow at side to address strength needed to perform transfers and UB ADLs with increased safety and independence.     Assessment  Assessment  Assessment: First Session: Patient agreeable to OT treatment session. Patient required supervision for functional transfer performance and supervision for functional mobility during the session. Session focused on performance of therapeutic exercises to address strength needed to perform transfers and UB ADLs with increased safety and independence. Patient tolerated the exercises well with minimal complaints of fatigue and no complaints of pain. Patient also exhibited improved endurance as she tolerated x20 reps with the exercises compared to x15 the session prior. Continue POC. Second Session: Patient sitting in recliner upon arrival and requesting a shower. Patient ambulated from the recliner to her closet without the use of an assistive device and retrieved clothes and LB dressing AE with supervision. Patient ambulated to the bathroom without the use of an assistive device. In the bathroom, patient performed toileting x 2 with modified independence, full body shower while seated with modified independence, full body dressing with the use of a

## 2025-06-13 NOTE — CARE COORDINATION
CM update: Called and left voicemail for daughter Cathleen informing that [facility] will be providing HHC at discharge planned for Monday 6/16. Encouraged to call back with any questions/concerns. Patient will need RW at discharge- [facility] notified of DME need. Daughter to provide transportation home.    Marlin Humphrey RN

## 2025-06-13 NOTE — CARE COORDINATION
.Clinicals faxed to [facility] for continued stay review.      Nargis Molina, RN BSN  [facility] Clinical Liasion  [phone]

## 2025-06-13 NOTE — PROGRESS NOTES
Department of Physical Medicine & Rehabilitation  Progress Note    Patient Identification:  Rosario Riley  [id number]  : 1960  Admit date: 2025    Chief Complaint: S/P CABG (coronary artery bypass graft)    Subjective:   No acute events overnight. Today Bri is seen in her room. She reports fever last night. Temp noted to be 99.1. She reports feeling hot and cold last night. She denies urinary or respiratory symptoms. Incisions healing well. No leukocytosis. Will continue to monitor for signs/symptoms of infection.     Personally reviewed labs.     ROS: No f/c, n/v, cp     Objective:  Patient Vitals for the past 24 hrs:   BP Temp Temp src Pulse Resp SpO2 Weight   25 0818 118/66 -- -- 68 16 -- --   25 0519 -- -- -- -- -- -- 102.8 kg (226 lb 11.2 oz)   25 (!) 117/52 99.1 °F (37.3 °C) Oral 86 16 98 % --   25 1331 -- -- -- -- 16 -- --   25 1301 -- -- -- -- 16 -- --     Const: Alert. No distress, pleasant.   HEENT: Normocephalic, atraumatic. Normal sclera/conjunctiva. MMM.   CV: RRR, sternal incision healing well without erythema or drainage  Resp: No respiratory distress. Lungs CTAB  Abd: Soft, nontender, nondistended   Ext: + edema significantly improved from prior  Neuro: Alert, oriented, appropriately interactive.   Psych: Cooperative, appropriate mood and affect    Laboratory data: Available via EMR.   Last 24 hour lab  Recent Results (from the past 24 hours)   POCT Glucose    Collection Time: 25 11:27 AM   Result Value Ref Range    POC Glucose 104 (H) 70 - 99 mg/dl    Performed on ACCU-CHEK    POCT Glucose    Collection Time: 25  4:32 PM   Result Value Ref Range    POC Glucose 155 (H) 70 - 99 mg/dl    Performed on ACCU-CHEK    POCT Glucose    Collection Time: 25  7:15 PM   Result Value Ref Range    POC Glucose 125 (H) 70 - 99 mg/dl    Performed on ACCU-CHEK    POCT Glucose    Collection Time: 25  5:45 AM   Result Value Ref Range    POC  150ft  OT  PT Equipment Recommendations  Equipment Needed: Yes  Mobility Devices: Walker  Walker: Rolling  Toilet - Technique: Ambulating  Equipment Used: Standard toilet (BSC placed over top. has same set-up at home.)  Assessment        SLP          Body mass index is 35.51 kg/m².    Assessment and Plan:  Rosario Riley is a 64 year old female with a past medical history significant for HTN, Hld, CAD, OA, vitamin D deficiency, obesity, former tobacco use, and recent admission for presyncope and SOB and found to have multivessel CAD who presented to [facility] on 5/30/25 for planned CABG x2 with left atrial appendage clip. She was admitted to [facility] on 6/5/25 due to functional deficits below her baseline.      CAD s/p CABG x2   - asa, plavix, metoprolol, atorvastatin  - sternal precautions  - incision care  - PT, OT     BLE edema  - on lasix 20 mg daily at home  - continue lasix wean per CT Surgery  - monitor K while on lasix  - daily weights, I/Os  - Consulted Nephrology for assistance with diuresis in the setting of hypotension, appreciate assistance, cosyntropin test 6/9. Added HCTZ    Pre-diabetes  - hold lantus  - continue SSI while admitted  - Dietician consulted for education  - follow up with PCP     Anemia  - iron  - monitor and transfuse for Hb<7     HTN  - holding home olmesartan  - metoprolol decreased to 12.5 mg BID  - HCTZ added per Nephrology     HLD  - atorvastatin      Class 2 obesity  - Body mass index is 37.15 kg/m².  - encourage lifestyle modifications   Bowels: adjust medications as needed for regular bowel movements     Rehab Progress: pending therapies today  Anticipated Dispo: home  Services: HH PT, OT, nursing   DME: [facility]  EDOD: 6/16    Gaye Willson MD  PM&R  6/13/2025  11:20 AM

## 2025-06-13 NOTE — PROGRESS NOTES
Comprehensive Nutrition Assessment    Type and Reason for Visit:  Reassess    Nutrition Recommendations/Plan:   Continue low sodium diet with 1500ml FR.  Encourage PO intakes.  Monitor pertinent labs, bowel habits, weight, N/V, clinical progression.       Malnutrition Assessment:  Malnutrition Status:  At risk for malnutrition (06/06/25 1233)    Context:  Acute Illness     Findings of the 6 clinical characteristics of malnutrition:  Energy Intake:  Mild decrease in energy intake  Weight Loss:  No weight loss     Body Fat Loss:  No body fat loss     Muscle Mass Loss:  No muscle mass loss    Fluid Accumulation:  Mild Extremities   Strength:  Not Performed    Nutrition Assessment:    Followup: Pt continues to be on low sodium diet with 1500ml FR. Patient seen resting in bed. Reports her appetite is improving. Confirmed per flowsheets with PO intakes of % of meals. Denies any N/V. Patient denies any diet questions, states her diet \"is all under control.\" Noted weight continues to downtrend throughout admission, likely r/t fluid fuctuations. Will continue to monitor.    Nutrition Related Findings:    BG x24 hrs 103-155. LBM 6/11. +1 edema RLE/LLE. Wound Type: Surgical Incision       Current Nutrition Intake & Therapies:    Average Meal Intake: %, 51-75%  Average Supplements Intake: None Ordered  ADULT DIET; Regular; Low Sodium (2 gm); 1500 ml    Anthropometric Measures:  Height: 170.2 cm (5' 7\")  Ideal Body Weight (IBW): 135 lbs (61 kg)       Current Body Weight: 102.8 kg (226 lb 10.1 oz) (6/13/25), 167.9 % IBW. Weight Source: Standing scale  Current BMI (kg/m2): 35.5  Usual Body Weight: 98.4 kg (217 lb) (standing scale prior to CABG)     % Weight Change (Calculated): 9.2           BMI Categories: Obese Class 2 (BMI 35.0 -39.9)    Estimated Daily Nutrient Needs:  Energy Requirements Based On: Kcal/kg  Weight Used for Energy Requirements: Current  Energy (kcal/day): 1850 - 2056 (18-20kcal/kg)  Weight Used

## 2025-06-14 LAB
GLUCOSE BLD-MCNC: 100 MG/DL (ref 70–99)
GLUCOSE BLD-MCNC: 108 MG/DL (ref 70–99)
GLUCOSE BLD-MCNC: 108 MG/DL (ref 70–99)
GLUCOSE BLD-MCNC: 192 MG/DL (ref 70–99)
PERFORMED ON: ABNORMAL

## 2025-06-14 PROCEDURE — 6370000000 HC RX 637 (ALT 250 FOR IP): Performed by: INTERNAL MEDICINE

## 2025-06-14 PROCEDURE — 97530 THERAPEUTIC ACTIVITIES: CPT

## 2025-06-14 PROCEDURE — 1280000000 HC REHAB R&B

## 2025-06-14 PROCEDURE — 6370000000 HC RX 637 (ALT 250 FOR IP): Performed by: STUDENT IN AN ORGANIZED HEALTH CARE EDUCATION/TRAINING PROGRAM

## 2025-06-14 PROCEDURE — 97535 SELF CARE MNGMENT TRAINING: CPT

## 2025-06-14 PROCEDURE — 6360000002 HC RX W HCPCS: Performed by: STUDENT IN AN ORGANIZED HEALTH CARE EDUCATION/TRAINING PROGRAM

## 2025-06-14 PROCEDURE — 97110 THERAPEUTIC EXERCISES: CPT

## 2025-06-14 PROCEDURE — 97116 GAIT TRAINING THERAPY: CPT

## 2025-06-14 RX ADMIN — FAMOTIDINE 20 MG: 20 TABLET, FILM COATED ORAL at 08:56

## 2025-06-14 RX ADMIN — CLOPIDOGREL BISULFATE 75 MG: 75 TABLET, FILM COATED ORAL at 08:56

## 2025-06-14 RX ADMIN — ATORVASTATIN CALCIUM 80 MG: 80 TABLET, FILM COATED ORAL at 20:04

## 2025-06-14 RX ADMIN — DICLOFENAC SODIUM 2 G: 10 GEL TOPICAL at 08:56

## 2025-06-14 RX ADMIN — DICLOFENAC SODIUM 2 G: 10 GEL TOPICAL at 20:06

## 2025-06-14 RX ADMIN — METHOCARBAMOL 500 MG: 500 TABLET ORAL at 14:03

## 2025-06-14 RX ADMIN — FUROSEMIDE 20 MG: 20 TABLET ORAL at 08:56

## 2025-06-14 RX ADMIN — GABAPENTIN 100 MG: 100 CAPSULE ORAL at 20:04

## 2025-06-14 RX ADMIN — ERGOCALCIFEROL 50000 UNITS: 1.25 CAPSULE ORAL at 08:55

## 2025-06-14 RX ADMIN — HEPARIN SODIUM 5000 UNITS: 5000 INJECTION INTRAVENOUS; SUBCUTANEOUS at 06:20

## 2025-06-14 RX ADMIN — INSULIN LISPRO 2 UNITS: 100 INJECTION, SOLUTION INTRAVENOUS; SUBCUTANEOUS at 17:22

## 2025-06-14 RX ADMIN — ACETAMINOPHEN 650 MG: 325 TABLET ORAL at 06:19

## 2025-06-14 RX ADMIN — METOPROLOL TARTRATE 12.5 MG: 25 TABLET, FILM COATED ORAL at 20:04

## 2025-06-14 RX ADMIN — ACETAMINOPHEN 650 MG: 325 TABLET ORAL at 14:03

## 2025-06-14 RX ADMIN — FERROUS SULFATE TAB 325 MG (65 MG ELEMENTAL FE) 325 MG: 325 (65 FE) TAB at 17:20

## 2025-06-14 RX ADMIN — GABAPENTIN 100 MG: 100 CAPSULE ORAL at 14:03

## 2025-06-14 RX ADMIN — METHOCARBAMOL 500 MG: 500 TABLET ORAL at 17:22

## 2025-06-14 RX ADMIN — ASPIRIN 81 MG: 81 TABLET, CHEWABLE ORAL at 08:55

## 2025-06-14 RX ADMIN — OXYCODONE 10 MG: 5 TABLET ORAL at 20:04

## 2025-06-14 RX ADMIN — METHOCARBAMOL 500 MG: 500 TABLET ORAL at 08:56

## 2025-06-14 RX ADMIN — FERROUS SULFATE TAB 325 MG (65 MG ELEMENTAL FE) 325 MG: 325 (65 FE) TAB at 08:56

## 2025-06-14 RX ADMIN — GABAPENTIN 100 MG: 100 CAPSULE ORAL at 08:55

## 2025-06-14 RX ADMIN — HYDROCHLOROTHIAZIDE 25 MG: 25 TABLET ORAL at 08:56

## 2025-06-14 RX ADMIN — OXYCODONE 10 MG: 5 TABLET ORAL at 06:19

## 2025-06-14 RX ADMIN — METOPROLOL TARTRATE 12.5 MG: 25 TABLET, FILM COATED ORAL at 08:55

## 2025-06-14 RX ADMIN — HEPARIN SODIUM 5000 UNITS: 5000 INJECTION INTRAVENOUS; SUBCUTANEOUS at 14:04

## 2025-06-14 RX ADMIN — HEPARIN SODIUM 5000 UNITS: 5000 INJECTION INTRAVENOUS; SUBCUTANEOUS at 22:54

## 2025-06-14 RX ADMIN — FAMOTIDINE 20 MG: 20 TABLET, FILM COATED ORAL at 20:04

## 2025-06-14 RX ADMIN — ACETAMINOPHEN 650 MG: 325 TABLET ORAL at 22:54

## 2025-06-14 RX ADMIN — METHOCARBAMOL 500 MG: 500 TABLET ORAL at 20:03

## 2025-06-14 RX ADMIN — Medication 5 MG: at 20:04

## 2025-06-14 ASSESSMENT — PAIN SCALES - GENERAL
PAINLEVEL_OUTOF10: 7
PAINLEVEL_OUTOF10: 4
PAINLEVEL_OUTOF10: 7
PAINLEVEL_OUTOF10: 7
PAINLEVEL_OUTOF10: 5
PAINLEVEL_OUTOF10: 9
PAINLEVEL_OUTOF10: 7

## 2025-06-14 ASSESSMENT — PAIN DESCRIPTION - DESCRIPTORS
DESCRIPTORS: ACHING
DESCRIPTORS: ACHING
DESCRIPTORS: CRAMPING;DISCOMFORT

## 2025-06-14 ASSESSMENT — PAIN DESCRIPTION - FREQUENCY: FREQUENCY: CONTINUOUS

## 2025-06-14 ASSESSMENT — PAIN SCALES - WONG BAKER
WONGBAKER_NUMERICALRESPONSE: NO HURT
WONGBAKER_NUMERICALRESPONSE: NO HURT

## 2025-06-14 ASSESSMENT — PAIN DESCRIPTION - ONSET: ONSET: ON-GOING

## 2025-06-14 ASSESSMENT — PAIN DESCRIPTION - ORIENTATION
ORIENTATION: MID
ORIENTATION: MID

## 2025-06-14 ASSESSMENT — PAIN - FUNCTIONAL ASSESSMENT
PAIN_FUNCTIONAL_ASSESSMENT: ACTIVITIES ARE NOT PREVENTED
PAIN_FUNCTIONAL_ASSESSMENT: PREVENTS OR INTERFERES SOME ACTIVE ACTIVITIES AND ADLS
PAIN_FUNCTIONAL_ASSESSMENT: ACTIVITIES ARE NOT PREVENTED

## 2025-06-14 ASSESSMENT — PAIN DESCRIPTION - LOCATION
LOCATION: GENERALIZED
LOCATION: STERNUM

## 2025-06-14 ASSESSMENT — PAIN DESCRIPTION - PAIN TYPE
TYPE: CHRONIC PAIN
TYPE: CHRONIC PAIN

## 2025-06-14 NOTE — PLAN OF CARE
Patient able to use pain rating scale 0/10 adequately without problems.  Pain medications explained along with frequency and when to notify the nurse with  possible side effects. Verbalizes understanding. Call light within reach. Resting quietly in bed. Expresses needs at present.

## 2025-06-14 NOTE — DISCHARGE SUMMARY
Occupational Therapy  Discharge Summary     Name:Rosario Riley  MRN:[id number]  :1960  Treatment Diagnosis: decreased functional mobility  Diagnosis: s/p CABG x 2    Restrictions/Precautions  Restrictions/Precautions: Cardiac, Surgical Protocols, Fall Risk  Required Braces or Orthoses?: Yes           Position Activity Restriction  Sternal Precautions: No Pushing, No Pulling, 5# Lifting Restrictions  Sternal Precautions Comments: Pt able to state precautions & follow throughout session.  Other Position/Activity Restrictions: sternal precautions     Goals:   Patient Goals   Patient goals : \"to be able to do everything so I can go home and to be stronger than when I came in\"  Short Term Goals  Time Frame for Short Term Goals: 6/10/25  Short Term Goal 1: Patient will hygiene and grooming tasks in stance at the sink with SBA. GOAL MET 6/10  Short Term Goal 2: Patient will perform toilet transfers with SBA. GOAL MET 6/10  Short Term Goal 3: Patient will perform toileting tasks with SBA. GOAL MET 6/10  Short Term Goal 4: Patient will perform LB dressing with SBA. GOAL MET   Short Term Goal 5: Patient will perform don/doff footwear with SPV and AE as needed. GOAL MET   Long Term Goals  Time Frame for Long Term Goals : 25 - extend to  due to LOS  Long Term Goal 1: Patient will perform toilet transfers with Karina. GOAL MET   Long Term Goal 2: Patient will perform toileting tasks with Karina. GOAL MET   Long Term Goal 3: Patient will perform FB dressing with Karina. GOAL MET   Long Term Goal 4: Patient will perform FB bathing with Karina. GOAL MET   Long Term Goal 5: Patient will perform a simple IADL task with SPV. GOAL MET   Long Term Goal 6: Patient will perform functional mobility with Karina.- MET     Pt. Met 5/5 short term goals and  long term goals.     ADL:  Feeding  Assistance Level: Independent  Grooming/Oral Hygiene  Assistance Level: Modified independent  Upper  Level: Modified independent  Skilled Clinical Factors: with the use of a gait belt and RW.           Assessment:   Assessment  Assessment: Pt seen for OT treatment with improving balance adn activity tolerance completing all in room activities to gather clothing and complete ADLs for toileting, showering, and grooming at Mod I level with TTB and RW. Pt required increased time for all activities, good understanding of all safety and energy conservation principles. pt educated on HEP for strength, safety with d/c, and ADLs verbalizing good understanding and demonstrating sfaty throughout session, maintaining sternal precautions 100% of the time. Educated on move in the tube sternal precautions with good understanding. Pt demo'd knee pain with extended bouts of mobility and standing, but complted all transfers wtih mod I and functional mobiliy up to 150' with SPV/mod I. recommend home 24h/a initial, HHOT at d/c.  Activity Tolerance: Patient tolerated treatment well  Discharge Recommendations: Home with assist PRN;Home with Home health OT  OT Equipment Recommendations  Equipment Needed: Yes  Mobility Devices: ADL Assistive Devices  ADL Assistive Devices: Shower Chair with back;Toileting - Raised Toilet Seat with arms (patient has purchased)  Safety Devices  Safety Devices in place: Yes  Type of devices: Left in chair;Patient at risk for falls;All fall risk precautions in place;Chair alarm in place;Call light within reach    Equipment Recommendations:  Shower chair with back, RW         Home Exercise Program  Provided Pt with theraband and BUE HEP    Signs and Symptoms of Delirium (from CAM)  A. Acute Onset Mental Status Change  Is there evidence of an acute change in mental status from the patient's baseline?  [x] 0. No [] 1. Yes    B. Inattention: Did the patient have difficulty focusing attention, for example being easily distractible or having difficulty keeping track of what was being said?  [x] 0. Behavior not

## 2025-06-14 NOTE — PROGRESS NOTES
Physical Therapy  Facility/Department: [facility]  Rehabilitation Physical Therapy Treatment Note    NAME: Rosario Riley  : 1960 (64 y.o.)  MRN: [id number]  CODE STATUS: Full Code    Date of Service: 25       Restrictions:  Restrictions/Precautions: Cardiac, Surgical Protocols, Fall Risk  Required Braces or Orthoses  Right Lower Extremity Brace: Knee Brace  Position Activity Restriction  Sternal Precautions: No Pushing, No Pulling, 5# Lifting Restrictions  Sternal Precautions Comments: Pt able to state precautions & follow throughout session.  Other Position/Activity Restrictions: sternal precautions     SUBJECTIVE  Subjective: Pt in recliner, agreeable to therapy  Pain: Pt denies pain at rest       OBJECTIVE  Cognition  Overall Cognitive Status: WNL  Orientation  Overall Orientation Status: Within Normal Limits  Orientation Level: Oriented X4    Functional Mobility  Roll Left  Assistance Level: Modified independent  Skilled Clinical Factors: bed flat, no rails, maintains precautions  Roll Right  Assistance Level: Modified independent  Skilled Clinical Factors: bed flat, no rails, maintains precautions  Sit to Supine  Assistance Level: Modified independent  Skilled Clinical Factors: bed flat, no rails, maintains precautions  Supine to Sit  Assistance Level: Modified independent  Skilled Clinical Factors: bed flat, no rails, maintains precautions  Sit to Stand  Assistance Level: Modified independent  Stand to Sit  Assistance Level: Modified independent  Skilled Clinical Factors: decreased eccentric control  Bed To/From Chair  Technique: Stand step  Assistance Level: Modified independent  Skilled Clinical Factors: with RW  Car Transfer  Assistance Level: Modified independent  Skilled Clinical Factors: with RW, increased time, cues to not reach for grab bar      Environmental Mobility  Ambulation  Surface: Level surface;Uneven surface  Device: Rolling walker  Distance: 200 ft + 150 ft over level tile, 20

## 2025-06-14 NOTE — PROGRESS NOTES
Physical Therapy  Discharge Summary    Name:Rosario Riley  MRN:[id number]  :1960  Treatment Diagnosis: decreased functional mobility  Diagnosis: s/p CABG x 2    Restrictions/Precautions  Restrictions/Precautions: Cardiac, Surgical Protocols, Fall Risk  Required Braces or Orthoses?: Yes           Position Activity Restriction  Sternal Precautions: No Pushing, No Pulling, 5# Lifting Restrictions  Sternal Precautions Comments: Pt able to state precautions & follow throughout session.  Other Position/Activity Restrictions: sternal precautions     Goals:                  Short Term Goals  Time Frame for Short Term Goals:  STG = LTG  Short Term Goal 1: pt will transfer supine to and from sit with ind; goal met   Short Term Goal 2: pt will transfer sit to and from stand with ind; goal met   Short Term Goal 3: pt will ambulate 150ft with RW with mod ind; goal met   Short Term Goal 4: pt will negotiate 14 steps with L rail with mod ind - pt would like to cancel goal as pt reports will not go upstairs for 2 weeks; not met  - climbs 12 stairs with BHR and mod I  Short Term Goal 5: pt will negotiate 3 curb steps with RW with sup; not met  - completes 3 curb steps with RW and CGA                 Pt. Met 3/5 short term goals and 3/5 long term goals.     Pt. Currently ambulates 200 feet with RW mod I  Up/down 12 steps with BHR mod I  Up/down curb step with RW and CGA  Sit to/from stand with mod I  Bed mobility with mod I  Recommend home PT in order to progress endurance and balance  Pt. Safe to return home with assistance from family prn.   Provided pt. with written HEP for LE strengthening and endurance    Electronically signed by Kim Hawley PT on 2025 at 11:16 AM

## 2025-06-14 NOTE — PROGRESS NOTES
Occupational Therapy  Facility/Department: [facility]  Rehabilitation Occupational Therapy Daily Treatment Note    Date: 25  Patient Name: Rosario Riley       Room: 0165/0165-01  MRN: [id number]  Account: [id number]   : 1960  (64 y.o.) Gender: female                  Past Medical History:  has a past medical history of Does mobilize using cane, Essential hypertension, Foot pain, bilateral, Hypercholesterolemia, Hypercholesterolemia, Multiple vessel coronary artery disease, Osteoarthritis, Severe obesity (BMI 35.0-39.9) with comorbidity (HCC), and Vitamin D deficiency.  Past Surgical History:   has a past surgical history that includes  section; Tonsillectomy (); Knee arthroscopy (Left); knee surgery (2022); Total hip arthroplasty (Left, 2024); Cardiac procedure (N/A, 2025); Coronary artery bypass graft (N/A, 2025); Hardware Removal (N/A, 2025); and Chest surgery (N/A, 2025).    Restrictions  Restrictions/Precautions: Cardiac, Surgical Protocols, Fall Risk  Sternal Precautions Comments: Pt able to state precautions & follow throughout session.  Other Position/Activity Restrictions: sternal precautions  Required Braces or Orthoses  Right Lower Extremity Brace: Knee Brace  Required Braces or Orthoses?: Yes    Subjective  Subjective: pt agreeable to OT treatment session; verbalizing education and understanding of all precautions with d/c home; no concerns  Restrictions/Precautions: Cardiac;Surgical Protocols;Fall Risk           Objective     Orientation  Overall Orientation Status: Within Normal Limits  Orientation Level: Oriented X4         ADL  Feeding  Assistance Level: Independent  Grooming/Oral Hygiene  Assistance Level: Modified independent  Upper Extremity Bathing  Assistance Level: Modified independent  Skilled Clinical Factors: seated on shower chair.  Lower Extremity Bathing  Assistance Level: Modified independent  Skilled Clinical Factors: Karina while seated

## 2025-06-15 VITALS
HEIGHT: 67 IN | HEART RATE: 83 BPM | DIASTOLIC BLOOD PRESSURE: 53 MMHG | BODY MASS INDEX: 35.33 KG/M2 | WEIGHT: 225.1 LBS | OXYGEN SATURATION: 100 % | RESPIRATION RATE: 18 BRPM | TEMPERATURE: 98.4 F | SYSTOLIC BLOOD PRESSURE: 137 MMHG

## 2025-06-15 LAB
GLUCOSE BLD-MCNC: 118 MG/DL (ref 70–99)
GLUCOSE BLD-MCNC: 120 MG/DL (ref 70–99)
GLUCOSE BLD-MCNC: 126 MG/DL (ref 70–99)
GLUCOSE BLD-MCNC: 133 MG/DL (ref 70–99)
PERFORMED ON: ABNORMAL

## 2025-06-15 PROCEDURE — 6370000000 HC RX 637 (ALT 250 FOR IP): Performed by: STUDENT IN AN ORGANIZED HEALTH CARE EDUCATION/TRAINING PROGRAM

## 2025-06-15 PROCEDURE — 6360000002 HC RX W HCPCS: Performed by: STUDENT IN AN ORGANIZED HEALTH CARE EDUCATION/TRAINING PROGRAM

## 2025-06-15 PROCEDURE — 1280000000 HC REHAB R&B

## 2025-06-15 PROCEDURE — 6370000000 HC RX 637 (ALT 250 FOR IP): Performed by: INTERNAL MEDICINE

## 2025-06-15 RX ADMIN — GABAPENTIN 100 MG: 100 CAPSULE ORAL at 13:53

## 2025-06-15 RX ADMIN — GABAPENTIN 100 MG: 100 CAPSULE ORAL at 08:52

## 2025-06-15 RX ADMIN — ASPIRIN 81 MG: 81 TABLET, CHEWABLE ORAL at 08:52

## 2025-06-15 RX ADMIN — HEPARIN SODIUM 5000 UNITS: 5000 INJECTION INTRAVENOUS; SUBCUTANEOUS at 22:04

## 2025-06-15 RX ADMIN — ACETAMINOPHEN 650 MG: 325 TABLET ORAL at 22:04

## 2025-06-15 RX ADMIN — DICLOFENAC SODIUM 2 G: 10 GEL TOPICAL at 08:51

## 2025-06-15 RX ADMIN — DICLOFENAC SODIUM 2 G: 10 GEL TOPICAL at 22:11

## 2025-06-15 RX ADMIN — FERROUS SULFATE TAB 325 MG (65 MG ELEMENTAL FE) 325 MG: 325 (65 FE) TAB at 08:52

## 2025-06-15 RX ADMIN — ACETAMINOPHEN 650 MG: 325 TABLET ORAL at 06:04

## 2025-06-15 RX ADMIN — FERROUS SULFATE TAB 325 MG (65 MG ELEMENTAL FE) 325 MG: 325 (65 FE) TAB at 16:36

## 2025-06-15 RX ADMIN — GABAPENTIN 100 MG: 100 CAPSULE ORAL at 22:04

## 2025-06-15 RX ADMIN — METHOCARBAMOL 500 MG: 500 TABLET ORAL at 22:04

## 2025-06-15 RX ADMIN — ACETAMINOPHEN 650 MG: 325 TABLET ORAL at 13:53

## 2025-06-15 RX ADMIN — CLOPIDOGREL BISULFATE 75 MG: 75 TABLET, FILM COATED ORAL at 08:52

## 2025-06-15 RX ADMIN — HEPARIN SODIUM 5000 UNITS: 5000 INJECTION INTRAVENOUS; SUBCUTANEOUS at 13:53

## 2025-06-15 RX ADMIN — HEPARIN SODIUM 5000 UNITS: 5000 INJECTION INTRAVENOUS; SUBCUTANEOUS at 06:04

## 2025-06-15 RX ADMIN — FAMOTIDINE 20 MG: 20 TABLET, FILM COATED ORAL at 22:04

## 2025-06-15 RX ADMIN — METOPROLOL TARTRATE 12.5 MG: 25 TABLET, FILM COATED ORAL at 08:51

## 2025-06-15 RX ADMIN — FUROSEMIDE 20 MG: 20 TABLET ORAL at 08:51

## 2025-06-15 RX ADMIN — HYDROCHLOROTHIAZIDE 25 MG: 25 TABLET ORAL at 08:51

## 2025-06-15 RX ADMIN — FAMOTIDINE 20 MG: 20 TABLET, FILM COATED ORAL at 08:52

## 2025-06-15 RX ADMIN — ATORVASTATIN CALCIUM 80 MG: 80 TABLET, FILM COATED ORAL at 22:04

## 2025-06-15 RX ADMIN — METHOCARBAMOL 500 MG: 500 TABLET ORAL at 16:36

## 2025-06-15 RX ADMIN — METOPROLOL TARTRATE 12.5 MG: 25 TABLET, FILM COATED ORAL at 22:04

## 2025-06-15 RX ADMIN — METHOCARBAMOL 500 MG: 500 TABLET ORAL at 08:52

## 2025-06-15 RX ADMIN — METHOCARBAMOL 500 MG: 500 TABLET ORAL at 13:53

## 2025-06-15 ASSESSMENT — PAIN SCALES - GENERAL
PAINLEVEL_OUTOF10: 3
PAINLEVEL_OUTOF10: 5
PAINLEVEL_OUTOF10: 0
PAINLEVEL_OUTOF10: 3

## 2025-06-15 ASSESSMENT — PAIN DESCRIPTION - LOCATION: LOCATION: GENERALIZED

## 2025-06-15 NOTE — PLAN OF CARE
Problem: Safety - Adult  Goal: Free from fall injury  6/15/2025 1539 by Renata Hennessy RN  Outcome: Progressing  Flowsheets (Taken 6/15/2025 1539)  Free From Fall Injury:   Instruct family/caregiver on patient safety   Based on caregiver fall risk screen, instruct family/caregiver to ask for assistance with transferring infant if caregiver noted to have fall risk factors      Problem: Pain  Goal: Verbalizes/displays adequate comfort level or baseline comfort level  6/15/2025 1539 by Renata Hennessy RN  Outcome: Progressing  Flowsheets (Taken 6/15/2025 1539)  Verbalizes/displays adequate comfort level or baseline comfort level:   Encourage patient to monitor pain and request assistance   Administer analgesics based on type and severity of pain and evaluate response   Consider cultural and social influences on pain and pain management   Assess pain using appropriate pain scale   Implement non-pharmacological measures as appropriate and evaluate response   Notify Licensed Independent Practitioner if interventions unsuccessful or patient reports new pain

## 2025-06-15 NOTE — PLAN OF CARE
Problem: Discharge Planning  Goal: Discharge to home or other facility with appropriate resources  Outcome: Progressing     Problem: Safety - Adult  Goal: Free from fall injury  Outcome: Progressing     Problem: Pain  Goal: Verbalizes/displays adequate comfort level or baseline comfort level  Outcome: Progressing     Problem: Cognitive:  Goal: Knowledge of wound care  Description: Knowledge of wound care  Outcome: Progressing  Goal: Understands risk factors for wounds  Description: Understands risk factors for wounds  Outcome: Progressing     Problem: Falls - Risk of:  Goal: Absence of physical injury  Description: Absence of physical injury  Outcome: Progressing     Problem: Nutrition Deficit:  Goal: Optimize nutritional status  Outcome: Progressing     Problem: Skin/Tissue Integrity  Goal: Skin integrity remains intact  Description: 1.  Monitor for areas of redness and/or skin breakdown2.  Assess vascular access sites hourly3.  Every 4-6 hours minimum:  Change oxygen saturation probe site4.  Every 4-6 hours:  If on nasal continuous positive airway pressure, respiratory therapy assess nares and determine need for appliance change or resting period  Outcome: Progressing  Flowsheets (Taken 6/14/2025 2004)  Skin Integrity Remains Intact:   Monitor for areas of redness and/or skin breakdown   Assess vascular access sites hourly   Every 4-6 hours minimum:  Change oxygen saturation probe site   Every 4-6 hours:  If on nasal continuous positive airway pressure, assess nares and determine need for appliance change or resting period   Turn and reposition as indicated   Assess need for specialty bed   Positioning devices   Pressure redistribution bed/mattress (bed type)   Check visual cues for pain   Monitor skin under medical devices

## 2025-06-16 VITALS
SYSTOLIC BLOOD PRESSURE: 133 MMHG | WEIGHT: 225.1 LBS | HEIGHT: 67 IN | HEART RATE: 66 BPM | RESPIRATION RATE: 18 BRPM | OXYGEN SATURATION: 99 % | DIASTOLIC BLOOD PRESSURE: 62 MMHG | BODY MASS INDEX: 35.33 KG/M2 | TEMPERATURE: 98.1 F

## 2025-06-16 LAB
ANION GAP SERPL CALCULATED.3IONS-SCNC: 11 MMOL/L (ref 3–16)
BASOPHILS # BLD: 0.1 K/UL (ref 0–0.2)
BASOPHILS NFR BLD: 0.8 %
BUN SERPL-MCNC: 13 MG/DL (ref 7–20)
CALCIUM SERPL-MCNC: 8.7 MG/DL (ref 8.3–10.6)
CHLORIDE SERPL-SCNC: 99 MMOL/L (ref 99–110)
CO2 SERPL-SCNC: 28 MMOL/L (ref 21–32)
CREAT SERPL-MCNC: 0.9 MG/DL (ref 0.6–1.2)
DEPRECATED RDW RBC AUTO: 14.4 % (ref 12.4–15.4)
EOSINOPHIL # BLD: 0.5 K/UL (ref 0–0.6)
EOSINOPHIL NFR BLD: 6.2 %
GFR SERPLBLD CREATININE-BSD FMLA CKD-EPI: 70 ML/MIN/{1.73_M2}
GLUCOSE BLD-MCNC: 114 MG/DL (ref 70–99)
GLUCOSE SERPL-MCNC: 108 MG/DL (ref 70–99)
HCT VFR BLD AUTO: 27.2 % (ref 36–48)
HGB BLD-MCNC: 8.8 G/DL (ref 12–16)
LYMPHOCYTES # BLD: 1.7 K/UL (ref 1–5.1)
LYMPHOCYTES NFR BLD: 19.4 %
MCH RBC QN AUTO: 30.2 PG (ref 26–34)
MCHC RBC AUTO-ENTMCNC: 32.5 G/DL (ref 31–36)
MCV RBC AUTO: 92.9 FL (ref 80–100)
MONOCYTES # BLD: 0.8 K/UL (ref 0–1.3)
MONOCYTES NFR BLD: 9.4 %
NEUTROPHILS # BLD: 5.6 K/UL (ref 1.7–7.7)
NEUTROPHILS NFR BLD: 64.2 %
PERFORMED ON: ABNORMAL
PLATELET # BLD AUTO: 339 K/UL (ref 135–450)
PMV BLD AUTO: 7.8 FL (ref 5–10.5)
POTASSIUM SERPL-SCNC: 4.4 MMOL/L (ref 3.5–5.1)
RBC # BLD AUTO: 2.93 M/UL (ref 4–5.2)
SODIUM SERPL-SCNC: 138 MMOL/L (ref 136–145)
WBC # BLD AUTO: 8.7 K/UL (ref 4–11)

## 2025-06-16 PROCEDURE — 6370000000 HC RX 637 (ALT 250 FOR IP): Performed by: INTERNAL MEDICINE

## 2025-06-16 PROCEDURE — 6370000000 HC RX 637 (ALT 250 FOR IP): Performed by: STUDENT IN AN ORGANIZED HEALTH CARE EDUCATION/TRAINING PROGRAM

## 2025-06-16 PROCEDURE — 85025 COMPLETE CBC W/AUTO DIFF WBC: CPT

## 2025-06-16 PROCEDURE — 36415 COLL VENOUS BLD VENIPUNCTURE: CPT

## 2025-06-16 PROCEDURE — 6360000002 HC RX W HCPCS: Performed by: STUDENT IN AN ORGANIZED HEALTH CARE EDUCATION/TRAINING PROGRAM

## 2025-06-16 PROCEDURE — 80048 BASIC METABOLIC PNL TOTAL CA: CPT

## 2025-06-16 RX ORDER — HYDROCHLOROTHIAZIDE 25 MG/1
25 TABLET ORAL DAILY
Qty: 30 TABLET | Refills: 0 | Status: SHIPPED | OUTPATIENT
Start: 2025-06-17

## 2025-06-16 RX ORDER — FAMOTIDINE 20 MG/1
20 TABLET, FILM COATED ORAL 2 TIMES DAILY
Qty: 60 TABLET | Refills: 0 | Status: SHIPPED | OUTPATIENT
Start: 2025-06-16

## 2025-06-16 RX ORDER — FUROSEMIDE 20 MG/1
20 TABLET ORAL DAILY
Qty: 30 TABLET | Refills: 0 | Status: SHIPPED | OUTPATIENT
Start: 2025-06-16

## 2025-06-16 RX ORDER — METOPROLOL TARTRATE 25 MG/1
12.5 TABLET, FILM COATED ORAL 2 TIMES DAILY
Qty: 60 TABLET | Refills: 0 | Status: SHIPPED | OUTPATIENT
Start: 2025-06-16

## 2025-06-16 RX ORDER — GABAPENTIN 100 MG/1
100 CAPSULE ORAL 3 TIMES DAILY
Qty: 15 CAPSULE | Refills: 0 | Status: SHIPPED | OUTPATIENT
Start: 2025-06-16 | End: 2025-06-18 | Stop reason: SDUPTHER

## 2025-06-16 RX ORDER — CLOPIDOGREL BISULFATE 75 MG/1
75 TABLET ORAL DAILY
Qty: 30 TABLET | Refills: 0 | Status: SHIPPED | OUTPATIENT
Start: 2025-06-17

## 2025-06-16 RX ORDER — OXYCODONE HYDROCHLORIDE 5 MG/1
5 TABLET ORAL EVERY 6 HOURS PRN
Qty: 20 TABLET | Refills: 0 | Status: SHIPPED | OUTPATIENT
Start: 2025-06-16 | End: 2025-06-21

## 2025-06-16 RX ORDER — METHOCARBAMOL 500 MG/1
500 TABLET, FILM COATED ORAL 4 TIMES DAILY
Qty: 40 TABLET | Refills: 0 | Status: SHIPPED | OUTPATIENT
Start: 2025-06-16 | End: 2025-06-26

## 2025-06-16 RX ORDER — FERROUS SULFATE 325(65) MG
325 TABLET ORAL 2 TIMES DAILY WITH MEALS
Qty: 60 TABLET | Refills: 0 | Status: SHIPPED | OUTPATIENT
Start: 2025-06-16

## 2025-06-16 RX ADMIN — ACETAMINOPHEN 650 MG: 325 TABLET ORAL at 05:35

## 2025-06-16 RX ADMIN — FERROUS SULFATE TAB 325 MG (65 MG ELEMENTAL FE) 325 MG: 325 (65 FE) TAB at 09:16

## 2025-06-16 RX ADMIN — HYDROCHLOROTHIAZIDE 25 MG: 25 TABLET ORAL at 09:16

## 2025-06-16 RX ADMIN — HEPARIN SODIUM 5000 UNITS: 5000 INJECTION INTRAVENOUS; SUBCUTANEOUS at 05:35

## 2025-06-16 RX ADMIN — ASPIRIN 81 MG: 81 TABLET, CHEWABLE ORAL at 09:16

## 2025-06-16 RX ADMIN — DICLOFENAC SODIUM 2 G: 10 GEL TOPICAL at 09:15

## 2025-06-16 RX ADMIN — GABAPENTIN 100 MG: 100 CAPSULE ORAL at 09:16

## 2025-06-16 RX ADMIN — METHOCARBAMOL 500 MG: 500 TABLET ORAL at 09:16

## 2025-06-16 RX ADMIN — CLOPIDOGREL BISULFATE 75 MG: 75 TABLET, FILM COATED ORAL at 09:16

## 2025-06-16 RX ADMIN — METOPROLOL TARTRATE 12.5 MG: 25 TABLET, FILM COATED ORAL at 09:15

## 2025-06-16 RX ADMIN — FUROSEMIDE 20 MG: 20 TABLET ORAL at 09:16

## 2025-06-16 RX ADMIN — FAMOTIDINE 20 MG: 20 TABLET, FILM COATED ORAL at 09:16

## 2025-06-16 ASSESSMENT — PAIN SCALES - GENERAL: PAINLEVEL_OUTOF10: 3

## 2025-06-16 ASSESSMENT — PAIN DESCRIPTION - DESCRIPTORS: DESCRIPTORS: ACHING

## 2025-06-16 ASSESSMENT — PAIN DESCRIPTION - ORIENTATION: ORIENTATION: LEFT;RIGHT

## 2025-06-16 ASSESSMENT — PAIN DESCRIPTION - LOCATION
LOCATION: KNEE
LOCATION: KNEE

## 2025-06-16 NOTE — CARE COORDINATION
CM update: Spoke with patient in room who reports feeling good about discharging home today. Patient expresses no questions/concerns. No further needs from case management.     Case Management Discharge Summary  [facility] - Acute Rehab Unit    Patient:Rosario Riley     Rehab Dx/Hx: S/P CABG (coronary artery bypass graft) [Z95.1]       Today's Date: 6/16/2025    Discharge to:  Home with family and [facility]: SN/PT/OT   Pre-certification completed:  [] No       [] Yes     [x] N/A   Hospital Exemption Notification (HENS) completed:  [] No       [] Yes     [x] N/A   IMM given:  Yes 6/16/25 Follow-Up copy of Important Message from Medicare (IMM2) has been explained to patient and/or designated healthcare decision maker (HCDM). Pt and/or HCDM aware that patient is permitted to stay an additional 4 hours prior to discharge to consider an appeal if they feel as though they are being discharged too soon. Patient may discharge as planned if chooses to do so.  Patient/HCDM voice no other concerns or questions regarding this process.          New Durable Medical Equipment ordered/agency:  RW provided through [employer]   Transportation:  Agency used: private car via [facility] Conn[facility]  Ambulance form completed: [] No       [] Yes   [x] N/A       Confirmed discharge plan with:  Patient: [] No       [x] Yes  Family:  [] No       [x] Yes      Name: clifford Latif  Contact number: [phone]  Facility/Agency, name: [facility]  HANNA/AVS faxed  Phone number for report to facility: N/A  RN, name: Beatriz FAY       Has lack of transportation kept you from medical appointments, meetings, work, or ADL's? [] Yes, it has kept me from medical appointments or from getting my meds  [] Yes, It has kept me from non-medical meetings, appointments, work, or getting things I need  [x] No  [] Pt unable to respond  [] Pt declines to respond     How often do you need to have someone help you when you

## 2025-06-16 NOTE — PROGRESS NOTES
ACUTE REHAB UNIT: DISCHARGE  [facility]    Patient:Rosario Riley     Rehab Dx/Hx: S/P CABG (coronary artery bypass graft) [Z95.1]   :1960  MRN:[id number]  Date of Admit: 2025   Date of Discharge: 2025    Subjective:   Order for patient discharge.  Reviewed discharge summary (AVS) with patient  including medications, physical instructions (safety) and diet. Pt in stable condition.     Reconciled Medication List - Patient:   Medications were reviewed by RN at time of discharge with patient and/or family:  []  Via EMR   []  Via health information exchange  [x]  Verbal (e.g. in person, telephone, video conferencing)  [x]  Paper-based (e.g. fax, copies, printouts)   []  Other Methods (e.g. texting, email, CDs)    Reconciled Medication List - Subsequent provider:   [x] No, current reconciled medication list not provided to the subsequent provider.  [] Yes, current reconciled medication list provided to the subsequent provider.   [] Via EMR    [] Via health information exchange  [] Verbal (e.g. in person, telephone, video conferencing)  [] Paper-based (e.g. fax, copies, printouts)   [] Other Methods (e.g. texting, email, CDs)    Discharge disposition:  Pt was discharged via:  [x]  Wheelchair  []  Stretcher  []  Safe ambulation and use of assistive device  []  Other:     Pt was discharged to:  [x]  Private car  []  Transportation service to next destination  []  Other:     Discharge destination:  [x]  Home  []  Skilled Nursing Facility  []  Long Term Care  []  Other:     Has lack of transportation kept you from medical appointments, meetings, work, or ADL's? [] Yes, it has kept me from medical appointments or from getting my meds  [] Yes, It has kept me from non-medical meetings, appointments, work, or getting things I need  [x] No  [] Pt unable to respond  [] Pt declines to respond     How often do you need to have someone help you when you read instructions, pamphlets, or other

## 2025-06-16 NOTE — PLAN OF CARE
Problem: Safety - Adult  Goal: Free from fall injury  6/15/2025 1539 by Renata Hennessy RN  Outcome: Progressing  Flowsheets (Taken 6/15/2025 1539)  Free From Fall Injury:   Instruct family/caregiver on patient safety   Based on caregiver fall risk screen, instruct family/caregiver to ask for assistance with transferring infant if caregiver noted to have fall risk factors     Problem: Skin/Tissue Integrity  Goal: Skin integrity remains intact  Description: 1.  Monitor for areas of redness and/or skin breakdown2.  Assess vascular access sites hourly3.  Every 4-6 hours minimum:  Change oxygen saturation probe site4.  Every 4-6 hours:  If on nasal continuous positive airway pressure, respiratory therapy assess nares and determine need for appliance change or resting period  Outcome: Progressing

## 2025-06-16 NOTE — PROGRESS NOTES
C1. Oxygen Therapy []   C2. Continuous []   C3. Intermittent []   C4. High-concentration []   D1. Suctioning []   D2. Scheduled []   D3. As needed []   E1. Tracheostomy Care []   F1. Invasive Mechanical Ventilator (ventilator or respirator) []   G1. Non-invasive Mechanical Ventilator []   G2. BiPAP []   G3. CPAP []   Other    H1. IV Medications []   H2. Vasoactive medications []   H3. Antibiotics []   H4. Anticoagulation []   H10. Other []   I1. Transfusions []   J1. Dialysis []   J2. Hemodialysis []   J3. Peritoneal dialysis []   O1. IV access []   O2. Peripheral []   O3. Midline []   O4. Central (PICC, tunneled, port) []   None of the above [x]     Signature:  Disha Davies Rn

## 2025-06-16 NOTE — DISCHARGE SUMMARY
Physical Medicine & Rehabilitation  Discharge Summary     Patient Identification:  Rosario Riley  : 1960  Admit date: 2025  Discharge date:  25  Attending provider: Gaye Willson MD        Primary care provider: Promise Taylor PA     Discharge Diagnoses:   Active Hospital Problems    Diagnosis     S/P CABG (coronary artery bypass graft) [Z95.1]        History of Present Illness/Acute Hospital Course:  Rosario Riley is a 64 year old female with a past medical history significant for HTN, Hld, CAD, OA, vitamin D deficiency, obesity, former tobacco use, and recent admission for presyncope and SOB and found to have multivessel CAD who presented to [facility] on 25 for planned CABG x2 with left atrial appendage clip. Course has been notable for JANIS, hyperkalemia, anemia, and LE edema. She was admitted to [facility] on 25 due to functional deficits below her baseline. Today Bri is seen with therapy present. She reports some sternal pain. She also reports chronic right knee pain. She is typically independent at baseline. She lives with multiple family members in a multilevel house with 1+1+1 ANALILIA. She reports that her bedroom and full bath are upstairs. She is retired, but is working at the [employer] through. She is also highly motivated to get back to work.     Inpatient Rehabilitation Course:   Rosario Riley is a 64 y.o. female admitted to inpatient rehabilitation on 2025 with S/P CABG (coronary artery bypass graft).  The patient participated in an aggressive multidisciplinary inpatient rehabilitation program involving 3 hours of therapy per day, at least 5 days per week. Discharging home with family. HH and DME ordered. Patient will follow-up with PCP and CT surgery.     Impairments: impaired mobility and ADLs, impaired gait and balance    Medical Management:    CAD s/p CABG x2   - asa, plavix, metoprolol, atorvastatin  - sternal precautions  - incision care  - PT, OT

## 2025-06-18 ENCOUNTER — OFFICE VISIT (OUTPATIENT)
Dept: FAMILY MEDICINE CLINIC | Age: 65
End: 2025-06-18

## 2025-06-18 VITALS
WEIGHT: 225 LBS | HEART RATE: 66 BPM | SYSTOLIC BLOOD PRESSURE: 120 MMHG | BODY MASS INDEX: 35.24 KG/M2 | DIASTOLIC BLOOD PRESSURE: 60 MMHG | OXYGEN SATURATION: 98 %

## 2025-06-18 DIAGNOSIS — D64.9 ANEMIA, UNSPECIFIED TYPE: ICD-10-CM

## 2025-06-18 DIAGNOSIS — T14.8XXA WOUND INFECTION: ICD-10-CM

## 2025-06-18 DIAGNOSIS — I10 ESSENTIAL HYPERTENSION: ICD-10-CM

## 2025-06-18 DIAGNOSIS — E55.9 VITAMIN D DEFICIENCY: ICD-10-CM

## 2025-06-18 DIAGNOSIS — Z95.1 S/P CABG (CORONARY ARTERY BYPASS GRAFT): ICD-10-CM

## 2025-06-18 DIAGNOSIS — L08.9 WOUND INFECTION: ICD-10-CM

## 2025-06-18 DIAGNOSIS — Z09 HOSPITAL DISCHARGE FOLLOW-UP: Primary | ICD-10-CM

## 2025-06-18 PROBLEM — R94.39 ABNORMAL STRESS TEST: Status: RESOLVED | Noted: 2025-05-17 | Resolved: 2025-06-18

## 2025-06-18 PROBLEM — I25.10 MULTIPLE VESSEL CORONARY ARTERY DISEASE: Status: RESOLVED | Noted: 2025-05-19 | Resolved: 2025-06-18

## 2025-06-18 PROBLEM — Z47.1 AFTERCARE FOLLOWING JOINT REPLACEMENT SURGERY: Status: RESOLVED | Noted: 2024-08-07 | Resolved: 2025-06-18

## 2025-06-18 PROBLEM — R55 PRE-SYNCOPE: Status: RESOLVED | Noted: 2025-05-14 | Resolved: 2025-06-18

## 2025-06-18 PROBLEM — I25.10 CORONARY ARTERY DISEASE: Status: RESOLVED | Noted: 2025-05-21 | Resolved: 2025-06-18

## 2025-06-18 RX ORDER — GABAPENTIN 100 MG/1
100 CAPSULE ORAL 3 TIMES DAILY
Qty: 15 CAPSULE | Refills: 0 | Status: SHIPPED | OUTPATIENT
Start: 2025-06-18 | End: 2025-06-18

## 2025-06-18 RX ORDER — ERGOCALCIFEROL 1.25 MG/1
50000 CAPSULE, LIQUID FILLED ORAL WEEKLY
Qty: 12 CAPSULE | Refills: 0 | Status: SHIPPED | OUTPATIENT
Start: 2025-06-18

## 2025-06-18 RX ORDER — GABAPENTIN 100 MG/1
100 CAPSULE ORAL 3 TIMES DAILY
Qty: 90 CAPSULE | Refills: 0 | Status: SHIPPED | OUTPATIENT
Start: 2025-06-18 | End: 2025-07-18

## 2025-06-18 RX ORDER — CLINDAMYCIN HYDROCHLORIDE 300 MG/1
300 CAPSULE ORAL 4 TIMES DAILY
Qty: 28 CAPSULE | Refills: 0 | Status: SHIPPED | OUTPATIENT
Start: 2025-06-18 | End: 2025-06-25

## 2025-06-18 ASSESSMENT — ENCOUNTER SYMPTOMS
ABDOMINAL PAIN: 0
COLOR CHANGE: 1
WHEEZING: 0
SHORTNESS OF BREATH: 0
COUGH: 0

## 2025-06-18 NOTE — TELEPHONE ENCOUNTER
.Refill Request     CONFIRM preferred pharmacy with the patient.    If Mail Order Rx - Pend for 90 day refill.      Last Seen: Last Seen Department: 6/18/2025  Last Seen by PCP: 6/18/2025    Last Written: 3-28-25 12 with 0     If no future appointment scheduled:  Review the last OV with PCP and review information for follow-up visit,  Route STAFF MESSAGE with patient name to the  Pool for scheduling with the following information:            -  Timing of next visit           -  Visit type ie Physical, OV, etc           -  Diagnoses/Reason ie. COPD, HTN - Do not use MEDICATION, Follow-up or CHECK UP - Give reason for visit      Next Appointment:   Future Appointments   Date Time Provider Department [place]   6/23/2025  3:15 PM Sánchez Mosquera APRN - CNP AND CT SURG [facility]   7/8/2025 10:00 AM John Carvajal MD [facility]   9/2/2025  2:00 PM John Carvajal MD [facility]   9/18/2025  3:00 PM Promise Taylor PA [facility] DEP       Message sent to  to schedule appt with patient?  NO      Requested Prescriptions     Pending Prescriptions Disp Refills    vitamin D (ERGOCALCIFEROL) 1.25 MG (37584 UT) CAPS capsule [Pharmacy Med Name: VITAMIN D2 1.25MG(50,000 UNIT)] 12 capsule 0     Sig: TAKE 1 CAPSULE BY MOUTH ONE TIME PER WEEK

## 2025-06-18 NOTE — PROGRESS NOTES
Post-Discharge Transitional Care Follow Up      Rosario Riley   YOB: 1960    Date of Office Visit:  6/18/2025  Date of Hospital Admission: 6/5/25  Date of Hospital Discharge: 6/16/25  Readmission Risk Score (high >=14%. Medium >=10%):Readmission Risk Score: 16.7      Care management risk score Rising risk (score 2-5) and Complex Care (Scores >=6): No Risk Score On File     Non face to face  following discharge, date last encounter closed (first attempt may have been earlier): 06/06/2025     Call initiated 2 business days of discharge: Yes     Hospital discharge follow-up  -     IA DISCHARGE MEDS RECONCILED W/ CURRENT OUTPATIENT MED LIST  -   reviewed lab work and notes with the pt and family today  Wound infection  -     Culture, Wound (with Gram Stain)  -     clindamycin (CLEOCIN) 300 MG capsule; Take 1 capsule by mouth 4 times daily for 7 days, Disp-28 capsule, R-0Normal  -     start on clindamycin, will follow up after I get the results of the wound culture  Essential hypertension  -     Renal Function Panel; Future  -     stop lasix once swelling is down. BP normal today, return for renal function panel next week. Encouraged movement and compression if she doesn't want to keep legs elevated  Anemia, unspecified type  -     Iron and TIBC; Future  -     CBC; Future  -     Ferritin; Future  -     unclear etiology, will recheck cbc and iron in one week. If still low, may consider referral to see [facility]  S/P CABG (coronary artery bypass graft)       -  pt has follow up scheduled with cardiology and cardio thoracic surgery    Medical Decision Making: high complexity  Return in about 3 months (around 9/18/2025) for prediabetes.           Subjective:   HPI    Inpatient course: Discharge summary reviewed- see chart.    Interval history/Current status: The pt is here with her daughter today. She is back at home and doing well though she continues to have chest wall pain from the surgery. Her daughter has

## 2025-06-20 NOTE — CARE COORDINATION
Discharge summaries sent to insurance    Naresh Broussard MPH, RRT  [facility] Admissions Supervisor-[facility]  (P)[phone]  (F)[phone]   Electronically signed by Naresh Broussard on 6/20/2025 at 12:42 PM

## 2025-06-21 LAB
BACTERIA SPEC AEROBE CULT: ABNORMAL
GRAM STN SPEC: ABNORMAL
ORGANISM: ABNORMAL

## 2025-06-23 ENCOUNTER — RESULTS FOLLOW-UP (OUTPATIENT)
Dept: FAMILY MEDICINE CLINIC | Age: 65
End: 2025-06-23

## 2025-07-08 ENCOUNTER — OFFICE VISIT (OUTPATIENT)
Dept: CARDIOLOGY CLINIC | Age: 65
End: 2025-07-08
Payer: COMMERCIAL

## 2025-07-08 VITALS
OXYGEN SATURATION: 97 % | SYSTOLIC BLOOD PRESSURE: 118 MMHG | HEART RATE: 67 BPM | BODY MASS INDEX: 34.06 KG/M2 | HEIGHT: 67 IN | DIASTOLIC BLOOD PRESSURE: 62 MMHG | WEIGHT: 217 LBS

## 2025-07-08 DIAGNOSIS — I25.10 CAD, MULTIPLE VESSEL: ICD-10-CM

## 2025-07-08 DIAGNOSIS — Z95.1 S/P CABG (CORONARY ARTERY BYPASS GRAFT): ICD-10-CM

## 2025-07-08 DIAGNOSIS — E78.00 HYPERCHOLESTEROLEMIA: ICD-10-CM

## 2025-07-08 DIAGNOSIS — I10 ESSENTIAL HYPERTENSION: Primary | ICD-10-CM

## 2025-07-08 PROCEDURE — 1159F MED LIST DOCD IN RCRD: CPT | Performed by: INTERNAL MEDICINE

## 2025-07-08 PROCEDURE — 3074F SYST BP LT 130 MM HG: CPT | Performed by: INTERNAL MEDICINE

## 2025-07-08 PROCEDURE — 99214 OFFICE O/P EST MOD 30 MIN: CPT | Performed by: INTERNAL MEDICINE

## 2025-07-08 PROCEDURE — 1123F ACP DISCUSS/DSCN MKR DOCD: CPT | Performed by: INTERNAL MEDICINE

## 2025-07-08 PROCEDURE — 3078F DIAST BP <80 MM HG: CPT | Performed by: INTERNAL MEDICINE

## 2025-07-08 RX ORDER — FUROSEMIDE 20 MG/1
20 TABLET ORAL DAILY PRN
Qty: 30 TABLET | Refills: 0
Start: 2025-07-08

## 2025-07-08 RX ORDER — ATORVASTATIN CALCIUM 80 MG/1
80 TABLET, FILM COATED ORAL DAILY
Qty: 90 TABLET | Refills: 3 | Status: SHIPPED | OUTPATIENT
Start: 2025-07-08

## 2025-07-08 RX ORDER — HYDROCHLOROTHIAZIDE 25 MG/1
25 TABLET ORAL DAILY
Qty: 90 TABLET | Refills: 3 | Status: SHIPPED | OUTPATIENT
Start: 2025-07-08

## 2025-07-08 RX ORDER — CLOPIDOGREL BISULFATE 75 MG/1
75 TABLET ORAL DAILY
Qty: 90 TABLET | Refills: 3 | Status: SHIPPED | OUTPATIENT
Start: 2025-07-08

## 2025-07-08 RX ORDER — METOPROLOL SUCCINATE 50 MG/1
50 TABLET, EXTENDED RELEASE ORAL DAILY
Qty: 90 TABLET | Refills: 3 | Status: SHIPPED | OUTPATIENT
Start: 2025-07-08

## 2025-07-08 NOTE — PATIENT INSTRUCTIONS
~Carotid dopplers at the end of the year   ~Call [facility] scheduling at [phone] to schedule testing    ~Take lasix as needed for swelling and weight gain. Call if have to take this daily   ~Referral to cardiac rehab     Cardiac medications reviewed including indications and pertinent side effects. Medication list updated at this visit.   Patient verbalizes understanding of the need for treatment and education has been provided at today's visit. Additional education material will be provided in after visit summary.    Check blood pressure and heart rate at home a few times per week- keep a log with dates and times and bring to office visit   Regular exercise and following a healthy diet encouraged   Follow up with me as scheduled in September    .

## 2025-07-08 NOTE — PROGRESS NOTES
[facility]   Cardiac follow up     Referring Provider:  Promise Taylor PA     Chief Complaint   Patient presents with    Follow-Up from Hospital    Coronary Artery Disease    Hypertension    Hyperlipidemia      Rosario Riley   1960    History of Present Illness:    Rosario Riley is a 65 y.o. female who is here today today for hospital follow up S/P CABG surgery.  She was initially seen as a new patient at the request of Promise Taylor PA for SOB, edema, elevated BNP, and hypertension.    Previous testing 2024- EF 60-65%. Repeat echocardiogram 2025- EF 60-65%.    Patient underwent a stress test which was abnormal which lead to a right and left heart cath showing MV CAD. She underwent CABG X3 - 2025. Carotid dopplers- ~ left is <50%, right-Moderate (50-69%). Patient was admitted to inpatient rehab.    Today she states she feels well overall since her surgery. She states she has days where she is tired and other days where she feels more energy. She is using her hand weights at home and is dong some walking. She is tolerating her medications and is taking them as prescribed. She states she has some chest tightness at times. She has a piece of gauze at the end of her mid sternal site due to slightly bleeding. Patient currently denies any weight gain, edema, palpitations, chest pain, shortness of breath, dizziness, and syncope.    She stopped smoking 6 weeks ago     Past Medical History:   has a past medical history of Does mobilize using cane, Essential hypertension, Foot pain, bilateral, Hypercholesterolemia, Hypercholesterolemia, Multiple vessel coronary artery disease, Osteoarthritis, Severe obesity (BMI 35.0-39.9) with comorbidity (HCC), and Vitamin D deficiency.    Surgical History:   has a past surgical history that includes  section; Tonsillectomy (); Knee arthroscopy (Left); knee surgery (2022); Total hip arthroplasty (Left, 2024); Cardiac procedure (N/A,

## 2025-07-30 ENCOUNTER — TELEPHONE (OUTPATIENT)
Dept: FAMILY MEDICINE CLINIC | Age: 65
End: 2025-07-30

## 2025-07-30 RX ORDER — GABAPENTIN 100 MG/1
100 CAPSULE ORAL 3 TIMES DAILY
Qty: 90 CAPSULE | Refills: 2 | Status: SHIPPED | OUTPATIENT
Start: 2025-07-30 | End: 2025-10-28

## 2025-07-30 NOTE — TELEPHONE ENCOUNTER
Patient is calling in stating that she was [id number] capsules 3 times daily plus tylenol plus the diclofenac gel, this all was working really well for patients arthritis.   Dr Justice had been previously prescribing this medication for healing from cardiac surgery.  Due to the patient being healed and the gabapentin therapy completed, they are no longer going to prescribe the medication, but the patients arthritis pain is no longer being relieved from just the tylenol and diclofenac gel.      Patient requesting a new prescription of Gabapentin 100mg capsules.     Please advise.

## 2025-08-02 DIAGNOSIS — E55.9 VITAMIN D DEFICIENCY: ICD-10-CM

## 2025-08-04 RX ORDER — ERGOCALCIFEROL 1.25 MG/1
50000 CAPSULE, LIQUID FILLED ORAL WEEKLY
Qty: 12 CAPSULE | Refills: 0 | Status: SHIPPED | OUTPATIENT
Start: 2025-08-04

## 2025-08-08 ENCOUNTER — HOSPITAL ENCOUNTER (OUTPATIENT)
Dept: CARDIAC REHAB | Age: 65
Setting detail: THERAPIES SERIES
Discharge: HOME | End: 2025-08-08
Attending: INTERNAL MEDICINE
Payer: COMMERCIAL

## 2025-08-08 PROCEDURE — 93798 PHYS/QHP OP CAR RHAB W/ECG: CPT

## 2025-08-11 ENCOUNTER — HOSPITAL ENCOUNTER (OUTPATIENT)
Dept: CARDIAC REHAB | Age: 65
Setting detail: THERAPIES SERIES
End: 2025-08-11
Attending: INTERNAL MEDICINE
Payer: COMMERCIAL

## 2025-08-13 ENCOUNTER — HOSPITAL ENCOUNTER (OUTPATIENT)
Dept: CARDIAC REHAB | Age: 65
Setting detail: THERAPIES SERIES
Discharge: HOME | End: 2025-08-13
Attending: INTERNAL MEDICINE
Payer: COMMERCIAL

## 2025-08-13 PROCEDURE — 93798 PHYS/QHP OP CAR RHAB W/ECG: CPT

## 2025-08-15 ENCOUNTER — HOSPITAL ENCOUNTER (OUTPATIENT)
Dept: CARDIAC REHAB | Age: 65
Setting detail: THERAPIES SERIES
Discharge: HOME | End: 2025-08-15
Attending: INTERNAL MEDICINE
Payer: COMMERCIAL

## 2025-08-15 PROCEDURE — 93798 PHYS/QHP OP CAR RHAB W/ECG: CPT

## 2025-08-18 ENCOUNTER — HOSPITAL ENCOUNTER (OUTPATIENT)
Dept: CARDIAC REHAB | Age: 65
Setting detail: THERAPIES SERIES
Discharge: HOME | End: 2025-08-18
Attending: INTERNAL MEDICINE
Payer: COMMERCIAL

## 2025-08-18 PROCEDURE — 93798 PHYS/QHP OP CAR RHAB W/ECG: CPT

## 2025-08-20 ENCOUNTER — HOSPITAL ENCOUNTER (OUTPATIENT)
Dept: CARDIAC REHAB | Age: 65
Setting detail: THERAPIES SERIES
End: 2025-08-20
Attending: INTERNAL MEDICINE
Payer: COMMERCIAL

## 2025-08-22 ENCOUNTER — HOSPITAL ENCOUNTER (OUTPATIENT)
Dept: CARDIAC REHAB | Age: 65
Setting detail: THERAPIES SERIES
Discharge: HOME | End: 2025-08-22
Attending: INTERNAL MEDICINE
Payer: COMMERCIAL

## 2025-08-22 ENCOUNTER — TELEPHONE (OUTPATIENT)
Dept: FAMILY MEDICINE CLINIC | Age: 65
End: 2025-08-22

## 2025-08-22 PROCEDURE — 93798 PHYS/QHP OP CAR RHAB W/ECG: CPT

## 2025-08-25 ENCOUNTER — HOSPITAL ENCOUNTER (OUTPATIENT)
Dept: CARDIAC REHAB | Age: 65
Setting detail: THERAPIES SERIES
End: 2025-08-25
Attending: INTERNAL MEDICINE
Payer: COMMERCIAL

## 2025-08-25 ENCOUNTER — OFFICE VISIT (OUTPATIENT)
Dept: FAMILY MEDICINE CLINIC | Age: 65
End: 2025-08-25
Payer: COMMERCIAL

## 2025-08-25 VITALS
HEART RATE: 80 BPM | SYSTOLIC BLOOD PRESSURE: 120 MMHG | WEIGHT: 214.4 LBS | OXYGEN SATURATION: 97 % | DIASTOLIC BLOOD PRESSURE: 84 MMHG | BODY MASS INDEX: 34.61 KG/M2

## 2025-08-25 DIAGNOSIS — N93.9 ABNORMAL UTERINE BLEEDING: ICD-10-CM

## 2025-08-25 DIAGNOSIS — N30.01 ACUTE CYSTITIS WITH HEMATURIA: ICD-10-CM

## 2025-08-25 DIAGNOSIS — R31.9 HEMATURIA, UNSPECIFIED TYPE: Primary | ICD-10-CM

## 2025-08-25 LAB
BILIRUBIN, POC: ABNORMAL
BLOOD URINE, POC: ABNORMAL
CLARITY, POC: ABNORMAL
COLOR, POC: ABNORMAL
GLUCOSE URINE, POC: ABNORMAL MG/DL
KETONES, POC: ABNORMAL MG/DL
LEUKOCYTE EST, POC: ABNORMAL
NITRITE, POC: POSITIVE
PH, POC: 6
PROTEIN, POC: 300 MG/DL
SPECIFIC GRAVITY, POC: 1.01
UROBILINOGEN, POC: 1 MG/DL

## 2025-08-25 PROCEDURE — 99214 OFFICE O/P EST MOD 30 MIN: CPT | Performed by: PHYSICIAN ASSISTANT

## 2025-08-25 PROCEDURE — 3079F DIAST BP 80-89 MM HG: CPT | Performed by: PHYSICIAN ASSISTANT

## 2025-08-25 PROCEDURE — 1159F MED LIST DOCD IN RCRD: CPT | Performed by: PHYSICIAN ASSISTANT

## 2025-08-25 PROCEDURE — 1123F ACP DISCUSS/DSCN MKR DOCD: CPT | Performed by: PHYSICIAN ASSISTANT

## 2025-08-25 PROCEDURE — G2211 COMPLEX E/M VISIT ADD ON: HCPCS | Performed by: PHYSICIAN ASSISTANT

## 2025-08-25 PROCEDURE — 81002 URINALYSIS NONAUTO W/O SCOPE: CPT | Performed by: PHYSICIAN ASSISTANT

## 2025-08-25 PROCEDURE — 3074F SYST BP LT 130 MM HG: CPT | Performed by: PHYSICIAN ASSISTANT

## 2025-08-25 RX ORDER — SULFAMETHOXAZOLE AND TRIMETHOPRIM 800; 160 MG/1; MG/1
1 TABLET ORAL 2 TIMES DAILY
Qty: 6 TABLET | Refills: 0 | Status: SHIPPED | OUTPATIENT
Start: 2025-08-25 | End: 2025-08-28

## 2025-08-25 ASSESSMENT — ENCOUNTER SYMPTOMS
BLOOD IN STOOL: 0
CONSTIPATION: 0
DIARRHEA: 0
ABDOMINAL PAIN: 0

## 2025-08-26 ENCOUNTER — HOSPITAL ENCOUNTER (OUTPATIENT)
Dept: ULTRASOUND IMAGING | Age: 65
Discharge: HOME | End: 2025-08-26
Payer: COMMERCIAL

## 2025-08-26 DIAGNOSIS — N93.9 ABNORMAL UTERINE BLEEDING: ICD-10-CM

## 2025-08-26 LAB
BACTERIA URNS QL MICRO: ABNORMAL /HPF
EPI CELLS #/AREA URNS AUTO: 4 /HPF (ref 0–5)
HYALINE CASTS #/AREA URNS AUTO: 4 /LPF (ref 0–8)
RBC CLUMPS #/AREA URNS AUTO: 9011 /HPF (ref 0–4)
URN SPEC COLLECT METH UR: ABNORMAL
WBC #/AREA URNS AUTO: 329 /HPF (ref 0–5)

## 2025-08-26 PROCEDURE — 76856 US EXAM PELVIC COMPLETE: CPT

## 2025-08-26 PROCEDURE — 76830 TRANSVAGINAL US NON-OB: CPT

## 2025-08-27 ENCOUNTER — HOSPITAL ENCOUNTER (OUTPATIENT)
Dept: CARDIAC REHAB | Age: 65
Setting detail: THERAPIES SERIES
End: 2025-08-27
Attending: INTERNAL MEDICINE
Payer: COMMERCIAL

## 2025-08-28 LAB
BACTERIA UR CULT: ABNORMAL
ORGANISM: ABNORMAL

## 2025-08-29 ENCOUNTER — APPOINTMENT (OUTPATIENT)
Dept: CARDIAC REHAB | Age: 65
End: 2025-08-29
Attending: INTERNAL MEDICINE
Payer: COMMERCIAL

## (undated) DEVICE — RETRACTOR SURG WIDE FLAT LO PROF LTWT PHOTONGUIDE

## (undated) DEVICE — KIT POS FOAM HANA TBL

## (undated) DEVICE — EVERGRIP INSERT SET 86MM: Brand: FOGARTY EVERGRIP

## (undated) DEVICE — SUTURE VICRYL + SZ 2-0 L27IN ABSRB CLR CT-1 1/2 CIR TAPERCUT VCP259H

## (undated) DEVICE — GLOVE SURG SZ 85 L12IN FNGR ORTHO 126MIL CRM LTX FREE

## (undated) DEVICE — CORONARY ARTERY BYPASS GRAFT MARKERS, STAINLESS STEEL, DISTAL, WITHOUT HOLDER: Brand: ANASTOMARK CORONARY ARTERY BYPASS GRAFT MARKERS, STAINLESS STEEL, DISTAL

## (undated) DEVICE — SUTURE NONABSORBABLE MONOFILAMENT 6-0 C-1 1X30 IN PROLENE 8706H

## (undated) DEVICE — SUTURE PERMAHAND SZ 0 L30IN NONABSORBABLE BLK L26MM SH 1/2 K834H

## (undated) DEVICE — GAUZE,SPONGE,4"X4",16PLY,XRAY,STRL,LF: Brand: MEDLINE

## (undated) DEVICE — CANNULA PERF L15IN DIA29FR VEN 3 STG THN WALL DSGN W  VENT

## (undated) DEVICE — COVER LT HNDL PLAS RIG 2 PER PK

## (undated) DEVICE — TUBING, SUCTION, 3/16" X 12', STRAIGHT: Brand: MEDLINE

## (undated) DEVICE — YANKAUER, OPEN TIP, W/O VENT, STERILE: Brand: MEDLINE

## (undated) DEVICE — SUTURE NONABSORBABLE MONOFILAMENT 7-0 BV-1 1X24 IN PROLENE 8702H

## (undated) DEVICE — Device

## (undated) DEVICE — GLOVE ORANGE PI 8 1/2   MSG9085

## (undated) DEVICE — SENSOR PLSE OXMTR AD CBL L36IN ADH FRM FIT SPO2 DISP

## (undated) DEVICE — PROCEDURE SET TOP OUTLT 04257

## (undated) DEVICE — MAT FLR W32XL58IN

## (undated) DEVICE — YANKAUER,OPEN TIP,W/O VENT,STERILE: Brand: MEDLINE INDUSTRIES, INC.

## (undated) DEVICE — GUIDEWIRE VASC L150CM DIA0.035IN FLX END L7CM J 3MM PTFE

## (undated) DEVICE — Device: Brand: PERFECTCUT ROTATING AORTIC PUNCH

## (undated) DEVICE — [HIGH FLOW INSUFFLATOR,  DO NOT USE IF PACKAGE IS DAMAGED,  KEEP DRY,  KEEP AWAY FROM SUNLIGHT,  PROTECT FROM HEAT AND RADIOACTIVE SOURCES.]: Brand: PNEUMOSURE

## (undated) DEVICE — ELECTRODE PT RET AD L9FT HI MOIST COND ADH HYDRGEL CORDED

## (undated) DEVICE — PINNACLE INTRODUCER SHEATH: Brand: PINNACLE

## (undated) DEVICE — SUTURE MONOCRYL STRATAFIX SPRL SZ 3-0 L12IN ABSRB UD FS-1 L30X30CM SXMP2B410

## (undated) DEVICE — 20 ML SYRINGE LUER-LOCK TIP: Brand: MONOJECT

## (undated) DEVICE — SURGIFOAM SPNG SZ 100

## (undated) DEVICE — BASIC SINGLE BASIN 1-LF: Brand: MEDLINE INDUSTRIES, INC.

## (undated) DEVICE — SUTURE VICRYL + SZ 3-0 L27IN ABSRB UD L26MM SH 1/2 CIR VCP416H

## (undated) DEVICE — DRIVER POWER 2 DRIVE DISP

## (undated) DEVICE — SUTURE PROL SZ 3-0 L36IN NONABSORBABLE BLU L17MM RB-1 1/2 8558H

## (undated) DEVICE — CATHETER PULM ART 5FR INFL 0.75CC L110CM BAL DIA8MM SGL WDG

## (undated) DEVICE — PACK HARV VEIN ENDOSCP VASOVIEW

## (undated) DEVICE — KIT APPL 11:1 PROC W/ FIBRIJET MED CUP APPL TIP TY

## (undated) DEVICE — SYRINGE IRRIG 60ML SFT PLIABLE BLB EZ TO GRP 1 HND USE W/

## (undated) DEVICE — RESERVOIR,SUCTION,100CC,SILICONE: Brand: MEDLINE

## (undated) DEVICE — CATH LAB PACK: Brand: MEDLINE INDUSTRIES, INC.

## (undated) DEVICE — KIT BLWR MISTER 5P 15L W/ TBNG SET IRRIG MIST TO IMPROVE

## (undated) DEVICE — DRAPE,REIN 53X77,STERILE: Brand: MEDLINE

## (undated) DEVICE — GLIDESHEATH SLENDER STAINLESS STEEL KIT: Brand: GLIDESHEATH SLENDER

## (undated) DEVICE — SUTURE MONOCRYL + SZ 4-0 L18IN ABSRB UD L19MM PS-2 3/8 CIR MCP496G

## (undated) DEVICE — SYSTEM SEAL 4.3MM PROX HEMSTAT HEARTSTRING III

## (undated) DEVICE — FRAZIER SUCTION INSTRUMENT 7 FR W/CONTROL VENT & OBTURATOR: Brand: FRAZIER

## (undated) DEVICE — CLIP SM RED INTERN HMOCLP TITAN LIGATING

## (undated) DEVICE — NDL ULTRA ONLY 21G X 3": Brand: MEDLINE INDUSTRIES, INC.

## (undated) DEVICE — CANNULA VES L25IN RADPQ BODY W 1 W VLV 3MM BLNT TIP DLP

## (undated) DEVICE — SUTURE PROL SZ 8-0 L18IN NONABSORBABLE BLU L6.5MM BV130-5 8730H

## (undated) DEVICE — APPLICATOR MEDICATED 10.5 CC SOLUTION HI LT ORNG CHLORAPREP

## (undated) DEVICE — PREVENA INCISION MANAGEMENT SYSTEM- PEEL & PLACE DRESSING: Brand: PREVENA™ PEEL & PLACE™

## (undated) DEVICE — TUBING SUCT 10FR MAL ALUM SHFT FN CAP VENT UNIV CONN W/ OBT

## (undated) DEVICE — 6619 2 PTNT ISO SYS INCISE AREA&LT;(&GT;&&LT;)&GT;P: Brand: STERI-DRAPE™ IOBAN™ 2

## (undated) DEVICE — CANNULA PERFUSION 5.5IN 9FR AORTIC ROOT

## (undated) DEVICE — APPLICATOR MEDICATED 26 CC SOLUTION HI LT ORNG CHLORAPREP

## (undated) DEVICE — SOLUTION IV HEPARIN SODIUM SODIUM CHL 0.9% 500 ML INJ VIAFLX

## (undated) DEVICE — SUTURE ABSORBABLE MONOFILAMENT 1 OS-8 36 CM 40 MM VIO PDS +

## (undated) DEVICE — CANNULA ENDOSCP 2 20 GAX5 CM MALL

## (undated) DEVICE — APPLICATOR LNG TP 12.5IN

## (undated) DEVICE — SUTURE STRATAFIX SPRL SZ 2 0 L14IN ABSRB UD MH L36MM 1 2 CIR SXMP2B401

## (undated) DEVICE — KIT,ANTI FOG,W/SPONGE & FLUID,SOFT PACK: Brand: MEDLINE

## (undated) DEVICE — SOLUTION PREP PAINT POV IOD FOR SKIN MUCOUS MEM

## (undated) DEVICE — GLOVE ORTHO 8   MSG9480

## (undated) DEVICE — BLANKET WRM W25XL64IN NONWOVEN SFT LTWT PLIABLE HYPR

## (undated) DEVICE — PREVENA PLUS INCISION MANAGEMENT SYSTEM: Brand: PREVENA PLUS™

## (undated) DEVICE — STANDARD HYPODERMIC NEEDLE,POLYPROPYLENE HUB: Brand: MONOJECT

## (undated) DEVICE — DRAIN SURG SGL COLL PT TB FOR ATS BG OASIS

## (undated) DEVICE — SPONGE LAP W18XL18IN WHT COT 4 PLY FLD STRUNG RADPQ DISP ST 2 PER PACK

## (undated) DEVICE — 8FR FRAZIER SUCTION HANDLE: Brand: CARDINAL HEALTH

## (undated) DEVICE — AGENT TOP HEMSTAT FOAM DISC STATSEAL L 10 - 14FR

## (undated) DEVICE — RADIFOCUS OPTITORQUE ANGIOGRAPHIC CATHETER: Brand: OPTITORQUE

## (undated) DEVICE — C-ARM: Brand: UNBRANDED

## (undated) DEVICE — TOWEL,OR,DSP,ST,BLUE,STD,6/PK,12PK/CS: Brand: MEDLINE

## (undated) DEVICE — CONNECTOR PERF W3/8XH3/8XL0.25IN BASE UNEQUAL Y SHP W/O

## (undated) DEVICE — LINE TBL CSC-14 FOR CARDPLG DEL SYS

## (undated) DEVICE — SUTURE VICRYL SZ 0 L27IN ABSRB UD L36MM CT-1 1/2 CIR J260H

## (undated) DEVICE — KIT ART LN 20GA L12CM FEP RADPQ 0.025X13.75IN SPR GWIRE

## (undated) DEVICE — ADHESIVE SKIN CLOSURE WND 8.661X1.5 IN 22 CM LIQUIBAND SECUR

## (undated) DEVICE — CLIP LIG M BLU TI HRT SHP WIRE HORZ 24 CLIPS/PK 25PK/CA

## (undated) DEVICE — BIPOLAR SEALER 23-112-1 AQM 6.0: Brand: AQUAMANTYS ®

## (undated) DEVICE — 1010 S-DRAPE TOWEL DRAPE 10/BX: Brand: STERI-DRAPE™

## (undated) DEVICE — POST-SURG COMPRESSION BRA,XXL: Brand: MEDLINE

## (undated) DEVICE — DRAIN SURG 19FR 0.25IN SIL RND W/ TRCR INDIC DOT RADPQ FULL

## (undated) DEVICE — SOLUTION IV 1000ML 0.9% SOD CHL FOR IRRIG PLAS CONT

## (undated) DEVICE — TR BAND RADIAL ARTERY COMPRESSION DEVICE: Brand: TR BAND

## (undated) DEVICE — EVERGRIP INSERT SET 61MM: Brand: FOGARTY EVERGRIP

## (undated) DEVICE — CATHETER THOR 32FR L23IN PVC 6 EYELET STR ATRAUM

## (undated) DEVICE — SOLUTION WND IRRIGATION 450 ML 0.5 PVP-I 0.9 NACL

## (undated) DEVICE — MASTISOL ADHESIVE LIQ 2/3ML

## (undated) DEVICE — ADAPTER PERF L7.5IN INLET LEG 3IN Y TYP VENT CLR CODE CLMP

## (undated) DEVICE — SUTURE PROL SZ 4-0 L36IN NONABSORBABLE BLU L26MM SH 1/2 CIR 8521H

## (undated) DEVICE — BLADE ES L6IN ELASTOMERIC COAT EXT DURABLE BEND UPTO 90DEG

## (undated) DEVICE — STERNUM BLADE, OFFSET (31.7 X 0.64 X 6.3MM)

## (undated) DEVICE — SUTURE VICRYL + SZ 2-0 L36IN ABSRB UD L36MM CT-1 1/2 CIR VCP945H

## (undated) DEVICE — SOLUTION IRRIG 1000ML 09% SOD CHL USP PIC PLAS CONTAINER

## (undated) DEVICE — TOTAL BASIC: Brand: MEDLINE INDUSTRIES, INC.

## (undated) DEVICE — SYSTEM ENDOSCP VES HARV W/ TOOL CANN SEAL SHT PRT BLNT TIP

## (undated) DEVICE — SUTURE VICRYL + SZ 0 L27IN ABSRB UD CT-1 L36MM 1/2 CIR TAPR VCP260H

## (undated) DEVICE — SUTURE PROL SZ 3-0 L36IN NONABSORBABLE BLU L26MM SH 1/2 CIR 8522H

## (undated) DEVICE — CANNULA ART 21FR L145IN VENT CONN SFT FLOW EXT

## (undated) DEVICE — GLOVE SURG SZ 8 L12IN FNGR THK79MIL GRN LTX FREE

## (undated) DEVICE — COVER US PRB W12XL244CM SURGICAL INTRAOPERATIVE PLAS TAPR L

## (undated) DEVICE — TRANSFER SET 3": Brand: MEDLINE INDUSTRIES, INC.

## (undated) DEVICE — HOVERMATT HALF-MATT 34" W X 45" L

## (undated) DEVICE — CONTAINER,SPECIMEN,OR STERILE,4OZ: Brand: MEDLINE

## (undated) DEVICE — SUTURE PROL SZ 7-0 L24IN NONABSORBABLE BLU L8MM BV175-6 3/8 8735H

## (undated) DEVICE — ASPIRATION/ANTICOAGULATION SET: Brand: HAEMONETICS CELL SAVER SYSTEM

## (undated) DEVICE — 3M™ IOBAN™ 2 ANTIMICROBIAL INCISE DRAPE 6650EZ: Brand: IOBAN™ 2

## (undated) DEVICE — NEEDLE HYPO 18GA L1.5IN PNK POLYPR HUB S STL REG BVL STR

## (undated) DEVICE — SUTURE NONABSORBABLE MONOFILAMENT 4-0 RB-1 36 IN BLU PROLENE 8557H

## (undated) DEVICE — 3M™ RED DOT™ REPOSITIONABLE MONITORING ELECTRODE 2670-5, 5/BAG, 200/CASE, 54/PLT: Brand: RED DOT™

## (undated) DEVICE — TIP APPL TOP 2 SPRY

## (undated) DEVICE — GLOVE SURG SZ 65 THK91MIL LTX FREE SYN POLYISOPRENE

## (undated) DEVICE — SUTURE NONABSORBABLE MONOFILAMENT 5-0 C-1 1X24 IN PROLENE 8725H

## (undated) DEVICE — PATCH SURG FIBRIN SEAL 4.8X4.8 CM ABSORBABLE TACHOSIL

## (undated) DEVICE — SUTURE PERMA-HAND SZ 2 L60IN NONABSORBABLE BLK SILK BRAID SA8H

## (undated) DEVICE — SHIELD RAD ANGIO FEM ENTRY W/ ABSORBENT ORNG STRL RADPAD LTX

## (undated) DEVICE — HYPODERMIC SAFETY NEEDLE: Brand: MONOJECT

## (undated) DEVICE — CANNULA PERF L125IN OD15FR POLYVI CHL VEN RG AUTO INFL SMOOT

## (undated) DEVICE — DRAIN,WOUND,ROUND,24FR,5/16",FULL-FLUTED: Brand: MEDLINE

## (undated) DEVICE — LIQUIBAND RAPID ADHESIVE 36/CS 0.8ML: Brand: MEDLINE

## (undated) DEVICE — ELECTRODE BLDE L4IN NONINSULATED EDGE